# Patient Record
Sex: MALE | Race: WHITE | Employment: OTHER | URBAN - NONMETROPOLITAN AREA
[De-identification: names, ages, dates, MRNs, and addresses within clinical notes are randomized per-mention and may not be internally consistent; named-entity substitution may affect disease eponyms.]

---

## 2017-04-07 ENCOUNTER — APPOINTMENT (OUTPATIENT)
Dept: ULTRASOUND IMAGING | Age: 62
End: 2017-04-07
Payer: MEDICARE

## 2017-04-07 ENCOUNTER — HOSPITAL ENCOUNTER (EMERGENCY)
Age: 62
Discharge: HOME | End: 2017-04-07
Attending: EMERGENCY MEDICINE
Payer: MEDICARE

## 2017-04-07 ENCOUNTER — APPOINTMENT (OUTPATIENT)
Dept: CT IMAGING | Age: 62
End: 2017-04-07
Payer: MEDICARE

## 2017-04-07 VITALS
HEIGHT: 67 IN | DIASTOLIC BLOOD PRESSURE: 78 MMHG | HEART RATE: 60 BPM | SYSTOLIC BLOOD PRESSURE: 125 MMHG | WEIGHT: 145 LBS | BODY MASS INDEX: 22.76 KG/M2 | RESPIRATION RATE: 18 BRPM | TEMPERATURE: 98.7 F | OXYGEN SATURATION: 98 %

## 2017-04-07 DIAGNOSIS — N40.0 BENIGN PROSTATIC HYPERPLASIA WITHOUT LOWER URINARY TRACT SYMPTOMS, UNSPECIFIED MORPHOLOGY: ICD-10-CM

## 2017-04-07 DIAGNOSIS — N50.812 TESTICULAR PAIN, LEFT: Primary | ICD-10-CM

## 2017-04-07 DIAGNOSIS — R19.7 DIARRHEA, UNSPECIFIED TYPE: ICD-10-CM

## 2017-04-07 DIAGNOSIS — Z87.442 HISTORY OF KIDNEY STONES: ICD-10-CM

## 2017-04-07 DIAGNOSIS — R30.0 DYSURIA: ICD-10-CM

## 2017-04-07 DIAGNOSIS — R10.9 FLANK PAIN: ICD-10-CM

## 2017-04-07 LAB
ALBUMIN SERPL-MCNC: 4.1 G/DL (ref 3.5–5.2)
ALP BLD-CCNC: 81 U/L (ref 40–130)
ALT SERPL-CCNC: 10 U/L (ref 5–41)
ANION GAP SERPL CALCULATED.3IONS-SCNC: 11 MMOL/L (ref 7–19)
AST SERPL-CCNC: 11 U/L (ref 5–40)
BASOPHILS ABSOLUTE: 0 K/UL (ref 0–0.2)
BASOPHILS RELATIVE PERCENT: 0.2 % (ref 0–1)
BILIRUB SERPL-MCNC: <0.2 MG/DL (ref 0.2–1.2)
BILIRUBIN URINE: NEGATIVE
BLOOD, URINE: NEGATIVE
BUN BLDV-MCNC: 14 MG/DL (ref 8–23)
CALCIUM SERPL-MCNC: 9.3 MG/DL (ref 8.8–10.2)
CHLORIDE BLD-SCNC: 102 MMOL/L (ref 98–111)
CLARITY: CLEAR
CO2: 27 MMOL/L (ref 22–29)
COLOR: YELLOW
CREAT SERPL-MCNC: 0.9 MG/DL (ref 0.5–1.2)
EOSINOPHILS ABSOLUTE: 0.1 K/UL (ref 0–0.6)
EOSINOPHILS RELATIVE PERCENT: 1.4 % (ref 0–5)
GFR NON-AFRICAN AMERICAN: >60
GLOBULIN: 2.5 G/DL
GLUCOSE BLD-MCNC: 99 MG/DL (ref 74–109)
GLUCOSE URINE: NEGATIVE MG/DL
HCT VFR BLD CALC: 38.6 % (ref 42–52)
HEMOGLOBIN: 13.3 G/DL (ref 14–18)
KETONES, URINE: NEGATIVE MG/DL
LEUKOCYTE ESTERASE, URINE: NEGATIVE
LYMPHOCYTES ABSOLUTE: 1.7 K/UL (ref 1.1–4.5)
LYMPHOCYTES RELATIVE PERCENT: 34.2 % (ref 20–40)
MCH RBC QN AUTO: 29.2 PG (ref 27–31)
MCHC RBC AUTO-ENTMCNC: 34.5 G/DL (ref 33–37)
MCV RBC AUTO: 84.8 FL (ref 80–94)
MONOCYTES ABSOLUTE: 0.5 K/UL (ref 0–0.9)
MONOCYTES RELATIVE PERCENT: 9.8 % (ref 0–10)
NEUTROPHILS ABSOLUTE: 2.7 K/UL (ref 1.5–7.5)
NEUTROPHILS RELATIVE PERCENT: 54.4 % (ref 50–65)
NITRITE, URINE: NEGATIVE
PDW BLD-RTO: 13.9 % (ref 11.5–14.5)
PH UA: 5.5
PLATELET # BLD: 225 K/UL (ref 130–400)
PMV BLD AUTO: 8.9 FL (ref 7.4–10.4)
POTASSIUM SERPL-SCNC: 3.6 MMOL/L (ref 3.5–5)
PROTEIN UA: NEGATIVE MG/DL
RBC # BLD: 4.55 M/UL (ref 4.7–6.1)
SODIUM BLD-SCNC: 140 MMOL/L (ref 136–145)
SPECIFIC GRAVITY UA: 1.02
TOTAL PROTEIN: 6.6 G/DL (ref 6.6–8.7)
UROBILINOGEN, URINE: 0.2 E.U./DL
WBC # BLD: 5 K/UL (ref 4.8–10.8)

## 2017-04-07 PROCEDURE — 99284 EMERGENCY DEPT VISIT MOD MDM: CPT | Performed by: EMERGENCY MEDICINE

## 2017-04-07 PROCEDURE — 99284 EMERGENCY DEPT VISIT MOD MDM: CPT

## 2017-04-07 PROCEDURE — 96375 TX/PRO/DX INJ NEW DRUG ADDON: CPT

## 2017-04-07 PROCEDURE — 96374 THER/PROPH/DIAG INJ IV PUSH: CPT

## 2017-04-07 PROCEDURE — 76870 US EXAM SCROTUM: CPT

## 2017-04-07 PROCEDURE — 81003 URINALYSIS AUTO W/O SCOPE: CPT

## 2017-04-07 PROCEDURE — 2580000003 HC RX 258: Performed by: PHYSICIAN ASSISTANT

## 2017-04-07 PROCEDURE — 85025 COMPLETE CBC W/AUTO DIFF WBC: CPT

## 2017-04-07 PROCEDURE — 96372 THER/PROPH/DIAG INJ SC/IM: CPT

## 2017-04-07 PROCEDURE — 6360000002 HC RX W HCPCS: Performed by: EMERGENCY MEDICINE

## 2017-04-07 PROCEDURE — 80053 COMPREHEN METABOLIC PANEL: CPT

## 2017-04-07 PROCEDURE — 6360000002 HC RX W HCPCS: Performed by: PHYSICIAN ASSISTANT

## 2017-04-07 PROCEDURE — 74150 CT ABDOMEN W/O CONTRAST: CPT

## 2017-04-07 PROCEDURE — 36415 COLL VENOUS BLD VENIPUNCTURE: CPT

## 2017-04-07 PROCEDURE — 96376 TX/PRO/DX INJ SAME DRUG ADON: CPT

## 2017-04-07 RX ORDER — KETOROLAC TROMETHAMINE 30 MG/ML
15 INJECTION, SOLUTION INTRAMUSCULAR; INTRAVENOUS ONCE
Status: COMPLETED | OUTPATIENT
Start: 2017-04-07 | End: 2017-04-07

## 2017-04-07 RX ORDER — OXYCODONE AND ACETAMINOPHEN 7.5; 325 MG/1; MG/1
1 TABLET ORAL EVERY 4 HOURS PRN
COMMUNITY
End: 2021-01-25 | Stop reason: CLARIF

## 2017-04-07 RX ORDER — 0.9 % SODIUM CHLORIDE 0.9 %
1000 INTRAVENOUS SOLUTION INTRAVENOUS ONCE
Status: COMPLETED | OUTPATIENT
Start: 2017-04-07 | End: 2017-04-07

## 2017-04-07 RX ORDER — CIPROFLOXACIN 500 MG/1
500 TABLET, FILM COATED ORAL 2 TIMES DAILY
Qty: 20 TABLET | Refills: 0 | Status: SHIPPED | OUTPATIENT
Start: 2017-04-07 | End: 2017-04-17

## 2017-04-07 RX ORDER — ONDANSETRON 2 MG/ML
4 INJECTION INTRAMUSCULAR; INTRAVENOUS ONCE
Status: COMPLETED | OUTPATIENT
Start: 2017-04-07 | End: 2017-04-07

## 2017-04-07 RX ORDER — CALCIUM CARBONATE 500(1250)
500 TABLET ORAL DAILY
COMMUNITY
End: 2021-12-14

## 2017-04-07 RX ADMIN — KETOROLAC TROMETHAMINE 15 MG: 30 INJECTION, SOLUTION INTRAMUSCULAR at 02:12

## 2017-04-07 RX ADMIN — SODIUM CHLORIDE 1000 ML: 9 INJECTION, SOLUTION INTRAVENOUS at 01:27

## 2017-04-07 RX ADMIN — ONDANSETRON 4 MG: 2 INJECTION INTRAMUSCULAR; INTRAVENOUS at 01:27

## 2017-04-07 RX ADMIN — HYDROMORPHONE HYDROCHLORIDE 1 MG: 1 INJECTION, SOLUTION INTRAMUSCULAR; INTRAVENOUS; SUBCUTANEOUS at 02:37

## 2017-04-07 RX ADMIN — HYDROMORPHONE HYDROCHLORIDE 1 MG: 1 INJECTION, SOLUTION INTRAMUSCULAR; INTRAVENOUS; SUBCUTANEOUS at 02:43

## 2017-04-07 RX ADMIN — HYDROMORPHONE HYDROCHLORIDE 1 MG: 1 INJECTION, SOLUTION INTRAMUSCULAR; INTRAVENOUS; SUBCUTANEOUS at 01:26

## 2017-04-07 RX ADMIN — HYDROMORPHONE HYDROCHLORIDE 2 MG: 1 INJECTION, SOLUTION INTRAMUSCULAR; INTRAVENOUS; SUBCUTANEOUS at 03:35

## 2017-04-07 ASSESSMENT — ENCOUNTER SYMPTOMS
COUGH: 0
WHEEZING: 0
SHORTNESS OF BREATH: 0
VOMITING: 1
CHEST TIGHTNESS: 0
ABDOMINAL PAIN: 0
RHINORRHEA: 0
BACK PAIN: 0
CONSTIPATION: 0
STRIDOR: 0
ABDOMINAL DISTENTION: 0
SORE THROAT: 0
NAUSEA: 1
COLOR CHANGE: 0

## 2017-04-07 ASSESSMENT — PAIN SCALES - GENERAL
PAINLEVEL_OUTOF10: 9
PAINLEVEL_OUTOF10: 9
PAINLEVEL_OUTOF10: 8
PAINLEVEL_OUTOF10: 9
PAINLEVEL_OUTOF10: 8
PAINLEVEL_OUTOF10: 9

## 2017-04-07 ASSESSMENT — PAIN DESCRIPTION - ORIENTATION: ORIENTATION: RIGHT

## 2017-04-07 ASSESSMENT — PAIN DESCRIPTION - LOCATION: LOCATION: FLANK

## 2017-04-16 ENCOUNTER — APPOINTMENT (OUTPATIENT)
Dept: GENERAL RADIOLOGY | Age: 62
End: 2017-04-16
Payer: MEDICARE

## 2017-04-16 ENCOUNTER — HOSPITAL ENCOUNTER (EMERGENCY)
Age: 62
Discharge: HOME | End: 2017-04-17
Payer: MEDICARE

## 2017-04-16 DIAGNOSIS — T20.10XA: Primary | ICD-10-CM

## 2017-04-16 DIAGNOSIS — M79.601 PAIN OF RIGHT UPPER EXTREMITY: ICD-10-CM

## 2017-04-16 PROCEDURE — 90471 IMMUNIZATION ADMIN: CPT | Performed by: PHYSICIAN ASSISTANT

## 2017-04-16 PROCEDURE — 6360000002 HC RX W HCPCS

## 2017-04-16 PROCEDURE — 99282 EMERGENCY DEPT VISIT SF MDM: CPT | Performed by: PHYSICIAN ASSISTANT

## 2017-04-16 PROCEDURE — 6360000002 HC RX W HCPCS: Performed by: PHYSICIAN ASSISTANT

## 2017-04-16 PROCEDURE — 99283 EMERGENCY DEPT VISIT LOW MDM: CPT

## 2017-04-16 PROCEDURE — 2500000003 HC RX 250 WO HCPCS: Performed by: PHYSICIAN ASSISTANT

## 2017-04-16 PROCEDURE — 90715 TDAP VACCINE 7 YRS/> IM: CPT | Performed by: PHYSICIAN ASSISTANT

## 2017-04-16 PROCEDURE — 73060 X-RAY EXAM OF HUMERUS: CPT

## 2017-04-16 PROCEDURE — 6370000000 HC RX 637 (ALT 250 FOR IP): Performed by: PHYSICIAN ASSISTANT

## 2017-04-16 PROCEDURE — 96372 THER/PROPH/DIAG INJ SC/IM: CPT

## 2017-04-16 RX ORDER — BACITRACIN ZINC AND POLYMYXIN B SULFATE 500; 1000 [USP'U]/G; [USP'U]/G
OINTMENT TOPICAL
Qty: 1 TUBE | Refills: 1 | Status: SHIPPED | OUTPATIENT
Start: 2017-04-16 | End: 2017-04-23

## 2017-04-16 RX ORDER — ONDANSETRON 2 MG/ML
INJECTION INTRAMUSCULAR; INTRAVENOUS
Status: COMPLETED
Start: 2017-04-16 | End: 2017-04-16

## 2017-04-16 RX ORDER — TETRACAINE HYDROCHLORIDE 5 MG/ML
1 SOLUTION OPHTHALMIC ONCE
Status: COMPLETED | OUTPATIENT
Start: 2017-04-16 | End: 2017-04-16

## 2017-04-16 RX ADMIN — SILVER SULFADIAZINE: 10 CREAM TOPICAL at 22:14

## 2017-04-16 RX ADMIN — ONDANSETRON 4 MG: 2 INJECTION INTRAMUSCULAR; INTRAVENOUS at 21:08

## 2017-04-16 RX ADMIN — HYDROMORPHONE HYDROCHLORIDE 1 MG: 1 INJECTION, SOLUTION INTRAMUSCULAR; INTRAVENOUS; SUBCUTANEOUS at 21:03

## 2017-04-16 RX ADMIN — TETANUS TOXOID, REDUCED DIPHTHERIA TOXOID AND ACELLULAR PERTUSSIS VACCINE, ADSORBED 0.5 ML: 5; 2.5; 8; 8; 2.5 SUSPENSION INTRAMUSCULAR at 21:08

## 2017-04-16 RX ADMIN — TETRACAINE HYDROCHLORIDE 1 DROP: 5 SOLUTION OPHTHALMIC at 21:09

## 2017-04-16 ASSESSMENT — ENCOUNTER SYMPTOMS
CONSTIPATION: 0
VOMITING: 0
RHINORRHEA: 0
BACK PAIN: 0
COUGH: 0
ABDOMINAL PAIN: 0
CHEST TIGHTNESS: 0
COLOR CHANGE: 0
ABDOMINAL DISTENTION: 0
SORE THROAT: 0
WHEEZING: 0
STRIDOR: 0
NAUSEA: 0
SHORTNESS OF BREATH: 0

## 2017-04-16 ASSESSMENT — PAIN SCALES - GENERAL
PAINLEVEL_OUTOF10: 7
PAINLEVEL_OUTOF10: 7

## 2017-04-17 VITALS
HEART RATE: 60 BPM | TEMPERATURE: 98.6 F | WEIGHT: 142 LBS | RESPIRATION RATE: 18 BRPM | OXYGEN SATURATION: 95 % | DIASTOLIC BLOOD PRESSURE: 76 MMHG | SYSTOLIC BLOOD PRESSURE: 127 MMHG | BODY MASS INDEX: 22.29 KG/M2 | HEIGHT: 67 IN

## 2017-04-17 PROCEDURE — 6360000002 HC RX W HCPCS: Performed by: PHYSICIAN ASSISTANT

## 2017-04-17 RX ADMIN — HYDROMORPHONE HYDROCHLORIDE 1 MG: 1 INJECTION, SOLUTION INTRAMUSCULAR; INTRAVENOUS; SUBCUTANEOUS at 00:00

## 2017-04-17 ASSESSMENT — PAIN SCALES - GENERAL: PAINLEVEL_OUTOF10: 8

## 2017-06-05 ENCOUNTER — TELEPHONE (OUTPATIENT)
Dept: FAMILY MEDICINE CLINIC | Facility: CLINIC | Age: 62
End: 2017-06-05

## 2017-06-05 NOTE — TELEPHONE ENCOUNTER
Says he is having a lot of chest pains all the time. Has no energy, no appetite. Just doesn't feel well. They told him  he could not get in until the 19th. Says something is going on. Needs to be seen sooner

## 2017-06-09 ENCOUNTER — DOCUMENTATION (OUTPATIENT)
Dept: FAMILY MEDICINE CLINIC | Facility: CLINIC | Age: 62
End: 2017-06-09

## 2017-06-09 ENCOUNTER — TELEPHONE (OUTPATIENT)
Dept: FAMILY MEDICINE CLINIC | Facility: CLINIC | Age: 62
End: 2017-06-09

## 2017-06-09 ENCOUNTER — OFFICE VISIT (OUTPATIENT)
Dept: FAMILY MEDICINE CLINIC | Facility: CLINIC | Age: 62
End: 2017-06-09

## 2017-06-09 VITALS
BODY MASS INDEX: 22.13 KG/M2 | SYSTOLIC BLOOD PRESSURE: 110 MMHG | DIASTOLIC BLOOD PRESSURE: 60 MMHG | WEIGHT: 141 LBS | HEIGHT: 67 IN | TEMPERATURE: 97.5 F | HEART RATE: 72 BPM | RESPIRATION RATE: 16 BRPM

## 2017-06-09 DIAGNOSIS — R19.7 DIARRHEA, UNSPECIFIED TYPE: Primary | ICD-10-CM

## 2017-06-09 DIAGNOSIS — F41.9 ANXIETY: ICD-10-CM

## 2017-06-09 DIAGNOSIS — Z86.39 PERSONAL HISTORY OF OTHER ENDOCRINE, NUTRITIONAL AND METABOLIC DISEASE: ICD-10-CM

## 2017-06-09 DIAGNOSIS — G62.9 NEUROPATHY: ICD-10-CM

## 2017-06-09 DIAGNOSIS — G89.21 CHRONIC PAIN DUE TO INJURY: ICD-10-CM

## 2017-06-09 PROCEDURE — 99214 OFFICE O/P EST MOD 30 MIN: CPT | Performed by: FAMILY MEDICINE

## 2017-06-09 RX ORDER — OXYCODONE AND ACETAMINOPHEN 7.5; 325 MG/1; MG/1
TABLET ORAL
COMMUNITY
Start: 2017-05-23 | End: 2017-06-09 | Stop reason: SDUPTHER

## 2017-06-09 RX ORDER — METRONIDAZOLE 250 MG/1
250 TABLET ORAL 3 TIMES DAILY
Qty: 21 TABLET | Refills: 0 | Status: SHIPPED | OUTPATIENT
Start: 2017-06-09 | End: 2017-07-07

## 2017-06-09 RX ORDER — GABAPENTIN 300 MG/1
CAPSULE ORAL 2 TIMES DAILY
COMMUNITY
Start: 2017-04-10 | End: 2018-01-10

## 2017-06-09 RX ORDER — NITROGLYCERIN 0.4 MG/1
0.4 TABLET SUBLINGUAL
Qty: 30 TABLET | Refills: 12 | Status: SHIPPED | OUTPATIENT
Start: 2017-06-09 | End: 2017-09-12 | Stop reason: SDUPTHER

## 2017-06-09 RX ORDER — LORAZEPAM 0.5 MG/1
0.5 TABLET ORAL 3 TIMES DAILY PRN
Qty: 90 TABLET | Refills: 0 | Status: SHIPPED | OUTPATIENT
Start: 2017-06-09 | End: 2023-01-12

## 2017-06-09 RX ORDER — OXYCODONE AND ACETAMINOPHEN 7.5; 325 MG/1; MG/1
1 TABLET ORAL EVERY 6 HOURS PRN
Qty: 56 TABLET | Refills: 0 | Status: SHIPPED | OUTPATIENT
Start: 2017-06-09 | End: 2017-10-09

## 2017-06-09 RX ORDER — LORAZEPAM 0.5 MG/1
TABLET ORAL
Refills: 1 | COMMUNITY
Start: 2017-05-30 | End: 2017-06-09 | Stop reason: SDUPTHER
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
## 2017-06-10 LAB
ALBUMIN SERPL-MCNC: 4.3 G/DL (ref 3.5–5)
ALBUMIN/GLOB SERPL: 1.4 G/DL (ref 1.1–2.5)
ALP SERPL-CCNC: 81 U/L (ref 24–120)
ALT SERPL-CCNC: 17 U/L (ref 0–54)
AST SERPL-CCNC: 17 U/L (ref 7–45)
BASOPHILS # BLD AUTO: 0.01 10*3/MM3 (ref 0–0.2)
BASOPHILS NFR BLD AUTO: 0.2 % (ref 0–2)
BILIRUB SERPL-MCNC: 0.5 MG/DL (ref 0.1–1)
BUN SERPL-MCNC: 17 MG/DL (ref 5–21)
BUN/CREAT SERPL: 14.2 (ref 7–25)
CALCIUM SERPL-MCNC: 9.3 MG/DL (ref 8.4–10.4)
CHLORIDE SERPL-SCNC: 99 MMOL/L (ref 98–110)
CO2 SERPL-SCNC: 29 MMOL/L (ref 24–31)
CREAT SERPL-MCNC: 1.2 MG/DL (ref 0.5–1.4)
EOSINOPHIL # BLD AUTO: 0.04 10*3/MM3 (ref 0–0.7)
EOSINOPHIL NFR BLD AUTO: 0.7 % (ref 0–4)
ERYTHROCYTE [DISTWIDTH] IN BLOOD BY AUTOMATED COUNT: 14.6 % (ref 12–15)
GLOBULIN SER CALC-MCNC: 3 GM/DL
GLUCOSE SERPL-MCNC: 75 MG/DL (ref 70–100)
HBA1C MFR BLD: 5.5 %
HCT VFR BLD AUTO: 40.6 % (ref 40–52)
HGB BLD-MCNC: 13.4 G/DL (ref 14–18)
IMM GRANULOCYTES # BLD: 0.02 10*3/MM3 (ref 0–0.03)
IMM GRANULOCYTES NFR BLD: 0.4 % (ref 0–5)
LYMPHOCYTES # BLD AUTO: 1.54 10*3/MM3 (ref 0.72–4.86)
LYMPHOCYTES NFR BLD AUTO: 28.6 % (ref 15–45)
MCH RBC QN AUTO: 28.8 PG (ref 28–32)
MCHC RBC AUTO-ENTMCNC: 33 G/DL (ref 33–36)
MCV RBC AUTO: 87.3 FL (ref 82–95)
MONOCYTES # BLD AUTO: 0.51 10*3/MM3 (ref 0.19–1.3)
MONOCYTES NFR BLD AUTO: 9.5 % (ref 4–12)
NEUTROPHILS # BLD AUTO: 3.27 10*3/MM3 (ref 1.87–8.4)
NEUTROPHILS NFR BLD AUTO: 60.6 % (ref 39–78)
PLATELET # BLD AUTO: 269 10*3/MM3 (ref 130–400)
POTASSIUM SERPL-SCNC: 4.3 MMOL/L (ref 3.5–5.3)
PROT SERPL-MCNC: 7.3 G/DL (ref 6.3–8.7)
RBC # BLD AUTO: 4.65 10*6/MM3 (ref 4.8–5.9)
SODIUM SERPL-SCNC: 141 MMOL/L (ref 135–145)
VIT B12 SERPL-MCNC: 361 PG/ML (ref 239–931)
WBC # BLD AUTO: 5.39 10*3/MM3 (ref 4.8–10.8)

## 2017-06-13 DIAGNOSIS — G62.9 NEUROPATHY: Primary | ICD-10-CM

## 2017-07-07 ENCOUNTER — OFFICE VISIT (OUTPATIENT)
Dept: FAMILY MEDICINE CLINIC | Facility: CLINIC | Age: 62
End: 2017-07-07

## 2017-07-07 VITALS
RESPIRATION RATE: 16 BRPM | HEIGHT: 67 IN | OXYGEN SATURATION: 97 % | SYSTOLIC BLOOD PRESSURE: 104 MMHG | DIASTOLIC BLOOD PRESSURE: 70 MMHG | HEART RATE: 68 BPM | BODY MASS INDEX: 21.5 KG/M2 | WEIGHT: 137 LBS

## 2017-07-07 DIAGNOSIS — R19.7 DIARRHEA, UNSPECIFIED TYPE: ICD-10-CM

## 2017-07-07 DIAGNOSIS — G62.9 NEUROPATHY: Primary | ICD-10-CM

## 2017-07-07 PROCEDURE — 99213 OFFICE O/P EST LOW 20 MIN: CPT | Performed by: FAMILY MEDICINE

## 2017-07-07 RX ORDER — RANOLAZINE 1000 MG/1
TABLET, FILM COATED, EXTENDED RELEASE ORAL
COMMUNITY
Start: 2017-06-29 | End: 2018-12-21

## 2017-07-07 NOTE — PROGRESS NOTES
Subjective   Junito Phelan is a 62 y.o. male.     Chief Complaint   Patient presents with   • Follow-up     4 wk        History of Present Illness     he notes his neuropathic pain is persistent =--his [place] cardiolgist is making appt to see neurologist ..still with burning in hands and feet  His recent diarrhea has resolved --he says he sent stool specimens to [facility] lab    Current Outpatient Prescriptions:   •  amLODIPine (NORVASC) 10 MG tablet, Take 10 mg by mouth daily.  , Disp: , Rfl:   •  aspirin 325 MG tablet, Take 325 mg by mouth daily, Disp: , Rfl:   •  clopidogrel (PLAVIX) 75 MG tablet, Take 75 mg by mouth daily.  , Disp: , Rfl:   •  Coenzyme Q10 50 MG tablet dispersible, Take by mouth daily , Disp: , Rfl:   •  gabapentin (NEURONTIN) 300 MG capsule, 2 (Two) Times a Day., Disp: , Rfl:   •  hydrochlorothiazide (HYDRODIURIL) 12.5 MG tablet, Take 12.5 mg by mouth daily Indications: as needed, Disp: , Rfl:   •  isosorbide mononitrate (IMDUR) 120 MG 24 hr tablet, Take 120 mg by mouth daily, Disp: , Rfl:   •  LORazepam (ATIVAN) 0.5 MG tablet, Take 1 tablet by mouth 3 (Three) Times a Day As Needed for Anxiety., Disp: 90 tablet, Rfl: 0  •  metoprolol tartrate (LOPRESSOR) 25 MG tablet, Take 25 mg by mouth 2 times daily Indications: Pt unaware of dosage, Disp: , Rfl:   •  nitroglycerin (NITROSTAT) 0.4 MG SL tablet, Place 1 tablet under the tongue Every 5 (Five) Minutes As Needed for Chest Pain. Take no more than 3 doses in 15 minutes., Disp: 30 tablet, Rfl: 12  •  oxyCODONE-acetaminophen (PERCOCET) 7.5-325 MG per tablet, Take 1 tablet by mouth Every 6 (Six) Hours As Needed for Moderate Pain (4-6)., Disp: 56 tablet, Rfl: 0  •  RANEXA 1000 MG 12 hr tablet, , Disp: , Rfl:   •  rosuvastatin (CRESTOR) 40 MG tablet, Take 40 mg by mouth every evening, Disp: , Rfl:   •  tamsulosin (FLOMAX) 0.4 MG capsule 24 hr capsule, Take 1 capsule by mouth daily, Disp: , Rfl:   Allergies   Allergen Reactions   • Morphine    •  "Penicillins    • Phenergan [Promethazine Hcl]    • Propoxyphene        Past Medical History:   Diagnosis Date   • Hyperlipidemia    • Hypertension      Past Surgical History:   Procedure Laterality Date   • CHOLECYSTECTOMY     • COLONOSCOPY     • CORONARY STENT PLACEMENT         Review of Systems   Constitutional: Negative.    HENT: Negative.    Eyes: Negative.    Respiratory: Negative.    Cardiovascular: Negative.    Gastrointestinal: Negative.    Endocrine: Negative.    Genitourinary: Negative.    Musculoskeletal: Negative.    Skin: Negative.    Allergic/Immunologic: Negative.    Neurological: Positive for numbness.   Hematological: Negative.    Psychiatric/Behavioral: Negative.        Objective  /70  Pulse 68  Resp 16  Ht 67\" (170.2 cm)  Wt 137 lb (62.1 kg)  SpO2 97%  BMI 21.46 kg/m2  Physical Exam   Constitutional: He is oriented to person, place, and time. He appears well-developed and well-nourished.   HENT:   Head: Normocephalic and atraumatic.   Right Ear: External ear normal.   Left Ear: External ear normal.   Nose: Nose normal.   Mouth/Throat: Oropharynx is clear and moist.   Eyes: Conjunctivae and EOM are normal. Pupils are equal, round, and reactive to light.   Neck: Normal range of motion. Neck supple.   Cardiovascular: Normal rate, regular rhythm, normal heart sounds and intact distal pulses.    Pulmonary/Chest: Effort normal and breath sounds normal.   Abdominal: Soft. Bowel sounds are normal.   Musculoskeletal: Normal range of motion.   Neurological: He is alert and oriented to person, place, and time. He has normal reflexes.   Skin: Skin is warm and dry.   Psychiatric: He has a normal mood and affect. His behavior is normal. Judgment and thought content normal.   Nursing note and vitals reviewed.      Assessment/Plan   Junito was seen today for follow-up.    Diagnoses and all orders for this visit:    Neuropathy    Diarrhea, unspecified type                 No orders of the defined types " were placed in this encounter.      Follow up: 4 month(s)

## 2017-09-12 ENCOUNTER — OFFICE VISIT (OUTPATIENT)
Dept: FAMILY MEDICINE CLINIC | Facility: CLINIC | Age: 62
End: 2017-09-12

## 2017-09-12 VITALS
OXYGEN SATURATION: 97 % | WEIGHT: 140 LBS | RESPIRATION RATE: 16 BRPM | HEART RATE: 68 BPM | DIASTOLIC BLOOD PRESSURE: 70 MMHG | HEIGHT: 67 IN | BODY MASS INDEX: 21.97 KG/M2 | SYSTOLIC BLOOD PRESSURE: 110 MMHG

## 2017-09-12 DIAGNOSIS — J31.0 RHINITIS, UNSPECIFIED TYPE: ICD-10-CM

## 2017-09-12 DIAGNOSIS — I10 ESSENTIAL HYPERTENSION: ICD-10-CM

## 2017-09-12 DIAGNOSIS — G89.21 CHRONIC PAIN DUE TO INJURY: Primary | ICD-10-CM

## 2017-09-12 DIAGNOSIS — I25.10 CORONARY ARTERY DISEASE INVOLVING NATIVE CORONARY ARTERY OF NATIVE HEART WITHOUT ANGINA PECTORIS: ICD-10-CM

## 2017-09-12 PROCEDURE — 99214 OFFICE O/P EST MOD 30 MIN: CPT | Performed by: FAMILY MEDICINE

## 2017-09-12 RX ORDER — METHYLPREDNISOLONE 4 MG/1
TABLET ORAL
Qty: 21 TABLET | Refills: 0 | Status: SHIPPED | OUTPATIENT
Start: 2017-09-12 | End: 2017-10-09

## 2017-09-12 RX ORDER — NITROGLYCERIN 0.4 MG/1
0.4 TABLET SUBLINGUAL
Qty: 30 TABLET | Refills: 12 | Status: SHIPPED | OUTPATIENT
Start: 2017-09-12 | End: 2017-09-13 | Stop reason: SDUPTHER
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
## 2017-09-13 RX ORDER — NITROGLYCERIN 0.4 MG/1
0.4 TABLET SUBLINGUAL
Qty: 30 TABLET | Refills: 12 | Status: SHIPPED | OUTPATIENT
Start: 2017-09-13 | End: 2019-12-05 | Stop reason: SDUPTHER

## 2017-10-09 ENCOUNTER — OFFICE VISIT (OUTPATIENT)
Dept: FAMILY MEDICINE CLINIC | Facility: CLINIC | Age: 62
End: 2017-10-09

## 2017-10-09 VITALS
DIASTOLIC BLOOD PRESSURE: 72 MMHG | RESPIRATION RATE: 16 BRPM | HEIGHT: 67 IN | WEIGHT: 140 LBS | HEART RATE: 61 BPM | OXYGEN SATURATION: 96 % | BODY MASS INDEX: 21.97 KG/M2 | SYSTOLIC BLOOD PRESSURE: 108 MMHG

## 2017-10-09 DIAGNOSIS — H00.015 HORDEOLUM EXTERNUM OF LEFT LOWER EYELID: Primary | ICD-10-CM

## 2017-10-09 DIAGNOSIS — Z23 FLU VACCINE NEED: ICD-10-CM

## 2017-10-09 PROCEDURE — G0008 ADMIN INFLUENZA VIRUS VAC: HCPCS | Performed by: FAMILY MEDICINE

## 2017-10-09 PROCEDURE — 99213 OFFICE O/P EST LOW 20 MIN: CPT | Performed by: FAMILY MEDICINE

## 2017-10-09 PROCEDURE — 90686 IIV4 VACC NO PRSV 0.5 ML IM: CPT | Performed by: FAMILY MEDICINE

## 2017-10-09 RX ORDER — GENTAMICIN SULFATE 3 MG/ML
1 SOLUTION/ DROPS OPHTHALMIC 3 TIMES DAILY
Qty: 5 ML | Refills: 0 | Status: SHIPPED | OUTPATIENT
Start: 2017-10-09 | End: 2018-01-10

## 2017-10-09 RX ORDER — GENTAMICIN SULFATE 3 MG/ML
1 SOLUTION/ DROPS OPHTHALMIC 3 TIMES DAILY
Qty: 5 ML | Refills: 0 | Status: SHIPPED | OUTPATIENT
Start: 2017-10-09 | End: 2017-10-09 | Stop reason: SDUPTHER

## 2017-11-20 ENCOUNTER — TELEPHONE (OUTPATIENT)
Dept: FAMILY MEDICINE CLINIC | Facility: CLINIC | Age: 62
End: 2017-11-20

## 2017-11-20 RX ORDER — SCOLOPAMINE TRANSDERMAL SYSTEM 1 MG/1
PATCH, EXTENDED RELEASE TRANSDERMAL
Qty: 10 PATCH | Refills: 0 | Status: SHIPPED | OUTPATIENT
Start: 2017-11-20 | End: 2018-01-10

## 2017-11-20 NOTE — TELEPHONE ENCOUNTER
Amanda called & req something for motion sickness because they are going on a cruise.   [phone]

## 2017-12-19 ENCOUNTER — LAB (OUTPATIENT)
Dept: FAMILY MEDICINE CLINIC | Facility: CLINIC | Age: 62
End: 2017-12-19

## 2017-12-19 DIAGNOSIS — E78.5 HYPERLIPIDEMIA, UNSPECIFIED HYPERLIPIDEMIA TYPE: Primary | ICD-10-CM

## 2017-12-19 LAB
CHOLEST SERPL-MCNC: 113 MG/DL (ref 130–200)
HDLC SERPL-MCNC: 69 MG/DL
LDLC SERPL CALC-MCNC: 21 MG/DL (ref 0–99)
TRIGL SERPL-MCNC: 115 MG/DL (ref 0–149)
VLDLC SERPL CALC-MCNC: 23 MG/DL

## 2017-12-19 NOTE — PROGRESS NOTES
Pt informed Lipids are excellent. He said to let us know that his Heart in [place] put him on Repatha 2 mos ago

## 2017-12-28 ENCOUNTER — TELEPHONE (OUTPATIENT)
Dept: FAMILY MEDICINE CLINIC | Facility: CLINIC | Age: 62
End: 2017-12-28

## 2017-12-28 RX ORDER — OSELTAMIVIR PHOSPHATE 75 MG/1
75 CAPSULE ORAL 2 TIMES DAILY
Qty: 10 CAPSULE | Refills: 0 | Status: SHIPPED | OUTPATIENT
Start: 2017-12-28 | End: 2018-01-02

## 2017-12-28 NOTE — TELEPHONE ENCOUNTER
Pt called he has sore throat head congestion body aches fever he wants to know if u will call in meds or see him? [phone]

## 2018-01-10 ENCOUNTER — OFFICE VISIT (OUTPATIENT)
Dept: FAMILY MEDICINE CLINIC | Facility: CLINIC | Age: 63
End: 2018-01-10

## 2018-01-10 VITALS
SYSTOLIC BLOOD PRESSURE: 124 MMHG | HEIGHT: 67 IN | BODY MASS INDEX: 21.03 KG/M2 | HEART RATE: 90 BPM | DIASTOLIC BLOOD PRESSURE: 68 MMHG | OXYGEN SATURATION: 98 % | WEIGHT: 134 LBS | RESPIRATION RATE: 16 BRPM | TEMPERATURE: 98.4 F

## 2018-01-10 DIAGNOSIS — I10 ESSENTIAL HYPERTENSION: ICD-10-CM

## 2018-01-10 DIAGNOSIS — G89.21 CHRONIC PAIN DUE TO INJURY: ICD-10-CM

## 2018-01-10 DIAGNOSIS — I25.10 CORONARY ARTERY DISEASE INVOLVING NATIVE CORONARY ARTERY OF NATIVE HEART WITHOUT ANGINA PECTORIS: Primary | ICD-10-CM

## 2018-01-10 DIAGNOSIS — E78.2 MIXED HYPERLIPIDEMIA: ICD-10-CM

## 2018-01-10 PROCEDURE — 99214 OFFICE O/P EST MOD 30 MIN: CPT | Performed by: FAMILY MEDICINE

## 2018-01-10 RX ORDER — EVOLOCUMAB 420 MG/3.5
KIT SUBCUTANEOUS
COMMUNITY
Start: 2018-01-09

## 2018-01-10 RX ORDER — TIZANIDINE 4 MG/1
TABLET ORAL
COMMUNITY
Start: 2017-11-17 | End: 2018-07-02

## 2018-01-10 NOTE — PROGRESS NOTES
Subjective   Junito Phelan is a 62 y.o. male.     Chief Complaint   Patient presents with   • Follow-up        History of Present Illness     he is seeing his doctor at [facility] for cad...he nots bp is stable witthout cp or ha..he will see pain manamgent on jan 22nd ..tolerating Repatha nicely...      Current Outpatient Prescriptions:   •  amLODIPine (NORVASC) 10 MG tablet, Take 10 mg by mouth daily.  , Disp: , Rfl:   •  aspirin 325 MG tablet, Take 325 mg by mouth daily, Disp: , Rfl:   •  clopidogrel (PLAVIX) 75 MG tablet, Take 75 mg by mouth daily.  , Disp: , Rfl:   •  Coenzyme Q10 50 MG tablet dispersible, Take by mouth daily , Disp: , Rfl:   •  hydrochlorothiazide (HYDRODIURIL) 12.5 MG tablet, Take 12.5 mg by mouth daily Indications: as needed, Disp: , Rfl:   •  isosorbide mononitrate (IMDUR) 120 MG 24 hr tablet, Take 120 mg by mouth daily, Disp: , Rfl:   •  LORazepam (ATIVAN) 0.5 MG tablet, Take 1 tablet by mouth 3 (Three) Times a Day As Needed for Anxiety., Disp: 90 tablet, Rfl: 0  •  metoprolol tartrate (LOPRESSOR) 25 MG tablet, Take 25 mg by mouth 2 times daily Indications: Pt unaware of dosage, Disp: , Rfl:   •  nitroglycerin (NITROSTAT) 0.4 MG SL tablet, Place 1 tablet under the tongue Every 5 (Five) Minutes As Needed for Chest Pain. Take no more than 3 doses in 15 minutes., Disp: 30 tablet, Rfl: 12  •  RANEXA 1000 MG 12 hr tablet, , Disp: , Rfl:   •  REPATHA PUSHTRONEX SYSTEM 420 MG/3.5ML solution cartridge, , Disp: , Rfl:   •  tiZANidine (ZANAFLEX) 4 MG tablet, , Disp: , Rfl:   Allergies   Allergen Reactions   • Morphine    • Penicillins    • Phenergan [Promethazine Hcl]    • Propoxyphene        Past Medical History:   Diagnosis Date   • Hyperlipidemia    • Hypertension      Past Surgical History:   Procedure Laterality Date   • CHOLECYSTECTOMY     • COLONOSCOPY     • CORONARY STENT PLACEMENT         Review of Systems   Constitutional: Negative.    HENT: Negative.    Eyes: Negative.    Respiratory:  "Negative.    Cardiovascular: Positive for chest pain.   Gastrointestinal: Negative.    Endocrine: Negative.    Genitourinary: Negative.    Musculoskeletal: Positive for arthralgias and back pain.   Skin: Negative.    Allergic/Immunologic: Negative.    Neurological: Negative.    Hematological: Negative.    Psychiatric/Behavioral: The patient is nervous/anxious.        Objective  /68  Pulse 90  Temp 98.4 °F (36.9 °C)  Resp 16  Ht 170.2 cm (67.01\")  Wt 60.8 kg (134 lb)  SpO2 98%  BMI 20.98 kg/m2  Physical Exam   Constitutional: He appears well-developed and well-nourished.   HENT:   Head: Normocephalic.   Right Ear: External ear normal.   Left Ear: External ear normal.   Nose: Nose normal.   Mouth/Throat: Oropharynx is clear and moist.   Eyes: Conjunctivae and EOM are normal. Pupils are equal, round, and reactive to light.   Neck: Normal range of motion. Neck supple.   Cardiovascular: Normal rate, regular rhythm, normal heart sounds and intact distal pulses.    Pulmonary/Chest: Effort normal and breath sounds normal.   Abdominal: Soft. Bowel sounds are normal.   Musculoskeletal: Normal range of motion.   Neurological: He is alert. He has normal reflexes.   Skin: Skin is warm.   Psychiatric: He has a normal mood and affect. His behavior is normal. Judgment and thought content normal.   Nursing note and vitals reviewed.      Assessment/Plan   uJnito was seen today for follow-up.    Diagnoses and all orders for this visit:    Coronary artery disease involving native coronary artery of native heart without angina pectoris    Essential hypertension    Mixed hyperlipidemia    Chronic pain due to injury                 No orders of the defined types were placed in this encounter.      Follow up: 6 month(s)  "

## 2018-03-16 ENCOUNTER — TELEPHONE (OUTPATIENT)
Dept: FAMILY MEDICINE CLINIC | Facility: CLINIC | Age: 63
End: 2018-03-16

## 2018-03-16 RX ORDER — METHYLPREDNISOLONE 4 MG/1
TABLET ORAL
Qty: 21 TABLET | Refills: 0 | Status: SHIPPED | OUTPATIENT
Start: 2018-03-16 | End: 2018-07-02

## 2018-03-16 NOTE — TELEPHONE ENCOUNTER
Pt called he has coughing sneezing clear nasal drainge and some nausea no fever he asked if u will call in meds  hp

## 2018-07-02 ENCOUNTER — OFFICE VISIT (OUTPATIENT)
Dept: FAMILY MEDICINE CLINIC | Facility: CLINIC | Age: 63
End: 2018-07-02

## 2018-07-02 VITALS
RESPIRATION RATE: 16 BRPM | WEIGHT: 128 LBS | BODY MASS INDEX: 20.09 KG/M2 | HEIGHT: 67 IN | OXYGEN SATURATION: 98 % | SYSTOLIC BLOOD PRESSURE: 114 MMHG | DIASTOLIC BLOOD PRESSURE: 64 MMHG | HEART RATE: 59 BPM

## 2018-07-02 DIAGNOSIS — R31.9 HEMATURIA, UNSPECIFIED TYPE: ICD-10-CM

## 2018-07-02 DIAGNOSIS — R10.9 FLANK PAIN: Primary | ICD-10-CM

## 2018-07-02 PROCEDURE — 99214 OFFICE O/P EST MOD 30 MIN: CPT | Performed by: FAMILY MEDICINE

## 2018-07-02 RX ORDER — OXYCODONE AND ACETAMINOPHEN 10; 325 MG/1; MG/1
TABLET ORAL
Refills: 0 | COMMUNITY
Start: 2018-06-17 | End: 2018-12-21

## 2018-07-02 RX ORDER — CIPROFLOXACIN 500 MG/1
500 TABLET, FILM COATED ORAL EVERY 12 HOURS SCHEDULED
Qty: 20 TABLET | Refills: 0 | Status: SHIPPED | OUTPATIENT
Start: 2018-07-02 | End: 2018-09-12

## 2018-07-02 NOTE — PROGRESS NOTES
Subjective   Junito Phelan is a 63 y.o. male.     Chief Complaint   Patient presents with   • Flank Pain     right side flank pain, hemeturia, dysuria        History of Present Illness     he is having hematuria wiht right flank pain --denie fever or chills      Current Outpatient Prescriptions:   •  amLODIPine (NORVASC) 10 MG tablet, Take 10 mg by mouth daily.  , Disp: , Rfl:   •  aspirin 325 MG tablet, Take 325 mg by mouth daily, Disp: , Rfl:   •  clopidogrel (PLAVIX) 75 MG tablet, Take 75 mg by mouth daily.  , Disp: , Rfl:   •  Coenzyme Q10 50 MG tablet dispersible, Take by mouth daily , Disp: , Rfl:   •  hydrochlorothiazide (HYDRODIURIL) 12.5 MG tablet, Take 12.5 mg by mouth daily Indications: as needed, Disp: , Rfl:   •  isosorbide mononitrate (IMDUR) 120 MG 24 hr tablet, Take 120 mg by mouth daily, Disp: , Rfl:   •  LORazepam (ATIVAN) 0.5 MG tablet, Take 1 tablet by mouth 3 (Three) Times a Day As Needed for Anxiety., Disp: 90 tablet, Rfl: 0  •  metoprolol tartrate (LOPRESSOR) 25 MG tablet, Take 25 mg by mouth 2 times daily Indications: Pt unaware of dosage, Disp: , Rfl:   •  nitroglycerin (NITROSTAT) 0.4 MG SL tablet, Place 1 tablet under the tongue Every 5 (Five) Minutes As Needed for Chest Pain. Take no more than 3 doses in 15 minutes., Disp: 30 tablet, Rfl: 12  •  oxyCODONE-acetaminophen (PERCOCET)  MG per tablet, TK 1 T PO  Q 6 H, Disp: , Rfl: 0  •  RANEXA 1000 MG 12 hr tablet, , Disp: , Rfl:   •  REPATHA PUSHTRONEX SYSTEM 420 MG/3.5ML solution cartridge, , Disp: , Rfl:   Allergies   Allergen Reactions   • Morphine    • Penicillins    • Phenergan [Promethazine Hcl]    • Propoxyphene        Past Medical History:   Diagnosis Date   • Hyperlipidemia    • Hypertension      Past Surgical History:   Procedure Laterality Date   • CHOLECYSTECTOMY     • COLONOSCOPY     • CORONARY STENT PLACEMENT         Review of Systems   Constitutional: Negative.    HENT: Negative.    Eyes: Negative.    Respiratory:  "Negative.    Cardiovascular: Negative.    Gastrointestinal: Negative.    Endocrine: Negative.    Genitourinary: Positive for flank pain and hematuria.   Skin: Negative.    Allergic/Immunologic: Negative.    Neurological: Negative.    Hematological: Negative.    Psychiatric/Behavioral: Negative.        Objective  /64   Pulse 59   Resp 16   Ht 170.2 cm (67\")   Wt 58.1 kg (128 lb)   SpO2 98%   BMI 20.05 kg/m²   Physical Exam   Constitutional: He is oriented to person, place, and time. He appears well-developed and well-nourished.   HENT:   Head: Normocephalic and atraumatic.   Right Ear: External ear normal.   Left Ear: External ear normal.   Nose: Nose normal.   Mouth/Throat: Oropharynx is clear and moist.   Eyes: Conjunctivae and EOM are normal. Pupils are equal, round, and reactive to light.   Neck: Normal range of motion. Neck supple.   Cardiovascular: Normal rate, regular rhythm, normal heart sounds and intact distal pulses.    Pulmonary/Chest: Effort normal and breath sounds normal.   Abdominal: Soft. Bowel sounds are normal.   Musculoskeletal: Normal range of motion.   Neurological: He is alert and oriented to person, place, and time.   Skin: Skin is warm. Capillary refill takes less than 2 seconds.   Psychiatric: He has a normal mood and affect. His behavior is normal. Judgment and thought content normal.   Vitals reviewed.      Assessment/Plan   Junito was seen today for flank pain.    Diagnoses and all orders for this visit:    Flank pain  -     Ambulatory Referral to Urology  -     Urinalysis without microscopic (no culture) - Urine, Clean Catch  -     Urine Culture - Urine, Urine, Clean Catch    Hematuria, unspecified type  -     Ambulatory Referral to Urology  -     Urinalysis without microscopic (no culture) - Urine, Clean Catch  -     Urine Culture - Urine, Urine, Clean Catch    Other orders  -     ciprofloxacin (CIPRO) 500 MG tablet; Take 1 tablet by mouth Every 12 (Twelve) Hours.             "     No orders of the defined types were placed in this encounter.      Follow up: 3 month(s)

## 2018-07-04 LAB
BACTERIA UR CULT: NO GROWTH
BACTERIA UR CULT: NORMAL

## 2018-07-17 ENCOUNTER — OFFICE VISIT (OUTPATIENT)
Dept: UROLOGY | Age: 63
End: 2018-07-17
Payer: MEDICARE

## 2018-07-17 VITALS
TEMPERATURE: 97.9 F | SYSTOLIC BLOOD PRESSURE: 98 MMHG | WEIGHT: 135 LBS | DIASTOLIC BLOOD PRESSURE: 71 MMHG | BODY MASS INDEX: 21.19 KG/M2 | HEIGHT: 67 IN | OXYGEN SATURATION: 99 % | HEART RATE: 62 BPM

## 2018-07-17 DIAGNOSIS — R35.1 NOCTURIA: ICD-10-CM

## 2018-07-17 DIAGNOSIS — R31.0 GROSS HEMATURIA: ICD-10-CM

## 2018-07-17 DIAGNOSIS — R10.9 RIGHT FLANK PAIN: Primary | ICD-10-CM

## 2018-07-17 LAB
BILIRUBIN, POC: NORMAL
BLOOD URINE, POC: NORMAL
CLARITY, POC: NORMAL
COLOR, POC: NORMAL
GLUCOSE URINE, POC: NORMAL
KETONES, POC: NORMAL
LEUKOCYTE EST, POC: NORMAL
NITRITE, POC: NORMAL
PH, POC: 5
PROTEIN, POC: NORMAL
SPECIFIC GRAVITY, POC: 1.03
UROBILINOGEN, POC: 0.2

## 2018-07-17 PROCEDURE — 3017F COLORECTAL CA SCREEN DOC REV: CPT | Performed by: PHYSICIAN ASSISTANT

## 2018-07-17 PROCEDURE — G8427 DOCREV CUR MEDS BY ELIG CLIN: HCPCS | Performed by: PHYSICIAN ASSISTANT

## 2018-07-17 PROCEDURE — 51798 US URINE CAPACITY MEASURE: CPT | Performed by: PHYSICIAN ASSISTANT

## 2018-07-17 PROCEDURE — 81003 URINALYSIS AUTO W/O SCOPE: CPT | Performed by: PHYSICIAN ASSISTANT

## 2018-07-17 PROCEDURE — G8598 ASA/ANTIPLAT THER USED: HCPCS | Performed by: PHYSICIAN ASSISTANT

## 2018-07-17 PROCEDURE — G8420 CALC BMI NORM PARAMETERS: HCPCS | Performed by: PHYSICIAN ASSISTANT

## 2018-07-17 PROCEDURE — 1036F TOBACCO NON-USER: CPT | Performed by: PHYSICIAN ASSISTANT

## 2018-07-17 PROCEDURE — 99214 OFFICE O/P EST MOD 30 MIN: CPT | Performed by: PHYSICIAN ASSISTANT

## 2018-07-17 RX ORDER — LORAZEPAM 0.5 MG/1
TABLET ORAL
Refills: 4 | COMMUNITY
Start: 2018-07-03 | End: 2018-07-17 | Stop reason: DRUGHIGH

## 2018-07-17 ASSESSMENT — ENCOUNTER SYMPTOMS
WHEEZING: 0
EYE DISCHARGE: 0
NAUSEA: 0
EYE REDNESS: 0
SHORTNESS OF BREATH: 0
HEARTBURN: 0

## 2018-07-17 NOTE — PROGRESS NOTES
Peggy Johnson is a 61 y.o. male who presents today   Chief Complaint   Patient presents with    Follow-up     I am here today because of hematuria and flank pain. I am not seeing the blood in my urine now however I did notice it for at least three days in a row.  Flank Pain    Hematuria     HPI: Flank pain and hematuria:  Patient is a 78-year-old gentleman we have not seen for 2 years who was referred by Dr. Ronan Comer for flank pain on the right side and episode or hematuria which lasted 3 days. Patient was placed on antibiotic and his symptoms have resolved however his main complaint today was nocturia getting up every hour. He has a history of kidney stones patient did not get any imaging studies. His urine is now clear both grossly and microscopically. Past Medical History:   Diagnosis Date    CAD (coronary artery disease) 2010    Native Vessel. S/p stenting wtih negative cardiac cath on January 18, 2010 and negative nuclear stress test on June 29, 2010.  Chest pain 1/28/2016    DJD (degenerative joint disease)     GERD (gastroesophageal reflux disease) 2010    HTN (hypertension)     Hyperlipidemia     Left knee pain 1/28/2016    Pacemaker 2010    [employer]    Polyarticular arthritis     Renal calculi 2010    S/p lithrotripsy    Right arm pain 1/28/2016    Right ureteral stone 7/8/2016    Tobacco abuse 2010    Prior. Past Surgical History:   Procedure Laterality Date    CARDIAC CATHETERIZATION  01/08/2010    EF is estimated to be 60%. See scanned document.  CARDIAC CATHETERIZATION  6/20/08, 10/2/08    EF is estimated to be 60%. See scanned document.  CARDIAC CATHETERIZATION  2/13/07, 6/6/07    EF 50%. See scanned document.  CARDIAC CATHETERIZATION  1/8/07, 11/26/07    EF is estimated to be in excess of 50%. See scanned doucment.  CARDIAC CATHETERIZATION  08/17/2006    EF is estimated to be in excess of 50%. See scanned doucment.     CARDIAC CATHETERIZATION  5/14/14  amlodipine (NORVASC) 10 MG tablet Take 10 mg by mouth daily.  clopidogrel (PLAVIX) 75 MG tablet Take 75 mg by mouth daily. No current facility-administered medications for this visit. Allergies   Allergen Reactions    Darvocet [Propoxyphene N-Acetaminophen]     Ezetimibe-Simvastatin      Other reaction(s): myositis/elevated CPK  Other reaction(s): Other (See Comments)  Myositis/ elevated CPK    Morphine     Penicillins     Phenergan [Promethazine Hcl]     Promethazine      Other reaction(s): jitters    Promethazine Hcl     Propoxyphene        Social History     Social History    Marital status:      Spouse name: N/A    Number of children: N/A    Years of education: N/A     Occupational History     Disabled     Social History Main Topics    Smoking status: Former Smoker    Smokeless tobacco: Never Used    Alcohol use No    Drug use: No    Sexual activity: Not Asked     Other Topics Concern    None     Social History Narrative    None       History reviewed. No pertinent family history. REVIEW OF SYSTEMS:  Review of Systems   Constitutional: Negative for chills and fever. HENT: Negative for hearing loss. Eyes: Negative for discharge and redness. Respiratory: Negative for shortness of breath and wheezing. Cardiovascular: Negative for leg swelling and PND. Gastrointestinal: Negative for heartburn and nausea. Genitourinary: Positive for flank pain. Negative for dysuria, frequency, hematuria and urgency. Musculoskeletal: Negative for myalgias and neck pain. Skin: Negative for itching and rash. Neurological: Negative for seizures, loss of consciousness and headaches. Endo/Heme/Allergies: Negative for environmental allergies and polydipsia. Psychiatric/Behavioral: Negative for depression and suicidal ideas.        PHYSICAL EXAM:  BP 98/71 (Site: Left Arm, Position: Sitting, Cuff Size: Medium Adult)   Pulse 62   Temp 97.9 °F (36.6 °C) (Temporal) Ht 5' 7\" (1.702 m)   Wt 135 lb (61.2 kg)   SpO2 99%   BMI 21.14 kg/m²   Physical Exam   Constitutional: No distress. HENT:   Mouth/Throat: No oropharyngeal exudate. Eyes: Left eye exhibits no discharge. No scleral icterus. Neck: No JVD present. No tracheal deviation present. No thyromegaly present. Cardiovascular: Exam reveals no gallop and no friction rub. Pulmonary/Chest: No stridor. He has no rales. Abdominal: He exhibits no distension and no mass. There is no rebound and no guarding. Musculoskeletal: He exhibits no edema. Neurological: No cranial nerve deficit. He exhibits normal muscle tone. Coordination normal.   Skin: He is not diaphoretic. No pallor. DATA:  U/A:    Lab Results   Component Value Date    NITRITE neg 07/17/2018    COLORU YELLOW 04/07/2017    PROTEINU neg 07/17/2018    PROTEINU Negative 04/07/2017    PHUR 5.0 07/17/2018    PHUR 5.5 04/07/2017    WBCUA 2-4 07/11/2016    RBCUA TNTC 07/11/2016    RBCUA 46 08/24/2015    YEAST Rare 07/11/2016    BACTERIA NEGATIVE 07/04/2016    CLARITYU Clear 04/07/2017    SPECGRAV 1.030 07/17/2018    SPECGRAV 1.022 04/07/2017    LEUKOCYTESUR neg 07/17/2018    LEUKOCYTESUR Negative 04/07/2017    UROBILINOGEN 0.2 04/07/2017    BILIRUBINUR small 07/17/2018    BLOODU neg 07/17/2018    BLOODU Negative 04/07/2017    GLUCOSEU neg 07/17/2018    GLUCOSEU Negative 04/07/2017           1. Right flank pain  Is is improved after antibiotic he was given Cipro. - POCT Urinalysis No Micro (Auto)  - MI MEASUREMENT,POST-VOID RESIDUAL VOLUME BY US,NON-IMAGING    2. Gross hematuria  Had 3 days of gross hematuria which is since resolved his urine is also microscopically clear he does have history kidney stones.  Because because of the flank pain and his history of stones and gross hematuria I wanted the patient to get an imaging study such as a KUB her CT scan however he stated he did not want that and is not worried about this time I did explain this

## 2018-08-17 ENCOUNTER — OFFICE VISIT (OUTPATIENT)
Dept: UROLOGY | Age: 63
End: 2018-08-17
Payer: MEDICARE

## 2018-08-17 VITALS
TEMPERATURE: 96.8 F | DIASTOLIC BLOOD PRESSURE: 68 MMHG | WEIGHT: 137 LBS | SYSTOLIC BLOOD PRESSURE: 107 MMHG | HEIGHT: 67 IN | HEART RATE: 71 BPM | BODY MASS INDEX: 21.5 KG/M2

## 2018-08-17 DIAGNOSIS — R35.1 NOCTURIA: Primary | ICD-10-CM

## 2018-08-17 LAB
BILIRUBIN, POC: 0
BLOOD URINE, POC: NORMAL
CLARITY, POC: CLEAR
COLOR, POC: NORMAL
GLUCOSE URINE, POC: NORMAL
KETONES, POC: NORMAL
LEUKOCYTE EST, POC: NORMAL
NITRITE, POC: NORMAL
PH, POC: 5
PROTEIN, POC: NORMAL
SPECIFIC GRAVITY, POC: 1.03
UROBILINOGEN, POC: 0.2

## 2018-08-17 PROCEDURE — 81003 URINALYSIS AUTO W/O SCOPE: CPT | Performed by: PHYSICIAN ASSISTANT

## 2018-08-17 PROCEDURE — G8420 CALC BMI NORM PARAMETERS: HCPCS | Performed by: PHYSICIAN ASSISTANT

## 2018-08-17 PROCEDURE — G8598 ASA/ANTIPLAT THER USED: HCPCS | Performed by: PHYSICIAN ASSISTANT

## 2018-08-17 PROCEDURE — 3017F COLORECTAL CA SCREEN DOC REV: CPT | Performed by: PHYSICIAN ASSISTANT

## 2018-08-17 PROCEDURE — 1036F TOBACCO NON-USER: CPT | Performed by: PHYSICIAN ASSISTANT

## 2018-08-17 PROCEDURE — G8427 DOCREV CUR MEDS BY ELIG CLIN: HCPCS | Performed by: PHYSICIAN ASSISTANT

## 2018-08-17 PROCEDURE — 99213 OFFICE O/P EST LOW 20 MIN: CPT | Performed by: PHYSICIAN ASSISTANT

## 2018-08-17 RX ORDER — FLUOCINONIDE 1 MG/G
CREAM TOPICAL
Refills: 6 | Status: ON HOLD | COMMUNITY
Start: 2018-07-16 | End: 2021-01-13 | Stop reason: ALTCHOICE

## 2018-08-17 ASSESSMENT — ENCOUNTER SYMPTOMS
NAUSEA: 0
BLURRED VISION: 0
WHEEZING: 1
DOUBLE VISION: 0
HEARTBURN: 0
SHORTNESS OF BREATH: 1
SORE THROAT: 0

## 2018-08-17 NOTE — PROGRESS NOTES
Janae Beebe is a 61 y.o. male who presents today   Chief Complaint   Patient presents with    Follow-up     I'm here for a f/u on nocturia i was given samples of Myrbetriq. they worked for [employer] but then it quit. I've not seen any more blood in my urine     HPI: Follow up nocturia:  Patient is a 78-year-old gentleman who was originally referred back to a 07/17/18 by Dr. Michelle Cordon for flank pain on the right side an episode of hematuria which lasted approximately 3 days. Recent antibiotic and his symptoms resolved. I wanted to evaluate further with CT scan however the patient declined despite my recommendation is main complaint was nocturia by last saw him. I placed him on Myrbetriq which worked initially and now does not work this is the 25 mg samples. He feels like he empties his bladder well he has no problems with left lower stream.    Past Medical History:   Diagnosis Date    CAD (coronary artery disease) 2010    Native Vessel. S/p stenting wtih negative cardiac cath on January 18, 2010 and negative nuclear stress test on June 29, 2010.  Chest pain 1/28/2016    DJD (degenerative joint disease)     GERD (gastroesophageal reflux disease) 2010    HTN (hypertension)     Hyperlipidemia     Left knee pain 1/28/2016    Pacemaker 2010    [employer]ONIC    Polyarticular arthritis     Renal calculi 2010    S/p lithrotripsy    Right arm pain 1/28/2016    Right ureteral stone 7/8/2016    Tobacco abuse 2010    Prior. Past Surgical History:   Procedure Laterality Date    CARDIAC CATHETERIZATION  01/08/2010    EF is estimated to be 60%. See scanned document.  CARDIAC CATHETERIZATION  6/20/08, 10/2/08    EF is estimated to be 60%. See scanned document.  CARDIAC CATHETERIZATION  2/13/07, 6/6/07    EF 50%. See scanned document.  CARDIAC CATHETERIZATION  1/8/07, 11/26/07    EF is estimated to be in excess of 50%. See scanned doucment.     CARDIAC CATHETERIZATION  08/17/2006    EF is estimated to be in excess of 50%. See scanned doucment.  CARDIAC CATHETERIZATION  5/14/14  MDL    normal LV function. EF 60    CHOLECYSTECTOMY      CLAVICLE SURGERY      COLONOSCOPY      Endoscopy    CYSTOSCOPY Right 7/8/2016    CYSTOSCOPY; RIGHT RETROGRADE PYELOGRAM; RIGHT URETERAL DILATATION; RIGHT URETEROSCOPY; RIGHT URETERAL LASER LITHOTRIPSY AND STONE EXTRACTION; INSERTION RIGHT URETERAL DOUBLE J STENT performed by Tacho Kan MD at [address] [place] [address] Left     HAND SURGERY      HUMERUS SURGERY      KNEE SURGERY      Right    KNEE SURGERY Left     LITHOTRIPSY      MANDIBLE FRACTURE SURGERY      NECK SURGERY      cervical spine.  OTHER SURGICAL HISTORY      Brachytherapy of the core stents.  PACEMAKER PLACEMENT      [employer]    PTCA      TIBIA FRACTURE SURGERY      WRIST SURGERY         Current Outpatient Prescriptions   Medication Sig Dispense Refill    Fluocinonide 0.1 % CREA APPLY 2-3 GRAMS TO AFFECTED AREAS 3-4 TIMES DAILY (1 GRAM = 1 DIME SIZE)  6    oxyCODONE-acetaminophen (PERCOCET) 7.5-325 MG per tablet Take 1 tablet by mouth every 4 hours as needed for Pain .       calcium carbonate (OSCAL) 500 MG TABS tablet Take 500 mg by mouth daily      diclofenac sodium 1 % GEL Apply 2 g topically 2 times daily      aspirin 325 MG tablet Take 325 mg by mouth daily      metoprolol tartrate (LOPRESSOR) 25 MG tablet Take 25 mg by mouth 2 times daily Indications: Pt unaware of dosage      tamsulosin (FLOMAX) 0.4 MG capsule Take 1 capsule by mouth daily 30 capsule 0    isosorbide mononitrate (IMDUR) 120 MG CR tablet Take 120 mg by mouth daily      hydrochlorothiazide (HYDRODIURIL) 12.5 MG tablet Take 12.5 mg by mouth daily Indications: as needed      Coenzyme Q10 (COQ-10 PO) Take by mouth daily       ranolazine (RANEXA) 500 MG SR tablet Take 500 mg by mouth 2 times daily      Nutritional Supplements (BOOST PO) Take by mouth      NITROSTAT 0.4 MG SL tablet Place 1 tablet under the tongue does not have insomnia. PHYSICAL EXAM:  /68   Pulse 71   Temp 96.8 °F (36 °C) (Temporal)   Ht 5' 7\" (1.702 m)   Wt 137 lb (62.1 kg)   BMI 21.46 kg/m²   Physical Exam   Constitutional: No distress. HENT:   Mouth/Throat: No oropharyngeal exudate. Eyes: Left eye exhibits no discharge. No scleral icterus. Neck: No JVD present. No tracheal deviation present. No thyromegaly present. Cardiovascular: Exam reveals no gallop and no friction rub. Pulmonary/Chest: No stridor. He has no rales. Abdominal: He exhibits no distension and no mass. There is no rebound and no guarding. Musculoskeletal: He exhibits no edema. Neurological: No cranial nerve deficit. He exhibits normal muscle tone. Coordination normal.   Skin: He is not diaphoretic. No pallor. DATA:  U/A:    Lab Results   Component Value Date    NITRITE neg 2018    COLORU orange 2018    COLORU YELLOW 2017    PROTEINU neg 2018    PROTEINU Negative 2017    PHUR 5.0 2018    PHUR 5.5 2017    WBCUA 2-4 2016    RBCUA TNTC 2016    RBCUA 46 2015    YEAST Rare 2016    BACTERIA NEGATIVE 2016    CLARITYU clear 2018    CLARITYU Clear 2017    SPECGRAV 1.030 2018    SPECGRAV 1.022 2017    LEUKOCYTESUR neg 2018    LEUKOCYTESUR Negative 2017    UROBILINOGEN 0.2 2017    BILIRUBINUR 0 2018    BLOODU neg 2018    BLOODU Negative 2017    GLUCOSEU neg 2018    GLUCOSEU Negative 2017           Imagin. Nocturia  Had a improvement initially and 25 mg Myrbetriq but feels like it is not helping he gets up several times at night. Orders Placed This Encounter   Procedures    POCT Urinalysis No Micro (Auto)     No orders of the defined types were placed in this encounter. Plan:  Patient will follow up in 1 month.

## 2018-09-12 ENCOUNTER — OFFICE VISIT (OUTPATIENT)
Dept: FAMILY MEDICINE CLINIC | Facility: CLINIC | Age: 63
End: 2018-09-12

## 2018-09-12 VITALS
WEIGHT: 134 LBS | HEIGHT: 67 IN | DIASTOLIC BLOOD PRESSURE: 64 MMHG | SYSTOLIC BLOOD PRESSURE: 106 MMHG | OXYGEN SATURATION: 96 % | RESPIRATION RATE: 16 BRPM | HEART RATE: 86 BPM | BODY MASS INDEX: 21.03 KG/M2

## 2018-09-12 DIAGNOSIS — J30.1 ACUTE SEASONAL ALLERGIC RHINITIS DUE TO POLLEN: Primary | ICD-10-CM

## 2018-09-12 PROCEDURE — 99213 OFFICE O/P EST LOW 20 MIN: CPT | Performed by: FAMILY MEDICINE

## 2018-09-12 RX ORDER — MONTELUKAST SODIUM 10 MG/1
10 TABLET ORAL NIGHTLY
Qty: 30 TABLET | Refills: 1 | Status: SHIPPED | OUTPATIENT
Start: 2018-09-12 | End: 2018-10-13 | Stop reason: SDUPTHER

## 2018-09-12 RX ORDER — METHYLPREDNISOLONE 4 MG/1
TABLET ORAL
Qty: 21 TABLET | Refills: 0 | Status: SHIPPED | OUTPATIENT
Start: 2018-09-12 | End: 2018-12-21

## 2018-09-12 NOTE — PROGRESS NOTES
Subjective   Junito Phelan is a 63 y.o. male.     Chief Complaint   Patient presents with   • Cough     x 2 days   nonproductive cough, sneezing, head/chest congestion        History of Present Illness     he notes sneezing wiht rhinorrhea and cough      Current Outpatient Prescriptions:   •  amLODIPine (NORVASC) 10 MG tablet, Take 10 mg by mouth daily.  , Disp: , Rfl:   •  aspirin 325 MG tablet, Take 325 mg by mouth daily, Disp: , Rfl:   •  clopidogrel (PLAVIX) 75 MG tablet, Take 75 mg by mouth daily.  , Disp: , Rfl:   •  Coenzyme Q10 50 MG tablet dispersible, Take by mouth daily , Disp: , Rfl:   •  hydrochlorothiazide (HYDRODIURIL) 12.5 MG tablet, Take 12.5 mg by mouth daily Indications: as needed, Disp: , Rfl:   •  isosorbide mononitrate (IMDUR) 120 MG 24 hr tablet, Take 120 mg by mouth daily, Disp: , Rfl:   •  LORazepam (ATIVAN) 0.5 MG tablet, Take 1 tablet by mouth 3 (Three) Times a Day As Needed for Anxiety., Disp: 90 tablet, Rfl: 0  •  MethylPREDNISolone (MEDROL, JYOTHI,) 4 MG tablet, Take as directed on package instructions., Disp: 21 tablet, Rfl: 0  •  metoprolol tartrate (LOPRESSOR) 25 MG tablet, Take 25 mg by mouth 2 times daily Indications: Pt unaware of dosage, Disp: , Rfl:   •  montelukast (SINGULAIR) 10 MG tablet, Take 1 tablet by mouth Every Night., Disp: 30 tablet, Rfl: 1  •  nitroglycerin (NITROSTAT) 0.4 MG SL tablet, Place 1 tablet under the tongue Every 5 (Five) Minutes As Needed for Chest Pain. Take no more than 3 doses in 15 minutes., Disp: 30 tablet, Rfl: 12  •  oxyCODONE-acetaminophen (PERCOCET)  MG per tablet, TK 1 T PO  Q 6 H, Disp: , Rfl: 0  •  RANEXA 1000 MG 12 hr tablet, , Disp: , Rfl:   •  REPATHA PUSHTRONEX SYSTEM 420 MG/3.5ML solution cartridge, , Disp: , Rfl:   Allergies   Allergen Reactions   • Ezetimibe-Simvastatin Other (See Comments)     Myositis/ elevated CPK  Other reaction(s): myositis/elevated CPK  Other reaction(s): Other (See Comments)  Myositis/ elevated CPK   •  "Morphine    • Penicillins    • Phenergan [Promethazine Hcl]    • Hydrocodone Rash     Other reaction(s): RASH URTICARIA   • Propoxyphene Rash       Past Medical History:   Diagnosis Date   • Hyperlipidemia    • Hypertension      Past Surgical History:   Procedure Laterality Date   • CHOLECYSTECTOMY     • COLONOSCOPY     • CORONARY STENT PLACEMENT         Review of Systems   Constitutional: Negative.    HENT: Positive for congestion, postnasal drip, rhinorrhea and sneezing.    Eyes: Negative.    Respiratory: Negative.    Cardiovascular: Negative.    Gastrointestinal: Negative.    Endocrine: Negative.    Genitourinary: Negative.    Musculoskeletal: Negative.    Skin: Negative.        Objective  /64   Pulse 86   Resp 16   Ht 170.2 cm (67\")   Wt 60.8 kg (134 lb)   SpO2 96%   BMI 20.99 kg/m²   Physical Exam   Constitutional: He is oriented to person, place, and time. He appears well-developed and well-nourished.   HENT:   Head: Normocephalic.   Right Ear: External ear normal.   Left Ear: External ear normal.   Nose: Nose normal.   Mouth/Throat: Oropharynx is clear and moist.   Eyes: Pupils are equal, round, and reactive to light. Conjunctivae and EOM are normal.   Neck: Normal range of motion. Neck supple.   Cardiovascular: Normal rate, regular rhythm, normal heart sounds and intact distal pulses.    Pulmonary/Chest: Effort normal and breath sounds normal.   Abdominal: Soft. Bowel sounds are normal.   Musculoskeletal: Normal range of motion.   Neurological: He is alert and oriented to person, place, and time.   Skin: Skin is warm. Capillary refill takes less than 2 seconds.   Psychiatric: He has a normal mood and affect. His behavior is normal. Judgment and thought content normal.   Vitals reviewed.      Assessment/Plan   Junito was seen today for cough.    Diagnoses and all orders for this visit:    Acute seasonal allergic rhinitis due to pollen    Other orders  -     MethylPREDNISolone (MEDROL, JYOTHI,) 4 MG " tablet; Take as directed on package instructions.  -     montelukast (SINGULAIR) 10 MG tablet; Take 1 tablet by mouth Every Night.      Decadron 4mg im x 1             No orders of the defined types were placed in this encounter.      Follow up: 4 month(s)

## 2018-10-15 RX ORDER — MONTELUKAST SODIUM 10 MG/1
TABLET ORAL
Qty: 30 TABLET | Refills: 2 | Status: ON HOLD | OUTPATIENT
Start: 2018-10-15 | End: 2023-01-19

## 2018-10-19 NOTE — PROGRESS NOTES
Subjective   Junito Phelan is a 62 y.o. male.     Chief Complaint   Patient presents with   • Blepharitis     left lower eyelid        History of Present Illness     he is here today with his wife Amanda --he is noting lesion under left eye that has been present for about a week--its interferring with his vison      Current Outpatient Prescriptions:   •  amLODIPine (NORVASC) 10 MG tablet, Take 10 mg by mouth daily.  , Disp: , Rfl:   •  aspirin 325 MG tablet, Take 325 mg by mouth daily, Disp: , Rfl:   •  clopidogrel (PLAVIX) 75 MG tablet, Take 75 mg by mouth daily.  , Disp: , Rfl:   •  Coenzyme Q10 50 MG tablet dispersible, Take by mouth daily , Disp: , Rfl:   •  gabapentin (NEURONTIN) 300 MG capsule, 2 (Two) Times a Day., Disp: , Rfl:   •  gentamicin (GARAMYCIN) 0.3 % ophthalmic solution, Administer 1 drop into the left eye 3 (Three) Times a Day., Disp: 5 mL, Rfl: 0  •  hydrochlorothiazide (HYDRODIURIL) 12.5 MG tablet, Take 12.5 mg by mouth daily Indications: as needed, Disp: , Rfl:   •  isosorbide mononitrate (IMDUR) 120 MG 24 hr tablet, Take 120 mg by mouth daily, Disp: , Rfl:   •  LORazepam (ATIVAN) 0.5 MG tablet, Take 1 tablet by mouth 3 (Three) Times a Day As Needed for Anxiety., Disp: 90 tablet, Rfl: 0  •  metoprolol tartrate (LOPRESSOR) 25 MG tablet, Take 25 mg by mouth 2 times daily Indications: Pt unaware of dosage, Disp: , Rfl:   •  nitroglycerin (NITROSTAT) 0.4 MG SL tablet, Place 1 tablet under the tongue Every 5 (Five) Minutes As Needed for Chest Pain. Take no more than 3 doses in 15 minutes., Disp: 30 tablet, Rfl: 12  •  RANEXA 1000 MG 12 hr tablet, , Disp: , Rfl:   •  rosuvastatin (CRESTOR) 40 MG tablet, Take 40 mg by mouth every evening, Disp: , Rfl:   •  tamsulosin (FLOMAX) 0.4 MG capsule 24 hr capsule, Take 1 capsule by mouth daily, Disp: , Rfl:   Allergies   Allergen Reactions   • Morphine    • Penicillins    • Phenergan [Promethazine Hcl]    • Propoxyphene        Past Medical History:  V[facility] Ortiz has called requesting a refill of their controlled medication, morphine ir, for the management of chronic generalized pain . Last office visit date: 09/21/18    Date last  was pulled and reviewed : 10/19/18    Was the patient compliant when the above report was pulled? yes    Analgesia: 70% pain relief noted     Aberrancies: none noted     ADL's: Patient is able to complete adl care. Adverse Reaction: none noted     Provider's last note and plan of care reviewed? yes  Request forwarded to provider for review. "  Diagnosis Date   • Hyperlipidemia    • Hypertension      Past Surgical History:   Procedure Laterality Date   • CHOLECYSTECTOMY     • COLONOSCOPY     • CORONARY STENT PLACEMENT         Review of Systems   Constitutional: Negative.    HENT: Negative.    Eyes: Positive for visual disturbance.   Respiratory: Negative.    Cardiovascular: Negative.    Gastrointestinal: Negative.    Endocrine: Negative.    Genitourinary: Negative.    Musculoskeletal: Negative.    Skin: Positive for wound.       Objective  /72  Pulse 61  Resp 16  Ht 67\" (170.2 cm)  Wt 140 lb (63.5 kg)  SpO2 96%  BMI 21.93 kg/m2  Physical Exam   Constitutional: He appears well-developed and well-nourished.   Eyes: Conjunctivae and EOM are normal. Pupils are equal, round, and reactive to light.   Skin: Skin is warm and dry.   Noted left lower lid lesion--0.5cm    Psychiatric: He has a normal mood and affect. His behavior is normal. Judgment and thought content normal.   Nursing note and vitals reviewed.      Assessment/Plan   Junito was seen today for blepharitis.    Diagnoses and all orders for this visit:    Hordeolum externum of left lower eyelid  -     Ambulatory Referral for Diabetic Eye Exam-Ophthalmology    Other orders  -     gentamicin (GARAMYCIN) 0.3 % ophthalmic solution; Administer 1 drop into the left eye 3 (Three) Times a Day.      Flu shot             No orders of the defined types were placed in this encounter.      [id number] up next appt  "

## 2018-10-29 ENCOUNTER — APPOINTMENT (OUTPATIENT)
Dept: CT IMAGING | Age: 63
End: 2018-10-29
Payer: MEDICARE

## 2018-10-29 ENCOUNTER — HOSPITAL ENCOUNTER (EMERGENCY)
Age: 63
Discharge: HOME | End: 2018-10-29
Payer: MEDICARE

## 2018-10-29 VITALS
HEART RATE: 77 BPM | RESPIRATION RATE: 16 BRPM | DIASTOLIC BLOOD PRESSURE: 72 MMHG | SYSTOLIC BLOOD PRESSURE: 110 MMHG | WEIGHT: 130 LBS | HEIGHT: 68 IN | TEMPERATURE: 98 F | BODY MASS INDEX: 19.7 KG/M2 | OXYGEN SATURATION: 97 %

## 2018-10-29 DIAGNOSIS — H11.31 SUBCONJUNCTIVAL BLEED, RIGHT: ICD-10-CM

## 2018-10-29 DIAGNOSIS — S05.01XA ABRASION OF RIGHT CORNEA, INITIAL ENCOUNTER: Primary | ICD-10-CM

## 2018-10-29 LAB
APTT: 26.4 SEC (ref 26–36.2)
BASOPHILS ABSOLUTE: 0 K/UL (ref 0–0.2)
BASOPHILS RELATIVE PERCENT: 0.2 % (ref 0–1)
EOSINOPHILS ABSOLUTE: 0 K/UL (ref 0–0.6)
EOSINOPHILS RELATIVE PERCENT: 0.4 % (ref 0–5)
HCT VFR BLD CALC: 39.6 % (ref 42–52)
HEMOGLOBIN: 13.3 G/DL (ref 14–18)
INR BLD: 0.92 (ref 0.88–1.18)
LYMPHOCYTES ABSOLUTE: 1.2 K/UL (ref 1.1–4.5)
LYMPHOCYTES RELATIVE PERCENT: 21.9 % (ref 20–40)
MCH RBC QN AUTO: 29.5 PG (ref 27–31)
MCHC RBC AUTO-ENTMCNC: 33.6 G/DL (ref 33–37)
MCV RBC AUTO: 87.8 FL (ref 80–94)
MONOCYTES ABSOLUTE: 0.5 K/UL (ref 0–0.9)
MONOCYTES RELATIVE PERCENT: 8.8 % (ref 0–10)
NEUTROPHILS ABSOLUTE: 3.7 K/UL (ref 1.5–7.5)
NEUTROPHILS RELATIVE PERCENT: 68.3 % (ref 50–65)
PDW BLD-RTO: 12.6 % (ref 11.5–14.5)
PLATELET # BLD: 214 K/UL (ref 130–400)
PMV BLD AUTO: 8.3 FL (ref 9.4–12.4)
PROTHROMBIN TIME: 12.3 SEC (ref 12–14.6)
RBC # BLD: 4.51 M/UL (ref 4.7–6.1)
WBC # BLD: 5.4 K/UL (ref 4.8–10.8)

## 2018-10-29 PROCEDURE — 85025 COMPLETE CBC W/AUTO DIFF WBC: CPT

## 2018-10-29 PROCEDURE — 99284 EMERGENCY DEPT VISIT MOD MDM: CPT | Performed by: NURSE PRACTITIONER

## 2018-10-29 PROCEDURE — 85730 THROMBOPLASTIN TIME PARTIAL: CPT

## 2018-10-29 PROCEDURE — 6360000002 HC RX W HCPCS: Performed by: NURSE PRACTITIONER

## 2018-10-29 PROCEDURE — 6360000002 HC RX W HCPCS

## 2018-10-29 PROCEDURE — 99283 EMERGENCY DEPT VISIT LOW MDM: CPT

## 2018-10-29 PROCEDURE — 96372 THER/PROPH/DIAG INJ SC/IM: CPT

## 2018-10-29 PROCEDURE — 85610 PROTHROMBIN TIME: CPT

## 2018-10-29 PROCEDURE — 70486 CT MAXILLOFACIAL W/O DYE: CPT

## 2018-10-29 PROCEDURE — 6370000000 HC RX 637 (ALT 250 FOR IP): Performed by: NURSE PRACTITIONER

## 2018-10-29 PROCEDURE — 36415 COLL VENOUS BLD VENIPUNCTURE: CPT

## 2018-10-29 RX ORDER — PROPARACAINE HYDROCHLORIDE 5 MG/ML
1 SOLUTION/ DROPS OPHTHALMIC ONCE
Status: COMPLETED | OUTPATIENT
Start: 2018-10-29 | End: 2018-10-29

## 2018-10-29 RX ORDER — OXYCODONE HYDROCHLORIDE AND ACETAMINOPHEN 5; 325 MG/1; MG/1
1 TABLET ORAL EVERY 6 HOURS PRN
Qty: 8 TABLET | Refills: 0 | Status: SHIPPED | OUTPATIENT
Start: 2018-10-29 | End: 2018-11-01

## 2018-10-29 RX ORDER — ERYTHROMYCIN 5 MG/G
OINTMENT OPHTHALMIC ONCE
Status: COMPLETED | OUTPATIENT
Start: 2018-10-29 | End: 2018-10-29

## 2018-10-29 RX ORDER — KETOROLAC TROMETHAMINE 30 MG/ML
30 INJECTION, SOLUTION INTRAMUSCULAR; INTRAVENOUS ONCE
Status: COMPLETED | OUTPATIENT
Start: 2018-10-29 | End: 2018-10-29

## 2018-10-29 RX ORDER — ONDANSETRON 2 MG/ML
4 INJECTION INTRAMUSCULAR; INTRAVENOUS ONCE
Status: COMPLETED | OUTPATIENT
Start: 2018-10-29 | End: 2018-10-29

## 2018-10-29 RX ORDER — HYDROMORPHONE HCL 110MG/55ML
1 PATIENT CONTROLLED ANALGESIA SYRINGE INTRAVENOUS ONCE
Status: DISCONTINUED | OUTPATIENT
Start: 2018-10-29 | End: 2018-10-29 | Stop reason: HOSPADM

## 2018-10-29 RX ORDER — CIPROFLOXACIN HYDROCHLORIDE 3.5 MG/ML
1 SOLUTION/ DROPS TOPICAL ONCE
Status: COMPLETED | OUTPATIENT
Start: 2018-10-29 | End: 2018-10-29

## 2018-10-29 RX ADMIN — PROPARACAINE HYDROCHLORIDE 1 DROP: 5 SOLUTION/ DROPS OPHTHALMIC at 13:10

## 2018-10-29 RX ADMIN — FLUORESCEIN SODIUM 1 EACH: 0.6 STRIP OPHTHALMIC at 14:41

## 2018-10-29 RX ADMIN — Medication 1 MG: at 13:10

## 2018-10-29 RX ADMIN — CIPROFLOXACIN HYDROCHLORIDE 1 DROP: 3 SOLUTION/ DROPS OPHTHALMIC at 14:41

## 2018-10-29 RX ADMIN — ERYTHROMYCIN: 5 OINTMENT OPHTHALMIC at 14:43

## 2018-10-29 RX ADMIN — ONDANSETRON 4 MG: 2 INJECTION INTRAMUSCULAR; INTRAVENOUS at 13:11

## 2018-10-29 RX ADMIN — KETOROLAC TROMETHAMINE 30 MG: 30 INJECTION, SOLUTION INTRAMUSCULAR; INTRAVENOUS at 14:46

## 2018-10-29 ASSESSMENT — ENCOUNTER SYMPTOMS
BACK PAIN: 0
EYE REDNESS: 1
COLOR CHANGE: 0
PHOTOPHOBIA: 1
ABDOMINAL PAIN: 0
EYE DISCHARGE: 0
ABDOMINAL DISTENTION: 0
FACIAL SWELLING: 1
APNEA: 0
PHOTOPHOBIA: 0
COUGH: 0
SHORTNESS OF BREATH: 0
EYE PAIN: 1

## 2018-10-29 ASSESSMENT — PAIN SCALES - GENERAL
PAINLEVEL_OUTOF10: 5
PAINLEVEL_OUTOF10: 6
PAINLEVEL_OUTOF10: 8

## 2018-10-29 NOTE — ED PROVIDER NOTES
mandibular fracture with dental braces   which obscure any fracture of the alveolar margin of the mandible. The above finding are recorded on a digital voice clip in PACS. Signed by Dr Darrel Gallegos on 10/29/2018 2:34 PM          LABS:  Labs Reviewed   CBC WITH AUTO DIFFERENTIAL - Abnormal; Notable for the following:        Result Value    RBC 4.51 (*)     Hemoglobin 13.3 (*)     Hematocrit 39.6 (*)     MPV 8.3 (*)     Neutrophils % 68.3 (*)     All other components within normal limits   PROTIME-INR   APTT       All other labs were within normal range or notreturned as of this dictation. RE-ASSESSMENT          EMERGENCY DEPARTMENT COURSE and DIFFERENTIAL DIAGNOSIS/MDM:   Vitals:    Vitals:    10/29/18 1227 10/29/18 1505   BP: 102/66 110/72   Pulse: 60 77   Resp: 18 16   Temp: 98.6 °F (37 °C) 98 °F (36.7 °C)   SpO2: 95% 97%   Weight: 130 lb (59 kg)    Height: 5' 8\" (1.727 m)            MDM  Number of Diagnoses or Management Options  Abrasion of right cornea, initial encounter:   Diagnosis management comments: Patient presents to the ED today with eye trauma. The CT was unremarkable for an acute fracture there was a depressed fracture of the right lamina papyracea. The patient tells me he had a very traumatic injury a few years ago that caused this old fracture noted on the CT. Discussed with the patient today and feel his symptoms are consistent with a corneal abrasion and a sad conjunctivae hemorrhage from the abrasion and trauma. We have given the patient eyedrops in the ER to treat his abrasion. I have stained the eye shows evidence of corneal abrasion and I have also checked his optic pressures which were 16. The patient's visual acuity was unremarkable. At this time he will follow-up with Dr. Madsen Bound his ophthalmologist tomorrow for further evaluation. He will be taking th eyedrops home with him as I medicine him home with erythromycin and Ciloxan.        PROCEDURES:    Procedures      FINAL IMPRESSION
or new fractures when compared to old x-rays. Per radiology report. We'll discharge this patient with eyedrops for antibiotic coverage as well as soothing effect. Along with only for pain. Plan is to follow-up with his ophthalmologist Dr. Rosanna Langston. Patient and spouse are agreeable to this plan usually recovers very Othelia Sites long as there is no orbital fracture surrounding it which has been ruled out today. PROCEDURES:    Procedures      FINAL IMPRESSION    No diagnosis found. DISPOSITION/PLAN   DISPOSITION        PATIENT REFERRED TO:  No follow-up provider specified.     DISCHARGE MEDICATIONS:  New Prescriptions    No medications on file       (Please note that portions of this note were completed with a voice recognition program.  Efforts were made to edit the dictations but occasionallywords are mis-transcribed.)    Eyal Flores [address] [place] [address]  10/29/18 [id number]

## 2018-11-27 ENCOUNTER — FLU SHOT (OUTPATIENT)
Dept: FAMILY MEDICINE CLINIC | Facility: CLINIC | Age: 63
End: 2018-11-27

## 2018-11-27 DIAGNOSIS — Z23 NEED FOR VACCINATION: Primary | ICD-10-CM

## 2018-11-27 PROCEDURE — G0008 ADMIN INFLUENZA VIRUS VAC: HCPCS | Performed by: FAMILY MEDICINE

## 2018-11-27 PROCEDURE — 90686 IIV4 VACC NO PRSV 0.5 ML IM: CPT | Performed by: FAMILY MEDICINE

## 2018-12-17 ENCOUNTER — APPOINTMENT (OUTPATIENT)
Dept: GENERAL RADIOLOGY | Age: 63
End: 2018-12-17
Payer: MEDICARE

## 2018-12-17 ENCOUNTER — HOSPITAL ENCOUNTER (EMERGENCY)
Age: 63
Discharge: HOME | End: 2018-12-17
Attending: FAMILY MEDICINE
Payer: MEDICARE

## 2018-12-17 VITALS
SYSTOLIC BLOOD PRESSURE: 105 MMHG | RESPIRATION RATE: 16 BRPM | OXYGEN SATURATION: 98 % | HEIGHT: 68 IN | DIASTOLIC BLOOD PRESSURE: 72 MMHG | TEMPERATURE: 98.2 F | BODY MASS INDEX: 19.7 KG/M2 | HEART RATE: 60 BPM | WEIGHT: 130 LBS
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
## 2018-12-17 PROCEDURE — 12032 INTMD RPR S/A/T/EXT 2.6-7.5: CPT

## 2018-12-17 PROCEDURE — 90715 TDAP VACCINE 7 YRS/> IM: CPT | Performed by: FAMILY MEDICINE

## 2018-12-17 PROCEDURE — 99283 EMERGENCY DEPT VISIT LOW MDM: CPT

## 2018-12-17 PROCEDURE — 73090 X-RAY EXAM OF FOREARM: CPT

## 2018-12-17 PROCEDURE — 6360000002 HC RX W HCPCS: Performed by: FAMILY MEDICINE

## 2018-12-17 PROCEDURE — 2500000003 HC RX 250 WO HCPCS: Performed by: FAMILY MEDICINE

## 2018-12-17 PROCEDURE — 99283 EMERGENCY DEPT VISIT LOW MDM: CPT | Performed by: FAMILY MEDICINE

## 2018-12-17 PROCEDURE — 12032 INTMD RPR S/A/T/EXT 2.6-7.5: CPT | Performed by: FAMILY MEDICINE

## 2018-12-17 PROCEDURE — 90471 IMMUNIZATION ADMIN: CPT | Performed by: FAMILY MEDICINE

## 2018-12-17 PROCEDURE — 6370000000 HC RX 637 (ALT 250 FOR IP): Performed by: FAMILY MEDICINE

## 2018-12-17 RX ORDER — LIDOCAINE HYDROCHLORIDE AND EPINEPHRINE 10; 10 MG/ML; UG/ML
20 INJECTION, SOLUTION INFILTRATION; PERINEURAL ONCE
Status: COMPLETED | OUTPATIENT
Start: 2018-12-17 | End: 2018-12-17

## 2018-12-17 RX ORDER — HYDROCODONE BITARTRATE AND ACETAMINOPHEN 5; 325 MG/1; MG/1
1 TABLET ORAL ONCE
Status: COMPLETED | OUTPATIENT
Start: 2018-12-17 | End: 2018-12-17

## 2018-12-17 RX ORDER — OXYCODONE AND ACETAMINOPHEN 10; 325 MG/1; MG/1
1 TABLET ORAL ONCE
Status: COMPLETED | OUTPATIENT
Start: 2018-12-17 | End: 2018-12-17

## 2018-12-17 RX ORDER — CLINDAMYCIN HYDROCHLORIDE 300 MG/1
300 CAPSULE ORAL 2 TIMES DAILY
Qty: 14 CAPSULE | Refills: 0 | Status: SHIPPED | OUTPATIENT
Start: 2018-12-17 | End: 2018-12-24

## 2018-12-17 RX ADMIN — HYDROCODONE BITARTRATE AND ACETAMINOPHEN 1 TABLET: 5; 325 TABLET ORAL at 13:40

## 2018-12-17 RX ADMIN — TETANUS TOXOID, REDUCED DIPHTHERIA TOXOID AND ACELLULAR PERTUSSIS VACCINE, ADSORBED 0.5 ML: 5; 2.5; 8; 8; 2.5 SUSPENSION INTRAMUSCULAR at 15:52

## 2018-12-17 RX ADMIN — LIDOCAINE HYDROCHLORIDE,EPINEPHRINE BITARTRATE 20 ML: 10; .01 INJECTION, SOLUTION INFILTRATION; PERINEURAL at 13:40

## 2018-12-17 RX ADMIN — OXYCODONE HYDROCHLORIDE AND ACETAMINOPHEN 1 TABLET: 10; 325 TABLET ORAL at 14:55

## 2018-12-17 ASSESSMENT — ENCOUNTER SYMPTOMS
ABDOMINAL DISTENTION: 0
VOMITING: 0
DIARRHEA: 0
BACK PAIN: 0
CHEST TIGHTNESS: 0
CONSTIPATION: 0
SHORTNESS OF BREATH: 0
APNEA: 0
SORE THROAT: 0
COUGH: 0
WHEEZING: 0
TROUBLE SWALLOWING: 0
ABDOMINAL PAIN: 0

## 2018-12-17 ASSESSMENT — PAIN SCALES - GENERAL
PAINLEVEL_OUTOF10: 7

## 2018-12-17 NOTE — ED NOTES
Contacted Carole Angeles with [facility]. Gave all information about dog bite to him. He states he will follow up with patient.      Choco Srivastava RN  12/17/18 2937

## 2018-12-17 NOTE — ED NOTES
Patient's right arm wrapped with gauze using sterile technique.       Joleen Correia RN  12/17/18 [id number]

## 2018-12-17 NOTE — ED PROVIDER NOTES
mg by mouth daily. ASPIRIN 325 MG TABLET    Take 325 mg by mouth daily    CALCIUM CARBONATE (OSCAL) 500 MG TABS TABLET    Take 500 mg by mouth daily    CLOPIDOGREL (PLAVIX) 75 MG TABLET    Take 75 mg by mouth daily. COENZYME Q10 (COQ-10 PO)    Take by mouth daily     DICLOFENAC SODIUM 1 % GEL    Apply 2 g topically 2 times daily    FLUOCINONIDE 0.1 % CREA    APPLY 2-3 GRAMS TO AFFECTED AREAS 3-4 TIMES DAILY (1 GRAM = 1 DIME SIZE)    HYDROCHLOROTHIAZIDE (HYDRODIURIL) 12.5 MG TABLET    Take 12.5 mg by mouth daily Indications: as needed    ISOSORBIDE MONONITRATE (IMDUR) 120 MG CR TABLET    Take 120 mg by mouth daily    LORAZEPAM (ATIVAN) 1 MG TABLET    Take 1 mg by mouth 3 times daily as needed    METOPROLOL TARTRATE (LOPRESSOR) 25 MG TABLET    Take 25 mg by mouth 2 times daily Indications: Pt unaware of dosage    NITROSTAT 0.4 MG SL TABLET    Place 1 tablet under the tongue 3 times daily as needed    NUTRITIONAL SUPPLEMENTS (BOOST PO)    Take by mouth    OXYCODONE-ACETAMINOPHEN (PERCOCET) 7.5-325 MG PER TABLET    Take 1 tablet by mouth every 4 hours as needed for Pain . RANOLAZINE (RANEXA) 500 MG SR TABLET    Take 500 mg by mouth 2 times daily    ROSUVASTATIN (CRESTOR) 40 MG TABLET    Take 40 mg by mouth every evening    TAMSULOSIN (FLOMAX) 0.4 MG CAPSULE    Take 1 capsule by mouth daily       ALLERGIES     Darvocet [propoxyphene n-acetaminophen]; Ezetimibe-simvastatin; Morphine; Penicillins; Phenergan [promethazine hcl]; Promethazine; Promethazine hcl; and Propoxyphene    FAMILY HISTORY     History reviewed. No pertinent family history.        SOCIAL HISTORY       Social History     Social History    Marital status:      Spouse name: N/A    Number of children: N/A    Years of education: N/A     Occupational History     Disabled     Social History Main Topics    Smoking status: Former Smoker    Smokeless tobacco: Never Used    Alcohol use No    Drug use: No    Sexual activity: Not Asked Other Topics Concern    None     Social History Narrative    None       SCREENINGS             PHYSICAL EXAM    (up to 7 for level 4, 8 or more for level 5)     ED Triage Vitals [12/17/18 1314]   BP Temp Temp src Pulse Resp SpO2 Height Weight   (!) 63/37 98.2 °F (36.8 °C) -- 60 17 98 % 5' 8\" (1.727 m) 130 lb (59 kg)       Physical Exam   Constitutional: He is oriented to person, place, and time. He appears well-developed and well-nourished. HENT:   Head: Normocephalic and atraumatic. Eyes: Pupils are equal, round, and reactive to light. Conjunctivae and EOM are normal.   Neck: Normal range of motion. Neck supple. No tracheal deviation present. Cardiovascular: Normal rate, regular rhythm, normal heart sounds and intact distal pulses. Pulmonary/Chest: Effort normal and breath sounds normal. No respiratory distress. He has no wheezes. He has no rales. Abdominal: Soft. Bowel sounds are normal. There is no tenderness. Musculoskeletal: Normal range of motion. Neurological: He is alert and oriented to person, place, and time. Skin: Skin is warm and dry. There is a 4 cm, irregular, stellate-shaped laceration on the dorsal aspect of the right forearm with mild active bleeding. No evidence of arterial bleeding. There is no obvious foreign body in the wound. The laceration extends down to an extensor tendon but does not violate the tendon. There is minimal muscular involvement. Psychiatric: He has a normal mood and affect. His behavior is normal. Thought content normal.   Nursing note and vitals reviewed.       DIAGNOSTIC RESULTS     EKG: All EKG's areinterpreted by the Emergency Department Physician who either signs or Co-signs this chart in the absence of a cardiologist.    None    RADIOLOGY:  Non-plain film images such as CT, Ultrasound and MRI are read by the radiologist. Plain radiographic images are visualized and preliminarily interpreted bythe emergency physician with the below Repair type: Intermediate  Pre-procedure details:     Preparation:  Patient was prepped and draped in usual sterile fashion and imaging obtained to evaluate for foreign bodies  Exploration:     Hemostasis achieved with:  Direct pressure    Wound exploration: wound explored through full range of motion and entire depth of wound probed and visualized      Wound extent: no fascia violation noted, no foreign bodies/material noted, no muscle damage noted, no nerve damage noted, no tendon damage noted and no vascular damage noted      Contaminated: no    Treatment:     Area cleansed with:  Hibiclens    Amount of cleaning:  Extensive    Irrigation solution:  Sterile saline    Irrigation method:  Pressure wash    Visualized foreign bodies/material removed: no    Skin repair:     Repair method:  Sutures    Suture size:  3-0    Suture material:  Nylon    Suture technique:  Simple interrupted    Number of sutures:  7  Approximation:     Approximation:  Loose  Post-procedure details:     Dressing:  Non-adherent dressing    Patient tolerance of procedure: Tolerated well, no immediate complications        FINAL IMPRESSION      1.  Dog bite, initial encounter          DISPOSITION/PLAN   DISPOSITION Decision To Discharge 12/17/2018 03:57:43 PM      PATIENT REFERRED TO:  Fady Crespo MD  [facility]  [phone]    In 2 days        DISCHARGE MEDICATIONS:  New Prescriptions    No medications on file          (Please note that portions of this note were completed with a voice recognition program.  Efforts were made to edit thedictations but occasionally words are mis-transcribed.)    Mame Chan MD (electronically signed)  Attending Emergency Physician          Mame Chan MD  12/17/18 3768

## 2018-12-21 ENCOUNTER — OFFICE VISIT (OUTPATIENT)
Dept: FAMILY MEDICINE CLINIC | Facility: CLINIC | Age: 63
End: 2018-12-21

## 2018-12-21 VITALS
BODY MASS INDEX: 21.35 KG/M2 | DIASTOLIC BLOOD PRESSURE: 64 MMHG | HEART RATE: 78 BPM | HEIGHT: 67 IN | SYSTOLIC BLOOD PRESSURE: 116 MMHG | RESPIRATION RATE: 16 BRPM | OXYGEN SATURATION: 98 % | WEIGHT: 136 LBS
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
## 2018-12-21 DIAGNOSIS — S41.151D DOG BITE OF RIGHT UPPER EXTREMITY, SUBSEQUENT ENCOUNTER: Primary | ICD-10-CM
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
## 2018-12-21 PROBLEM — S41.151A DOG BITE OF RIGHT ARM: Status: ACTIVE | Noted: 2018-12-21

## 2018-12-21 PROCEDURE — 99213 OFFICE O/P EST LOW 20 MIN: CPT | Performed by: FAMILY MEDICINE

## 2018-12-21 RX ORDER — CLINDAMYCIN HYDROCHLORIDE 300 MG/1
CAPSULE ORAL
Refills: 0 | COMMUNITY
Start: 2018-12-17 | End: 2019-01-28

## 2018-12-21 RX ORDER — RANOLAZINE 1000 MG/1
1000 TABLET, EXTENDED RELEASE ORAL
COMMUNITY
Start: 2018-12-05 | End: 2019-12-06

## 2018-12-21 RX ORDER — OXYCODONE AND ACETAMINOPHEN 10; 325 MG/1; MG/1
1 TABLET ORAL
COMMUNITY
End: 2021-07-30

## 2018-12-21 RX ORDER — ISOSORBIDE MONONITRATE 120 MG/1
120 TABLET, EXTENDED RELEASE ORAL
COMMUNITY
Start: 2018-12-05 | End: 2019-12-06

## 2018-12-21 NOTE — PROGRESS NOTES
Subjective   Junito Phelan is a 63 y.o. male.     Chief Complaint   Patient presents with   • Animal Bite     pt states that he was bitten by his dog on monday night and seen at [facility] ER to get sutures.  he wanted to make sure that the wound is not getting infected        History of Present Illness     was bitten by his dog this week --went to [facility] and had laceration repaired and placed on antbx and given tetanus booster...--he feels laceration is healing nicely      Current Outpatient Medications:   •  isosorbide mononitrate (IMDUR) 120 MG 24 hr tablet, Take 120 mg by mouth., Disp: , Rfl:   •  ranolazine (RANEXA) 1000 MG 12 hr tablet, Take 1,000 mg by mouth., Disp: , Rfl:   •  amLODIPine (NORVASC) 10 MG tablet, Take 10 mg by mouth daily.  , Disp: , Rfl:   •  aspirin 325 MG tablet, Take 325 mg by mouth daily, Disp: , Rfl:   •  clindamycin (CLEOCIN) 300 MG capsule, TK 1 C PO BID FOR 7 DAYS, Disp: , Rfl: 0  •  clopidogrel (PLAVIX) 75 MG tablet, Take 75 mg by mouth daily.  , Disp: , Rfl:   •  Coenzyme Q10 50 MG tablet dispersible, Take by mouth daily , Disp: , Rfl:   •  hydrochlorothiazide (HYDRODIURIL) 12.5 MG tablet, Take 12.5 mg by mouth daily Indications: as needed, Disp: , Rfl:   •  LORazepam (ATIVAN) 0.5 MG tablet, Take 1 tablet by mouth 3 (Three) Times a Day As Needed for Anxiety., Disp: 90 tablet, Rfl: 0  •  metoprolol tartrate (LOPRESSOR) 25 MG tablet, Take 25 mg by mouth 2 times daily Indications: Pt unaware of dosage, Disp: , Rfl:   •  montelukast (SINGULAIR) 10 MG tablet, TAKE ONE TABLET AT BEDTIME, Disp: 30 tablet, Rfl: 2  •  nitroglycerin (NITROSTAT) 0.4 MG SL tablet, Place 1 tablet under the tongue Every 5 (Five) Minutes As Needed for Chest Pain. Take no more than 3 doses in 15 minutes., Disp: 30 tablet, Rfl: 12  •  oxyCODONE-acetaminophen (PERCOCET)  MG per tablet, Take 1 tablet by mouth., Disp: , Rfl:   •  REPATHA PUSHTRONEX SYSTEM 420 MG/3.5ML solution cartridge, , Disp: , Rfl:  "  Allergies   Allergen Reactions   • Ezetimibe-Simvastatin Other (See Comments)     Myositis/ elevated CPK  Other reaction(s): myositis/elevated CPK  Other reaction(s): Other (See Comments)  Myositis/ elevated CPK   • Morphine    • Penicillins    • Phenergan [Promethazine Hcl]    • Hydrocodone Rash     Other reaction(s): RASH URTICARIA   • Propoxyphene Rash   • Shellfish-Derived Products Rash       Past Medical History:   Diagnosis Date   • Hyperlipidemia    • Hypertension      Past Surgical History:   Procedure Laterality Date   • CHOLECYSTECTOMY     • COLONOSCOPY     • CORONARY STENT PLACEMENT         Review of Systems   Constitutional: Negative.    HENT: Negative.    Eyes: Negative.    Respiratory: Negative.    Cardiovascular: Negative.    Gastrointestinal: Negative.    Endocrine: Negative.    Genitourinary: Negative.    Musculoskeletal: Negative.    Skin: Positive for wound.   Allergic/Immunologic: Negative.    Neurological: Negative.    Hematological: Negative.    Psychiatric/Behavioral: Negative.        Objective  /64   Pulse 78   Resp 16   Ht 170.2 cm (67\")   Wt 61.7 kg (136 lb)   SpO2 98%   BMI 21.30 kg/m²   Physical Exam   Constitutional: He is oriented to person, place, and time. He appears well-developed and well-nourished.   HENT:   Head: Normocephalic and atraumatic.   Right Ear: External ear normal.   Left Ear: External ear normal.   Nose: Nose normal.   Mouth/Throat: Oropharynx is clear and moist.   Eyes: Conjunctivae and EOM are normal. Pupils are equal, round, and reactive to light.   Neck: Normal range of motion. Neck supple.   Cardiovascular: Normal rate, regular rhythm, normal heart sounds and intact distal pulses.   Pulmonary/Chest: Effort normal and breath sounds normal.   Abdominal: Soft. Bowel sounds are normal.   Musculoskeletal: Normal range of motion.   Neurological: He is alert and oriented to person, place, and time.   Skin: Skin is warm and dry. Capillary refill takes less " than 2 seconds.   Psychiatric: He has a normal mood and affect. His behavior is normal. Judgment and thought content normal.   Nursing note and vitals reviewed.  exam does confirm healing dog bite wound  Right arm with noted lacerations--no evidence of infection     Assessment/Plan   Junito was seen today for animal bite.    Diagnoses and all orders for this visit:    Dog bite of right upper extremity, subsequent encounter      Continue antbx for now  He will come out next week to have sutures removed next week           No orders of the defined types were placed in this encounter.      Follow up: 5 day(s) for suture removal

## 2018-12-26 ENCOUNTER — OFFICE VISIT (OUTPATIENT)
Dept: FAMILY MEDICINE CLINIC | Facility: CLINIC | Age: 63
End: 2018-12-26

## 2018-12-26 VITALS
SYSTOLIC BLOOD PRESSURE: 112 MMHG | HEART RATE: 72 BPM | HEIGHT: 67 IN | WEIGHT: 130 LBS | DIASTOLIC BLOOD PRESSURE: 72 MMHG | BODY MASS INDEX: 20.4 KG/M2 | OXYGEN SATURATION: 90 %

## 2018-12-26 DIAGNOSIS — S41.151D DOG BITE OF RIGHT UPPER EXTREMITY, SUBSEQUENT ENCOUNTER: Primary | ICD-10-CM
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
## 2018-12-26 PROCEDURE — 99213 OFFICE O/P EST LOW 20 MIN: CPT | Performed by: FAMILY MEDICINE

## 2018-12-26 NOTE — PROGRESS NOTES
Subjective   Junito Phelan is a 63 y.o. male.     Chief Complaint   Patient presents with   • Follow-up     Patient states stitches removed from right arm.         History of Present Illness     feels his dog bite is healing nicely witout fver orchills      Current Outpatient Medications:   •  amLODIPine (NORVASC) 10 MG tablet, Take 10 mg by mouth daily.  , Disp: , Rfl:   •  aspirin 325 MG tablet, Take 325 mg by mouth daily, Disp: , Rfl:   •  clindamycin (CLEOCIN) 300 MG capsule, TK 1 C PO BID FOR 7 DAYS, Disp: , Rfl: 0  •  clopidogrel (PLAVIX) 75 MG tablet, Take 75 mg by mouth daily.  , Disp: , Rfl:   •  Coenzyme Q10 50 MG tablet dispersible, Take by mouth daily , Disp: , Rfl:   •  hydrochlorothiazide (HYDRODIURIL) 12.5 MG tablet, Take 12.5 mg by mouth daily Indications: as needed, Disp: , Rfl:   •  isosorbide mononitrate (IMDUR) 120 MG 24 hr tablet, Take 120 mg by mouth., Disp: , Rfl:   •  LORazepam (ATIVAN) 0.5 MG tablet, Take 1 tablet by mouth 3 (Three) Times a Day As Needed for Anxiety., Disp: 90 tablet, Rfl: 0  •  metoprolol tartrate (LOPRESSOR) 25 MG tablet, Take 25 mg by mouth 2 times daily Indications: Pt unaware of dosage, Disp: , Rfl:   •  montelukast (SINGULAIR) 10 MG tablet, TAKE ONE TABLET AT BEDTIME, Disp: 30 tablet, Rfl: 2  •  nitroglycerin (NITROSTAT) 0.4 MG SL tablet, Place 1 tablet under the tongue Every 5 (Five) Minutes As Needed for Chest Pain. Take no more than 3 doses in 15 minutes., Disp: 30 tablet, Rfl: 12  •  oxyCODONE-acetaminophen (PERCOCET)  MG per tablet, Take 1 tablet by mouth., Disp: , Rfl:   •  ranolazine (RANEXA) 1000 MG 12 hr tablet, Take 1,000 mg by mouth., Disp: , Rfl:   •  REPATHA PUSHTRONEX SYSTEM 420 MG/3.5ML solution cartridge, , Disp: , Rfl:   Allergies   Allergen Reactions   • Ezetimibe-Simvastatin Other (See Comments)     Myositis/ elevated CPK  Other reaction(s): myositis/elevated CPK  Other reaction(s): Other (See Comments)  Myositis/ elevated CPK   • Morphine   "  • Penicillins    • Phenergan [Promethazine Hcl]    • Hydrocodone Rash     Other reaction(s): RASH URTICARIA   • Propoxyphene Rash   • Shellfish-Derived Products Rash       Past Medical History:   Diagnosis Date   • Hyperlipidemia    • Hypertension      Past Surgical History:   Procedure Laterality Date   • CHOLECYSTECTOMY     • COLONOSCOPY     • CORONARY STENT PLACEMENT         Review of Systems   Constitutional: Negative.    HENT: Negative.    Eyes: Negative.    Respiratory: Negative.    Cardiovascular: Negative.    Gastrointestinal: Negative.    Endocrine: Negative.    Genitourinary: Negative.    Musculoskeletal: Negative.    Skin: Positive for wound.   Allergic/Immunologic: Negative.    Neurological: Negative.    Hematological: Negative.    Psychiatric/Behavioral: Negative.        Objective  /72 (BP Location: Left arm, Patient Position: Sitting, Cuff Size: Adult)   Pulse 72   Ht 170.2 cm (67\")   Wt 59 kg (130 lb)   SpO2 90%   BMI 20.36 kg/m²   Physical Exam   Constitutional: He is oriented to person, place, and time. He appears well-developed and well-nourished.   HENT:   Head: Normocephalic and atraumatic.   Right Ear: External ear normal.   Left Ear: External ear normal.   Nose: Nose normal.   Mouth/Throat: Oropharynx is clear and moist.   Eyes: Conjunctivae and EOM are normal. Pupils are equal, round, and reactive to light.   Neck: Normal range of motion. Neck supple.   Cardiovascular: Normal rate, regular rhythm, normal heart sounds and intact distal pulses.   Pulmonary/Chest: Effort normal and breath sounds normal.   Abdominal: Soft. Bowel sounds are normal.   Musculoskeletal: Normal range of motion.   Neurological: He is alert and oriented to person, place, and time.   Skin: Skin is warm. Capillary refill takes less than 2 seconds. There is erythema.   Psychiatric: He has a normal mood and affect. His behavior is normal. Judgment and thought content normal.   Nursing note and vitals " reviewed.  exam does  Confirm healing erythema of the right forearm    Assessment/Plan   Junito was seen today for follow-up.    Diagnoses and all orders for this visit:    Dog bite of right upper extremity, subsequent encounter      Continue local care and call if any signs of  infection           No orders of the defined types were placed in this encounter.      Follow up: 4 month(s)

## 2018-12-28 ENCOUNTER — TELEPHONE (OUTPATIENT)
Dept: FAMILY MEDICINE CLINIC | Facility: CLINIC | Age: 63
End: 2018-12-28

## 2018-12-28 RX ORDER — AZITHROMYCIN 250 MG/1
TABLET, FILM COATED ORAL
Qty: 6 TABLET | Refills: 0 | Status: SHIPPED | OUTPATIENT
Start: 2018-12-28 | End: 2019-01-28

## 2018-12-28 NOTE — TELEPHONE ENCOUNTER
He says he has been around his Grandson, and has the same thing, Sore throat, head congestion, and cough. Would like something sent to [facility]

## 2019-01-10 ENCOUNTER — TELEPHONE (OUTPATIENT)
Dept: FAMILY MEDICINE CLINIC | Facility: CLINIC | Age: 64
End: 2019-01-10

## 2019-01-10 NOTE — TELEPHONE ENCOUNTER
Pt is in [place] nothing can be called in he is already on cipro so he will cont that and walk in clinic if need be

## 2019-01-10 NOTE — TELEPHONE ENCOUNTER
Pt called he is out of town on vacation stating that when he got off the plane his ear seemed all clogged up and sinus pressure he said that he can not get them to pop or clear up so he wants to know what you suggest he do? [phone]

## 2019-01-24 ENCOUNTER — TELEPHONE (OUTPATIENT)
Dept: FAMILY MEDICINE CLINIC | Facility: CLINIC | Age: 64
End: 2019-01-24

## 2019-01-24 NOTE — TELEPHONE ENCOUNTER
Pt wife called she is asking if u will see this pt he has been having issues with his ears since the 1st of jan he has been out of the country since and his ears are no better(there is a message on his wife also)

## 2019-01-28 ENCOUNTER — OFFICE VISIT (OUTPATIENT)
Dept: FAMILY MEDICINE CLINIC | Facility: CLINIC | Age: 64
End: 2019-01-28

## 2019-01-28 VITALS
RESPIRATION RATE: 16 BRPM | HEART RATE: 68 BPM | SYSTOLIC BLOOD PRESSURE: 102 MMHG | BODY MASS INDEX: 20.88 KG/M2 | DIASTOLIC BLOOD PRESSURE: 62 MMHG | WEIGHT: 133 LBS | OXYGEN SATURATION: 97 % | HEIGHT: 67 IN

## 2019-01-28 DIAGNOSIS — H66.003 ACUTE SUPPURATIVE OTITIS MEDIA OF BOTH EARS WITHOUT SPONTANEOUS RUPTURE OF TYMPANIC MEMBRANES, RECURRENCE NOT SPECIFIED: Primary | ICD-10-CM

## 2019-01-28 PROCEDURE — 99213 OFFICE O/P EST LOW 20 MIN: CPT | Performed by: FAMILY MEDICINE

## 2019-01-28 RX ORDER — CLARITHROMYCIN 500 MG/1
500 TABLET, COATED ORAL 2 TIMES DAILY
Qty: 20 TABLET | Refills: 0 | Status: SHIPPED | OUTPATIENT
Start: 2019-01-28 | End: 2019-02-05

## 2019-01-28 NOTE — PROGRESS NOTES
Subjective   Junito Phelan is a 64 y.o. male.     Chief Complaint   Patient presents with   • Ear Fullness     pt c/o body aches, ear fullness x 3 weeks, sore throat        History of Present Illness     he is here today with his wife...noted ear fullness and sore throat  with mylagias      Current Outpatient Medications:   •  amLODIPine (NORVASC) 10 MG tablet, Take 10 mg by mouth daily.  , Disp: , Rfl:   •  aspirin 325 MG tablet, Take 325 mg by mouth daily, Disp: , Rfl:   •  clopidogrel (PLAVIX) 75 MG tablet, Take 75 mg by mouth daily.  , Disp: , Rfl:   •  Coenzyme Q10 50 MG tablet dispersible, Take by mouth daily , Disp: , Rfl:   •  hydrochlorothiazide (HYDRODIURIL) 12.5 MG tablet, Take 12.5 mg by mouth daily Indications: as needed, Disp: , Rfl:   •  isosorbide mononitrate (IMDUR) 120 MG 24 hr tablet, Take 120 mg by mouth., Disp: , Rfl:   •  LORazepam (ATIVAN) 0.5 MG tablet, Take 1 tablet by mouth 3 (Three) Times a Day As Needed for Anxiety., Disp: 90 tablet, Rfl: 0  •  metoprolol tartrate (LOPRESSOR) 25 MG tablet, Take 25 mg by mouth 2 times daily Indications: Pt unaware of dosage, Disp: , Rfl:   •  montelukast (SINGULAIR) 10 MG tablet, TAKE ONE TABLET AT BEDTIME, Disp: 30 tablet, Rfl: 2  •  nitroglycerin (NITROSTAT) 0.4 MG SL tablet, Place 1 tablet under the tongue Every 5 (Five) Minutes As Needed for Chest Pain. Take no more than 3 doses in 15 minutes., Disp: 30 tablet, Rfl: 12  •  oxyCODONE-acetaminophen (PERCOCET)  MG per tablet, Take 1 tablet by mouth., Disp: , Rfl:   •  ranolazine (RANEXA) 1000 MG 12 hr tablet, Take 1,000 mg by mouth., Disp: , Rfl:   •  REPATHA PUSHTRONEX SYSTEM 420 MG/3.5ML solution cartridge, , Disp: , Rfl:   Allergies   Allergen Reactions   • Ezetimibe-Simvastatin Other (See Comments)     Myositis/ elevated CPK  Other reaction(s): myositis/elevated CPK  Other reaction(s): Other (See Comments)  Myositis/ elevated CPK   • Morphine    • Penicillins    • Phenergan [Promethazine Hcl]   "  • Hydrocodone Rash     Other reaction(s): RASH URTICARIA   • Propoxyphene Rash   • Shellfish-Derived Products Rash       Past Medical History:   Diagnosis Date   • Hyperlipidemia    • Hypertension      Past Surgical History:   Procedure Laterality Date   • CHOLECYSTECTOMY     • COLONOSCOPY     • CORONARY STENT PLACEMENT         Review of Systems   Constitutional: Negative.    HENT: Positive for congestion and ear pain.    Eyes: Negative.    Respiratory: Negative.    Cardiovascular: Negative.    Gastrointestinal: Negative.    Endocrine: Negative.    Genitourinary: Negative.    Musculoskeletal: Negative.    Skin: Negative.    Allergic/Immunologic: Negative.    Neurological: Negative.    Hematological: Negative.    Psychiatric/Behavioral: Negative.        Objective  /62   Pulse 68   Resp 16   Ht 170.2 cm (67\")   Wt 60.3 kg (133 lb)   SpO2 97%   BMI 20.83 kg/m²   Physical Exam   Constitutional: He is oriented to person, place, and time. He appears well-developed and well-nourished.   HENT:   Head: Normocephalic and atraumatic.   Right Ear: External ear normal.   Left Ear: External ear normal.   Nose: Nose normal.   Mouth/Throat: Oropharynx is clear and moist.   Eyes: Conjunctivae and EOM are normal. Pupils are equal, round, and reactive to light.   Neck: Normal range of motion. Neck supple.   Cardiovascular: Normal rate, regular rhythm, normal heart sounds and intact distal pulses.   Pulmonary/Chest: Effort normal and breath sounds normal.   Abdominal: Soft. Bowel sounds are normal.   Musculoskeletal: Normal range of motion.   Neurological: He is alert and oriented to person, place, and time.   Skin: Skin is warm. Capillary refill takes less than 2 seconds.   Psychiatric: He has a normal mood and affect. His behavior is normal. Judgment and thought content normal.   Nursing note and vitals reviewed.      Assessment/Plan   Junito was seen today for ear fullness.    Diagnoses and all orders for this " visit:    Acute suppurative otitis media of both ears without spontaneous rupture of tympanic membranes, recurrence not specified    Other orders  -     clarithromycin (BIAXIN) 500 MG tablet; Take 1 tablet by mouth 2 (Two) Times a Day.        Keep me informrd       No orders of the defined types were placed in this encounter.      Follow up prn

## 2019-02-04 ENCOUNTER — TELEPHONE (OUTPATIENT)
Dept: FAMILY MEDICINE CLINIC | Facility: CLINIC | Age: 64
End: 2019-02-04

## 2019-02-04 NOTE — TELEPHONE ENCOUNTER
He says he is not getting any better.He says his ear is alittle better. Says he just doesn't feel like doing anything.Wants to know what to do?

## 2019-02-05 ENCOUNTER — OFFICE VISIT (OUTPATIENT)
Dept: FAMILY MEDICINE CLINIC | Facility: CLINIC | Age: 64
End: 2019-02-05

## 2019-02-05 VITALS
DIASTOLIC BLOOD PRESSURE: 62 MMHG | SYSTOLIC BLOOD PRESSURE: 90 MMHG | HEART RATE: 61 BPM | WEIGHT: 134 LBS | RESPIRATION RATE: 16 BRPM | BODY MASS INDEX: 21.03 KG/M2 | HEIGHT: 67 IN | OXYGEN SATURATION: 97 %

## 2019-02-05 DIAGNOSIS — R53.82 CHRONIC FATIGUE: ICD-10-CM

## 2019-02-05 DIAGNOSIS — R05.9 COUGH: ICD-10-CM

## 2019-02-05 DIAGNOSIS — H66.001 ACUTE SUPPURATIVE OTITIS MEDIA OF RIGHT EAR WITHOUT SPONTANEOUS RUPTURE OF TYMPANIC MEMBRANE, RECURRENCE NOT SPECIFIED: Primary | ICD-10-CM

## 2019-02-05 DIAGNOSIS — Z77.21 HISTORY OF EXPOSURE TO BLOOD OR BODY FLUID: ICD-10-CM

## 2019-02-05 PROCEDURE — 99214 OFFICE O/P EST MOD 30 MIN: CPT | Performed by: FAMILY MEDICINE

## 2019-02-05 RX ORDER — LEVOFLOXACIN 500 MG/1
500 TABLET, FILM COATED ORAL DAILY
Qty: 10 TABLET | Refills: 0 | Status: SHIPPED | OUTPATIENT
Start: 2019-02-05 | End: 2019-12-23
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
## 2019-02-06 LAB
HAV IGM SERPL QL IA: NEGATIVE
HBV CORE IGM SERPL QL IA: NEGATIVE
HBV SURFACE AG SERPL QL IA: NEGATIVE
HCV AB S/CO SERPL IA: <0.1 S/CO RATIO (ref 0–0.9)
HIV 1+2 AB+HIV1 P24 AG SERPL QL IA: NON REACTIVE
RPR SER QL: NON REACTIVE

## 2019-03-26 ENCOUNTER — APPOINTMENT (OUTPATIENT)
Dept: CT IMAGING | Age: 64
End: 2019-03-26
Payer: MEDICARE

## 2019-03-26 ENCOUNTER — HOSPITAL ENCOUNTER (EMERGENCY)
Age: 64
Discharge: HOME | End: 2019-03-26
Attending: FAMILY MEDICINE
Payer: MEDICARE

## 2019-03-26 VITALS
OXYGEN SATURATION: 96 % | BODY MASS INDEX: 19.77 KG/M2 | TEMPERATURE: 98.7 F | DIASTOLIC BLOOD PRESSURE: 82 MMHG | HEART RATE: 68 BPM | RESPIRATION RATE: 16 BRPM | SYSTOLIC BLOOD PRESSURE: 124 MMHG | WEIGHT: 130 LBS

## 2019-03-26 DIAGNOSIS — R10.9 FLANK PAIN: Primary | ICD-10-CM

## 2019-03-26 DIAGNOSIS — N20.0 NEPHROLITHIASIS: ICD-10-CM

## 2019-03-26 DIAGNOSIS — R10.9 RIGHT FLANK PAIN: ICD-10-CM

## 2019-03-26 LAB
ALBUMIN SERPL-MCNC: 4.5 G/DL (ref 3.5–5.2)
ALP BLD-CCNC: 74 U/L (ref 40–130)
ALT SERPL-CCNC: 9 U/L (ref 5–41)
ANION GAP SERPL CALCULATED.3IONS-SCNC: 14 MMOL/L (ref 7–19)
AST SERPL-CCNC: 12 U/L (ref 5–40)
BASOPHILS ABSOLUTE: 0 K/UL (ref 0–0.2)
BASOPHILS RELATIVE PERCENT: 0.5 % (ref 0–1)
BILIRUB SERPL-MCNC: <0.2 MG/DL (ref 0.2–1.2)
BILIRUBIN URINE: NEGATIVE
BLOOD, URINE: NEGATIVE
BUN BLDV-MCNC: 15 MG/DL (ref 8–23)
CALCIUM SERPL-MCNC: 9.2 MG/DL (ref 8.8–10.2)
CHLORIDE BLD-SCNC: 103 MMOL/L (ref 98–111)
CLARITY: CLEAR
CO2: 23 MMOL/L (ref 22–29)
COLOR: YELLOW
CREAT SERPL-MCNC: 0.9 MG/DL (ref 0.5–1.2)
EOSINOPHILS ABSOLUTE: 0.1 K/UL (ref 0–0.6)
EOSINOPHILS RELATIVE PERCENT: 1.6 % (ref 0–5)
GFR NON-AFRICAN AMERICAN: >60
GLUCOSE BLD-MCNC: 103 MG/DL (ref 74–109)
GLUCOSE URINE: NEGATIVE MG/DL
HCT VFR BLD CALC: 45 % (ref 42–52)
HEMOGLOBIN: 14.6 G/DL (ref 14–18)
KETONES, URINE: NEGATIVE MG/DL
LEUKOCYTE ESTERASE, URINE: NEGATIVE
LIPASE: 35 U/L (ref 13–60)
LYMPHOCYTES ABSOLUTE: 1.4 K/UL (ref 1.1–4.5)
LYMPHOCYTES RELATIVE PERCENT: 24.9 % (ref 20–40)
MCH RBC QN AUTO: 29.9 PG (ref 27–31)
MCHC RBC AUTO-ENTMCNC: 32.4 G/DL (ref 33–37)
MCV RBC AUTO: 92.2 FL (ref 80–94)
MONOCYTES ABSOLUTE: 0.7 K/UL (ref 0–0.9)
MONOCYTES RELATIVE PERCENT: 12.5 % (ref 0–10)
NEUTROPHILS ABSOLUTE: 3.4 K/UL (ref 1.5–7.5)
NEUTROPHILS RELATIVE PERCENT: 60 % (ref 50–65)
NITRITE, URINE: NEGATIVE
PDW BLD-RTO: 13.6 % (ref 11.5–14.5)
PH UA: 7 (ref 5–8)
PLATELET # BLD: 233 K/UL (ref 130–400)
PMV BLD AUTO: 9 FL (ref 9.4–12.4)
POTASSIUM REFLEX MAGNESIUM: 4.1 MMOL/L (ref 3.5–5)
PROTEIN UA: NEGATIVE MG/DL
RBC # BLD: 4.88 M/UL (ref 4.7–6.1)
SODIUM BLD-SCNC: 140 MMOL/L (ref 136–145)
SPECIFIC GRAVITY UA: 1.01 (ref 1–1.03)
TOTAL PROTEIN: 7 G/DL (ref 6.6–8.7)
UROBILINOGEN, URINE: 0.2 E.U./DL
WBC # BLD: 5.7 K/UL (ref 4.8–10.8)

## 2019-03-26 PROCEDURE — 74176 CT ABD & PELVIS W/O CONTRAST: CPT

## 2019-03-26 PROCEDURE — 85025 COMPLETE CBC W/AUTO DIFF WBC: CPT

## 2019-03-26 PROCEDURE — 81003 URINALYSIS AUTO W/O SCOPE: CPT

## 2019-03-26 PROCEDURE — 83690 ASSAY OF LIPASE: CPT

## 2019-03-26 PROCEDURE — 99284 EMERGENCY DEPT VISIT MOD MDM: CPT | Performed by: FAMILY MEDICINE

## 2019-03-26 PROCEDURE — 80053 COMPREHEN METABOLIC PANEL: CPT

## 2019-03-26 PROCEDURE — 2580000003 HC RX 258: Performed by: FAMILY MEDICINE

## 2019-03-26 PROCEDURE — 96376 TX/PRO/DX INJ SAME DRUG ADON: CPT

## 2019-03-26 PROCEDURE — 96375 TX/PRO/DX INJ NEW DRUG ADDON: CPT

## 2019-03-26 PROCEDURE — 99284 EMERGENCY DEPT VISIT MOD MDM: CPT

## 2019-03-26 PROCEDURE — 96374 THER/PROPH/DIAG INJ IV PUSH: CPT

## 2019-03-26 PROCEDURE — 36415 COLL VENOUS BLD VENIPUNCTURE: CPT

## 2019-03-26 PROCEDURE — 6360000002 HC RX W HCPCS: Performed by: FAMILY MEDICINE

## 2019-03-26 RX ORDER — MONTELUKAST SODIUM 10 MG/1
10 TABLET ORAL NIGHTLY
COMMUNITY
End: 2021-01-13

## 2019-03-26 RX ORDER — 0.9 % SODIUM CHLORIDE 0.9 %
1000 INTRAVENOUS SOLUTION INTRAVENOUS ONCE
Status: COMPLETED | OUTPATIENT
Start: 2019-03-26 | End: 2019-03-26

## 2019-03-26 RX ORDER — ONDANSETRON 2 MG/ML
4 INJECTION INTRAMUSCULAR; INTRAVENOUS ONCE
Status: COMPLETED | OUTPATIENT
Start: 2019-03-26 | End: 2019-03-26

## 2019-03-26 RX ADMIN — HYDROMORPHONE HYDROCHLORIDE 0.5 MG: 1 INJECTION, SOLUTION INTRAMUSCULAR; INTRAVENOUS; SUBCUTANEOUS at 17:27

## 2019-03-26 RX ADMIN — ONDANSETRON 4 MG: 2 INJECTION INTRAMUSCULAR; INTRAVENOUS at 17:28

## 2019-03-26 RX ADMIN — HYDROMORPHONE HYDROCHLORIDE 0.5 MG: 1 INJECTION, SOLUTION INTRAMUSCULAR; INTRAVENOUS; SUBCUTANEOUS at 18:45

## 2019-03-26 RX ADMIN — SODIUM CHLORIDE 1000 ML: 9 INJECTION, SOLUTION INTRAVENOUS at 17:28

## 2019-03-26 ASSESSMENT — ENCOUNTER SYMPTOMS
APNEA: 0
SHORTNESS OF BREATH: 0
TROUBLE SWALLOWING: 0
SORE THROAT: 0
WHEEZING: 0
VOMITING: 0
ABDOMINAL PAIN: 0
CONSTIPATION: 0
COUGH: 0
BACK PAIN: 0
ABDOMINAL DISTENTION: 0
DIARRHEA: 0
NAUSEA: 1
CHEST TIGHTNESS: 0

## 2019-03-26 ASSESSMENT — PAIN SCALES - GENERAL
PAINLEVEL_OUTOF10: 10
PAINLEVEL_OUTOF10: 7
PAINLEVEL_OUTOF10: 10

## 2019-12-05 RX ORDER — NITROGLYCERIN 0.4 MG/1
0.4 TABLET SUBLINGUAL
Qty: 30 TABLET | Refills: 2 | Status: SHIPPED | OUTPATIENT
Start: 2019-12-05 | End: 2020-12-21

## 2019-12-19 ENCOUNTER — TELEPHONE (OUTPATIENT)
Dept: FAMILY MEDICINE CLINIC | Facility: CLINIC | Age: 64
End: 2019-12-19

## 2019-12-19 NOTE — TELEPHONE ENCOUNTER
Can u please call this pt and set him up for an appt next week with either allyson or chato he had a seizure while in [place] and was told to see pcp [phone]

## 2019-12-23 ENCOUNTER — DOCUMENTATION (OUTPATIENT)
Dept: FAMILY MEDICINE CLINIC | Facility: CLINIC | Age: 64
End: 2019-12-23

## 2019-12-23 ENCOUNTER — OFFICE VISIT (OUTPATIENT)
Dept: FAMILY MEDICINE CLINIC | Facility: CLINIC | Age: 64
End: 2019-12-23

## 2019-12-23 VITALS
BODY MASS INDEX: 20.72 KG/M2 | HEIGHT: 67 IN | WEIGHT: 132 LBS | HEART RATE: 82 BPM | TEMPERATURE: 97.8 F | OXYGEN SATURATION: 99 % | SYSTOLIC BLOOD PRESSURE: 98 MMHG | DIASTOLIC BLOOD PRESSURE: 62 MMHG | RESPIRATION RATE: 14 BRPM

## 2019-12-23 DIAGNOSIS — R56.9 SEIZURE (HCC): Primary | ICD-10-CM

## 2019-12-23 PROCEDURE — 99213 OFFICE O/P EST LOW 20 MIN: CPT | Performed by: FAMILY MEDICINE

## 2019-12-23 RX ORDER — ISOSORBIDE MONONITRATE 120 MG/1
90 TABLET, EXTENDED RELEASE ORAL 2 TIMES DAILY
COMMUNITY

## 2019-12-23 NOTE — PROGRESS NOTES
Subjective   Junito Phelan is a 64 y.o. male.     Chief Complaint   Patient presents with   • Seizures     F/U hospital stay in [place], [place]       History of Present Illness     while in [facility] Junito had a single seizure lasting a few min..he was admitted and saw neurology and had eeg and ct scan and sent home..      Current Outpatient Medications:   •  amLODIPine (NORVASC) 10 MG tablet, Take 10 mg by mouth daily.  , Disp: , Rfl:   •  aspirin 325 MG tablet, Take 325 mg by mouth daily, Disp: , Rfl:   •  clopidogrel (PLAVIX) 75 MG tablet, Take 75 mg by mouth daily.  , Disp: , Rfl:   •  Coenzyme Q10 50 MG tablet dispersible, Take by mouth daily , Disp: , Rfl:   •  hydrochlorothiazide (HYDRODIURIL) 12.5 MG tablet, Take 12.5 mg by mouth daily Indications: as needed, Disp: , Rfl:   •  isosorbide mononitrate (IMDUR) 120 MG 24 hr tablet, Take 120 mg by mouth Daily., Disp: , Rfl:   •  LORazepam (ATIVAN) 0.5 MG tablet, Take 1 tablet by mouth 3 (Three) Times a Day As Needed for Anxiety., Disp: 90 tablet, Rfl: 0  •  metoprolol tartrate (LOPRESSOR) 25 MG tablet, Take 25 mg by mouth 2 times daily Indications: Pt unaware of dosage, Disp: , Rfl:   •  nitroglycerin (NITROSTAT) 0.4 MG SL tablet, Place 1 tablet under the tongue Every 5 (Five) Minutes As Needed for Chest Pain. Take no more than 3 doses in 15 minutes., Disp: 30 tablet, Rfl: 2  •  oxyCODONE-acetaminophen (PERCOCET)  MG per tablet, Take 1 tablet by mouth., Disp: , Rfl:   •  REPATHA PUSHTRONEX SYSTEM 420 MG/3.5ML solution cartridge, , Disp: , Rfl:   •  montelukast (SINGULAIR) 10 MG tablet, TAKE ONE TABLET AT BEDTIME, Disp: 30 tablet, Rfl: 2  Allergies   Allergen Reactions   • Ezetimibe-Simvastatin Other (See Comments)     Myositis/ elevated CPK  Other reaction(s): myositis/elevated CPK  Other reaction(s): Other (See Comments)  Myositis/ elevated CPK   • Morphine    • Penicillins    • Phenergan [Promethazine Hcl]    • Hydrocodone Rash     Other reaction(s): RASH  "URTICARIA   • Propoxyphene Rash   • Shellfish-Derived Products Rash       Past Medical History:   Diagnosis Date   • Hyperlipidemia    • Hypertension      Past Surgical History:   Procedure Laterality Date   • CHOLECYSTECTOMY     • COLONOSCOPY     • CORONARY STENT PLACEMENT         Review of Systems   Constitutional: Negative.    HENT: Negative.    Eyes: Negative.    Respiratory: Negative.    Cardiovascular: Negative.    Gastrointestinal: Negative.    Endocrine: Negative.    Genitourinary: Negative.    Musculoskeletal: Negative.    Skin: Negative.    Allergic/Immunologic: Negative.    Neurological: Positive for seizures.   Hematological: Negative.    Psychiatric/Behavioral: Negative.        Objective  BP 98/62 (BP Location: Left arm, Patient Position: Sitting)   Pulse 82   Temp 97.8 °F (36.6 °C) (Temporal)   Resp 14   Ht 170.2 cm (67\")   Wt 59.9 kg (132 lb)   SpO2 99%   BMI 20.67 kg/m²   Physical Exam   Constitutional: He is oriented to person, place, and time. He appears well-developed and well-nourished.   HENT:   Head: Normocephalic and atraumatic.   Right Ear: External ear normal.   Left Ear: External ear normal.   Nose: Nose normal.   Mouth/Throat: Oropharynx is clear and moist.   Eyes: Pupils are equal, round, and reactive to light. Conjunctivae and EOM are normal.   Neck: Normal range of motion. Neck supple.   Cardiovascular: Normal rate, regular rhythm, normal heart sounds and intact distal pulses.   Pulmonary/Chest: Effort normal and breath sounds normal.   Abdominal: Soft. Bowel sounds are normal.   Musculoskeletal: Normal range of motion.   Neurological: He is alert and oriented to person, place, and time.   Skin: Skin is warm. Capillary refill takes less than 2 seconds.   Psychiatric: He has a normal mood and affect. His behavior is normal. Judgment and thought content normal.   Nursing note and vitals reviewed.      Assessment/Plan   Junito was seen today for seizures.    Diagnoses and all orders " for this visit:    Seizure (CMS/HCC)                  No orders of the defined types were placed in this encounter.      Follow up: 2 month(s)

## 2020-06-17 ENCOUNTER — APPOINTMENT (OUTPATIENT)
Dept: CT IMAGING | Age: 65
End: 2020-06-17
Payer: MEDICARE

## 2020-06-17 ENCOUNTER — HOSPITAL ENCOUNTER (EMERGENCY)
Age: 65
Discharge: HOME | End: 2020-06-17
Attending: EMERGENCY MEDICINE
Payer: MEDICARE

## 2020-06-17 VITALS
RESPIRATION RATE: 20 BRPM | OXYGEN SATURATION: 95 % | HEIGHT: 68 IN | WEIGHT: 142 LBS | DIASTOLIC BLOOD PRESSURE: 99 MMHG | HEART RATE: 89 BPM | BODY MASS INDEX: 21.52 KG/M2 | SYSTOLIC BLOOD PRESSURE: 143 MMHG | TEMPERATURE: 98.1 F

## 2020-06-17 LAB
ALBUMIN SERPL-MCNC: 5 G/DL (ref 3.5–5.2)
ALP BLD-CCNC: 93 U/L (ref 40–130)
ALT SERPL-CCNC: 12 U/L (ref 5–41)
ANION GAP SERPL CALCULATED.3IONS-SCNC: 11 MMOL/L (ref 7–19)
AST SERPL-CCNC: 15 U/L (ref 5–40)
BACTERIA: NEGATIVE /HPF
BASOPHILS ABSOLUTE: 0 K/UL (ref 0–0.2)
BASOPHILS RELATIVE PERCENT: 0.6 % (ref 0–1)
BILIRUB SERPL-MCNC: 0.7 MG/DL (ref 0.2–1.2)
BILIRUBIN URINE: NEGATIVE
BLOOD, URINE: ABNORMAL
BUN BLDV-MCNC: 14 MG/DL (ref 8–23)
CALCIUM SERPL-MCNC: 9.3 MG/DL (ref 8.8–10.2)
CHLORIDE BLD-SCNC: 102 MMOL/L (ref 98–111)
CLARITY: CLEAR
CO2: 27 MMOL/L (ref 22–29)
COLOR: YELLOW
CREAT SERPL-MCNC: 0.8 MG/DL (ref 0.5–1.2)
EOSINOPHILS ABSOLUTE: 0 K/UL (ref 0–0.6)
EOSINOPHILS RELATIVE PERCENT: 0.6 % (ref 0–5)
EPITHELIAL CELLS, UA: 0 /HPF (ref 0–5)
GFR NON-AFRICAN AMERICAN: >60
GLUCOSE BLD-MCNC: 99 MG/DL (ref 74–109)
GLUCOSE URINE: NEGATIVE MG/DL
HCT VFR BLD CALC: 45.8 % (ref 42–52)
HEMOGLOBIN: 15.6 G/DL (ref 14–18)
HYALINE CASTS: 0 /HPF (ref 0–8)
IMMATURE GRANULOCYTES #: 0 K/UL
KETONES, URINE: NEGATIVE MG/DL
LEUKOCYTE ESTERASE, URINE: NEGATIVE
LYMPHOCYTES ABSOLUTE: 1.7 K/UL (ref 1.1–4.5)
LYMPHOCYTES RELATIVE PERCENT: 23.4 % (ref 20–40)
MCH RBC QN AUTO: 28.8 PG (ref 27–31)
MCHC RBC AUTO-ENTMCNC: 34.1 G/DL (ref 33–37)
MCV RBC AUTO: 84.5 FL (ref 80–94)
MONOCYTES ABSOLUTE: 0.6 K/UL (ref 0–0.9)
MONOCYTES RELATIVE PERCENT: 8.6 % (ref 0–10)
NEUTROPHILS ABSOLUTE: 4.8 K/UL (ref 1.5–7.5)
NEUTROPHILS RELATIVE PERCENT: 66.4 % (ref 50–65)
NITRITE, URINE: NEGATIVE
PDW BLD-RTO: 13.5 % (ref 11.5–14.5)
PH UA: 5.5 (ref 5–8)
PLATELET # BLD: 216 K/UL (ref 130–400)
PMV BLD AUTO: 8.6 FL (ref 9.4–12.4)
POTASSIUM REFLEX MAGNESIUM: 3.8 MMOL/L (ref 3.5–5)
PROTEIN UA: NEGATIVE MG/DL
RBC # BLD: 5.42 M/UL (ref 4.7–6.1)
RBC UA: 5 /HPF (ref 0–4)
SODIUM BLD-SCNC: 140 MMOL/L (ref 136–145)
SPECIFIC GRAVITY UA: 1.02 (ref 1–1.03)
TOTAL PROTEIN: 8 G/DL (ref 6.6–8.7)
UROBILINOGEN, URINE: 0.2 E.U./DL
WBC # BLD: 7.3 K/UL (ref 4.8–10.8)
WBC UA: 1 /HPF (ref 0–5)
YEAST: ABNORMAL /HPF

## 2020-06-17 PROCEDURE — 81001 URINALYSIS AUTO W/SCOPE: CPT

## 2020-06-17 PROCEDURE — 85025 COMPLETE CBC W/AUTO DIFF WBC: CPT

## 2020-06-17 PROCEDURE — 6360000002 HC RX W HCPCS: Performed by: EMERGENCY MEDICINE

## 2020-06-17 PROCEDURE — 99284 EMERGENCY DEPT VISIT MOD MDM: CPT

## 2020-06-17 PROCEDURE — 96375 TX/PRO/DX INJ NEW DRUG ADDON: CPT

## 2020-06-17 PROCEDURE — 6360000002 HC RX W HCPCS: Performed by: NURSE PRACTITIONER

## 2020-06-17 PROCEDURE — 36415 COLL VENOUS BLD VENIPUNCTURE: CPT

## 2020-06-17 PROCEDURE — 74150 CT ABDOMEN W/O CONTRAST: CPT

## 2020-06-17 PROCEDURE — 96374 THER/PROPH/DIAG INJ IV PUSH: CPT

## 2020-06-17 PROCEDURE — 80053 COMPREHEN METABOLIC PANEL: CPT

## 2020-06-17 RX ORDER — TAMSULOSIN HYDROCHLORIDE 0.4 MG/1
0.4 CAPSULE ORAL DAILY
Qty: 10 CAPSULE | Refills: 0 | Status: SHIPPED | OUTPATIENT
Start: 2020-06-17 | End: 2020-10-04

## 2020-06-17 RX ORDER — ONDANSETRON 4 MG/1
4 TABLET, ORALLY DISINTEGRATING ORAL EVERY 8 HOURS PRN
Qty: 15 TABLET | Refills: 0 | Status: SHIPPED | OUTPATIENT
Start: 2020-06-17 | End: 2020-11-04 | Stop reason: ALTCHOICE

## 2020-06-17 RX ORDER — KETOROLAC TROMETHAMINE 30 MG/ML
30 INJECTION, SOLUTION INTRAMUSCULAR; INTRAVENOUS ONCE
Status: COMPLETED | OUTPATIENT
Start: 2020-06-17 | End: 2020-06-17

## 2020-06-17 RX ORDER — MEPERIDINE HYDROCHLORIDE 50 MG/ML
25 INJECTION INTRAMUSCULAR; INTRAVENOUS; SUBCUTANEOUS ONCE
Status: COMPLETED | OUTPATIENT
Start: 2020-06-17 | End: 2020-06-17

## 2020-06-17 RX ORDER — ONDANSETRON 2 MG/ML
4 INJECTION INTRAMUSCULAR; INTRAVENOUS ONCE
Status: COMPLETED | OUTPATIENT
Start: 2020-06-17 | End: 2020-06-17

## 2020-06-17 RX ADMIN — MEPERIDINE HYDROCHLORIDE 25 MG: 50 INJECTION, SOLUTION INTRAMUSCULAR; INTRAVENOUS; SUBCUTANEOUS at 20:49

## 2020-06-17 RX ADMIN — ONDANSETRON 4 MG: 2 INJECTION INTRAMUSCULAR; INTRAVENOUS at 19:15

## 2020-06-17 RX ADMIN — KETOROLAC TROMETHAMINE 30 MG: 30 INJECTION, SOLUTION INTRAMUSCULAR at 19:15

## 2020-06-17 RX ADMIN — MEPERIDINE HYDROCHLORIDE 25 MG: 50 INJECTION, SOLUTION INTRAMUSCULAR; INTRAVENOUS; SUBCUTANEOUS at 21:18

## 2020-06-17 ASSESSMENT — PAIN SCALES - GENERAL
PAINLEVEL_OUTOF10: 8
PAINLEVEL_OUTOF10: 7
PAINLEVEL_OUTOF10: 8

## 2020-06-17 ASSESSMENT — PAIN DESCRIPTION - LOCATION
LOCATION: FLANK
LOCATION: FLANK

## 2020-06-17 ASSESSMENT — ENCOUNTER SYMPTOMS
VOMITING: 1
NAUSEA: 1
ABDOMINAL PAIN: 1

## 2020-06-17 ASSESSMENT — PAIN DESCRIPTION - ORIENTATION: ORIENTATION: RIGHT

## 2020-06-18 NOTE — ED PROVIDER NOTES
[facility] EMERGENCY DEPT  eMERGENCY dEPARTMENT eNCOUnter      Pt Name: Gabino Pike  MRN: [id number]  Radhagfnadege 1955  Date of evaluation: 6/17/2020  Provider: Derrell Alvarez MD    [address] [place] [address]       Chief Complaint   Patient presents with    Flank Pain     right, states he passed multiple stones today         HISTORY OF PRESENT ILLNESS   (Location/Symptom, Timing/Onset,Context/Setting, Quality, Duration, Modifying Factors, Severity)  Note limiting factors. Gabino Pike is a 72 y.o. male who presents to the emergency department      The history is provided by the patient. Abdominal Pain   Pain location:  R flank  Pain quality: aching    Pain radiates to:  Groin  Pain severity now: 8/10. Onset quality:  Sudden  Duration: last pm.  Timing:  Constant  Progression:  Waxing and waning  Chronicity:  Recurrent (\"stones\")  Relieved by:  Nothing  Worsened by:  Nothing  Ineffective treatments: \"throw my percocet up\"  Associated symptoms: hematuria (earlier today), nausea and vomiting (x 6)    Associated symptoms: no dysuria and no fever    Associated symptoms comment:  Reports he passed a stone earlier but still has pain      NursingNotes were reviewed. REVIEW OF SYSTEMS    (2-9 systems for level 4, 10 or more for level 5)     Review of Systems   Constitutional: Negative for fever. Gastrointestinal: Positive for abdominal pain, nausea and vomiting (x 6). Genitourinary: Positive for hematuria (earlier today). Negative for dysuria. All other systems reviewed and are negative. Except as noted above the remainder of the review of systems was reviewed and negative. PAST MEDICAL HISTORY     Past Medical History:   Diagnosis Date    CAD (coronary artery disease) 2010    Native Vessel. S/p stenting wtih negative cardiac cath on January 18, 2010 and negative nuclear stress test on June 29, 2010.     Chest pain 1/28/2016    DJD (degenerative joint disease)     GERD (gastroesophageal reflux disease) hours as needed for Pain . Historical Med      calcium carbonate (OSCAL) 500 MG TABS tablet Take 500 mg by mouth dailyHistorical Med      aspirin 325 MG tablet Take 325 mg by mouth daily      metoprolol tartrate (LOPRESSOR) 25 MG tablet Take 25 mg by mouth 2 times daily Indications: Pt unaware of dosage      isosorbide mononitrate (IMDUR) 120 MG CR tablet Take 120 mg by mouth daily      hydrochlorothiazide (HYDRODIURIL) 12.5 MG tablet Take 12.5 mg by mouth daily Indications: as needed      Coenzyme Q10 (COQ-10 PO) Take by mouth daily       ranolazine (RANEXA) 500 MG SR tablet Take 500 mg by mouth 2 times daily      LORazepam (ATIVAN) 1 MG tablet Take 1 mg by mouth 3 times daily as needed, R-5      rosuvastatin (CRESTOR) 40 MG tablet Take 40 mg by mouth every evening      amlodipine (NORVASC) 10 MG tablet Take 10 mg by mouth daily. clopidogrel (PLAVIX) 75 MG tablet Take 75 mg by mouth daily. Fluocinonide 0.1 % CREA APPLY 2-3 GRAMS TO AFFECTED AREAS 3-4 TIMES DAILY (1 GRAM = 1 DIME SIZE), R-6Historical Med      diclofenac sodium 1 % GEL Apply 2 g topically 2 times daily, Topical, 2 TIMES DAILY, Until Discontinued, Historical Med      Nutritional Supplements (BOOST PO) Take by mouth      NITROSTAT 0.4 MG SL tablet Place 1 tablet under the tongue 3 times daily as needed, R-0, RUBEN             ALLERGIES     Darvocet [propoxyphene n-acetaminophen]; Ezetimibe-simvastatin; Morphine; Penicillins; Phenergan [promethazine hcl]; Promethazine; Promethazine hcl; and Propoxyphene    FAMILY HISTORY     History reviewed. No pertinent family history.        SOCIAL HISTORY       Social History     Socioeconomic History    Marital status:      Spouse name: None    Number of children: None    Years of education: None    Highest education level: None   Occupational History     Employer: DISABLED   Social Needs    Financial resource strain: None    Food insecurity     Worry: None     Inability: None   

## 2020-06-24 ENCOUNTER — TELEPHONE (OUTPATIENT)
Dept: UROLOGY | Age: 65
End: 2020-06-24

## 2020-07-16 ENCOUNTER — TELEPHONE (OUTPATIENT)
Dept: FAMILY MEDICINE CLINIC | Facility: CLINIC | Age: 65
End: 2020-07-16

## 2020-07-16 NOTE — TELEPHONE ENCOUNTER
Patients wife called in wanting to see if the patient could come in to see Dr. Stover for a cyst on his shoulder. She states he has had this before but it has came back and is now needing it to be removed. Per the front office, a referral will be needed for the patient to see a specialist to have removed. Please call patient or his wife once referral has been placed. Phone number is [phone]

## 2020-07-21 ENCOUNTER — OFFICE VISIT (OUTPATIENT)
Dept: FAMILY MEDICINE CLINIC | Facility: CLINIC | Age: 65
End: 2020-07-21

## 2020-07-21 VITALS
HEIGHT: 67 IN | OXYGEN SATURATION: 98 % | SYSTOLIC BLOOD PRESSURE: 104 MMHG | TEMPERATURE: 98 F | BODY MASS INDEX: 22.6 KG/M2 | WEIGHT: 144 LBS | RESPIRATION RATE: 14 BRPM | DIASTOLIC BLOOD PRESSURE: 60 MMHG | HEART RATE: 92 BPM

## 2020-07-21 DIAGNOSIS — R56.9 SEIZURE (HCC): ICD-10-CM

## 2020-07-21 DIAGNOSIS — L72.3 SEBACEOUS CYST: ICD-10-CM

## 2020-07-21 DIAGNOSIS — R79.9 ABNORMAL FINDING OF BLOOD CHEMISTRY, UNSPECIFIED: ICD-10-CM

## 2020-07-21 DIAGNOSIS — I10 ESSENTIAL HYPERTENSION: ICD-10-CM

## 2020-07-21 DIAGNOSIS — I25.119 ATHEROSCLEROSIS OF NATIVE CORONARY ARTERY OF NATIVE HEART WITH ANGINA PECTORIS (HCC): ICD-10-CM

## 2020-07-21 DIAGNOSIS — I25.10 CORONARY ARTERY DISEASE INVOLVING NATIVE CORONARY ARTERY OF NATIVE HEART WITHOUT ANGINA PECTORIS: Primary | ICD-10-CM

## 2020-07-21 DIAGNOSIS — E78.2 MIXED HYPERLIPIDEMIA: ICD-10-CM

## 2020-07-21 DIAGNOSIS — Z12.11 COLON CANCER SCREENING: ICD-10-CM

## 2020-07-21 PROCEDURE — G0439 PPPS, SUBSEQ VISIT: HCPCS | Performed by: FAMILY MEDICINE

## 2020-07-21 PROCEDURE — 99214 OFFICE O/P EST MOD 30 MIN: CPT | Performed by: FAMILY MEDICINE

## 2020-07-21 RX ORDER — MINOCYCLINE HYDROCHLORIDE 50 MG/1
50 CAPSULE ORAL 2 TIMES DAILY
Qty: 60 CAPSULE | Refills: 1 | Status: SHIPPED | OUTPATIENT
Start: 2020-07-21 | End: 2020-09-28

## 2020-07-21 RX ORDER — POTASSIUM CHLORIDE 750 MG/1
10 TABLET, EXTENDED RELEASE ORAL AS NEEDED
COMMUNITY
Start: 2020-05-14 | End: 2021-05-15

## 2020-07-21 NOTE — PROGRESS NOTES
The ABCs of the Annual Wellness Visit  Subsequent Medicare Wellness Visit    Chief Complaint   Patient presents with   • Cyst     Right shoulder x 6 months       Subjective   History of Present Illness:  Junito Phelan is a 65 y.o. male who presents for a Subsequent Medicare Wellness Visit.    HEALTH RISK ASSESSMENT    Recent Hospitalizations:  No hospitalization(s) within the last year.    Current Medical Providers:  Patient Care Team:  Jim Stover MD as PCP - General (Family Medicine)    Smoking Status:  Social History     Tobacco Use   Smoking Status Never Smoker   Smokeless Tobacco Never Used       Alcohol Consumption:  Social History     Substance and Sexual Activity   Alcohol Use No       Depression Screen:   PHQ-2/PHQ-9 Depression Screening 7/21/2020   Little interest or pleasure in doing things 1   Feeling down, depressed, or hopeless 0   Total Score 1       Fall Risk Screen:  STEADI Fall Risk Assessment was completed, and patient is at MODERATE risk for falls. Assessment completed on:7/21/2020    Health Habits and Functional and Cognitive Screening:  Functional & Cognitive Status 7/21/2020   Do you have difficulty preparing food and eating? No   Do you have difficulty bathing yourself, getting dressed or grooming yourself? No   Do you have difficulty using the toilet? No   Do you have difficulty moving around from place to place? No   Do you have trouble with steps or getting out of a bed or a chair? No   Current Diet Well Balanced Diet   Dental Exam Up to date   Eye Exam Up to date   Exercise (times per week) 5 times per week   Current Exercise Activities Include Bicycling Outdoors   Do you need help using the phone?  No   Are you deaf or do you have serious difficulty hearing?  No   Do you need help with transportation? No   Do you need help shopping? No   Do you need help preparing meals?  No   Do you need help with housework?  No   Do you need help with laundry? No   Do you need help taking  your medications? No   Do you need help managing money? No   Do you ever drive or ride in a car without wearing a seat belt? No   Have you felt unusual stress, anger or loneliness in the last month? No   Who do you live with? Spouse   If you need help, do you have trouble finding someone available to you? No   Have you been bothered in the last four weeks by sexual problems? No   Do you have difficulty concentrating, remembering or making decisions? Yes         Does the patient have evidence of cognitive impairment? No    Asprin use counseling:Taking ASA appropriately as indicated    Age-appropriate Screening Schedule:  Refer to the list below for future screening recommendations based on patient's age, sex and/or medical conditions. Orders for these recommended tests are listed in the plan section. The patient has been provided with a written plan.    Health Maintenance   Topic Date Due   • ZOSTER VACCINE (1 of 2) 01/22/2005   • COLONOSCOPY  10/10/2016   • LIPID PANEL  09/01/2019   • INFLUENZA VACCINE  08/01/2020   • TDAP/TD VACCINES (3 - Td) 12/17/2028          The following portions of the patient's history were reviewed and updated as appropriate: allergies, current medications, past family history, past medical history, past social history, past surgical history and problem list.    Outpatient Medications Prior to Visit   Medication Sig Dispense Refill   • amLODIPine (NORVASC) 10 MG tablet Take 10 mg by mouth daily.       • aspirin 325 MG tablet Take 325 mg by mouth daily     • clopidogrel (PLAVIX) 75 MG tablet Take 75 mg by mouth daily.       • Coenzyme Q10 50 MG tablet dispersible Take by mouth daily      • hydrochlorothiazide (HYDRODIURIL) 12.5 MG tablet Take 12.5 mg by mouth daily Indications: as needed     • isosorbide mononitrate (IMDUR) 120 MG 24 hr tablet Take 120 mg by mouth Daily.     • LORazepam (ATIVAN) 0.5 MG tablet Take 1 tablet by mouth 3 (Three) Times a Day As Needed for Anxiety. 90 tablet 0   •  "metoprolol tartrate (LOPRESSOR) 25 MG tablet Take 25 mg by mouth 2 times daily Indications: Pt unaware of dosage     • nitroglycerin (NITROSTAT) 0.4 MG SL tablet Place 1 tablet under the tongue Every 5 (Five) Minutes As Needed for Chest Pain. Take no more than 3 doses in 15 minutes. 30 tablet 2   • oxyCODONE-acetaminophen (PERCOCET)  MG per tablet Take 1 tablet by mouth.     • potassium chloride (K-DUR,KLOR-CON) 10 MEQ CR tablet Take 10 mEq by mouth.     • REPATHA PUSHTRONEX SYSTEM 420 MG/3.5ML solution cartridge      • montelukast (SINGULAIR) 10 MG tablet TAKE ONE TABLET AT BEDTIME 30 tablet 2     No facility-administered medications prior to visit.        Patient Active Problem List   Diagnosis   • Atherosclerosis of native coronary artery of native heart with angina pectoris (CMS/HCC)   • Essential hypertension   • Mixed hyperlipidemia   • Chronic stable angina (CMS/HCC)   • Anxiety   • Neuropathy   • Diarrhea   • Chronic pain due to injury   • Rhinitis   • Flank pain   • Hematuria   • Acute seasonal allergic rhinitis due to pollen   • Dog bite of right arm   • Acute suppurative otitis media of right ear without spontaneous rupture of tympanic membrane   • Seizure (CMS/HCC)   • Sebaceous cyst       Advanced Care Planning:  ACP discussion was held with the patient during this visit. Patient does not have an advance directive, information provided.    Review of Systems    Compared to one year ago, the patient feels his physical health is the same.  Compared to one year ago, the patient feels his mental health is the same.    Reviewed chart for potential of high risk medication in the elderly: yes  Reviewed chart for potential of harmful drug interactions in the elderly:yes    Objective         Vitals:    07/21/20 1300   BP: 104/60   Pulse: 92   Resp: 14   Temp: 98 °F (36.7 °C)   SpO2: 98%   Weight: 65.3 kg (144 lb)   Height: 170.2 cm (67\")   PainSc:   6   PainLoc: Arm  Comment: Right       Body mass index is " 22.55 kg/m².  Discussed the patient's BMI with him. The BMI is in the acceptable range.    Physical Exam          Assessment/Plan   Medicare Risks and Personalized Health Plan  CMS Preventative Services Quick Reference  Advance Directive Discussion    The above risks/problems have been discussed with the patient.  Pertinent information has been shared with the patient in the After Visit Summary.  Follow up plans and orders are seen below in the Assessment/Plan Section.    Diagnoses and all orders for this visit:    1. Coronary artery disease involving native coronary artery of native heart without angina pectoris (Primary)    2. Seizure (CMS/HCC)    3. Atherosclerosis of native coronary artery of native heart with angina pectoris (CMS/HCC)    4. Essential hypertension    5. Mixed hyperlipidemia    6. Sebaceous cyst  -     Comprehensive metabolic panel  -     Lipid Panel With / Chol / HDL Ratio    7. Abnormal finding of blood chemistry, unspecified   -     Lipid Panel With / Chol / HDL Ratio    8. Colon cancer screening  -     Ambulatory Referral to Gastroenterology    Other orders  -     minocycline (Minocin) 50 MG capsule; Take 1 capsule by mouth 2 (Two) Times a Day.  Dispense: 60 capsule; Refill: 1      Follow Up:  No follow-ups on file.     An After Visit Summary and PPPS were given to the patient.

## 2020-07-22 LAB
ALBUMIN SERPL-MCNC: 4.5 G/DL (ref 3.5–5.2)
ALBUMIN/GLOB SERPL: 1.9 G/DL
ALP SERPL-CCNC: 85 U/L (ref 39–117)
ALT SERPL-CCNC: 18 U/L (ref 1–41)
AST SERPL-CCNC: 15 U/L (ref 1–40)
BILIRUB SERPL-MCNC: 0.4 MG/DL (ref 0–1.2)
BUN SERPL-MCNC: 13 MG/DL (ref 8–23)
BUN/CREAT SERPL: 13.4 (ref 7–25)
CALCIUM SERPL-MCNC: 8.9 MG/DL (ref 8.6–10.5)
CHLORIDE SERPL-SCNC: 104 MMOL/L (ref 98–107)
CHOLEST SERPL-MCNC: 100 MG/DL (ref 0–200)
CHOLEST/HDLC SERPL: 1.89 {RATIO}
CO2 SERPL-SCNC: 27.8 MMOL/L (ref 22–29)
CREAT SERPL-MCNC: 0.97 MG/DL (ref 0.76–1.27)
GLOBULIN SER CALC-MCNC: 2.4 GM/DL
GLUCOSE SERPL-MCNC: 100 MG/DL (ref 65–99)
HDLC SERPL-MCNC: 53 MG/DL (ref 40–60)
LDLC SERPL CALC-MCNC: 30 MG/DL (ref 0–100)
POTASSIUM SERPL-SCNC: 4.1 MMOL/L (ref 3.5–5.2)
PROT SERPL-MCNC: 6.9 G/DL (ref 6–8.5)
SODIUM SERPL-SCNC: 140 MMOL/L (ref 136–145)
TRIGL SERPL-MCNC: 83 MG/DL (ref 0–150)
VLDLC SERPL CALC-MCNC: 16.6 MG/DL

## 2020-08-09 ENCOUNTER — HOSPITAL ENCOUNTER (EMERGENCY)
Age: 65
Discharge: ANOTHER HOSPITAL | End: 2020-08-09
Attending: PEDIATRICS
Payer: MEDICARE

## 2020-08-09 ENCOUNTER — APPOINTMENT (OUTPATIENT)
Dept: GENERAL RADIOLOGY | Age: 65
End: 2020-08-09
Payer: MEDICARE

## 2020-08-09 VITALS
SYSTOLIC BLOOD PRESSURE: 104 MMHG | OXYGEN SATURATION: 96 % | WEIGHT: 142 LBS | HEIGHT: 68 IN | RESPIRATION RATE: 17 BRPM | DIASTOLIC BLOOD PRESSURE: 79 MMHG | HEART RATE: 96 BPM | BODY MASS INDEX: 21.52 KG/M2 | TEMPERATURE: 98.7 F

## 2020-08-09 LAB
ALBUMIN SERPL-MCNC: 4.6 G/DL (ref 3.5–5.2)
ALP BLD-CCNC: 87 U/L (ref 40–130)
ALT SERPL-CCNC: 12 U/L (ref 5–41)
ANION GAP SERPL CALCULATED.3IONS-SCNC: 10 MMOL/L (ref 7–19)
APTT: 26.4 SEC (ref 26–36.2)
AST SERPL-CCNC: 12 U/L (ref 5–40)
BASOPHILS ABSOLUTE: 0 K/UL (ref 0–0.2)
BASOPHILS RELATIVE PERCENT: 0.4 % (ref 0–1)
BILIRUB SERPL-MCNC: 0.4 MG/DL (ref 0.2–1.2)
BILIRUBIN URINE: NEGATIVE
BLOOD, URINE: NEGATIVE
BUN BLDV-MCNC: 14 MG/DL (ref 8–23)
CALCIUM SERPL-MCNC: 9.3 MG/DL (ref 8.8–10.2)
CHLORIDE BLD-SCNC: 104 MMOL/L (ref 98–111)
CLARITY: CLEAR
CO2: 28 MMOL/L (ref 22–29)
COLOR: YELLOW
CREAT SERPL-MCNC: 0.9 MG/DL (ref 0.5–1.2)
EOSINOPHILS ABSOLUTE: 0 K/UL (ref 0–0.6)
EOSINOPHILS RELATIVE PERCENT: 0.3 % (ref 0–5)
GFR AFRICAN AMERICAN: >59
GFR NON-AFRICAN AMERICAN: >60
GLUCOSE BLD-MCNC: 110 MG/DL (ref 74–109)
GLUCOSE URINE: NEGATIVE MG/DL
HCT VFR BLD CALC: 41.8 % (ref 42–52)
HEMOGLOBIN: 14.2 G/DL (ref 14–18)
IMMATURE GRANULOCYTES #: 0 K/UL
INR BLD: 0.96 (ref 0.88–1.18)
KETONES, URINE: NEGATIVE MG/DL
LEUKOCYTE ESTERASE, URINE: NEGATIVE
LYMPHOCYTES ABSOLUTE: 1.7 K/UL (ref 1.1–4.5)
LYMPHOCYTES RELATIVE PERCENT: 24.4 % (ref 20–40)
MCH RBC QN AUTO: 28.3 PG (ref 27–31)
MCHC RBC AUTO-ENTMCNC: 34 G/DL (ref 33–37)
MCV RBC AUTO: 83.3 FL (ref 80–94)
MONOCYTES ABSOLUTE: 0.6 K/UL (ref 0–0.9)
MONOCYTES RELATIVE PERCENT: 8.7 % (ref 0–10)
NEUTROPHILS ABSOLUTE: 4.5 K/UL (ref 1.5–7.5)
NEUTROPHILS RELATIVE PERCENT: 65.6 % (ref 50–65)
NITRITE, URINE: NEGATIVE
PDW BLD-RTO: 13.3 % (ref 11.5–14.5)
PH UA: 6 (ref 5–8)
PLATELET # BLD: 232 K/UL (ref 130–400)
PMV BLD AUTO: 8.9 FL (ref 9.4–12.4)
POTASSIUM REFLEX MAGNESIUM: 4 MMOL/L (ref 3.5–5)
PRO-BNP: 164 PG/ML (ref 0–900)
PROTEIN UA: NEGATIVE MG/DL
PROTHROMBIN TIME: 12.7 SEC (ref 12–14.6)
RBC # BLD: 5.02 M/UL (ref 4.7–6.1)
SODIUM BLD-SCNC: 142 MMOL/L (ref 136–145)
SPECIFIC GRAVITY UA: 1.02 (ref 1–1.03)
TOTAL PROTEIN: 7.1 G/DL (ref 6.6–8.7)
TROPONIN: <0.01 NG/ML (ref 0–0.03)
UROBILINOGEN, URINE: 0.2 E.U./DL
WBC # BLD: 6.8 K/UL (ref 4.8–10.8)

## 2020-08-09 PROCEDURE — 71045 X-RAY EXAM CHEST 1 VIEW: CPT

## 2020-08-09 PROCEDURE — 85025 COMPLETE CBC W/AUTO DIFF WBC: CPT

## 2020-08-09 PROCEDURE — 93005 ELECTROCARDIOGRAM TRACING: CPT | Performed by: PEDIATRICS

## 2020-08-09 PROCEDURE — 96367 TX/PROPH/DG ADDL SEQ IV INF: CPT

## 2020-08-09 PROCEDURE — 85730 THROMBOPLASTIN TIME PARTIAL: CPT

## 2020-08-09 PROCEDURE — 96375 TX/PRO/DX INJ NEW DRUG ADDON: CPT

## 2020-08-09 PROCEDURE — 96365 THER/PROPH/DIAG IV INF INIT: CPT

## 2020-08-09 PROCEDURE — 80053 COMPREHEN METABOLIC PANEL: CPT

## 2020-08-09 PROCEDURE — 99284 EMERGENCY DEPT VISIT MOD MDM: CPT

## 2020-08-09 PROCEDURE — 2500000003 HC RX 250 WO HCPCS: Performed by: PEDIATRICS

## 2020-08-09 PROCEDURE — 96366 THER/PROPH/DIAG IV INF ADDON: CPT

## 2020-08-09 PROCEDURE — 36415 COLL VENOUS BLD VENIPUNCTURE: CPT

## 2020-08-09 PROCEDURE — 81003 URINALYSIS AUTO W/O SCOPE: CPT

## 2020-08-09 PROCEDURE — 84484 ASSAY OF TROPONIN QUANT: CPT

## 2020-08-09 PROCEDURE — 6360000002 HC RX W HCPCS: Performed by: PEDIATRICS

## 2020-08-09 PROCEDURE — 85610 PROTHROMBIN TIME: CPT

## 2020-08-09 PROCEDURE — 83880 ASSAY OF NATRIURETIC PEPTIDE: CPT

## 2020-08-09 PROCEDURE — 6370000000 HC RX 637 (ALT 250 FOR IP): Performed by: PEDIATRICS

## 2020-08-09 RX ORDER — OXYCODONE HYDROCHLORIDE 15 MG/1
15 TABLET, FILM COATED, EXTENDED RELEASE ORAL ONCE
Status: COMPLETED | OUTPATIENT
Start: 2020-08-09 | End: 2020-08-09

## 2020-08-09 RX ORDER — HEPARIN SODIUM 1000 [USP'U]/ML
60 INJECTION, SOLUTION INTRAVENOUS; SUBCUTANEOUS ONCE
Status: COMPLETED | OUTPATIENT
Start: 2020-08-09 | End: 2020-08-09

## 2020-08-09 RX ORDER — ASPIRIN 81 MG/1
324 TABLET, CHEWABLE ORAL ONCE
Status: DISCONTINUED | OUTPATIENT
Start: 2020-08-09 | End: 2020-08-09 | Stop reason: HOSPADM

## 2020-08-09 RX ORDER — HYDROMORPHONE HYDROCHLORIDE 1 MG/ML
0.5 INJECTION, SOLUTION INTRAMUSCULAR; INTRAVENOUS; SUBCUTANEOUS ONCE
Status: COMPLETED | OUTPATIENT
Start: 2020-08-09 | End: 2020-08-09

## 2020-08-09 RX ORDER — HEPARIN SODIUM 10000 [USP'U]/100ML
12 INJECTION, SOLUTION INTRAVENOUS CONTINUOUS
Status: DISCONTINUED | OUTPATIENT
Start: 2020-08-09 | End: 2020-08-09 | Stop reason: HOSPADM

## 2020-08-09 RX ORDER — NITROGLYCERIN 0.4 MG/1
0.4 TABLET SUBLINGUAL EVERY 5 MIN PRN
Status: DISCONTINUED | OUTPATIENT
Start: 2020-08-09 | End: 2020-08-09 | Stop reason: HOSPADM

## 2020-08-09 RX ORDER — NITROGLYCERIN 20 MG/100ML
5 INJECTION INTRAVENOUS CONTINUOUS
Status: DISCONTINUED | OUTPATIENT
Start: 2020-08-09 | End: 2020-08-09 | Stop reason: HOSPADM

## 2020-08-09 RX ADMIN — NITROGLYCERIN 5 MCG: 20 INJECTION INTRAVENOUS at 17:00

## 2020-08-09 RX ADMIN — NITROGLYCERIN 0.4 MG: 0.4 TABLET, ORALLY DISINTEGRATING SUBLINGUAL at 16:52

## 2020-08-09 RX ADMIN — HYDROMORPHONE HYDROCHLORIDE 0.5 MG: 1 INJECTION, SOLUTION INTRAMUSCULAR; INTRAVENOUS; SUBCUTANEOUS at 19:16

## 2020-08-09 RX ADMIN — HYDROMORPHONE HYDROCHLORIDE 0.5 MG: 1 INJECTION, SOLUTION INTRAMUSCULAR; INTRAVENOUS; SUBCUTANEOUS at 17:14

## 2020-08-09 RX ADMIN — NITROGLYCERIN 0.4 MG: 0.4 TABLET, ORALLY DISINTEGRATING SUBLINGUAL at 15:40

## 2020-08-09 RX ADMIN — HEPARIN SODIUM 12 UNITS/KG/HR: 10000 INJECTION, SOLUTION INTRAVENOUS at 17:14

## 2020-08-09 RX ADMIN — OXYCODONE HYDROCHLORIDE 15 MG: 15 TABLET, FILM COATED, EXTENDED RELEASE ORAL at 15:40

## 2020-08-09 RX ADMIN — HEPARIN SODIUM 3860 UNITS: 1000 INJECTION INTRAVENOUS; SUBCUTANEOUS at 17:14

## 2020-08-09 ASSESSMENT — ENCOUNTER SYMPTOMS
EYE DISCHARGE: 0
BACK PAIN: 0
ABDOMINAL PAIN: 0
SHORTNESS OF BREATH: 1
RHINORRHEA: 0
NAUSEA: 0
COLOR CHANGE: 0
VOMITING: 0
COUGH: 0

## 2020-08-09 ASSESSMENT — PAIN SCALES - GENERAL
PAINLEVEL_OUTOF10: 8
PAINLEVEL_OUTOF10: 8
PAINLEVEL_OUTOF10: 4

## 2020-08-09 NOTE — ED NOTES
[address] @ [phone] @ 9097 for transfer. Unable to use [employer] or [employer] so ground transportation was requested.      Baldev Diss  08/09/20 9571
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
## 2020-08-10 LAB
EKG P AXIS: NORMAL DEGREES
EKG P-R INTERVAL: NORMAL MS
EKG Q-T INTERVAL: 412 MS
EKG QRS DURATION: 86 MS
EKG QTC CALCULATION (BAZETT): 412 MS
EKG T AXIS: 67 DEGREES

## 2020-08-10 PROCEDURE — 93010 ELECTROCARDIOGRAM REPORT: CPT | Performed by: INTERNAL MEDICINE

## 2020-08-25 ENCOUNTER — OFFICE VISIT (OUTPATIENT)
Dept: GASTROENTEROLOGY | Facility: CLINIC | Age: 65
End: 2020-08-25

## 2020-08-25 VITALS
HEIGHT: 67 IN | SYSTOLIC BLOOD PRESSURE: 116 MMHG | BODY MASS INDEX: 22.76 KG/M2 | WEIGHT: 145 LBS | DIASTOLIC BLOOD PRESSURE: 76 MMHG | OXYGEN SATURATION: 100 % | HEART RATE: 54 BPM | TEMPERATURE: 98.2 F

## 2020-08-25 DIAGNOSIS — Z86.010 HX OF COLONIC POLYP: Primary | ICD-10-CM

## 2020-08-25 DIAGNOSIS — D68.9 COAGULOPATHY (HCC): ICD-10-CM

## 2020-08-25 PROCEDURE — S0260 H&P FOR SURGERY: HCPCS | Performed by: NURSE PRACTITIONER

## 2020-08-25 RX ORDER — OXYCODONE HYDROCHLORIDE 15 MG/1
15 TABLET, FILM COATED, EXTENDED RELEASE ORAL EVERY 6 HOURS PRN
COMMUNITY

## 2020-09-28 RX ORDER — MINOCYCLINE HYDROCHLORIDE 50 MG/1
CAPSULE ORAL
Qty: 60 CAPSULE | Refills: 1 | Status: SHIPPED | OUTPATIENT
Start: 2020-09-28 | End: 2021-07-30

## 2020-10-04 ENCOUNTER — HOSPITAL ENCOUNTER (EMERGENCY)
Age: 65
Discharge: HOME | End: 2020-10-04
Attending: EMERGENCY MEDICINE
Payer: MEDICARE

## 2020-10-04 ENCOUNTER — APPOINTMENT (OUTPATIENT)
Dept: CT IMAGING | Age: 65
End: 2020-10-04
Payer: MEDICARE

## 2020-10-04 VITALS
DIASTOLIC BLOOD PRESSURE: 74 MMHG | TEMPERATURE: 98.7 F | BODY MASS INDEX: 22.05 KG/M2 | WEIGHT: 145 LBS | OXYGEN SATURATION: 98 % | HEART RATE: 60 BPM | RESPIRATION RATE: 20 BRPM | SYSTOLIC BLOOD PRESSURE: 117 MMHG

## 2020-10-04 LAB
ALBUMIN SERPL-MCNC: 4.6 G/DL (ref 3.5–5.2)
ALP BLD-CCNC: 75 U/L (ref 40–130)
ALT SERPL-CCNC: 16 U/L (ref 5–41)
ANION GAP SERPL CALCULATED.3IONS-SCNC: 9 MMOL/L (ref 7–19)
APTT: 28.1 SEC (ref 26–36.2)
AST SERPL-CCNC: 15 U/L (ref 5–40)
BASOPHILS ABSOLUTE: 0 K/UL (ref 0–0.2)
BASOPHILS RELATIVE PERCENT: 0.2 % (ref 0–1)
BILIRUB SERPL-MCNC: 0.5 MG/DL (ref 0.2–1.2)
BILIRUBIN URINE: NEGATIVE
BLOOD, URINE: NEGATIVE
BUN BLDV-MCNC: 13 MG/DL (ref 8–23)
CALCIUM SERPL-MCNC: 9.4 MG/DL (ref 8.8–10.2)
CHLORIDE BLD-SCNC: 104 MMOL/L (ref 98–111)
CLARITY: CLEAR
CO2: 25 MMOL/L (ref 22–29)
COLOR: YELLOW
CREAT SERPL-MCNC: 0.8 MG/DL (ref 0.5–1.2)
EOSINOPHILS ABSOLUTE: 0 K/UL (ref 0–0.6)
EOSINOPHILS RELATIVE PERCENT: 0.4 % (ref 0–5)
GFR AFRICAN AMERICAN: >59
GFR NON-AFRICAN AMERICAN: >60
GLUCOSE BLD-MCNC: 108 MG/DL (ref 74–109)
GLUCOSE URINE: NEGATIVE MG/DL
HCT VFR BLD CALC: 42.2 % (ref 42–52)
HEMOGLOBIN: 14.3 G/DL (ref 14–18)
IMMATURE GRANULOCYTES #: 0 K/UL
INR BLD: 1.02 (ref 0.88–1.18)
KETONES, URINE: NEGATIVE MG/DL
LEUKOCYTE ESTERASE, URINE: NEGATIVE
LIPASE: 34 U/L (ref 13–60)
LYMPHOCYTES ABSOLUTE: 1.1 K/UL (ref 1.1–4.5)
LYMPHOCYTES RELATIVE PERCENT: 18.9 % (ref 20–40)
MAGNESIUM: 2.2 MG/DL (ref 1.6–2.4)
MCH RBC QN AUTO: 28.8 PG (ref 27–31)
MCHC RBC AUTO-ENTMCNC: 33.9 G/DL (ref 33–37)
MCV RBC AUTO: 85.1 FL (ref 80–94)
MONOCYTES ABSOLUTE: 0.4 K/UL (ref 0–0.9)
MONOCYTES RELATIVE PERCENT: 7.7 % (ref 0–10)
NEUTROPHILS ABSOLUTE: 4.1 K/UL (ref 1.5–7.5)
NEUTROPHILS RELATIVE PERCENT: 72.4 % (ref 50–65)
NITRITE, URINE: NEGATIVE
PDW BLD-RTO: 13.6 % (ref 11.5–14.5)
PH UA: 6 (ref 5–8)
PLATELET # BLD: 231 K/UL (ref 130–400)
PMV BLD AUTO: 8.8 FL (ref 9.4–12.4)
POTASSIUM REFLEX MAGNESIUM: 3.5 MMOL/L (ref 3.5–5)
PROTEIN UA: NEGATIVE MG/DL
PROTHROMBIN TIME: 13.3 SEC (ref 12–14.6)
RBC # BLD: 4.96 M/UL (ref 4.7–6.1)
SODIUM BLD-SCNC: 138 MMOL/L (ref 136–145)
SPECIFIC GRAVITY UA: 1.01 (ref 1–1.03)
TOTAL PROTEIN: 7.2 G/DL (ref 6.6–8.7)
UROBILINOGEN, URINE: 0.2 E.U./DL
WBC # BLD: 5.7 K/UL (ref 4.8–10.8)

## 2020-10-04 PROCEDURE — 81003 URINALYSIS AUTO W/O SCOPE: CPT

## 2020-10-04 PROCEDURE — 36415 COLL VENOUS BLD VENIPUNCTURE: CPT

## 2020-10-04 PROCEDURE — 6360000002 HC RX W HCPCS: Performed by: PHYSICIAN ASSISTANT

## 2020-10-04 PROCEDURE — 99282 EMERGENCY DEPT VISIT SF MDM: CPT

## 2020-10-04 PROCEDURE — 96375 TX/PRO/DX INJ NEW DRUG ADDON: CPT

## 2020-10-04 PROCEDURE — 83735 ASSAY OF MAGNESIUM: CPT

## 2020-10-04 PROCEDURE — 74150 CT ABDOMEN W/O CONTRAST: CPT

## 2020-10-04 PROCEDURE — 2580000003 HC RX 258: Performed by: PHYSICIAN ASSISTANT

## 2020-10-04 PROCEDURE — 85025 COMPLETE CBC W/AUTO DIFF WBC: CPT

## 2020-10-04 PROCEDURE — 99999 PR OFFICE/OUTPT VISIT,PROCEDURE ONLY: CPT | Performed by: PHYSICIAN ASSISTANT

## 2020-10-04 PROCEDURE — 80053 COMPREHEN METABOLIC PANEL: CPT

## 2020-10-04 PROCEDURE — 83690 ASSAY OF LIPASE: CPT

## 2020-10-04 PROCEDURE — 85610 PROTHROMBIN TIME: CPT

## 2020-10-04 PROCEDURE — 96376 TX/PRO/DX INJ SAME DRUG ADON: CPT

## 2020-10-04 PROCEDURE — 96374 THER/PROPH/DIAG INJ IV PUSH: CPT

## 2020-10-04 PROCEDURE — 85730 THROMBOPLASTIN TIME PARTIAL: CPT

## 2020-10-04 RX ORDER — HYDROMORPHONE HYDROCHLORIDE 1 MG/ML
0.5 INJECTION, SOLUTION INTRAMUSCULAR; INTRAVENOUS; SUBCUTANEOUS ONCE
Status: COMPLETED | OUTPATIENT
Start: 2020-10-04 | End: 2020-10-04

## 2020-10-04 RX ORDER — DIPHENHYDRAMINE HYDROCHLORIDE 50 MG/ML
25 INJECTION INTRAMUSCULAR; INTRAVENOUS ONCE
Status: DISCONTINUED | OUTPATIENT
Start: 2020-10-04 | End: 2020-10-04

## 2020-10-04 RX ORDER — 0.9 % SODIUM CHLORIDE 0.9 %
1000 INTRAVENOUS SOLUTION INTRAVENOUS ONCE
Status: COMPLETED | OUTPATIENT
Start: 2020-10-04 | End: 2020-10-04

## 2020-10-04 RX ORDER — ONDANSETRON 2 MG/ML
4 INJECTION INTRAMUSCULAR; INTRAVENOUS ONCE
Status: COMPLETED | OUTPATIENT
Start: 2020-10-04 | End: 2020-10-04

## 2020-10-04 RX ORDER — TAMSULOSIN HYDROCHLORIDE 0.4 MG/1
0.4 CAPSULE ORAL DAILY
Qty: 90 CAPSULE | Refills: 0 | Status: SHIPPED | OUTPATIENT
Start: 2020-10-04 | End: 2020-11-04 | Stop reason: ALTCHOICE

## 2020-10-04 RX ORDER — MORPHINE SULFATE 4 MG/ML
4 INJECTION, SOLUTION INTRAMUSCULAR; INTRAVENOUS ONCE
Status: DISCONTINUED | OUTPATIENT
Start: 2020-10-04 | End: 2020-10-04

## 2020-10-04 RX ADMIN — HYDROMORPHONE HYDROCHLORIDE 0.5 MG: 1 INJECTION, SOLUTION INTRAMUSCULAR; INTRAVENOUS; SUBCUTANEOUS at 17:55

## 2020-10-04 RX ADMIN — ONDANSETRON 4 MG: 2 INJECTION INTRAMUSCULAR; INTRAVENOUS at 16:19

## 2020-10-04 RX ADMIN — SODIUM CHLORIDE 1000 ML: 9 INJECTION, SOLUTION INTRAVENOUS at 16:19

## 2020-10-04 RX ADMIN — HYDROMORPHONE HYDROCHLORIDE 0.5 MG: 1 INJECTION, SOLUTION INTRAMUSCULAR; INTRAVENOUS; SUBCUTANEOUS at 16:19

## 2020-10-04 RX ADMIN — ONDANSETRON 4 MG: 2 INJECTION INTRAMUSCULAR; INTRAVENOUS at 17:56

## 2020-10-04 ASSESSMENT — ENCOUNTER SYMPTOMS
PHOTOPHOBIA: 0
APNEA: 0
EYE DISCHARGE: 0
EYE ITCHING: 0
BACK PAIN: 0
SHORTNESS OF BREATH: 0
NAUSEA: 1
COUGH: 0
COLOR CHANGE: 0

## 2020-10-04 ASSESSMENT — PAIN SCALES - GENERAL
PAINLEVEL_OUTOF10: 6
PAINLEVEL_OUTOF10: 9
PAINLEVEL_OUTOF10: 7

## 2020-10-04 NOTE — ED PROVIDER NOTES
Diagnosis Date    CAD (coronary artery disease) 2010    Native Vessel. S/p stenting wtih negative cardiac cath on January 18, 2010 and negative nuclear stress test on June 29, 2010.  Chest pain 1/28/2016    DJD (degenerative joint disease)     GERD (gastroesophageal reflux disease) 2010    HTN (hypertension)     Hyperlipidemia     Left knee pain 1/28/2016    Pacemaker 2010    [employer]    Polyarticular arthritis     Renal calculi 2010    S/p lithrotripsy    Right arm pain 1/28/2016    Right ureteral stone 7/8/2016    Tobacco abuse 2010    Prior. SURGICAL HISTORY       Past Surgical History:   Procedure Laterality Date    CARDIAC CATHETERIZATION  01/08/2010    EF is estimated to be 60%. See scanned document.  CARDIAC CATHETERIZATION  6/20/08, 10/2/08    EF is estimated to be 60%. See scanned document.  CARDIAC CATHETERIZATION  2/13/07, 6/6/07    EF 50%. See scanned document.  CARDIAC CATHETERIZATION  1/8/07, 11/26/07    EF is estimated to be in excess of 50%. See scanned doucment.  CARDIAC CATHETERIZATION  08/17/2006    EF is estimated to be in excess of 50%. See scanned doucment.  CARDIAC CATHETERIZATION  5/14/14  MDL    normal LV function. EF 60    CHOLECYSTECTOMY      CLAVICLE SURGERY      COLONOSCOPY      Endoscopy    CYSTOSCOPY Right 7/8/2016    CYSTOSCOPY; RIGHT RETROGRADE PYELOGRAM; RIGHT URETERAL DILATATION; RIGHT URETEROSCOPY; RIGHT URETERAL LASER LITHOTRIPSY AND STONE EXTRACTION; INSERTION RIGHT URETERAL DOUBLE J STENT performed by Eleonora Monteiro MD at [address] [place] [address] Left     HAND SURGERY      HUMERUS SURGERY      KNEE SURGERY      Right    KNEE SURGERY Left     LITHOTRIPSY      MANDIBLE FRACTURE SURGERY      NECK SURGERY      cervical spine.  OTHER SURGICAL HISTORY      Brachytherapy of the core stents.     PACEMAKER PLACEMENT      [employer]    PTCA      TIBIA FRACTURE SURGERY      WRIST SURGERY           CURRENT MEDICATIONS Discharge Medication List as of 10/4/2020  5:49 PM      CONTINUE these medications which have NOT CHANGED    Details   ondansetron (ZOFRAN ODT) 4 MG disintegrating tablet Take 1 tablet by mouth every 8 hours as needed for Nausea or Vomiting, Disp-15 tablet, R-0Print      montelukast (SINGULAIR) 10 MG tablet Take 10 mg by mouth nightlyHistorical Med      Fluocinonide 0.1 % CREA APPLY 2-3 GRAMS TO AFFECTED AREAS 3-4 TIMES DAILY (1 GRAM = 1 DIME SIZE), R-6Historical Med      oxyCODONE-acetaminophen (PERCOCET) 7.5-325 MG per tablet Take 1 tablet by mouth every 4 hours as needed for Pain . Historical Med      calcium carbonate (OSCAL) 500 MG TABS tablet Take 500 mg by mouth dailyHistorical Med      diclofenac sodium 1 % GEL Apply 2 g topically 2 times daily, Topical, 2 TIMES DAILY, Until Discontinued, Historical Med      aspirin 325 MG tablet Take 325 mg by mouth daily      metoprolol tartrate (LOPRESSOR) 25 MG tablet Take 25 mg by mouth 2 times daily Indications: Pt unaware of dosage      isosorbide mononitrate (IMDUR) 120 MG CR tablet Take 120 mg by mouth daily      hydrochlorothiazide (HYDRODIURIL) 12.5 MG tablet Take 12.5 mg by mouth daily Indications: as needed      Coenzyme Q10 (COQ-10 PO) Take by mouth daily       ranolazine (RANEXA) 500 MG SR tablet Take 500 mg by mouth 2 times daily      Nutritional Supplements (BOOST PO) Take by mouth      NITROSTAT 0.4 MG SL tablet Place 1 tablet under the tongue 3 times daily as needed, R-0, RUBEN      LORazepam (ATIVAN) 1 MG tablet Take 1 mg by mouth 3 times daily as needed, R-5      rosuvastatin (CRESTOR) 40 MG tablet Take 40 mg by mouth every evening      amlodipine (NORVASC) 10 MG tablet Take 10 mg by mouth daily. clopidogrel (PLAVIX) 75 MG tablet Take 75 mg by mouth daily. ALLERGIES     Darvocet [propoxyphene n-acetaminophen]; Ezetimibe-simvastatin; Morphine; Penicillins; Phenergan [promethazine hcl];  Promethazine; Promethazine hcl; and Propoxyphene    FAMILY HISTORY     History reviewed. No pertinent family history. SOCIAL HISTORY       Social History     Socioeconomic History    Marital status:      Spouse name: None    Number of children: None    Years of education: None    Highest education level: None   Occupational History     Employer: DISABLED   Social Needs    Financial resource strain: None    Food insecurity     Worry: None     Inability: None    Transportation needs     Medical: None     Non-medical: None   Tobacco Use    Smoking status: Former Smoker    Smokeless tobacco: Never Used   Substance and Sexual Activity    Alcohol use: No    Drug use: No    Sexual activity: None   Lifestyle    Physical activity     Days per week: None     Minutes per session: None    Stress: None   Relationships    Social connections     Talks on phone: None     Gets together: None     Attends [employer]ism service: None     Active member of club or organization: None     Attends meetings of clubs or organizations: None     Relationship status: None    Intimate partner violence     Fear of current or ex partner: None     Emotionally abused: None     Physically abused: None     Forced sexual activity: None   Other Topics Concern    None   Social History Narrative    None       SCREENINGS           PHYSICAL EXAM    (up to 7 forlevel 4, 8 or more for level 5)     ED Triage Vitals [10/04/20 1546]   BP Temp Temp Source Pulse Resp SpO2 Height Weight   117/74 98.7 °F (37.1 °C) Temporal 60 20 98 % -- 145 lb (65.8 kg)       Physical Exam  Vitals signs and nursing note reviewed. Constitutional:       General: He is not in acute distress. Appearance: Normal appearance. He is well-developed. He is not diaphoretic. HENT:      Head: Normocephalic and atraumatic. Right Ear: External ear normal.      Left Ear: External ear normal.   Eyes:      Pupils: Pupils are equal, round, and reactive to light.    Neck:      Musculoskeletal: Normal other components within normal limits   COMPREHENSIVE METABOLIC PANEL W/ REFLEX TO MG FOR LOW K   LIPASE   URINE RT REFLEX TO CULTURE   PROTIME-INR   APTT   MAGNESIUM       All other labs were within normal range or notreturned as of this dictation. RE-ASSESSMENT        EMERGENCY DEPARTMENT COURSE and DIFFERENTIAL DIAGNOSIS/MDM:   Vitals:    Vitals:    10/04/20 1546   BP: 117/74   Pulse: 60   Resp: 20   Temp: 98.7 °F (37.1 °C)   TempSrc: Temporal   SpO2: 98%   Weight: 145 lb (65.8 kg)       MDM  Patient has findings consistent with renal colic possibly recent passed stone he tells shows me one stone is Craig passed I think this is likely from that. He has bladder wall thickening on CT so support possible infection versus trauma I think again the recent passage correlates. His urine is otherwise clean and no obvious stones between the kidneys and within the bladder at this time appreciated. He has intrarenal calculi which should not be causing any symptoms pain controlled here plan will be for him to go home and follow with Dr. Soni Dodge as needed. PROCEDURES:    Procedures      FINAL IMPRESSION      1.  Right flank pain          DISPOSITION/PLAN   DISPOSITION Decision To Discharge 10/04/2020 05:48:56 PM      PATIENT REFERRED TO:  [address] [place] [address] EMERGENCY DEPT  [facility]  [phone]    If symptoms worsen    Renetta Raymond MD  [address] [place] [address] 32 [phone]    Call   As needed      DISCHARGE MEDICATIONS:  Discharge Medication List as of 10/4/2020  5:49 PM          (Please note that portions of this note were completed with a voice recognition program.  Efforts were made to edit the dictations but occasionallywords are mis-transcribed.)    Eyal Au [address] [place] [address]  10/04/20 [id number]

## 2020-10-05 NOTE — ED PROVIDER NOTES
See PA note. Reviewed HPI, PE, labs, imaging. Agree with A/P.      Shannen Navarro MD  10/05/20 2617

## 2020-11-04 ENCOUNTER — OFFICE VISIT (OUTPATIENT)
Dept: UROLOGY | Age: 65
End: 2020-11-04
Payer: MEDICARE

## 2020-11-04 VITALS — BODY MASS INDEX: 22.71 KG/M2 | HEIGHT: 67 IN

## 2020-11-04 LAB
BILIRUBIN, POC: NORMAL
BLOOD URINE, POC: NORMAL
CLARITY, POC: CLEAR
COLOR, POC: YELLOW
GLUCOSE URINE, POC: NORMAL
KETONES, POC: NORMAL
LEUKOCYTE EST, POC: NORMAL
NITRITE, POC: NORMAL
PH, POC: 6.5
PROTEIN, POC: NORMAL
SPECIFIC GRAVITY, POC: >=1.03
UROBILINOGEN, POC: 1

## 2020-11-04 PROCEDURE — 51798 US URINE CAPACITY MEASURE: CPT | Performed by: NURSE PRACTITIONER

## 2020-11-04 PROCEDURE — 99214 OFFICE O/P EST MOD 30 MIN: CPT | Performed by: NURSE PRACTITIONER

## 2020-11-04 PROCEDURE — 81003 URINALYSIS AUTO W/O SCOPE: CPT | Performed by: NURSE PRACTITIONER

## 2020-11-04 RX ORDER — SOLIFENACIN SUCCINATE 10 MG/1
10 TABLET, FILM COATED ORAL DAILY
Qty: 90 TABLET | Refills: 2 | Status: ON HOLD | OUTPATIENT
Start: 2020-11-04 | End: 2021-01-14 | Stop reason: HOSPADM

## 2020-11-04 RX ORDER — POTASSIUM CHLORIDE 750 MG/1
10 TABLET, FILM COATED, EXTENDED RELEASE ORAL DAILY PRN
COMMUNITY
Start: 2020-05-14 | End: 2021-12-14

## 2020-11-04 ASSESSMENT — ENCOUNTER SYMPTOMS
VOMITING: 0
SORE THROAT: 0
COUGH: 0
EYE PAIN: 0
WHEEZING: 0
NAUSEA: 0
BACK PAIN: 0

## 2020-11-04 NOTE — PROGRESS NOTES
Erna West is a 72 y.o. male who presents today   Chief Complaint   Patient presents with    Follow-up     i am here to discuss flank pain today. I had a CT done at [facility] 10/4/20       Benign Prostatic Hypertrophy   This is a chronic problem. The current episode started more than 1 year ago. The problem has been gradually worsening since onset. Irritative symptoms include frequency, nocturia and urgency. Obstructive symptoms include dribbling. Obstructive symptoms do not include incomplete emptying, an intermittent stream, a slower stream, straining or a weak stream. Associated symptoms include dysuria (not today; he did have dysuria 11/3/20). Pertinent negatives include no chills, hematuria, hesitancy, nausea or vomiting. Nothing aggravates the symptoms. Past treatments include nothing. Patient presents with complaints of persistent right-sided flank pain. He is a well-known stone patient reports he has passed 119 stones since 1980. He reports passing a 3 mm stone on 10/4/2020 or he presented to the emergency room. He continues to experience intermittent right-sided flank pain since his emergency room visit, however, this pain is no worse than before. He does have chronic urinary symptoms such as nocturia, frequency, urgency, but denies any fevers, chills, nausea, vomiting, hematuria, dysuria. He has undergone metabolic work-up at the [facility] twice sometime in the early 90s and once again after that. He has passed a 7 mm stone on his own from his left side per patient report. Past Medical History:   Diagnosis Date    CAD (coronary artery disease) 2010    Native Vessel. S/p stenting wtih negative cardiac cath on January 18, 2010 and negative nuclear stress test on June 29, 2010.     Chest pain 1/28/2016    DJD (degenerative joint disease)     GERD (gastroesophageal reflux disease) 2010    HTN (hypertension)     Hyperlipidemia     Left knee pain 1/28/2016    Pacemaker 2010    [employer]  Polyarticular arthritis     Renal calculi 2010    S/p lithrotripsy    Right arm pain 1/28/2016    Right ureteral stone 7/8/2016    Tobacco abuse 2010    Prior. Past Surgical History:   Procedure Laterality Date    CARDIAC CATHETERIZATION  01/08/2010    EF is estimated to be 60%. See scanned document.  CARDIAC CATHETERIZATION  6/20/08, 10/2/08    EF is estimated to be 60%. See scanned document.  CARDIAC CATHETERIZATION  2/13/07, 6/6/07    EF 50%. See scanned document.  CARDIAC CATHETERIZATION  1/8/07, 11/26/07    EF is estimated to be in excess of 50%. See scanned doucment.  CARDIAC CATHETERIZATION  08/17/2006    EF is estimated to be in excess of 50%. See scanned doucment.  CARDIAC CATHETERIZATION  5/14/14  MDL    normal LV function. EF 60    CHOLECYSTECTOMY      CLAVICLE SURGERY      COLONOSCOPY      Endoscopy    CYSTOSCOPY Right 7/8/2016    CYSTOSCOPY; RIGHT RETROGRADE PYELOGRAM; RIGHT URETERAL DILATATION; RIGHT URETEROSCOPY; RIGHT URETERAL LASER LITHOTRIPSY AND STONE EXTRACTION; INSERTION RIGHT URETERAL DOUBLE J STENT performed by Sharron Ganser, MD at [address] [place] [address] Left     HAND SURGERY      HUMERUS SURGERY      KNEE SURGERY      Right    KNEE SURGERY Left     LITHOTRIPSY      MANDIBLE FRACTURE SURGERY      NECK SURGERY      cervical spine.  OTHER SURGICAL HISTORY      Brachytherapy of the core stents.  PACEMAKER PLACEMENT      [employer]    PTCA      TIBIA FRACTURE SURGERY      WRIST SURGERY         Current Outpatient Medications   Medication Sig Dispense Refill    potassium chloride (KLOR-CON 10) 10 MEQ extended release tablet Take 10 mEq by mouth daily as needed      solifenacin (VESICARE) 10 MG tablet Take 1 tablet by mouth daily 90 tablet 2    oxyCODONE-acetaminophen (PERCOCET) 7.5-325 MG per tablet Take 1 tablet by mouth every 4 hours as needed for Pain .       calcium carbonate (OSCAL) 500 MG TABS tablet Take 500 mg by mouth daily Pt Allergic/Immunologic: Negative for environmental allergies and food allergies. Neurological: Negative for dizziness and headaches. Hematological: Negative for adenopathy. Does not bruise/bleed easily. Psychiatric/Behavioral: Negative for confusion and hallucinations. All other systems reviewed and are negative. PHYSICAL EXAM:  Ht 5' 7\" (1.702 m)   BMI 22.71 kg/m²   Physical Exam  Vitals signs and nursing note reviewed. Constitutional:       General: He is not in acute distress. Appearance: Normal appearance. He is not ill-appearing. HENT:      Head: Normocephalic and atraumatic. Nose: Nose normal.      Mouth/Throat:      Pharynx: Oropharynx is clear. Eyes:      General:         Right eye: No discharge. Left eye: No discharge. Extraocular Movements: Extraocular movements intact. Neck:      Musculoskeletal: Normal range of motion. Cardiovascular:      Pulses: Normal pulses. Pulmonary:      Effort: Pulmonary effort is normal. No respiratory distress. Abdominal:      General: There is no distension. Tenderness: There is no abdominal tenderness. There is no right CVA tenderness or left CVA tenderness. Musculoskeletal:         General: No swelling. Right lower leg: No edema. Left lower leg: No edema. Skin:     General: Skin is warm and dry. Neurological:      Mental Status: He is alert and oriented to person, place, and time. Mental status is at baseline.    Psychiatric:         Mood and Affect: Mood normal.         Behavior: Behavior normal.         DATA:  Results for orders placed or performed in visit on 11/04/20   POCT Urinalysis No Micro (Auto)   Result Value Ref Range    Color, UA yellow     Clarity, UA clear     Glucose, UA POC neg     Bilirubin, UA neg     Ketones, UA neg     Spec Grav, UA >=1.030     Blood, UA POC neg     pH, UA 6.5     Protein, UA POC trace     Urobilinogen, UA 1.0     Leukocytes, UA neg     Nitrite, UA neg      Lab Results Component Value Date    PSA 0.3 08/11/2016     Lab Results   Component Value Date    PSAFREEPCT 33 08/11/2016     Bladder Scan interpretation  Estimation of residual urine via abdominal ultrasound  Residual Urine: 25 ml  Indication: BPH  Position: Supine  Examination: Incremental scanning of the suprapubic area using 3 MHz transducer using copious amounts of acoustic gel. Findings: An anechoic area was demonstrated which represented the bladder, with measurement of residual urine as noted. IMAGING:  CT without contrast on 10/4/2020 reveals bilateral nonobstructing renal calculi. 1. Right flank pain  - POCT Urinalysis No Micro (Auto)  2. Bilateral nephrolithiasis  Patient presents for follow-up of persistent right flank pain. He does have a known history of nephrolithiasis and most recent imaging last month revealed to lower pole nonobstructing stones. We discussed the possibility of reimaging with KUB or CT again today, patient does not wish to proceed at this time with additional imaging. He does not feel it is necessary as his symptoms are not severe. 3. Benign prostatic hyperplasia with urinary frequency  Patient does complain of some chronic urinary symptoms that have become more bothersome. Bladder scan reveals adequate bladder emptying today. He denies any significant issues with his stream.  We will proceed with a trial of anticholinergics. We discussed possible side effects such as dry eyes, dry mouth, constipation, urinary retention. He will follow up in 3 months for symptom check and bladder scan. - solifenacin (VESICARE) 10 MG tablet; Take 1 tablet by mouth daily  Dispense: 90 tablet; Refill: 2      Orders Placed This Encounter   Procedures    POCT Urinalysis No Micro (Auto)        Return in about 3 months (around 2/4/2021).       ARNOLD RUVALCABA, APRN - CNP    All information inputted into the note by the MA to include chief complaint, past medical history, past surgical history, medications, allergies, social and family history and review of systems has been reviewed and updated as needed by me. EMR Dragon/transcription disclaimer: Much of this document is electronic transcription/translation of spoken language to printed text. The electronic translation of spoken language may be erroneous or, at times, nonsensical words or phrases may be inadvertently transcribed.  Although I have reviewed the document for such errors, some may still exist.

## 2020-12-16 ENCOUNTER — FLU SHOT (OUTPATIENT)
Dept: FAMILY MEDICINE CLINIC | Facility: CLINIC | Age: 65
End: 2020-12-16

## 2020-12-16 DIAGNOSIS — Z23 NEED FOR INFLUENZA VACCINATION: ICD-10-CM

## 2020-12-16 PROCEDURE — 90694 VACC AIIV4 NO PRSRV 0.5ML IM: CPT | Performed by: FAMILY MEDICINE

## 2020-12-16 PROCEDURE — G0008 ADMIN INFLUENZA VIRUS VAC: HCPCS | Performed by: FAMILY MEDICINE

## 2020-12-21 RX ORDER — NITROGLYCERIN 0.4 MG/1
TABLET SUBLINGUAL
Qty: 25 TABLET | Refills: 1 | Status: SHIPPED | OUTPATIENT
Start: 2020-12-21 | End: 2021-05-25

## 2020-12-21 NOTE — TELEPHONE ENCOUNTER
Requested Prescriptions     Pending Prescriptions Disp Refills   • nitroglycerin (NITROSTAT) 0.4 MG SL tablet [Pharmacy Med Name: NITROGLYCERIN 0.4MG SUB TAB 25] 25 tablet 1     Sig: ONE TABLET UNDER TONGUE AS NEEDED FOR CHEST PAIN. MAY REPEAT EVERY 5 MINUTES UP TO 3 TABLETS IN 15 MINUTES.

## 2021-01-13 ENCOUNTER — TELEPHONE (OUTPATIENT)
Dept: UROLOGY | Age: 66
End: 2021-01-13

## 2021-01-13 ENCOUNTER — HOSPITAL ENCOUNTER (OUTPATIENT)
Age: 66
Setting detail: OBSERVATION
Discharge: HOME | End: 2021-01-14
Attending: PEDIATRICS | Admitting: UROLOGY
Payer: MEDICARE

## 2021-01-13 ENCOUNTER — APPOINTMENT (OUTPATIENT)
Dept: GENERAL RADIOLOGY | Age: 66
End: 2021-01-13
Payer: MEDICARE

## 2021-01-13 ENCOUNTER — APPOINTMENT (OUTPATIENT)
Dept: CT IMAGING | Age: 66
End: 2021-01-13
Payer: MEDICARE

## 2021-01-13 DIAGNOSIS — R10.9 FLANK PAIN: ICD-10-CM

## 2021-01-13 DIAGNOSIS — N20.1 URETEROLITHIASIS: Primary | ICD-10-CM

## 2021-01-13 LAB
ALBUMIN SERPL-MCNC: 4.3 G/DL (ref 3.5–5.2)
ALP BLD-CCNC: 84 U/L (ref 40–130)
ALT SERPL-CCNC: 12 U/L (ref 5–41)
ANION GAP SERPL CALCULATED.3IONS-SCNC: 7 MMOL/L (ref 7–19)
AST SERPL-CCNC: 14 U/L (ref 5–40)
BACTERIA: NEGATIVE /HPF
BASOPHILS ABSOLUTE: 0.1 K/UL (ref 0–0.2)
BASOPHILS RELATIVE PERCENT: 0.8 % (ref 0–1)
BILIRUB SERPL-MCNC: 0.3 MG/DL (ref 0.2–1.2)
BILIRUBIN URINE: NEGATIVE
BLOOD, URINE: ABNORMAL
BUN BLDV-MCNC: 20 MG/DL (ref 8–23)
CALCIUM SERPL-MCNC: 9.1 MG/DL (ref 8.8–10.2)
CHLORIDE BLD-SCNC: 102 MMOL/L (ref 98–111)
CLARITY: CLEAR
CO2: 30 MMOL/L (ref 22–29)
COLOR: YELLOW
CREAT SERPL-MCNC: 0.9 MG/DL (ref 0.5–1.2)
CRYSTALS, UA: ABNORMAL /HPF
EOSINOPHILS ABSOLUTE: 0.4 K/UL (ref 0–0.6)
EOSINOPHILS RELATIVE PERCENT: 5.3 % (ref 0–5)
EPITHELIAL CELLS, UA: 0 /HPF (ref 0–5)
GFR AFRICAN AMERICAN: >59
GFR NON-AFRICAN AMERICAN: >60
GLUCOSE BLD-MCNC: 101 MG/DL (ref 74–109)
GLUCOSE URINE: NEGATIVE MG/DL
HCT VFR BLD CALC: 38.9 % (ref 42–52)
HEMOGLOBIN: 13.1 G/DL (ref 14–18)
HYALINE CASTS: 0 /HPF (ref 0–8)
IMMATURE GRANULOCYTES #: 0 K/UL
KETONES, URINE: NEGATIVE MG/DL
LEUKOCYTE ESTERASE, URINE: ABNORMAL
LYMPHOCYTES ABSOLUTE: 2.2 K/UL (ref 1.1–4.5)
LYMPHOCYTES RELATIVE PERCENT: 27.7 % (ref 20–40)
MCH RBC QN AUTO: 29 PG (ref 27–31)
MCHC RBC AUTO-ENTMCNC: 33.7 G/DL (ref 33–37)
MCV RBC AUTO: 86.3 FL (ref 80–94)
MONOCYTES ABSOLUTE: 0.8 K/UL (ref 0–0.9)
MONOCYTES RELATIVE PERCENT: 9.9 % (ref 0–10)
NEUTROPHILS ABSOLUTE: 4.4 K/UL (ref 1.5–7.5)
NEUTROPHILS RELATIVE PERCENT: 55.9 % (ref 50–65)
NITRITE, URINE: NEGATIVE
PDW BLD-RTO: 12.7 % (ref 11.5–14.5)
PH UA: 7 (ref 5–8)
PLATELET # BLD: 200 K/UL (ref 130–400)
PMV BLD AUTO: 9 FL (ref 9.4–12.4)
POTASSIUM REFLEX MAGNESIUM: 4 MMOL/L (ref 3.5–5)
PROTEIN UA: NEGATIVE MG/DL
RBC # BLD: 4.51 M/UL (ref 4.7–6.1)
RBC UA: 193 /HPF (ref 0–4)
SARS-COV-2, NAAT: NOT DETECTED
SODIUM BLD-SCNC: 139 MMOL/L (ref 136–145)
SPECIFIC GRAVITY UA: 1.01 (ref 1–1.03)
TOTAL PROTEIN: 6.8 G/DL (ref 6.6–8.7)
UROBILINOGEN, URINE: 0.2 E.U./DL
WBC # BLD: 7.9 K/UL (ref 4.8–10.8)
WBC UA: 1 /HPF (ref 0–5)

## 2021-01-13 PROCEDURE — 85025 COMPLETE CBC W/AUTO DIFF WBC: CPT

## 2021-01-13 PROCEDURE — 99284 EMERGENCY DEPT VISIT MOD MDM: CPT

## 2021-01-13 PROCEDURE — 2580000003 HC RX 258: Performed by: UROLOGY

## 2021-01-13 PROCEDURE — 96376 TX/PRO/DX INJ SAME DRUG ADON: CPT

## 2021-01-13 PROCEDURE — 80053 COMPREHEN METABOLIC PANEL: CPT

## 2021-01-13 PROCEDURE — 81001 URINALYSIS AUTO W/SCOPE: CPT

## 2021-01-13 PROCEDURE — G0378 HOSPITAL OBSERVATION PER HR: HCPCS

## 2021-01-13 PROCEDURE — 74176 CT ABD & PELVIS W/O CONTRAST: CPT

## 2021-01-13 PROCEDURE — 96375 TX/PRO/DX INJ NEW DRUG ADDON: CPT

## 2021-01-13 PROCEDURE — 6370000000 HC RX 637 (ALT 250 FOR IP): Performed by: UROLOGY

## 2021-01-13 PROCEDURE — U0002 COVID-19 LAB TEST NON-CDC: HCPCS

## 2021-01-13 PROCEDURE — 6360000002 HC RX W HCPCS: Performed by: UROLOGY

## 2021-01-13 PROCEDURE — 36415 COLL VENOUS BLD VENIPUNCTURE: CPT

## 2021-01-13 PROCEDURE — 6360000002 HC RX W HCPCS: Performed by: PEDIATRICS

## 2021-01-13 PROCEDURE — 74018 RADEX ABDOMEN 1 VIEW: CPT

## 2021-01-13 PROCEDURE — 96372 THER/PROPH/DIAG INJ SC/IM: CPT

## 2021-01-13 PROCEDURE — 96374 THER/PROPH/DIAG INJ IV PUSH: CPT

## 2021-01-13 RX ORDER — SODIUM CHLORIDE 0.9 % (FLUSH) 0.9 %
10 SYRINGE (ML) INJECTION PRN
Status: DISCONTINUED | OUTPATIENT
Start: 2021-01-13 | End: 2021-01-14

## 2021-01-13 RX ORDER — SODIUM CHLORIDE 0.9 % (FLUSH) 0.9 %
10 SYRINGE (ML) INJECTION EVERY 12 HOURS SCHEDULED
Status: DISCONTINUED | OUTPATIENT
Start: 2021-01-13 | End: 2021-01-14

## 2021-01-13 RX ORDER — HYDROMORPHONE HYDROCHLORIDE 1 MG/ML
1 INJECTION, SOLUTION INTRAMUSCULAR; INTRAVENOUS; SUBCUTANEOUS
Status: COMPLETED | OUTPATIENT
Start: 2021-01-13 | End: 2021-01-13

## 2021-01-13 RX ORDER — HYDROMORPHONE HYDROCHLORIDE 1 MG/ML
1 INJECTION, SOLUTION INTRAMUSCULAR; INTRAVENOUS; SUBCUTANEOUS ONCE
Status: COMPLETED | OUTPATIENT
Start: 2021-01-13 | End: 2021-01-13

## 2021-01-13 RX ORDER — ACETAMINOPHEN 325 MG/1
650 TABLET ORAL EVERY 4 HOURS PRN
Status: DISCONTINUED | OUTPATIENT
Start: 2021-01-13 | End: 2021-01-15 | Stop reason: HOSPADM

## 2021-01-13 RX ORDER — HYDROMORPHONE HYDROCHLORIDE 1 MG/ML
0.5 INJECTION, SOLUTION INTRAMUSCULAR; INTRAVENOUS; SUBCUTANEOUS EVERY 30 MIN PRN
Status: COMPLETED | OUTPATIENT
Start: 2021-01-13 | End: 2021-01-14

## 2021-01-13 RX ORDER — KETOROLAC TROMETHAMINE 30 MG/ML
15 INJECTION, SOLUTION INTRAMUSCULAR; INTRAVENOUS ONCE
Status: COMPLETED | OUTPATIENT
Start: 2021-01-13 | End: 2021-01-13

## 2021-01-13 RX ORDER — KETOROLAC TROMETHAMINE 30 MG/ML
15 INJECTION, SOLUTION INTRAMUSCULAR; INTRAVENOUS EVERY 8 HOURS PRN
Status: DISCONTINUED | OUTPATIENT
Start: 2021-01-13 | End: 2021-01-14 | Stop reason: HOSPADM

## 2021-01-13 RX ORDER — ONDANSETRON 2 MG/ML
4 INJECTION INTRAMUSCULAR; INTRAVENOUS EVERY 6 HOURS PRN
Status: DISCONTINUED | OUTPATIENT
Start: 2021-01-13 | End: 2021-01-14 | Stop reason: HOSPADM

## 2021-01-13 RX ADMIN — HYDROMORPHONE HYDROCHLORIDE 1 MG: 1 INJECTION, SOLUTION INTRAMUSCULAR; INTRAVENOUS; SUBCUTANEOUS at 22:19

## 2021-01-13 RX ADMIN — ACETAMINOPHEN 650 MG: 325 TABLET ORAL at 22:09

## 2021-01-13 RX ADMIN — HYDROMORPHONE HYDROCHLORIDE 0.5 MG: 1 INJECTION, SOLUTION INTRAMUSCULAR; INTRAVENOUS; SUBCUTANEOUS at 17:57

## 2021-01-13 RX ADMIN — KETOROLAC TROMETHAMINE 15 MG: 30 INJECTION, SOLUTION INTRAMUSCULAR at 20:54

## 2021-01-13 RX ADMIN — SODIUM CHLORIDE, PRESERVATIVE FREE 10 ML: 5 INJECTION INTRAVENOUS at 22:09

## 2021-01-13 RX ADMIN — HYDROMORPHONE HYDROCHLORIDE 1 MG: 1 INJECTION, SOLUTION INTRAMUSCULAR; INTRAVENOUS; SUBCUTANEOUS at 16:03

## 2021-01-13 RX ADMIN — HYDROMORPHONE HYDROCHLORIDE 0.5 MG: 1 INJECTION, SOLUTION INTRAMUSCULAR; INTRAVENOUS; SUBCUTANEOUS at 20:05

## 2021-01-13 RX ADMIN — ONDANSETRON HYDROCHLORIDE 4 MG: 2 SOLUTION INTRAMUSCULAR; INTRAVENOUS at 22:09

## 2021-01-13 RX ADMIN — ENOXAPARIN SODIUM 40 MG: 40 INJECTION SUBCUTANEOUS at 22:09

## 2021-01-13 ASSESSMENT — PAIN DESCRIPTION - ORIENTATION: ORIENTATION: LEFT

## 2021-01-13 ASSESSMENT — PAIN SCALES - GENERAL
PAINLEVEL_OUTOF10: 9
PAINLEVEL_OUTOF10: 8
PAINLEVEL_OUTOF10: 9

## 2021-01-13 ASSESSMENT — PAIN DESCRIPTION - PAIN TYPE: TYPE: ACUTE PAIN

## 2021-01-13 NOTE — ED PROVIDER NOTES
pallor. Neurological: Negative for syncope and light-headedness. Psychiatric/Behavioral: Negative for agitation and confusion. All other systems reviewed and are negative. PAST MEDICALHISTORY     Past Medical History:   Diagnosis Date    CAD (coronary artery disease) 2010    [place]. S/p stenting wtih negative cardiac cath on January 18, 2010 and negative nuclear stress test on June 29, 2010.  Chest pain 1/28/2016    DJD (degenerative joint disease)     GERD (gastroesophageal reflux disease) 2010    HTN (hypertension)     Hyperlipidemia     Left knee pain 1/28/2016    Pacemaker 2010    [employer]    Polyarticular arthritis     Renal calculi 2010    S/p lithrotripsy    Right arm pain 1/28/2016    Right ureteral stone 7/8/2016    Tobacco abuse 2010    Prior. SURGICAL HISTORY       Past Surgical History:   Procedure Laterality Date    CARDIAC CATHETERIZATION  01/08/2010    EF is estimated to be 60%. See scanned document.  CARDIAC CATHETERIZATION  6/20/08, 10/2/08    EF is estimated to be 60%. See scanned document.  CARDIAC CATHETERIZATION  2/13/07, 6/6/07    EF 50%. See scanned document.  CARDIAC CATHETERIZATION  1/8/07, 11/26/07    EF is estimated to be in excess of 50%. See scanned doucment.  CARDIAC CATHETERIZATION  08/17/2006    EF is estimated to be in excess of 50%. See scanned doucment.  CARDIAC CATHETERIZATION  5/14/14  [facility]DL    normal LV function.  EF 60    CHOLECYSTECTOMY      CLAVICLE SURGERY      COLONOSCOPY      Endoscopy    CYSTOSCOPY Right 7/8/2016    CYSTOSCOPY; RIGHT RETROGRADE PYELOGRAM; RIGHT URETERAL DILATATION; RIGHT URETEROSCOPY; RIGHT URETERAL LASER LITHOTRIPSY AND STONE EXTRACTION; INSERTION RIGHT URETERAL DOUBLE J STENT performed by Ryan Angel MD at [address] [place] [address] Left     HAND SURGERY      HUMERUS SURGERY      KNEE SURGERY      Right    KNEE SURGERY Left     LITHOTRIPSY      MANDIBLE FRACTURE SURGERY      NECK SURGERY      cervical spine.  OTHER SURGICAL HISTORY      Brachytherapy of the core stents.  PACEMAKER PLACEMENT      [employer]    PTCA      TIBIA FRACTURE SURGERY      WRIST SURGERY           CURRENT MEDICATIONS     Current Discharge Medication List      CONTINUE these medications which have NOT CHANGED    Details   potassium chloride (KLOR-CON 10) 10 MEQ extended release tablet Take 10 mEq by mouth daily as needed      solifenacin (VESICARE) 10 MG tablet Take 1 tablet by mouth daily  Qty: 90 tablet, Refills: 2    Associated Diagnoses: Benign prostatic hyperplasia with urinary frequency      oxyCODONE-acetaminophen (PERCOCET) 7.5-325 MG per tablet Take 1 tablet by mouth every 4 hours as needed for Pain . calcium carbonate (OSCAL) 500 MG TABS tablet Take 500 mg by mouth daily Pt states he is taking prn      aspirin 325 MG tablet Take 325 mg by mouth daily      metoprolol tartrate (LOPRESSOR) 25 MG tablet Take 25 mg by mouth 2 times daily Indications: Pt unaware of dosage      isosorbide mononitrate (IMDUR) 120 MG CR tablet Take 120 mg by mouth daily      hydrochlorothiazide (HYDRODIURIL) 12.5 MG tablet Take 12.5 mg by mouth daily Indications: as needed      Coenzyme Q10 (COQ-10 PO) Take by mouth daily       ranolazine (RANEXA) 500 MG SR tablet Take 500 mg by mouth 2 times daily      Nutritional Supplements (BOOST PO) Take by mouth      amlodipine (NORVASC) 10 MG tablet Take 10 mg by mouth daily. clopidogrel (PLAVIX) 75 MG tablet Take 75 mg by mouth daily. NITROSTAT 0.4 MG SL tablet Place 1 tablet under the tongue 3 times daily as needed  Refills: 0      LORazepam (ATIVAN) 1 MG tablet Take 1 mg by mouth 3 times daily as needed  Refills: 5             ALLERGIES     Ezetimibe-simvastatin, Bee venom, Darvocet [propoxyphene n-acetaminophen], Penicillins, Phenergan [promethazine hcl], Promethazine, Promethazine hcl, and Propoxyphene    FAMILY HISTORY     History reviewed.  No pertinent family history. SOCIAL HISTORY       Social History     Socioeconomic History    Marital status:      Spouse name: None    Number of children: None    Years of education: None    Highest education level: None   Occupational History     Employer: DISABLED   Social Needs    Financial resource strain: None    Food insecurity     Worry: None     Inability: None    Transportation needs     Medical: None     Non-medical: None   Tobacco Use    Smoking status: Former Smoker     Types: Cigarettes    Smokeless tobacco: Never Used   Substance and Sexual Activity    Alcohol use: No    Drug use: No    Sexual activity: None   Lifestyle    Physical activity     Days per week: None     Minutes per session: None    Stress: None   Relationships    Social connections     Talks on phone: None     Gets together: None     Attends [employer] service: None     Active member of club or organization: None     Attends meetings of clubs or organizations: None     Relationship status: None    Intimate partner violence     Fear of current or ex partner: None     Emotionally abused: None     Physically abused: None     Forced sexual activity: None   Other Topics Concern    None   Social History Narrative    None       SCREENINGS    [place] Coma Scale  Eye Opening: Spontaneous  Best Verbal Response: Oriented  Best Motor Response: Obeys commands  [place] Coma Scale Score: 15        PHYSICAL EXAM    (up to 7 for level 4, 8 or more for level 5)     ED Triage Vitals   BP Temp Temp src Pulse Resp SpO2 Height Weight   -- -- -- -- -- -- -- --       Physical Exam  Vitals signs and nursing note reviewed. Constitutional:       General: He is not in acute distress. Comments: Patient appears to be in pain. HENT:      Head: Normocephalic and atraumatic. Right Ear: External ear normal.      Left Ear: External ear normal.      Nose: Nose normal.      Mouth/Throat:      Pharynx: Oropharynx is clear.  No oropharyngeal exudate. Comments: Tacky mucous membranes. Eyes:      General: No scleral icterus. Conjunctiva/sclera: Conjunctivae normal.      Pupils: Pupils are equal, round, and reactive to light. Neck:      Musculoskeletal: Neck supple. No neck rigidity. Cardiovascular:      Rate and Rhythm: Normal rate and regular rhythm. Pulses: Normal pulses. Heart sounds: Normal heart sounds. Pulmonary:      Effort: Pulmonary effort is normal.      Breath sounds: Normal breath sounds. Abdominal:      General: Bowel sounds are normal.      Palpations: Abdomen is soft. Tenderness: There is no abdominal tenderness. There is no right CVA tenderness, left CVA tenderness or guarding. Musculoskeletal:         General: No tenderness or deformity. Skin:     General: Skin is warm and dry. Capillary Refill: Capillary refill takes less than 2 seconds. Coloration: Skin is not jaundiced. Neurological:      General: No focal deficit present. Mental Status: He is alert and oriented to person, place, and time. Mental status is at baseline. Coordination: Coordination normal.   Psychiatric:         Mood and Affect: Mood normal.         Behavior: Behavior normal.         DIAGNOSTIC RESULTS     RADIOLOGY:  Non-plain film images such as CT, Ultrasound and MRI are read by the radiologist. Plain radiographic images are visualized and preliminarily interpreted bythe emergency physician with the below findings:          XR ABDOMEN (KUB) (SINGLE AP VIEW)   Final Result   Satisfactory position of the left ureteral stent. 2 tiny 1   mm stone fragments appear to be present in the proximal left ureter at   the L3-4 level. Moderate constipation. Signed by Dr Hal Marlow. Mariola on 1/13/2021 9:17 PM      CT ABDOMEN PELVIS WO CONTRAST Additional Contrast? None   Final Result   1. A left-sided double-J ureteral stent is in place. There is a   punctate nonobstructing calculus lower pole calyx left kidney.  There is some mild dilatation of the upper tracts of the left kidney. A   small proximal left ureteral stone is noted just posterior to the   indwelling stent just below the UPJ. This measures approximately 2 mm   in size. No additional stones are present. The distal aspect of the   indwelling stent does abut the UVJ and there does appear to be some   mild thickening along the posterior aspect of the urinary bladder wall   perhaps reactive in nature. No discrete bladder stone is demonstrated. The right ureter is decompressed and normal in appearance. No evidence   of right-sided obstructive uropathy. 2. Mild to moderate constipation with increased stool throughout the   colon. No evidence of mechanical obstruction. The appendix is   identified and is normal in appearance. 3. Mild enlargement of the prostate gland with central calcifications. Signed by Dr Jayme Jackman on 1/13/2021 4:58 PM              LABS:  Labs Reviewed   CBC WITH AUTO DIFFERENTIAL - Abnormal; Notable for the following components:       Result Value    RBC 4.51 (*)     Hemoglobin 13.1 (*)     Hematocrit 38.9 (*)     MPV 9.0 (*)     Eosinophils % 5.3 (*)     All other components within normal limits   COMPREHENSIVE METABOLIC PANEL W/ REFLEX TO MG FOR LOW K - Abnormal; Notable for the following components:    CO2 30 (*)     All other components within normal limits   URINE RT REFLEX TO CULTURE - Abnormal; Notable for the following components:    Blood, Urine LARGE (*)     Leukocyte Esterase, Urine TRACE (*)     All other components within normal limits   MICROSCOPIC URINALYSIS - Abnormal; Notable for the following components:    Bacteria, UA NEGATIVE (*)     Crystals, UA NEG (*)     RBC,  (*)     All other components within normal limits   COVID-19       All other labs were within normal range or not returned as of this dictation.     EMERGENCY DEPARTMENT COURSE and DIFFERENTIAL DIAGNOSIS/MDM:   Vitals:    Vitals:    01/13/21 2005 01/13/21 2125 01/13/21 2150 01/13/21 2203   BP: 119/88 115/79 115/79 130/82   Pulse: 81 80 80 80   Resp: 19 19 18 18   Temp:  98 °F (36.7 °C) 98 °F (36.7 °C) 97.1 °F (36.2 °C)   TempSrc:   Oral Temporal   SpO2: 98% 98%  99%       MDM  44-year-old male with history of \"21 kidney stones\" in the past presents with his 22nd known ureteral stone. Lab and radiology results reviewed. Discussed with Dr. Ajay Espinosa, urologist, who will admit patient for further evaluation and treatment. He recommends Toradol 15 mg IV every 8 hours as needed pain. We will hold Plavix and aspirin at this time. Patient will be n.p.o. after midnight. Patient verbalizes understanding and agreement with plan of care. CONSULTS:  None    PROCEDURES:  Unless otherwise noted below, none     Procedures    FINAL IMPRESSION      1. Ureterolithiasis    2. Flank pain          DISPOSITION/PLAN   DISPOSITION Admitted 01/13/2021 08:52:09 PM      PATIENT REFERRED TO:  No follow-up provider specified.     DISCHARGE MEDICATIONS:  Current Discharge Medication List             (Please note that portions of this note were completed with a voice recognition program.  Efforts were made to edit thedictations but occasionally words are mis-transcribed.)    Fiona Olivo MD (electronically signed)  Attending Emergency Physician          Fiona Olivo MD  01/14/21 1359

## 2021-01-14 ENCOUNTER — APPOINTMENT (OUTPATIENT)
Dept: GENERAL RADIOLOGY | Age: 66
End: 2021-01-14
Payer: MEDICARE

## 2021-01-14 ENCOUNTER — ANESTHESIA (OUTPATIENT)
Dept: OPERATING ROOM | Age: 66
End: 2021-01-14
Payer: MEDICARE

## 2021-01-14 ENCOUNTER — ANESTHESIA EVENT (OUTPATIENT)
Dept: OPERATING ROOM | Age: 66
End: 2021-01-14
Payer: MEDICARE

## 2021-01-14 VITALS
SYSTOLIC BLOOD PRESSURE: 137 MMHG | TEMPERATURE: 94.6 F | RESPIRATION RATE: 1 BRPM | DIASTOLIC BLOOD PRESSURE: 84 MMHG | OXYGEN SATURATION: 100 %

## 2021-01-14 VITALS
RESPIRATION RATE: 14 BRPM | OXYGEN SATURATION: 96 % | DIASTOLIC BLOOD PRESSURE: 74 MMHG | HEART RATE: 59 BPM | SYSTOLIC BLOOD PRESSURE: 122 MMHG | TEMPERATURE: 97.5 F

## 2021-01-14 PROBLEM — N23 RENAL COLIC ON LEFT SIDE: Status: ACTIVE | Noted: 2021-01-14

## 2021-01-14 PROCEDURE — 2720000010 HC SURG SUPPLY STERILE: Performed by: UROLOGY

## 2021-01-14 PROCEDURE — 3209999900 FLUORO FOR SURGICAL PROCEDURES

## 2021-01-14 PROCEDURE — 2580000003 HC RX 258: Performed by: ANESTHESIOLOGY

## 2021-01-14 PROCEDURE — 3700000000 HC ANESTHESIA ATTENDED CARE: Performed by: UROLOGY

## 2021-01-14 PROCEDURE — 6360000002 HC RX W HCPCS: Performed by: NURSE ANESTHETIST, CERTIFIED REGISTERED

## 2021-01-14 PROCEDURE — 3600000004 HC SURGERY LEVEL 4 BASE: Performed by: UROLOGY

## 2021-01-14 PROCEDURE — 6360000002 HC RX W HCPCS: Performed by: UROLOGY

## 2021-01-14 PROCEDURE — 7100000001 HC PACU RECOVERY - ADDTL 15 MIN: Performed by: UROLOGY

## 2021-01-14 PROCEDURE — 93005 ELECTROCARDIOGRAM TRACING: CPT | Performed by: UROLOGY

## 2021-01-14 PROCEDURE — G0378 HOSPITAL OBSERVATION PER HR: HCPCS

## 2021-01-14 PROCEDURE — 6360000002 HC RX W HCPCS: Performed by: ANESTHESIOLOGY

## 2021-01-14 PROCEDURE — 6370000000 HC RX 637 (ALT 250 FOR IP): Performed by: UROLOGY

## 2021-01-14 PROCEDURE — 3700000001 HC ADD 15 MINUTES (ANESTHESIA): Performed by: UROLOGY

## 2021-01-14 PROCEDURE — 6360000002 HC RX W HCPCS: Performed by: PEDIATRICS

## 2021-01-14 PROCEDURE — 2580000003 HC RX 258: Performed by: UROLOGY

## 2021-01-14 PROCEDURE — 2709999900 HC NON-CHARGEABLE SUPPLY: Performed by: UROLOGY

## 2021-01-14 PROCEDURE — 7100000000 HC PACU RECOVERY - FIRST 15 MIN: Performed by: UROLOGY

## 2021-01-14 PROCEDURE — C2617 STENT, NON-COR, TEM W/O DEL: HCPCS | Performed by: UROLOGY

## 2021-01-14 PROCEDURE — 96376 TX/PRO/DX INJ SAME DRUG ADON: CPT

## 2021-01-14 PROCEDURE — C1769 GUIDE WIRE: HCPCS | Performed by: UROLOGY

## 2021-01-14 PROCEDURE — 99219 PR INITIAL OBSERVATION CARE/DAY 50 MINUTES: CPT | Performed by: UROLOGY

## 2021-01-14 PROCEDURE — 52356 CYSTO/URETERO W/LITHOTRIPSY: CPT | Performed by: UROLOGY

## 2021-01-14 PROCEDURE — 3600000014 HC SURGERY LEVEL 4 ADDTL 15MIN: Performed by: UROLOGY

## 2021-01-14 PROCEDURE — 2500000003 HC RX 250 WO HCPCS: Performed by: NURSE ANESTHETIST, CERTIFIED REGISTERED

## 2021-01-14 PROCEDURE — C1758 CATHETER, URETERAL: HCPCS | Performed by: UROLOGY

## 2021-01-14 DEVICE — URETERAL STENT
Type: IMPLANTABLE DEVICE | Site: URETER | Status: FUNCTIONAL
Brand: PERCUFLEX™ PLUS

## 2021-01-14 RX ORDER — PROPOFOL 10 MG/ML
INJECTION, EMULSION INTRAVENOUS PRN
Status: DISCONTINUED | OUTPATIENT
Start: 2021-01-14 | End: 2021-01-14 | Stop reason: SDUPTHER

## 2021-01-14 RX ORDER — LORAZEPAM 1 MG/1
1 TABLET ORAL 3 TIMES DAILY PRN
Status: DISCONTINUED | OUTPATIENT
Start: 2021-01-14 | End: 2021-01-15 | Stop reason: HOSPADM

## 2021-01-14 RX ORDER — SODIUM CHLORIDE 9 MG/ML
INJECTION, SOLUTION INTRAVENOUS CONTINUOUS
Status: DISCONTINUED | OUTPATIENT
Start: 2021-01-14 | End: 2021-01-15 | Stop reason: HOSPADM

## 2021-01-14 RX ORDER — ONDANSETRON 2 MG/ML
4 INJECTION INTRAMUSCULAR; INTRAVENOUS
Status: DISCONTINUED | OUTPATIENT
Start: 2021-01-14 | End: 2021-01-14 | Stop reason: HOSPADM

## 2021-01-14 RX ORDER — ISOSORBIDE MONONITRATE 60 MG/1
120 TABLET, EXTENDED RELEASE ORAL DAILY
Status: DISCONTINUED | OUTPATIENT
Start: 2021-01-14 | End: 2021-01-15 | Stop reason: HOSPADM

## 2021-01-14 RX ORDER — HYDROMORPHONE HYDROCHLORIDE 1 MG/ML
1 INJECTION, SOLUTION INTRAMUSCULAR; INTRAVENOUS; SUBCUTANEOUS
Status: DISCONTINUED | OUTPATIENT
Start: 2021-01-14 | End: 2021-01-15 | Stop reason: HOSPADM

## 2021-01-14 RX ORDER — SODIUM CHLORIDE, SODIUM LACTATE, POTASSIUM CHLORIDE, CALCIUM CHLORIDE 600; 310; 30; 20 MG/100ML; MG/100ML; MG/100ML; MG/100ML
INJECTION, SOLUTION INTRAVENOUS CONTINUOUS
Status: DISCONTINUED | OUTPATIENT
Start: 2021-01-14 | End: 2021-01-14

## 2021-01-14 RX ORDER — HYDROMORPHONE HYDROCHLORIDE 1 MG/ML
0.25 INJECTION, SOLUTION INTRAMUSCULAR; INTRAVENOUS; SUBCUTANEOUS EVERY 5 MIN PRN
Status: DISCONTINUED | OUTPATIENT
Start: 2021-01-14 | End: 2021-01-14 | Stop reason: HOSPADM

## 2021-01-14 RX ORDER — SODIUM CHLORIDE 0.9 % (FLUSH) 0.9 %
10 SYRINGE (ML) INJECTION EVERY 12 HOURS SCHEDULED
Status: DISCONTINUED | OUTPATIENT
Start: 2021-01-14 | End: 2021-01-15 | Stop reason: HOSPADM

## 2021-01-14 RX ORDER — PHENAZOPYRIDINE HYDROCHLORIDE 100 MG/1
100 TABLET, FILM COATED ORAL EVERY 6 HOURS PRN
Status: DISCONTINUED | OUTPATIENT
Start: 2021-01-14 | End: 2021-01-15 | Stop reason: HOSPADM

## 2021-01-14 RX ORDER — FENTANYL CITRATE 50 UG/ML
INJECTION, SOLUTION INTRAMUSCULAR; INTRAVENOUS PRN
Status: DISCONTINUED | OUTPATIENT
Start: 2021-01-14 | End: 2021-01-14 | Stop reason: SDUPTHER

## 2021-01-14 RX ORDER — CIPROFLOXACIN 2 MG/ML
INJECTION, SOLUTION INTRAVENOUS PRN
Status: DISCONTINUED | OUTPATIENT
Start: 2021-01-14 | End: 2021-01-14 | Stop reason: SDUPTHER

## 2021-01-14 RX ORDER — OXYCODONE AND ACETAMINOPHEN 7.5; 325 MG/1; MG/1
1 TABLET ORAL EVERY 4 HOURS PRN
Status: DISCONTINUED | OUTPATIENT
Start: 2021-01-14 | End: 2021-01-15 | Stop reason: HOSPADM

## 2021-01-14 RX ORDER — CALCIUM CARBONATE 500(1250)
500 TABLET ORAL DAILY
Status: DISCONTINUED | OUTPATIENT
Start: 2021-01-14 | End: 2021-01-15 | Stop reason: HOSPADM

## 2021-01-14 RX ORDER — TROSPIUM CHLORIDE 20 MG/1
20 TABLET, FILM COATED ORAL NIGHTLY
Status: DISCONTINUED | OUTPATIENT
Start: 2021-01-14 | End: 2021-01-15 | Stop reason: HOSPADM

## 2021-01-14 RX ORDER — KETOROLAC TROMETHAMINE 30 MG/ML
INJECTION, SOLUTION INTRAMUSCULAR; INTRAVENOUS PRN
Status: DISCONTINUED | OUTPATIENT
Start: 2021-01-14 | End: 2021-01-14 | Stop reason: SDUPTHER

## 2021-01-14 RX ORDER — ROCURONIUM BROMIDE 10 MG/ML
INJECTION, SOLUTION INTRAVENOUS PRN
Status: DISCONTINUED | OUTPATIENT
Start: 2021-01-14 | End: 2021-01-14 | Stop reason: SDUPTHER

## 2021-01-14 RX ORDER — ACETAMINOPHEN 325 MG/1
650 TABLET ORAL EVERY 6 HOURS PRN
Status: DISCONTINUED | OUTPATIENT
Start: 2021-01-14 | End: 2021-01-15 | Stop reason: HOSPADM

## 2021-01-14 RX ORDER — HYDROMORPHONE HYDROCHLORIDE 1 MG/ML
0.5 INJECTION, SOLUTION INTRAMUSCULAR; INTRAVENOUS; SUBCUTANEOUS EVERY 5 MIN PRN
Status: DISCONTINUED | OUTPATIENT
Start: 2021-01-14 | End: 2021-01-14 | Stop reason: HOSPADM

## 2021-01-14 RX ORDER — PHENAZOPYRIDINE HYDROCHLORIDE 200 MG/1
200 TABLET, FILM COATED ORAL 3 TIMES DAILY PRN
Qty: 15 TABLET | Refills: 1 | Status: SHIPPED | OUTPATIENT
Start: 2021-01-14 | End: 2021-01-17

## 2021-01-14 RX ORDER — GINSENG 100 MG
CAPSULE ORAL 2 TIMES DAILY
Status: DISCONTINUED | OUTPATIENT
Start: 2021-01-14 | End: 2021-01-15 | Stop reason: HOSPADM

## 2021-01-14 RX ORDER — POLYETHYLENE GLYCOL 3350 17 G/17G
17 POWDER, FOR SOLUTION ORAL DAILY PRN
Status: DISCONTINUED | OUTPATIENT
Start: 2021-01-14 | End: 2021-01-15 | Stop reason: HOSPADM

## 2021-01-14 RX ORDER — SULFAMETHOXAZOLE AND TRIMETHOPRIM 800; 160 MG/1; MG/1
1 TABLET ORAL 2 TIMES DAILY
Qty: 14 TABLET | Refills: 0 | Status: SHIPPED | OUTPATIENT
Start: 2021-01-14 | End: 2021-01-21

## 2021-01-14 RX ORDER — SODIUM CHLORIDE 0.9 % (FLUSH) 0.9 %
10 SYRINGE (ML) INJECTION EVERY 12 HOURS SCHEDULED
Status: DISCONTINUED | OUTPATIENT
Start: 2021-01-14 | End: 2021-01-14 | Stop reason: HOSPADM

## 2021-01-14 RX ORDER — LIDOCAINE HYDROCHLORIDE 10 MG/ML
INJECTION, SOLUTION INFILTRATION; PERINEURAL PRN
Status: DISCONTINUED | OUTPATIENT
Start: 2021-01-14 | End: 2021-01-14 | Stop reason: SDUPTHER

## 2021-01-14 RX ORDER — ONDANSETRON 2 MG/ML
INJECTION INTRAMUSCULAR; INTRAVENOUS PRN
Status: DISCONTINUED | OUTPATIENT
Start: 2021-01-14 | End: 2021-01-14 | Stop reason: SDUPTHER

## 2021-01-14 RX ORDER — AMLODIPINE BESYLATE 10 MG/1
10 TABLET ORAL DAILY
Status: DISCONTINUED | OUTPATIENT
Start: 2021-01-14 | End: 2021-01-15 | Stop reason: HOSPADM

## 2021-01-14 RX ORDER — DEXAMETHASONE SODIUM PHOSPHATE 4 MG/ML
4 INJECTION, SOLUTION INTRA-ARTICULAR; INTRALESIONAL; INTRAMUSCULAR; INTRAVENOUS; SOFT TISSUE ONCE
Status: DISCONTINUED | OUTPATIENT
Start: 2021-01-14 | End: 2021-01-14 | Stop reason: HOSPADM

## 2021-01-14 RX ORDER — SODIUM CHLORIDE 0.9 % (FLUSH) 0.9 %
10 SYRINGE (ML) INJECTION PRN
Status: DISCONTINUED | OUTPATIENT
Start: 2021-01-14 | End: 2021-01-15 | Stop reason: HOSPADM

## 2021-01-14 RX ORDER — DEXAMETHASONE SODIUM PHOSPHATE 10 MG/ML
INJECTION, SOLUTION INTRAMUSCULAR; INTRAVENOUS PRN
Status: DISCONTINUED | OUTPATIENT
Start: 2021-01-14 | End: 2021-01-14 | Stop reason: SDUPTHER

## 2021-01-14 RX ORDER — TAMSULOSIN HYDROCHLORIDE 0.4 MG/1
0.4 CAPSULE ORAL DAILY
Status: DISCONTINUED | OUTPATIENT
Start: 2021-01-14 | End: 2021-01-15 | Stop reason: HOSPADM

## 2021-01-14 RX ORDER — RANOLAZINE 500 MG/1
500 TABLET, EXTENDED RELEASE ORAL 2 TIMES DAILY
Status: DISCONTINUED | OUTPATIENT
Start: 2021-01-14 | End: 2021-01-15 | Stop reason: HOSPADM

## 2021-01-14 RX ORDER — CIPROFLOXACIN 2 MG/ML
INJECTION, SOLUTION INTRAVENOUS
Status: COMPLETED
Start: 2021-01-14 | End: 2021-01-14

## 2021-01-14 RX ORDER — FENTANYL CITRATE 50 UG/ML
50 INJECTION, SOLUTION INTRAMUSCULAR; INTRAVENOUS EVERY 5 MIN PRN
Status: DISCONTINUED | OUTPATIENT
Start: 2021-01-14 | End: 2021-01-14 | Stop reason: HOSPADM

## 2021-01-14 RX ORDER — DEXTROSE AND SODIUM CHLORIDE 5; .45 G/100ML; G/100ML
INJECTION, SOLUTION INTRAVENOUS CONTINUOUS
Status: DISCONTINUED | OUTPATIENT
Start: 2021-01-14 | End: 2021-01-14

## 2021-01-14 RX ORDER — FUROSEMIDE 10 MG/ML
INJECTION INTRAMUSCULAR; INTRAVENOUS PRN
Status: DISCONTINUED | OUTPATIENT
Start: 2021-01-14 | End: 2021-01-14 | Stop reason: SDUPTHER

## 2021-01-14 RX ORDER — NITROGLYCERIN 0.4 MG/1
0.4 TABLET SUBLINGUAL 3 TIMES DAILY PRN
Status: DISCONTINUED | OUTPATIENT
Start: 2021-01-14 | End: 2021-01-15 | Stop reason: HOSPADM

## 2021-01-14 RX ORDER — SODIUM CHLORIDE 0.9 % (FLUSH) 0.9 %
10 SYRINGE (ML) INJECTION PRN
Status: DISCONTINUED | OUTPATIENT
Start: 2021-01-14 | End: 2021-01-14 | Stop reason: HOSPADM

## 2021-01-14 RX ORDER — HYDROCHLOROTHIAZIDE 12.5 MG/1
12.5 CAPSULE, GELATIN COATED ORAL DAILY
Status: DISCONTINUED | OUTPATIENT
Start: 2021-01-14 | End: 2021-01-15 | Stop reason: HOSPADM

## 2021-01-14 RX ORDER — ACETAMINOPHEN 650 MG/1
650 SUPPOSITORY RECTAL EVERY 6 HOURS PRN
Status: DISCONTINUED | OUTPATIENT
Start: 2021-01-14 | End: 2021-01-15 | Stop reason: HOSPADM

## 2021-01-14 RX ORDER — POTASSIUM CHLORIDE 750 MG/1
10 TABLET, EXTENDED RELEASE ORAL DAILY
Status: DISCONTINUED | OUTPATIENT
Start: 2021-01-14 | End: 2021-01-15 | Stop reason: HOSPADM

## 2021-01-14 RX ORDER — TAMSULOSIN HYDROCHLORIDE 0.4 MG/1
0.4 CAPSULE ORAL DAILY
Qty: 30 CAPSULE | Refills: 3 | Status: SHIPPED | OUTPATIENT
Start: 2021-01-14 | End: 2021-07-12

## 2021-01-14 RX ADMIN — SODIUM CHLORIDE, SODIUM LACTATE, POTASSIUM CHLORIDE, AND CALCIUM CHLORIDE: 600; 310; 30; 20 INJECTION, SOLUTION INTRAVENOUS at 12:36

## 2021-01-14 RX ADMIN — AMLODIPINE BESYLATE 10 MG: 10 TABLET ORAL at 16:07

## 2021-01-14 RX ADMIN — Medication 10 ML: at 15:58

## 2021-01-14 RX ADMIN — DEXTROSE AND SODIUM CHLORIDE: 5; 450 INJECTION, SOLUTION INTRAVENOUS at 03:41

## 2021-01-14 RX ADMIN — SODIUM CHLORIDE, PRESERVATIVE FREE 10 ML: 5 INJECTION INTRAVENOUS at 02:52

## 2021-01-14 RX ADMIN — METOPROLOL TARTRATE 25 MG: 25 TABLET, FILM COATED ORAL at 09:49

## 2021-01-14 RX ADMIN — FENTANYL CITRATE 50 MCG: 50 INJECTION, SOLUTION INTRAMUSCULAR; INTRAVENOUS at 11:19

## 2021-01-14 RX ADMIN — HYDROMORPHONE HYDROCHLORIDE 0.5 MG: 1 INJECTION, SOLUTION INTRAMUSCULAR; INTRAVENOUS; SUBCUTANEOUS at 13:16

## 2021-01-14 RX ADMIN — PROPOFOL 140 MG: 10 INJECTION, EMULSION INTRAVENOUS at 11:19

## 2021-01-14 RX ADMIN — BACITRACIN: 500 OINTMENT TOPICAL at 15:38

## 2021-01-14 RX ADMIN — HYDROMORPHONE HYDROCHLORIDE 0.5 MG: 1 INJECTION, SOLUTION INTRAMUSCULAR; INTRAVENOUS; SUBCUTANEOUS at 01:40

## 2021-01-14 RX ADMIN — HYDROMORPHONE HYDROCHLORIDE 1 MG: 1 INJECTION, SOLUTION INTRAMUSCULAR; INTRAVENOUS; SUBCUTANEOUS at 07:37

## 2021-01-14 RX ADMIN — TAMSULOSIN HYDROCHLORIDE 0.4 MG: 0.4 CAPSULE ORAL at 16:06

## 2021-01-14 RX ADMIN — ROCURONIUM BROMIDE 10 MG: 10 INJECTION, SOLUTION INTRAVENOUS at 11:51

## 2021-01-14 RX ADMIN — CIPROFLOXACIN 400 MG: 2 INJECTION INTRAVENOUS at 11:23

## 2021-01-14 RX ADMIN — HYDROMORPHONE HYDROCHLORIDE 0.5 MG: 1 INJECTION, SOLUTION INTRAMUSCULAR; INTRAVENOUS; SUBCUTANEOUS at 13:35

## 2021-01-14 RX ADMIN — SODIUM CHLORIDE, PRESERVATIVE FREE 10 ML: 5 INJECTION INTRAVENOUS at 01:40

## 2021-01-14 RX ADMIN — DEXAMETHASONE SODIUM PHOSPHATE 10 MG: 10 INJECTION, SOLUTION INTRAMUSCULAR; INTRAVENOUS at 11:25

## 2021-01-14 RX ADMIN — ROCURONIUM BROMIDE 40 MG: 10 INJECTION, SOLUTION INTRAVENOUS at 11:19

## 2021-01-14 RX ADMIN — SUGAMMADEX 200 MG: 100 INJECTION, SOLUTION INTRAVENOUS at 12:36

## 2021-01-14 RX ADMIN — FUROSEMIDE 20 MG: 10 INJECTION, SOLUTION INTRAVENOUS at 12:40

## 2021-01-14 RX ADMIN — LIDOCAINE HYDROCHLORIDE 50 MG: 10 INJECTION, SOLUTION INFILTRATION; PERINEURAL at 11:19

## 2021-01-14 RX ADMIN — FENTANYL CITRATE 50 MCG: 50 INJECTION, SOLUTION INTRAMUSCULAR; INTRAVENOUS at 11:51

## 2021-01-14 RX ADMIN — ONDANSETRON HYDROCHLORIDE 4 MG: 2 INJECTION, SOLUTION INTRAMUSCULAR; INTRAVENOUS at 11:25

## 2021-01-14 RX ADMIN — ISOSORBIDE MONONITRATE 120 MG: 60 TABLET, EXTENDED RELEASE ORAL at 16:07

## 2021-01-14 RX ADMIN — SODIUM CHLORIDE, SODIUM LACTATE, POTASSIUM CHLORIDE, AND CALCIUM CHLORIDE: 600; 310; 30; 20 INJECTION, SOLUTION INTRAVENOUS at 10:09

## 2021-01-14 RX ADMIN — KETOROLAC TROMETHAMINE 15 MG: 30 INJECTION, SOLUTION INTRAMUSCULAR; INTRAVENOUS at 11:58

## 2021-01-14 RX ADMIN — OXYCODONE HYDROCHLORIDE AND ACETAMINOPHEN 1 TABLET: 7.5; 325 TABLET ORAL at 16:07

## 2021-01-14 RX ADMIN — HYDROMORPHONE HYDROCHLORIDE 1 MG: 1 INJECTION, SOLUTION INTRAMUSCULAR; INTRAVENOUS; SUBCUTANEOUS at 14:57

## 2021-01-14 RX ADMIN — HYDROMORPHONE HYDROCHLORIDE 1 MG: 1 INJECTION, SOLUTION INTRAMUSCULAR; INTRAVENOUS; SUBCUTANEOUS at 02:52

## 2021-01-14 RX ADMIN — HYDROMORPHONE HYDROCHLORIDE 0.5 MG: 1 INJECTION, SOLUTION INTRAMUSCULAR; INTRAVENOUS; SUBCUTANEOUS at 13:01

## 2021-01-14 ASSESSMENT — ENCOUNTER SYMPTOMS
RHINORRHEA: 0
COUGH: 0
VOMITING: 0
ABDOMINAL PAIN: 0
RESPIRATORY NEGATIVE: 1
SHORTNESS OF BREATH: 0
EYES NEGATIVE: 1
COLOR CHANGE: 0
BACK PAIN: 0
EYE DISCHARGE: 0
NAUSEA: 1

## 2021-01-14 ASSESSMENT — PAIN SCALES - GENERAL
PAINLEVEL_OUTOF10: 5
PAINLEVEL_OUTOF10: 8
PAINLEVEL_OUTOF10: 8
PAINLEVEL_OUTOF10: 5
PAINLEVEL_OUTOF10: 6

## 2021-01-14 ASSESSMENT — LIFESTYLE VARIABLES: SMOKING_STATUS: 0

## 2021-01-14 NOTE — BRIEF OP NOTE
Brief Postoperative Note      Patient: Kali Cifuentes  YOB: 1955  MRN: [id number]    Date of Procedure: 1/14/2021    Pre-Op Diagnosis: Left renal colic, indwelling left ureteral stent, left proximal ureteral calculus (ureteropelvic junction)    Post-Op Diagnosis: Same as preoperative diagnosis-stone 2 x 4 mm    Procedure: Cystoscopy, left ureteroscopy laser pulverization of left proximal ureteral calculus, insertion of left ureteral stent    Surgeon(s):  Maeve Moss MD    Assistant: NONE    Anesthesia: General    Estimated Blood Loss (mL): 0 mL  Complications: NONE    Specimens:   None    Implants:  Implant Name Type Inv.  Item Serial No.  Lot No. LRB No. Used Action   STENT URET 6FR [id number] HYDR+ GRAD CIRCUMFERENTIAL MRK LO PROF  STENT URET 6FR L24CM HYDR+ GRAD CIRCUMFERENTIAL MRK LO PROF  [employer] [facility] UROLOG[facility]- [id number] Left 1 Implanted         Drains: None    Findings: Ureteropelvic junction calculus stone measuring 2 x 4 mm    Electronically signed by Maeve Moss MD on 1/14/2021 at 12:45 PM

## 2021-01-14 NOTE — DISCHARGE INSTR - DIET

## 2021-01-14 NOTE — H&P
Polyarticular arthritis     Renal calculi 2010    S/p lithrotripsy    Right arm pain 1/28/2016    Right ureteral stone 7/8/2016    Tobacco abuse 2010    Prior. Past Surgical History     Past Surgical History:   Procedure Laterality Date    CARDIAC CATHETERIZATION  01/08/2010    EF is estimated to be 60%. See scanned document.  CARDIAC CATHETERIZATION  6/20/08, 10/2/08    EF is estimated to be 60%. See scanned document.  CARDIAC CATHETERIZATION  2/13/07, 6/6/07    EF 50%. See scanned document.  CARDIAC CATHETERIZATION  1/8/07, 11/26/07    EF is estimated to be in excess of 50%. See scanned doucment.  CARDIAC CATHETERIZATION  08/17/2006    EF is estimated to be in excess of 50%. See scanned doucment.  CARDIAC CATHETERIZATION  5/14/14  MDL    normal LV function. EF 60    CHOLECYSTECTOMY      CLAVICLE SURGERY      COLONOSCOPY      Endoscopy    CYSTOSCOPY Right 7/8/2016    CYSTOSCOPY; RIGHT RETROGRADE PYELOGRAM; RIGHT URETERAL DILATATION; RIGHT URETEROSCOPY; RIGHT URETERAL LASER LITHOTRIPSY AND STONE EXTRACTION; INSERTION RIGHT URETERAL DOUBLE J STENT performed by Fawn Blake MD at [address] [place] [address] Left     HAND SURGERY      HUMERUS SURGERY      KNEE SURGERY      Right    KNEE SURGERY Left     LITHOTRIPSY      MANDIBLE FRACTURE SURGERY      NECK SURGERY      cervical spine.  OTHER SURGICAL HISTORY      Brachytherapy of the core stents.  PACEMAKER PLACEMENT      [employer]    PTCA      TIBIA FRACTURE SURGERY      WRIST SURGERY          Medications Prior to Admission     Prior to Admission medications    Medication Sig Start Date End Date Taking?  Authorizing Provider   potassium chloride (KLOR-CON 10) 10 MEQ extended release tablet Take 10 mEq by mouth daily as needed 5/14/20 5/15/21 Yes Historical Provider, MD   solifenacin (VESICARE) 10 MG tablet Take 1 tablet by mouth daily 11/4/20  Yes Arianna Gordillo, APRN - CNP   oxyCODONE-acetaminophen (PERCOCET) 7.5-325 MG per tablet Take 1 tablet by mouth every 4 hours as needed for Pain . Yes Historical Provider, MD   calcium carbonate (OSCAL) 500 MG TABS tablet Take 500 mg by mouth daily Pt states he is taking prn   Yes Historical Provider, MD   aspirin 325 MG tablet Take 325 mg by mouth daily   Yes Historical Provider, MD   metoprolol tartrate (LOPRESSOR) 25 MG tablet Take 25 mg by mouth 2 times daily Indications: Pt unaware of dosage   Yes Historical Provider, MD   isosorbide mononitrate (IMDUR) 120 MG CR tablet Take 120 mg by mouth daily   Yes Historical Provider, MD   hydrochlorothiazide (HYDRODIURIL) 12.5 MG tablet Take 12.5 mg by mouth daily Indications: as needed   Yes Historical Provider, MD   Coenzyme Q10 (COQ-10 PO) Take by mouth daily    Yes Historical Provider, MD   ranolazine (RANEXA) 500 MG SR tablet Take 500 mg by mouth 2 times daily   Yes Historical Provider, MD   Nutritional Supplements (BOOST PO) Take by mouth   Yes Historical Provider, MD   amlodipine (NORVASC) 10 MG tablet Take 10 mg by mouth daily. Yes Historical Provider, MD   clopidogrel (PLAVIX) 75 MG tablet Take 75 mg by mouth daily. Yes Historical Provider, MD   NITROSTAT 0.4 MG SL tablet Place 1 tablet under the tongue 3 times daily as needed 10/23/15   Historical Provider, MD   LORazepam (ATIVAN) 1 MG tablet Take 1 mg by mouth 3 times daily as needed 1/18/16   Historical Provider, MD        Allergies   Ezetimibe-simvastatin, Bee venom, Darvocet [propoxyphene n-acetaminophen], Penicillins, Phenergan [promethazine hcl], Promethazine, Promethazine hcl, and Propoxyphene    Social History     Social History     Tobacco History     Smoking Status  Former Smoker Smoking Tobacco Type  Cigarettes    Smokeless Tobacco Use  Never Used          Alcohol History     Alcohol Use Status  No          Drug Use     Drug Use Status  No          Sexual Activity     Sexually Active  Not Asked                Family History   History reviewed.  No pertinent family history. Review of Systems   Review of Systems   Constitutional: Negative for activity change, chills and fever. HENT: Negative. Eyes: Negative. Respiratory: Negative. Cardiovascular: Negative. Gastrointestinal: Positive for nausea. Negative for abdominal pain and vomiting. Genitourinary: Positive for flank pain and frequency. Neurological: Negative. All other systems reviewed and are negative. Physical Exam   /74   Pulse 59   Temp 97.2 °F (36.2 °C) (Temporal)   Resp 16   SpO2 96%     Physical Exam  Constitutional:       General: He is not in acute distress. Appearance: Normal appearance. He is normal weight. He is not toxic-appearing. HENT:      Head: Normocephalic and atraumatic. Nose: Nose normal.      Mouth/Throat:      Mouth: Mucous membranes are moist.   Eyes:      General: No scleral icterus. Extraocular Movements: Extraocular movements intact. Conjunctiva/sclera: Conjunctivae normal.   Neck:      Musculoskeletal: Normal range of motion and neck supple. Cardiovascular:      Rate and Rhythm: Normal rate and regular rhythm. Pulses: Normal pulses. Pulmonary:      Effort: Pulmonary effort is normal. No respiratory distress. Abdominal:      General: Abdomen is flat. There is no distension. Palpations: Abdomen is soft. Tenderness: There is no abdominal tenderness. There is left CVA tenderness. Genitourinary:     Penis: Normal.       Testes: Normal.   Musculoskeletal: Normal range of motion. General: No swelling or deformity. Skin:     General: Skin is warm and dry. Neurological:      General: No focal deficit present. Mental Status: He is alert and oriented to person, place, and time.    Psychiatric:         Mood and Affect: Mood normal.         Labs      Recent Results (from the past 24 hour(s))   CBC Auto Differential    Collection Time: 01/13/21  3:45 PM   Result Value Ref Range    WBC 7.9 4.8 - 10.8 K/uL RBC 4.51 (L) 4.70 - 6.10 M/uL    Hemoglobin 13.1 (L) 14.0 - 18.0 g/dL    Hematocrit 38.9 (L) 42.0 - 52.0 %    MCV 86.3 80.0 - 94.0 fL    MCH 29.0 27.0 - 31.0 pg    MCHC 33.7 33.0 - 37.0 g/dL    RDW 12.7 11.5 - 14.5 %    Platelets 365 755 - 897 K/uL    MPV 9.0 (L) 9.4 - 12.4 fL    Neutrophils % 55.9 50.0 - 65.0 %    Lymphocytes % 27.7 20.0 - 40.0 %    Monocytes % 9.9 0.0 - 10.0 %    Eosinophils % 5.3 (H) 0.0 - 5.0 %    Basophils % 0.8 0.0 - 1.0 %    Neutrophils Absolute 4.4 1.5 - 7.5 K/uL    Immature Granulocytes # 0.0 K/uL    Lymphocytes Absolute 2.2 1.1 - 4.5 K/uL    Monocytes Absolute 0.80 0.00 - 0.90 K/uL    Eosinophils Absolute 0.40 0.00 - 0.60 K/uL    Basophils Absolute 0.10 0.00 - 0.20 K/uL   Comprehensive Metabolic Panel w/ Reflex to MG    Collection Time: 01/13/21  3:45 PM   Result Value Ref Range    Sodium 139 136 - 145 mmol/L    Potassium reflex Magnesium 4.0 3.5 - 5.0 mmol/L    Chloride 102 98 - 111 mmol/L    CO2 30 (H) 22 - 29 mmol/L    Anion Gap 7 7 - 19 mmol/L    Glucose 101 74 - 109 mg/dL    BUN 20 8 - 23 mg/dL    CREATININE 0.9 0.5 - 1.2 mg/dL    GFR Non-African American >60 >60    GFR African American >59 >59    Calcium 9.1 8.8 - 10.2 mg/dL    Total Protein 6.8 6.6 - 8.7 g/dL    Alb 4.3 3.5 - 5.2 g/dL    Total Bilirubin 0.3 0.2 - 1.2 mg/dL    Alkaline Phosphatase 84 40 - 130 U/L    ALT 12 5 - 41 U/L    AST 14 5 - 40 U/L   Urinalysis Reflex to Culture    Collection Time: 01/13/21  8:00 PM    Specimen: Urine, clean catch   Result Value Ref Range    Color, UA YELLOW Straw/Yellow    Clarity, UA Clear Clear    Glucose, Ur Negative Negative mg/dL    Bilirubin Urine Negative Negative    Ketones, Urine Negative Negative mg/dL    Specific Gravity, UA 1.008 1.005 - 1.030    Blood, Urine LARGE (A) Negative    pH, UA 7.0 5.0 - 8.0    Protein, UA Negative Negative mg/dL    Urobilinogen, Urine 0.2 <2.0 E.U./dL    Nitrite, Urine Negative Negative    Leukocyte Esterase, Urine TRACE (A) Negative   Microscopic Urinalysis    Collection Time: 01/13/21  8:00 PM   Result Value Ref Range    Bacteria, UA NEGATIVE (A) None Seen /HPF    Crystals, UA NEG (A) None Seen /HPF    Hyaline Casts, UA 0 0 - 8 /HPF    WBC, UA 1 0 - 5 /HPF    RBC,  (H) 0 - 4 /HPF    Epithelial Cells, UA 0 0 - 5 /HPF   COVID-19    Collection Time: 01/13/21  9:09 PM   Result Value Ref Range    SARS-CoV-2, NAAT Not Detected Not Detected        Imaging/Diagnostics Last 24 Hours   Ct Abdomen Pelvis Wo Contrast Additional Contrast? None    Result Date: 1/13/2021  CT ABDOMEN PELVIS WO CONTRAST 1/13/2021 3:38 PM HISTORY: Flank pain. Status post stent on 1/10/2020 COMPARISON: 10/4/2020 DLP: 672 mGy cm. Automated exposure control was utilized to diminish patient radiation dose. TECHNIQUE: Noncontrast enhanced images of the abdomen and pelvis obtained without oral contrast. FINDINGS: There is mild bibasilar atelectasis. The lung bases are otherwise clear. A transvenous pacer is in place. The base of the heart is otherwise unremarkable. Lawrance Potter LIVER: No focal liver lesion. BILIARY SYSTEM: The gallbladder has been surgically removed. No evidence of intra or extrahepatic biliary dilatation. Lawrance Potter PANCREAS: No focal pancreatic lesion. SPLEEN: No evidence of splenomegaly. Splenic granulomas are present. Lawrance Potter KIDNEYS AND ADRENALS: The adrenals are unremarkable. Cortical calcifications are noted along the right renal capsule anteriorly. A previously noted 3 mm calculus which was projected over the lower pole calyx of the right kidney is no longer visualized. The right ureter is decompressed and normal in appearance. Metallic clips are noted adjacent to the left UPJ unchanged from the previous examination. There is a punctate nonobstructing calculus of the lower pole calyx of the left kidney. I do not see any additional stones in the upper tracts of the left kidney but there is mild dilatation of the upper tracts of the left kidney.  Within the proximal left ureter just below the UPJ evidence of mechanical obstruction. The appendix is identified and is normal in appearance. 3. Mild enlargement of the prostate gland with central calcifications. Signed by Dr Vasu Gonzalez on 1/13/2021 4:58 PM    Xr Abdomen (kub) (single Ap View)    Result Date: 1/13/2021  EXAMINATION: XR ABDOMEN (KUB) (SINGLE AP VIEW) 1/13/2021 9:15 PM HISTORY: Kidney stones. Report: A supine view of the abdomen was obtained. COMPARISON: CT of the abdomen and pelvis without contrast 1/13/2021. Large amount stool seen throughout the colon. There is a left-sided double pigtail ureteral stent which appears to be in satisfactory position. No definite nephrolithiasis is identified. There are 2 tiny adjacent ureterolithiasis can't within the proximal left ureter at the L3-4 level, each measuring approximately 1 mm. Cholecystectomy clips are present. There are advanced degenerative changes throughout the lumbar spine. Satisfactory position of the left ureteral stent. 2 tiny 1 mm stone fragments appear to be present in the proximal left ureter at the L3-4 level. Moderate constipation. Signed by Dr Halley Hernandez. Mariola on 1/13/2021 9:17 PM      Assessment      Hospital Problems           Last Modified POA    Left ureteral calculus 3/62/2045 Yes    Renal colic on left side 2/06/3523 Yes          Plan   1. Left renal colic secondary to proximal left ureteral calculus. He had a stent placed proximal week ago. He is likely having some stent colic as well his pain is not controlled on outpatient basis therefore he is now admitted for pain control, and definitive urologic intervention. We will get IV fluids, tamsulosin, parenteral pain medicine and keep n.p.o.  2. Left proximal ureteral calculus. Status post left stent placement outside hospital approximately week ago. We will plan for urologic intervention with left ureteroscopy stone extraction laser lithotripsy possible stent replacement.   We discussed the risks of failure to remove the stone need for subsequent adjuvant or repeat procedures ureteral tear perforation avulsion or injury. He does understand that the stent may need to be replaced.   Because he is on Plavix and aspirin he is not a candidate for ESWL      Consultations Ordered:  None    Electronically signed by Miya Sims MD on 1/14/21 at 6:34 AM CST

## 2021-01-14 NOTE — PROGRESS NOTES
4 Eyes Skin Assessment    Philipp Cruz is being assessed upon: Admission    I agree that I, Kadeem Kan, along with Sarah Tao (either 2 RN's or 1 LPN and 1 RN) have performed a thorough Head to Toe Skin Assessment on the patient. ALL assessment sites listed below have been assessed. Areas assessed by both nurses:     [x]   Head, Face, and Ears   [x]   Shoulders, Back, and Chest  [x]   Arms, Elbows, and Hands   [x]   Coccyx, Sacrum, and Ischium  [x]   Legs, Feet, and Heels    Does the Patient have Skin Breakdown? No    Koko Prevention initiated: Yes  Wound Care Orders initiated: NA    [facility] nurse consulted for Pressure Injury (Stage 3,4, Unstageable, DTI, NWPT, and Complex wounds) and New or Established Ostomies: No        Primary Nurse eSignature: Kadeem Kan RN on 1/14/2021 at 1:53 AM      Co-Signer eSignature: {Esignature:[id number]}   Philipp Cruz arrived to room # 515. Presented with: ureterolithiasis  Mental Status: Patient is oriented, alert, coherent, logical, thought processes intact and able to concentrate and follow conversation. Vitals:    01/13/21 2203   BP: 130/82   Pulse: 80   Resp: 18   Temp: 97.1 °F (36.2 °C)   SpO2: 99%     Patient safety contract and falls prevention contract reviewed with patient Yes. Oriented Patient to room. Call light within reach. Yes.   Needs, issues or concerns expressed at this time: no.      Electronically signed by Kadeem Kan RN on 1/14/2021 at 1:53 AM

## 2021-01-14 NOTE — ANESTHESIA POSTPROCEDURE EVALUATION
Department of Anesthesiology  Postprocedure Note    Patient: Governor Schwab  MRN: [id number]  YOB: 1955  Date of evaluation: 1/14/2021  Time:  12:51 PM     Procedure Summary     Date: 01/14/21 Room / Location: [facility] OR [facility]    Anesthesia Start: 1113 Anesthesia Stop: 1250    Procedure: CYSTOSCOPY URETEROSCOPY LASER LITHO WITH STONE EXTRACTION AND STENT REMOVAL> (Left ) Diagnosis: (renal stone)    Surgeons: Wandy Pepe MD Responsible Provider: BHUMIKA Lara CRNA    Anesthesia Type: general ASA Status: 3          Anesthesia Type: general    Brenna Phase I: Brenna Score: 9    Brenna Phase II:      Last vitals: Reviewed and per EMR flowsheets.        Anesthesia Post Evaluation    Patient location during evaluation: bedside  Level of consciousness: awake  Pain score: 0  Airway patency: patent  Nausea & Vomiting: no nausea and no vomiting  Complications: no  Cardiovascular status: blood pressure returned to baseline  Respiratory status: acceptable  Hydration status: euvolemic

## 2021-01-14 NOTE — ANESTHESIA PRE PROCEDURE
Department of Anesthesiology  Preprocedure Note       Name:  Alexandro Moulton   Age:  72 y.o.  :  1955                                          MRN:  [id number]         Date:  2021      Surgeon: Cheikh Hebert):  Ilana Lawson MD    Procedure: Procedure(s):  CYSTOSCOPY URETEROSCOPY LASER LITHO WITH STONE EXTRACTION AND STENT REMOVAL>    Medications prior to admission:   Prior to Admission medications    Medication Sig Start Date End Date Taking? Authorizing Provider   potassium chloride (KLOR-CON 10) 10 MEQ extended release tablet Take 10 mEq by mouth daily as needed 5/14/20 5/15/21 Yes Historical Provider, MD   solifenacin (VESICARE) 10 MG tablet Take 1 tablet by mouth daily 20  Yes Arianna Gordillo APRN - DONNIE   oxyCODONE-acetaminophen (PERCOCET) 7.5-325 MG per tablet Take 1 tablet by mouth every 4 hours as needed for Pain . Yes Historical Provider, MD   calcium carbonate (OSCAL) 500 MG TABS tablet Take 500 mg by mouth daily Pt states he is taking prn   Yes Historical Provider, MD   aspirin 325 MG tablet Take 325 mg by mouth daily   Yes Historical Provider, MD   metoprolol tartrate (LOPRESSOR) 25 MG tablet Take 25 mg by mouth 2 times daily Indications: Pt unaware of dosage   Yes Historical Provider, MD   isosorbide mononitrate (IMDUR) 120 MG CR tablet Take 120 mg by mouth daily   Yes Historical Provider, MD   hydrochlorothiazide (HYDRODIURIL) 12.5 MG tablet Take 12.5 mg by mouth daily Indications: as needed   Yes Historical Provider, MD   Coenzyme Q10 (COQ-10 PO) Take by mouth daily    Yes Historical Provider, MD   ranolazine (RANEXA) 500 MG SR tablet Take 500 mg by mouth 2 times daily   Yes Historical Provider, MD   Nutritional Supplements (BOOST PO) Take by mouth   Yes Historical Provider, MD   amlodipine (NORVASC) 10 MG tablet Take 10 mg by mouth daily. Yes Historical Provider, MD   clopidogrel (PLAVIX) 75 MG tablet Take 75 mg by mouth daily.      Yes Historical Provider, MD   NITROSTAT 0.4 MG SL tablet Place 1 tablet under the tongue 3 times daily as needed 10/23/15   Historical Provider, MD   LORazepam (ATIVAN) 1 MG tablet Take 1 mg by mouth 3 times daily as needed 1/18/16   Historical Provider, MD       Current medications:    Current Facility-Administered Medications   Medication Dose Route Frequency Provider Last Rate Last Admin    HYDROmorphone HCl PF (DILAUDID) injection 1 mg  1 mg Intravenous Q3H PRN Ines Macias MD   1 mg at 01/14/21 0737    amLODIPine (NORVASC) tablet 10 mg  10 mg Oral Daily Ines Macias MD        nitroGLYCERIN (NITROSTAT) SL tablet 0.4 mg  0.4 mg Sublingual TID PRN Ines Macias MD        LORazepam (ATIVAN) tablet 1 mg  1 mg Oral TID PRN Ines Macias MD        isosorbide mononitrate (IMDUR) extended release tablet 120 mg  120 mg Oral Daily Ines Macias MD        hydroCHLOROthiazide (MICROZIDE) capsule 12.5 mg  12.5 mg Oral Daily Ines Macias MD        metoprolol tartrate (LOPRESSOR) tablet 25 mg  25 mg Oral BID Ines Macias MD   25 mg at 01/14/21 0949    oxyCODONE-acetaminophen (PERCOCET) 7.5-325 MG per tablet 1 tablet  1 tablet Oral Q4H PRN Ines Macias MD        calcium elemental (OSCAL) tablet 500 mg  500 mg Oral Daily Ines Macias MD        potassium chloride (KLOR-CON M) extended release tablet 10 mEq  10 mEq Oral Daily Ines Macias MD        [facility]) tablet 20 mg  20 mg Oral Nightly Ines Macias MD        ranolazine [facility]) extended release tablet 500 mg  500 mg Oral BID Ines Macias MD        0.9 % sodium chloride infusion   Intravenous Continuous Ines Macias MD        sodium chloride flush 0.9 % injection 10 mL  10 mL Intravenous 2 times per day Ines Macias MD        sodium chloride flush 0.9 % injection 10 mL  10 mL Intravenous PRN Ines Macias MD        polyethylene glycol [facility]) packet 17 g  17 g Oral Daily PRN Ines Macias MD       [facility] acetaminophen (TYLENOL) tablet 650 mg  650 mg Oral Q6H PRN Abisai Sheikh MD        Or    acetaminophen (TYLENOL) suppository 650 mg  650 mg Rectal Q6H PRN Abisai Sheikh MD        tamsulosin [facility]) capsule 0.4 mg  0.4 mg Oral Daily Abisai Sheikh MD        lactated ringers infusion   Intravenous Continuous Patito Mccollum  mL/hr at 01/14/21 1009 New Bag at 01/14/21 1009    acetaminophen (TYLENOL) tablet 650 mg  650 mg Oral Q4H PRN Abisai Sheikh MD   650 mg at 01/13/21 2209    ondansetron (ZOFRAN) injection 4 mg  4 mg Intravenous Q6H PRN Abisai Sheikh MD   4 mg at 01/13/21 2209    ketorolac (TORADOL) injection 15 mg  15 mg Intravenous Q8H PRN Abisai Sheikh MD           Allergies: Allergies   Allergen Reactions    Ezetimibe-Simvastatin Other (See Comments)     Other reaction(s): myositis/elevated CPK  Other reaction(s):  Other (See Comments)  Myositis/ elevated CPK    Bee Venom     Darvocet [Propoxyphene N-Acetaminophen] Rash    Penicillins Other (See Comments)     childhood allergy    Phenergan [Promethazine Hcl] Anxiety and Other (See Comments)     Restless legs    Promethazine Other (See Comments)     Other reaction(s): jitters    Promethazine Hcl Other (See Comments)     Restless legs    Propoxyphene Rash       Problem List:    Patient Active Problem List   Diagnosis Code    Coronary artery disease involving native coronary artery of native heart I25.10    Tobacco abuse Z72.0    Hyperlipidemia E78.5    HTN (hypertension) I10    Chest pain R07.9    Right arm pain M79.601    Left knee pain M25.562    History of kidney stones Z87.442    Benign prostatic hyperplasia with urinary frequency N40.1, R35.0    Angina pectoris, crescendo ([address] I20.0    ACS (acute coronary syndrome) (HCC) I24.9    Nocturia R35.1    Left ureteral calculus P18.6    Renal colic on left side M65       Past Medical History:        Diagnosis Date    CAD (coronary artery disease) 2010    Native Vessel. S/p stenting wt negative cardiac cath on January 18, 2010 and negative nuclear stress test on June 29, 2010.  Chest pain 1/28/2016    DJD (degenerative joint disease)     GERD (gastroesophageal reflux disease) 2010    HTN (hypertension)     Hyperlipidemia     Left knee pain 1/28/2016    Pacemaker 2010    [employer]TR[employer]IC    Polyarticular arthritis     Renal calculi 2010    S/p lithrotripsy    Right arm pain 1/28/2016    Right ureteral stone 7/8/2016    Tobacco abuse 2010    Prior. Past Surgical History:        Procedure Laterality Date    CARDIAC CATHETERIZATION  01/08/2010    EF is estimated to be 60%. See scanned document.  CARDIAC CATHETERIZATION  6/20/08, 10/2/08    EF is estimated to be 60%. See scanned document.  CARDIAC CATHETERIZATION  2/13/07, 6/6/07    EF 50%. See scanned document.  CARDIAC CATHETERIZATION  1/8/07, 11/26/07    EF is estimated to be in excess of 50%. See scanned doucment.  CARDIAC CATHETERIZATION  08/17/2006    EF is estimated to be in excess of 50%. See scanned doucment.  CARDIAC CATHETERIZATION  5/14/14  MDL    normal LV function. EF 60    CHOLECYSTECTOMY      CLAVICLE SURGERY      COLONOSCOPY      Endoscopy    CYSTOSCOPY Right 7/8/2016    CYSTOSCOPY; RIGHT RETROGRADE PYELOGRAM; RIGHT URETERAL DILATATION; RIGHT URETEROSCOPY; RIGHT URETERAL LASER LITHOTRIPSY AND STONE EXTRACTION; INSERTION RIGHT URETERAL DOUBLE J STENT performed by Juan Jose Rosales MD at [address] [place] [address] Left     HAND SURGERY      HUMERUS SURGERY      KNEE SURGERY      Right    KNEE SURGERY Left     LITHOTRIPSY      MANDIBLE FRACTURE SURGERY      NECK SURGERY      cervical spine.  OTHER SURGICAL HISTORY      Brachytherapy of the core stents.     PACEMAKER PLACEMENT      [employer]    PTCA      TIBIA FRACTURE SURGERY      WRIST SURGERY         Social History:    Social History     Tobacco Use    Smoking status: Former Smoker     Types: Cigarettes    Smokeless tobacco: Never Used   Substance Use Topics    Alcohol use: No                                Counseling given: Not Answered      Vital Signs (Current):   Vitals:    01/13/21 2150 01/13/21 2203 01/14/21 0334 01/14/21 0639   BP: 115/79 130/82 114/74 117/80   Pulse: 80 80 59 59   Resp: 18 18 16 16   Temp: 98 °F (36.7 °C) 97.1 °F (36.2 °C) 97.2 °F (36.2 °C) 97.6 °F (36.4 °C)   TempSrc: Oral Temporal Temporal    SpO2:  99% 96% 98%                                              BP Readings from Last 3 Encounters:   01/14/21 117/80   10/04/20 117/74   08/09/20 104/79       NPO Status: Time of last liquid consumption: 2350                        Time of last solid consumption: 2350                        Date of last liquid consumption: 01/13/21                        Date of last solid food consumption: 01/13/21    BMI:   Wt Readings from Last 3 Encounters:   10/04/20 145 lb (65.8 kg)   08/09/20 142 lb (64.4 kg)   06/17/20 142 lb (64.4 kg)     There is no height or weight on file to calculate BMI.    CBC:   Lab Results   Component Value Date    WBC 7.9 01/13/2021    RBC 4.51 01/13/2021    RBC 4.11 12/18/2011    HGB 13.1 01/13/2021    HCT 38.9 01/13/2021    HCT 36.7 12/18/2011    MCV 86.3 01/13/2021    RDW 12.7 01/13/2021     01/13/2021       CMP:   Lab Results   Component Value Date     01/13/2021     12/21/2011    K 4.0 01/13/2021    K 3.7 12/21/2011     01/13/2021     12/21/2011    CO2 30 01/13/2021    BUN 20 01/13/2021    CREATININE 0.9 01/13/2021    CREATININE 0.9 12/21/2011    GFRAA >59 01/13/2021    LABGLOM >60 01/13/2021    GLUCOSE 101 01/13/2021    PROT 6.8 01/13/2021    CALCIUM 9.1 01/13/2021    BILITOT 0.3 01/13/2021    ALKPHOS 84 01/13/2021    AST 14 01/13/2021    ALT 12 01/13/2021       POC Tests: No results for input(s): POCGLU, POCNA, POCK, POCCL, POCBUN, POCHEMO, POCHCT in the last 72 hours.     Coags:   Lab Results   Component Value Date    PROTIME 13.3 10/04/2020    PROTIME 12.76 12/18/2011    INR 1.02 10/04/2020    APTT 28.1 10/04/2020       HCG (If Applicable): No results found for: PREGTESTUR, PREGSERUM, HCG, HCGQUANT     ABGs: No results found for: PHART, PO2ART, QJH7XEY, WMQ0NWR, BEART, F7ELZYBT     Type & Screen (If Applicable):  No results found for: LABABO, LABRH    Drug/Infectious Status (If Applicable):  No results found for: HIV, HEPCAB    COVID-19 Screening (If Applicable):   Lab Results   Component Value Date    COVID19 Not Detected 01/13/2021         Anesthesia Evaluation  Patient summary reviewed no history of anesthetic complications:   Airway: Mallampati: II  TM distance: >3 FB   Neck ROM: full  Mouth opening: > = 3 FB Dental:          Pulmonary:normal exam  breath sounds clear to auscultation  (+) sleep apnea: on CPAP,      (-) asthma, recent URI and not a current smoker                           Cardiovascular:  Exercise tolerance: good (>4 METS),   (+) hypertension:, pacemaker (Medtronic, placed for SSS, last interrogated approximately November 2020):, CAD:, CABG/stent (Stents x 6, last one 2010):,     (-) past MI and  angina    ECG reviewed  Rhythm: regular  Rate: normal           Beta Blocker:  Dose within 24 Hrs         Neuro/Psych:      (-) seizures, TIA and CVA           GI/Hepatic/Renal:   (+) GERD:, renal disease: kidney stones,      (-) liver disease       Endo/Other:        (-) diabetes mellitus, hypothyroidism, hyperthyroidism               Abdominal:           Vascular:                                    Anesthesia Plan      general     ASA 3     (Preop dexamethasone)  Induction: intravenous. MIPS: Postoperative opioids intended and Prophylactic antiemetics administered. Anesthetic plan and risks discussed with patient. Use of blood products discussed with patient whom consented to blood products.                    Calli Vega MD   1/14/2021

## 2021-01-15 ENCOUNTER — TELEPHONE (OUTPATIENT)
Dept: UROLOGY | Age: 66
End: 2021-01-15

## 2021-01-15 NOTE — TELEPHONE ENCOUNTER
Received CT from [facility].   Pt was in [facility] and had to have ER SX.

## 2021-01-15 NOTE — DISCHARGE SUMMARY
[facility] [email or web address] OF [facility] PATIENCE [address], [address] [facility]                               DISCHARGE SUMMARY    PATIENT NAME: Ruth Kingsley                    :        1955  MED REC NO:   [id number]                              ROOM:       [facility]  ACCOUNT NO:   [de-identified]                           ADMIT DATE: 2021  PROVIDER:     Tam Rocha MD               [address] [place] [address] DATE: 2021    ADMISSION DIAGNOSES:  1. Left renal colic. 2.  Left ureteropelvic junction calculus. 3. Indwelling left ureteral stent. COMORBIDITIES:  Include,  1. Hypertension. 2.  Coronary artery disease. 3.  Peripheral vascular disease. DISCHARGE DIAGNOSES:  1. Left renal colic. 2.  Left ureteropelvic junction calculus. 3. Indwelling left ureteral stent. PROCEDURES UNDERGONE DURING HOSPITALIZATION:  1. Cystoscopy. 2.  Left ureteroscopy. 3.  Laser pulverization of left ureteropelvic junction calculus. 4. Re-insertion of left ureteral stent. CLINICAL HISTORY:  A 69-year-old white male presented with left flank  pain. The patient's history is significant that he has had multiple recurrent  nephrolithiasis. On this presentation, the patient apparently was  vacationing in [place] and developed left flank pain. A workup done  including a CT scan showed he had a stone overlying the distribution of  the left proximal ureter measuring 2 x 4 mm. A double-J stent was  placed and the patient returned home here to [place]. On returning home, the patient with continued pain and as such presented  to the hospital.  The patient was admitted for definitive treatment to  his left proximal ureteral calculus.     Medications, allergies, past surgical history, past medical history,  family history, social history, review of systems, physical examination  as well as the admission labs, please refer to the admission history and  physical.    IMPRESSION: 1.  Indwelling left ureteral stent. 2.  Left proximal ureteral calculus in the area of the left  ureteropelvic junction, measuring 2 x 4 mm. 3.  Indwelling left ureteral stent. HOSPITAL COURSE:  The patient was brought to the operating room on the  day of admission, when he underwent cystoscopy, left ureteroscopy with  laser pulverization of the stone, and insertion of a left ureteral  stent. The stone was pulverized using a holmium laser. Postoperatively, a double-J stent was left in position because of the  manipulation of the ureter in the area of the left ureteropelvic  junction. Postoperative course was essentially unremarkable. The patient was  brought back to the floor where he was observed to voiding clear urine  and was tolerating a regular diet. Based on the patient's improved  condition, the patient is now being discharged to be followed up in the  office in approximately 10 days for stent removal.    DISCHARGE MEDICATIONS:  Include those medications on which he was  admitted along with,  1. Bactrim DS. 2.  Pyridium. 3.  Lortab. 4.  Flomax. DISCHARGE DIET:  A 2 gm sodium diet. DISCHARGE ACTIVITY:  Limited until seen back in the office but most  important, limited for the next 24 hours. DISCHARGE CONDITION:  Improved. CECILIO Espana MD    D: 01/14/2021 15:37:10      T: 01/14/2021 17:03:44     [id number]  Job#: [id number]     Doc#: [id number]    CC:  Hui Greene MD

## 2021-01-15 NOTE — OP NOTE
bladder mucosa had undergone minimal trabecular changes. There appeared  to be no evidence of stones or tumors in the bladder. There was some  some bullous edema around the left ureteral orifice, as the stent is  seen exiting the left ureteral orifice. Following removal of the stent,  there appeared to be diminished efflux of urine from the left ureteral  orifice in comparison to that on the right. Left ureteroscopy demonstrated a stone in the area of the left  ureteropelvic junction. The stone was fragmented using the laser. The  estimated treatment was 3 minutes and 28 seconds. The energy used was  1.2 joules and the frequency was 8 Hz. No stone fragment was removed as  the stone was powdered. Some larger fragments may pass spontaneously  and those will be collected and submitted for chemical analysis. Following treatment, double-J stent was repositioned such that the  proximal end of the stent was in the renal pelvis and the distal end was  left in the bladder. OPERATIVE PROCEDURE:  The patient was brought to the operating room and  was positioned in the supine position. Following the administration of  general anesthesia, the patient was repositioned in a dorsal lithotomy  position. Groin area was then prepped and draped in the usual sterile  manner to expose the penis. A #22-Albanian Storz cystoscope was passed transurethrally into the  bladder under direct vision. First, the urethra was examined with the  30-degree lens and the scope was passed into the bladder and bladder was  examined with the 30- and 70-degree lens respectively. Those findings  are as described above. An alligator forceps was passed through the working channel of the  cystoscope, the stent was grasped as it exited the left ureteral orifice  and was pulled back to the tip of the penis.   Glidewire was then passed  through the working channel of the stent and it was manipulated in such a manner, it was passed up the left ureter into the left renal pelvis. The stent was removed and attention was then directed towards the left  ureteroscopy. A 7.5-Jordanian Storz ureteroscope was then passed transurethrally into the  bladder and up the left ureter. The stone was visualized in the area of  the left ureteropelvic junction. Attempts to manipulate the stone into  the basket was met with difficulty and as such, attention was then  directed towards the laser pulverization of the stone. A 200 micron laser fiber was passed through the working channel of the  ureteroscope and the stone was engaged and was powdered. Larger  fragments were seen that were relative to the powdered fragments and  those fragments were left uncollected with the hope that they will  spontaneously pass because of the dilated ureter. Having accomplished  this and having felt that the stone was completely pulverized, attention  was then directed towards placement of a double-J stent. The ureteroscope was removed leaving the glidewire in position. Next,  the cystoscope was back threaded over the glidewire and was passed  transurethrally into the bladder. Over the glidewire, was then passed a  24 cm x 6-Jordanian double-J stent. The stent was manipulated in such a  manner that the proximal end was passed in the renal pelvis and distal  end was left in the bladder. The glidewire was then removed leaving the  stent in position. Fluoroscopy confirmed that the stent was in excellent position in that  the proximal end of the stent was in the renal pelvis and distal end was  in the bladder. The patient was subsequently repositioned in supine position, reversed  from the anesthesia, extubated and transferred to the recovery room in  stable condition. The patient tolerated these procedures well. CECILIO Paula MD    D: 01/14/2021 14:42:20      T: 01/14/2021 14:52:32     [id number] Job#: [id number]     Doc#: [id number]    CC:  Mariusz Ng MD

## 2021-01-17 LAB
EKG P AXIS: -12 DEGREES
EKG P-R INTERVAL: 206 MS
EKG Q-T INTERVAL: 402 MS
EKG QRS DURATION: 86 MS
EKG QTC CALCULATION (BAZETT): 402 MS
EKG T AXIS: 39 DEGREES

## 2021-01-17 PROCEDURE — 93010 ELECTROCARDIOGRAM REPORT: CPT | Performed by: INTERNAL MEDICINE

## 2021-01-25 ENCOUNTER — PROCEDURE VISIT (OUTPATIENT)
Dept: UROLOGY | Age: 66
End: 2021-01-25
Payer: MEDICARE

## 2021-01-25 DIAGNOSIS — N20.1 LEFT URETERAL CALCULUS: ICD-10-CM

## 2021-01-25 DIAGNOSIS — N20.0 BILATERAL NEPHROLITHIASIS: Primary | ICD-10-CM

## 2021-01-25 LAB
BILIRUBIN, POC: ABNORMAL
BLOOD URINE, POC: ABNORMAL
CLARITY, POC: CLEAR
COLOR, POC: YELLOW
GLUCOSE URINE, POC: ABNORMAL
KETONES, POC: ABNORMAL
LEUKOCYTE EST, POC: ABNORMAL
NITRITE, POC: ABNORMAL
PH, POC: 5
PROTEIN, POC: ABNORMAL
SPECIFIC GRAVITY, POC: 1.03
UROBILINOGEN, POC: 0.2

## 2021-01-25 PROCEDURE — 52310 CYSTOSCOPY AND TREATMENT: CPT | Performed by: UROLOGY

## 2021-01-25 PROCEDURE — 81003 URINALYSIS AUTO W/O SCOPE: CPT | Performed by: UROLOGY

## 2021-01-25 RX ORDER — OXYCODONE HYDROCHLORIDE 15 MG/1
TABLET ORAL
COMMUNITY
Start: 2021-01-15

## 2021-01-25 RX ORDER — PROCHLORPERAZINE MALEATE 5 MG/1
TABLET ORAL
COMMUNITY
Start: 2021-01-04 | End: 2022-09-23

## 2021-01-25 RX ORDER — EVOLOCUMAB 420 MG/3.5
KIT SUBCUTANEOUS
COMMUNITY
Start: 2021-01-15 | End: 2022-01-16

## 2021-01-25 NOTE — PROGRESS NOTES
RIGHT URETERAL DILATATION; RIGHT URETEROSCOPY; RIGHT URETERAL LASER LITHOTRIPSY AND STONE EXTRACTION; INSERTION RIGHT URETERAL DOUBLE J STENT performed by Liang Alexandra MD at [facility] Left 1/14/2021    CYSTOSCOPY URETEROSCOPY LASER LITHO WITH STONE EXTRACTION AND STENT REMOVAL> performed by Bhavani Alvares MD at [address] [place] [address] Left     HAND SURGERY      HUMERUS SURGERY      KNEE SURGERY      Right    KNEE SURGERY Left     LITHOTRIPSY      MANDIBLE FRACTURE SURGERY      NECK SURGERY      cervical spine.  OTHER SURGICAL HISTORY      Brachytherapy of the core stents.     PACEMAKER PLACEMENT      [employer]    PTCA      TIBIA FRACTURE SURGERY      WRIST SURGERY         Current Outpatient Medications   Medication Sig Dispense Refill    Evolocumab with Infusor ([facility]) 420 MG/3.5ML SOCT INJECT THE CONTENTS OF ONE CARTRIDGE (420 MG) UNDER THE SKIN EVERY 30 DAYS (420 MG=3.5 ML)      oxyCODONE (OXY-IR) 15 MG immediate release tablet TAKE 1 TABLET BY MOUTH EVERY 6 HOURS      prochlorperazine (COMPAZINE) 5 MG tablet TAKE 1 TABLET BY MOUTH EVERY 6 HOURS AS NEEDED FOR NAUSEA      tamsulosin (FLOMAX) 0.4 MG capsule Take 1 capsule by mouth daily 30 capsule 3    potassium chloride (KLOR-CON 10) 10 MEQ extended release tablet Take 10 mEq by mouth daily as needed      calcium carbonate (OSCAL) 500 MG TABS tablet Take 500 mg by mouth daily Pt states he is taking prn      aspirin 325 MG tablet Take 325 mg by mouth daily      metoprolol tartrate (LOPRESSOR) 25 MG tablet Take 25 mg by mouth 2 times daily Indications: Pt unaware of dosage      isosorbide mononitrate (IMDUR) 120 MG CR tablet Take 120 mg by mouth daily      hydrochlorothiazide (HYDRODIURIL) 12.5 MG tablet Take 12.5 mg by mouth daily Indications: as needed      Coenzyme Q10 (COQ-10 PO) Take by mouth daily       ranolazine (RANEXA) 500 MG SR tablet Take 500 mg by mouth 2 times daily      Nutritional Supplements (BOOST PO) Take by mouth      NITROSTAT 0.4 MG SL tablet Place 1 tablet under the tongue 3 times daily as needed  0    LORazepam (ATIVAN) 1 MG tablet Take 1 mg by mouth 3 times daily as needed  5    amlodipine (NORVASC) 10 MG tablet Take 10 mg by mouth daily.  clopidogrel (PLAVIX) 75 MG tablet Take 75 mg by mouth daily. No current facility-administered medications for this visit. Allergies   Allergen Reactions    Ezetimibe-Simvastatin Other (See Comments)     Other reaction(s): myositis/elevated CPK  Other reaction(s): Other (See Comments)  Myositis/ elevated CPK    Bee Venom     Darvocet [Propoxyphene N-Acetaminophen] Rash    Penicillins Other (See Comments)     childhood allergy    Phenergan [Promethazine Hcl] Anxiety and Other (See Comments)     Restless legs    Promethazine Other (See Comments)     Other reaction(s): jitters    Promethazine Hcl Other (See Comments)     Restless legs    Propoxyphene Rash         REVIEW OF SYSTEMS:  Review of Systems    PHYSICAL EXAM:  Physical Exam      Cystoscopy Procedure Note    Indications: Diagnosis,  Indwelling Left Ureteral stent     Pre-operative Diagnosis: Left ureteral calculus status post stent placement    Post-operative Diagnosis: Same    Surgeon: Abisai Sheikh MD     Assistants: staff    Anesthesia: Local anesthesia topical 2% lidocaine gel    Procedure Details   The risks, benefits, complications, treatment options, and expected outcomes were discussedwith the patient. The patient concurred with the proposed plan, giving informed consent. Cystoscopy was performed today under local anesthesia, using sterile technique. The patient was placed in the supine position, prepped with Hibiclens, and draped in the usual sterile fashion. A 17 French sheath flexible cystoscope was used to inspect both the urethra and bladder. A string was seen in the pendulous urethra.  It was grasped with a grasper and the stent was moved removed in its entirety, without difficulty. Findings:  Anterior urethra: normal without strictures and without scarring. A string was seen within the lumen of the urethra this was grasped with alligator graspers and the stent was removed without difficulty. Complications:  None;patient tolerated the procedure well. Disposition: To home after observation. Condition: stable          DATA:  CMP:    Lab Results   Component Value Date     01/13/2021     12/21/2011    K 4.0 01/13/2021    K 3.7 12/21/2011     01/13/2021     12/21/2011    CO2 30 01/13/2021    BUN 20 01/13/2021    CREATININE 0.9 01/13/2021    CREATININE 0.9 12/21/2011    GFRAA >59 01/13/2021    LABGLOM >60 01/13/2021    GLUCOSE 101 01/13/2021    PROT 6.8 01/13/2021    LABALBU 4.3 01/13/2021    CALCIUM 9.1 01/13/2021    BILITOT 0.3 01/13/2021    ALKPHOS 84 01/13/2021    AST 14 01/13/2021    ALT 12 01/13/2021     Results for orders placed or performed in visit on 01/25/21   POCT Urinalysis No Micro (Auto)   Result Value Ref Range    Color, UA yellow     Clarity, UA clear     Glucose, UA POC neg     Bilirubin, UA small     Ketones, UA neg     Spec Grav, UA 1.030     Blood, UA POC large (A)     pH, UA 5.0     Protein, UA  mg/dl (A)     Urobilinogen, UA 0.2     Leukocytes, UA neg     Nitrite, UA neg        1. Bilateral nephrolithiasis  Patient had multiple stone episodes in the past recent CT showed no large stones in the kidneys we will otherwise manage these expectantly  - POCT Urinalysis No Micro (Auto)  - US RENAL COMPLETE; Future    2. Left ureteral calculus  Status post ureteroscopy laser lithotripsy stent was removed today.   He will follow-up in 6 weeks with a renal ultrasound  - [place] CYSTOURETHROGRAPHY REMV CALCULUS, STENT, FOREIGN BODY, SIMPLE  - US RENAL COMPLETE; Future      Orders Placed This Encounter   Procedures    US RENAL COMPLETE     In 6 weeks prior to next visit s/p ureteroscopy for ureteral calculus     Standing Status:   Future     Standing Expiration Date:   1/25/2022     Scheduling Instructions: In 6 week prior to next visit     Order Specific Question:   Reason for exam:     Answer:   s/p  ureterroscopy r/o hydro    POCT Urinalysis No Micro (Auto)    [place] CYSTOURETHROGRAPHY REMV CALCULUS, STENT, FOREIGN BODY, SIMPLE     Cysto stent removal        Return in about 6 weeks (around 3/8/2021) for Okay to follow-up with MARY (NP/PA), office visit after xray study.

## 2021-02-09 ENCOUNTER — TELEPHONE (OUTPATIENT)
Dept: FAMILY MEDICINE CLINIC | Facility: CLINIC | Age: 66
End: 2021-02-09

## 2021-02-09 NOTE — TELEPHONE ENCOUNTER
Ok for this(but make sure adia oks it with his cardiologist---this does have an effect upon heart functioning and rythym)

## 2021-02-09 NOTE — TELEPHONE ENCOUNTER
Caller: Junito Phelan    Relationship: Self    Best call back number: [phone]    What medication are you requesting: QUETIAPINE 25 MG.     What are your current symptoms: NEED HELP WITH SLEEPING.     How long have you been experiencing symptoms: MONTHS    Have you had these symptoms before:    [x] Yes  [] No    Have you been treated for these symptoms before:   [x] Yes  [] No    If a prescription is needed, what is your preferred pharmacy and phone number: [id number] - [place], [place] - [address] AT [facility] [id number]  [phone] [facility] [phone] FX     Additional notes: PATIENT STATED THAT THEY GAVE HIM THIS MEDICINE IN THE HOSPITAL. HE SAID IT WORKS GREAT.

## 2021-02-10 ENCOUNTER — TELEPHONE (OUTPATIENT)
Dept: FAMILY MEDICINE CLINIC | Facility: CLINIC | Age: 66
End: 2021-02-10

## 2021-02-10 NOTE — TELEPHONE ENCOUNTER
PATIENT CALLED WANTING TO TALK TO THE NURSE. COULD NOT GET ANY OTHER INFORMATION.    GOOD CALL BACK   [phone]

## 2021-02-12 ENCOUNTER — CLINICAL SUPPORT (OUTPATIENT)
Dept: FAMILY MEDICINE CLINIC | Facility: CLINIC | Age: 66
End: 2021-02-12

## 2021-02-12 VITALS — HEART RATE: 56 BPM | RESPIRATION RATE: 16 BRPM | TEMPERATURE: 98.4 F

## 2021-02-12 DIAGNOSIS — Z00.00 WELLNESS EXAMINATION: Primary | ICD-10-CM

## 2021-02-12 RX ORDER — QUETIAPINE FUMARATE 50 MG/1
25 TABLET, FILM COATED ORAL NIGHTLY
Qty: 30 TABLET | Refills: 1 | Status: SHIPPED | OUTPATIENT
Start: 2021-02-12 | End: 2021-05-04 | Stop reason: SDUPTHER

## 2021-02-12 RX ORDER — EPINEPHRINE 0.3 MG/.3ML
0.3 INJECTION SUBCUTANEOUS
COMMUNITY
Start: 2020-09-01

## 2021-02-12 RX ORDER — TAMSULOSIN HYDROCHLORIDE 0.4 MG/1
0.4 CAPSULE ORAL
COMMUNITY
Start: 2021-01-14 | End: 2021-07-30

## 2021-02-12 RX ORDER — CALCIUM CARBONATE 500(1250)
500 TABLET ORAL
COMMUNITY
End: 2021-02-12 | Stop reason: SDUPTHER

## 2021-02-12 NOTE — TELEPHONE ENCOUNTER
Pt showed proof of conversation with cardiologist stating it was okay for him for his PCP to prescribe this medication with EKG before prescribing and to continue monitoring . Request meds to go to [facility], [address].

## 2021-02-12 NOTE — PROGRESS NOTES
EKG done as ordered and taken to Dr. Stover for reviewal. Record of test was given to  to be scanned into chart.

## 2021-03-08 ENCOUNTER — HOSPITAL ENCOUNTER (OUTPATIENT)
Dept: ULTRASOUND IMAGING | Age: 66
Discharge: HOME | End: 2021-03-08
Payer: MEDICARE

## 2021-03-08 DIAGNOSIS — N20.0 BILATERAL NEPHROLITHIASIS: ICD-10-CM

## 2021-03-08 DIAGNOSIS — N20.1 LEFT URETERAL CALCULUS: ICD-10-CM

## 2021-03-08 PROCEDURE — 76770 US EXAM ABDO BACK WALL COMP: CPT

## 2021-03-10 ENCOUNTER — OFFICE VISIT (OUTPATIENT)
Dept: UROLOGY | Age: 66
End: 2021-03-10
Payer: MEDICARE

## 2021-03-10 VITALS
RESPIRATION RATE: 16 BRPM | BODY MASS INDEX: 21.07 KG/M2 | HEART RATE: 60 BPM | DIASTOLIC BLOOD PRESSURE: 63 MMHG | WEIGHT: 139 LBS | SYSTOLIC BLOOD PRESSURE: 102 MMHG | TEMPERATURE: 96.9 F | HEIGHT: 68 IN

## 2021-03-10 DIAGNOSIS — N20.0 LEFT NEPHROLITHIASIS: Primary | ICD-10-CM

## 2021-03-10 LAB
APPEARANCE FLUID: CLEAR
BILIRUBIN, POC: NORMAL
BLOOD URINE, POC: NORMAL
CLARITY, POC: CLEAR
COLOR, POC: YELLOW
GLUCOSE URINE, POC: NORMAL
KETONES, POC: NORMAL
LEUKOCYTE EST, POC: NORMAL
NITRITE, POC: NORMAL
PH, POC: 5
PROTEIN, POC: NORMAL
SPECIFIC GRAVITY, POC: >=1.03
UROBILINOGEN, POC: 0.2

## 2021-03-10 PROCEDURE — G8484 FLU IMMUNIZE NO ADMIN: HCPCS | Performed by: NURSE PRACTITIONER

## 2021-03-10 PROCEDURE — 4040F PNEUMOC VAC/ADMIN/RCVD: CPT | Performed by: NURSE PRACTITIONER

## 2021-03-10 PROCEDURE — 81002 URINALYSIS NONAUTO W/O SCOPE: CPT | Performed by: NURSE PRACTITIONER

## 2021-03-10 PROCEDURE — 1036F TOBACCO NON-USER: CPT | Performed by: NURSE PRACTITIONER

## 2021-03-10 PROCEDURE — G8420 CALC BMI NORM PARAMETERS: HCPCS | Performed by: NURSE PRACTITIONER

## 2021-03-10 PROCEDURE — G8427 DOCREV CUR MEDS BY ELIG CLIN: HCPCS | Performed by: NURSE PRACTITIONER

## 2021-03-10 PROCEDURE — 99213 OFFICE O/P EST LOW 20 MIN: CPT | Performed by: NURSE PRACTITIONER

## 2021-03-10 PROCEDURE — 1123F ACP DISCUSS/DSCN MKR DOCD: CPT | Performed by: NURSE PRACTITIONER

## 2021-03-10 PROCEDURE — 3017F COLORECTAL CA SCREEN DOC REV: CPT | Performed by: NURSE PRACTITIONER

## 2021-03-10 ASSESSMENT — ENCOUNTER SYMPTOMS
VOMITING: 0
DIARRHEA: 0

## 2021-03-10 NOTE — PROGRESS NOTES
Cholo Dick is a 77 y.o., male, Established patient who presents today   Chief Complaint   Patient presents with    Follow-up     6wk f/u renal US prior       HPI   Patient presents for 6 week follow up after stent removal status post ureteroscopy of a left proximal ureteral stone on 1/14/2021. His stent was removed via cystoscopy on 1/25/2021. He denies any fevers, chills, nausea, vomiting, hematuria, dysuria, flank pain, abdominal pain. He has had stones for several years and has been able to pass several stones on his own. He is also had both ESWL and ureteroscopy for stone management. He reports he did not do well with ESWL in the past as he developed a renal hematoma ultimately requiring blood transfusion. REVIEW OF SYSTEMS:  Review of Systems   Constitutional: Negative for chills and fever. Gastrointestinal: Negative for diarrhea and vomiting. Genitourinary: Negative for dysuria, flank pain, frequency, hematuria and urgency. PHYSICAL EXAM:  /63   Pulse 60   Temp 96.9 °F (36.1 °C)   Resp 16   Ht 5' 8\" (1.727 m)   Wt 139 lb (63 kg)   BMI 21.13 kg/m²   Physical Exam  Constitutional:       Appearance: Normal appearance. Pulmonary:      Effort: No respiratory distress. Abdominal:      General: There is no distension. Tenderness: There is no abdominal tenderness. Musculoskeletal:      Right lower leg: No edema. Left lower leg: No edema. Neurological:      Mental Status: He is alert.        DATA:  Results for orders placed or performed in visit on 03/10/21   POCT Urinalysis no Micro   Result Value Ref Range    Color, UA yellow     Clarity, UA clear     Glucose, UA POC Neg     Bilirubin, UA Neg     Ketones, UA Neg     Spec Grav, UA >=1.030     Blood, UA POC Neg     pH, UA 5.0     Protein, UA POC Neg     Urobilinogen, UA 0.2     Leukocytes, UA Neg     Nitrite, UA Neg     Appearance, Fluid Clear Clear, Slightly Cloudy       IMAGING:  Renal ultrasound reveals mild left-sided pelvicaliectasis which appears to be normal for the patient is seen previous imaging. There are approximately 3 small nonobstructing stones in his left kidney. ASSESSMENT/PLAN  1. Left nephrolithiasis  Patient presents for follow-up after ureteroscopic management of the left ureteral stone. He is currently asymptomatic. Renal ultrasound does reveal some nonobstructing stones in his left kidney, however, he does not wish to proceed treatment such as ESWL for these at this time. He would like to follow-up in 6 months with KUB prior to monitor stability. Orders Placed This Encounter   Procedures    XR ABDOMEN (KUB) (SINGLE AP VIEW)     Standing Status:   Future     Standing Expiration Date:   3/10/2022    POCT Urinalysis no Micro        Return in about 6 months (around 9/10/2021) for KUB prior. An electronic signature was used to authenticate this note. BHUMIKA JOSEPH - CNP    All information inputted into the note by the MA to include chief complaint, past medical history, past surgical history, medications, allergies, social and family history and review of systems has been reviewed and updated as needed by me. EMR Dragon/transcription disclaimer: Much of this document is electronic transcription/translation of spoken language to printed text. The electronic translation of spoken language may be erroneous or, at times, nonsensical words or phrases may be inadvertently transcribed.  Although I have reviewed the document for such errors, some may still exist.

## 2021-05-03 ENCOUNTER — TELEPHONE (OUTPATIENT)
Dept: FAMILY MEDICINE CLINIC | Facility: CLINIC | Age: 66
End: 2021-05-03

## 2021-05-03 RX ORDER — QUETIAPINE FUMARATE 50 MG/1
50 TABLET, FILM COATED ORAL NIGHTLY
Qty: 30 TABLET | Refills: 2 | Status: CANCELLED | OUTPATIENT
Start: 2021-05-03

## 2021-05-03 NOTE — TELEPHONE ENCOUNTER
Caller: Junito Phelan    Relationship: Self    Best call back number: [phone]     Medication needed:   Requested Prescriptions     Pending Prescriptions Disp Refills   • QUEtiapine (SEROquel) 50 MG tablet 30 tablet 1     Sig: Take 0.5 tablets by mouth Every Night.       When do you need the refill by: 05/03/21     What additional details did the patient provide when requesting the medication: PATIENT IS NEEDING A REFILL. HE RECEVIED 50 MG. LAST MONTH. HE IS WANTING TO STAY ON THE 50 MG. HE IS DOING BETTER WITHE THE 50. CAN HE HAVE MORE THAN I MONTH SUPPLY.     Does the patient have less than a 3 day supply:  [] Yes  [] No    What is the patient's preferred pharmacy:    [facility]   [address] [place] [address]

## 2021-05-03 NOTE — TELEPHONE ENCOUNTER
Amanda says that Junito has been taking 50mg Seroquel because they cannot cut the tablet in half.  She is asking if he can just continue the 50mg and resend to pharmacy w/ correct sig & dosing.

## 2021-05-03 NOTE — TELEPHONE ENCOUNTER
PATIENTS WIFE CALLED IN REQUESTING A CALL BACK TO DISCUSS CHANGING PATIENTS SEROQUEL     PLEASE CALL BACK AND ADVISE  [phone]

## 2021-05-04 RX ORDER — QUETIAPINE FUMARATE 50 MG/1
50 TABLET, FILM COATED ORAL NIGHTLY
Qty: 30 TABLET | Refills: 2 | Status: SHIPPED | OUTPATIENT
Start: 2021-05-04 | End: 2021-07-30 | Stop reason: SDUPTHER

## 2021-05-25 RX ORDER — NITROGLYCERIN 0.4 MG/1
TABLET SUBLINGUAL
Qty: 25 TABLET | Refills: 0 | Status: SHIPPED | OUTPATIENT
Start: 2021-05-25 | End: 2023-04-03 | Stop reason: SDUPTHER

## 2021-05-25 NOTE — TELEPHONE ENCOUNTER
Requested Prescriptions     Pending Prescriptions Disp Refills   • nitroglycerin (NITROSTAT) 0.4 MG SL tablet [Pharmacy Med Name: NITROGLYCERIN 0.4MG SUB TAB 25] 25 tablet 0     Sig: ONE TABLET UNDER TONGUE AS NEEDED FOR CHEST PAIN. MAY REPEAT EVERY 5 MINUTES UP TO 3 TABLETS IN 15 MINUTES.

## 2021-07-12 ENCOUNTER — APPOINTMENT (OUTPATIENT)
Dept: CT IMAGING | Age: 66
End: 2021-07-12
Payer: MEDICARE

## 2021-07-12 ENCOUNTER — APPOINTMENT (OUTPATIENT)
Dept: GENERAL RADIOLOGY | Age: 66
End: 2021-07-12
Payer: MEDICARE

## 2021-07-12 ENCOUNTER — HOSPITAL ENCOUNTER (EMERGENCY)
Age: 66
Discharge: HOME | End: 2021-07-12
Attending: EMERGENCY MEDICINE
Payer: MEDICARE

## 2021-07-12 VITALS
RESPIRATION RATE: 12 BRPM | OXYGEN SATURATION: 92 % | HEART RATE: 60 BPM | DIASTOLIC BLOOD PRESSURE: 78 MMHG | SYSTOLIC BLOOD PRESSURE: 123 MMHG | TEMPERATURE: 98 F

## 2021-07-12 DIAGNOSIS — I63.50 CEREBROVASCULAR ACCIDENT (CVA) INVOLVING POSTERIOR CIRCULATION (HCC): Primary | ICD-10-CM

## 2021-07-12 DIAGNOSIS — I25.811 CORONARY ARTERY DISEASE INVOLVING NATIVE ARTERY OF TRANSPLANTED HEART WITHOUT ANGINA PECTORIS: ICD-10-CM

## 2021-07-12 LAB
ALBUMIN SERPL-MCNC: 4.3 G/DL (ref 3.5–5.2)
ALP BLD-CCNC: 105 U/L (ref 40–130)
ALT SERPL-CCNC: 9 U/L (ref 5–41)
ANION GAP SERPL CALCULATED.3IONS-SCNC: 10 MMOL/L (ref 7–19)
APTT: 28.7 SEC (ref 26–36.2)
AST SERPL-CCNC: 17 U/L (ref 5–40)
BASOPHILS ABSOLUTE: 0 K/UL (ref 0–0.2)
BASOPHILS RELATIVE PERCENT: 0.4 % (ref 0–1)
BILIRUB SERPL-MCNC: <0.2 MG/DL (ref 0.2–1.2)
BUN BLDV-MCNC: 10 MG/DL (ref 8–23)
CALCIUM SERPL-MCNC: 9 MG/DL (ref 8.8–10.2)
CHLORIDE BLD-SCNC: 105 MMOL/L (ref 98–111)
CO2: 26 MMOL/L (ref 22–29)
CREAT SERPL-MCNC: 0.6 MG/DL (ref 0.5–1.2)
EKG P AXIS: NORMAL DEGREES
EKG P-R INTERVAL: NORMAL MS
EKG Q-T INTERVAL: 402 MS
EKG QRS DURATION: 82 MS
EKG QTC CALCULATION (BAZETT): 402 MS
EKG T AXIS: 51 DEGREES
EOSINOPHILS ABSOLUTE: 0 K/UL (ref 0–0.6)
EOSINOPHILS RELATIVE PERCENT: 0.8 % (ref 0–5)
GFR AFRICAN AMERICAN: >59
GFR NON-AFRICAN AMERICAN: >60
GLUCOSE BLD-MCNC: 84 MG/DL (ref 74–109)
HCT VFR BLD CALC: 39.7 % (ref 42–52)
HEMOGLOBIN: 13.4 G/DL (ref 14–18)
IMMATURE GRANULOCYTES #: 0 K/UL
INR BLD: 0.95 (ref 0.88–1.18)
LYMPHOCYTES ABSOLUTE: 1 K/UL (ref 1.1–4.5)
LYMPHOCYTES RELATIVE PERCENT: 18.5 % (ref 20–40)
MCH RBC QN AUTO: 30.2 PG (ref 27–31)
MCHC RBC AUTO-ENTMCNC: 33.8 G/DL (ref 33–37)
MCV RBC AUTO: 89.6 FL (ref 80–94)
MONOCYTES ABSOLUTE: 0.4 K/UL (ref 0–0.9)
MONOCYTES RELATIVE PERCENT: 6.9 % (ref 0–10)
NEUTROPHILS ABSOLUTE: 3.8 K/UL (ref 1.5–7.5)
NEUTROPHILS RELATIVE PERCENT: 73.2 % (ref 50–65)
PDW BLD-RTO: 13.9 % (ref 11.5–14.5)
PLATELET # BLD: 177 K/UL (ref 130–400)
PMV BLD AUTO: 8.8 FL (ref 9.4–12.4)
POTASSIUM REFLEX MAGNESIUM: 4.3 MMOL/L (ref 3.5–5)
PROTHROMBIN TIME: 12.9 SEC (ref 12–14.6)
RBC # BLD: 4.43 M/UL (ref 4.7–6.1)
SARS-COV-2, NAAT: NOT DETECTED
SODIUM BLD-SCNC: 141 MMOL/L (ref 136–145)
TOTAL PROTEIN: 6.7 G/DL (ref 6.6–8.7)
WBC # BLD: 5.2 K/UL (ref 4.8–10.8)

## 2021-07-12 PROCEDURE — 36415 COLL VENOUS BLD VENIPUNCTURE: CPT

## 2021-07-12 PROCEDURE — 6360000004 HC RX CONTRAST MEDICATION: Performed by: EMERGENCY MEDICINE

## 2021-07-12 PROCEDURE — 71045 X-RAY EXAM CHEST 1 VIEW: CPT

## 2021-07-12 PROCEDURE — 70496 CT ANGIOGRAPHY HEAD: CPT

## 2021-07-12 PROCEDURE — 99283 EMERGENCY DEPT VISIT LOW MDM: CPT

## 2021-07-12 PROCEDURE — 85610 PROTHROMBIN TIME: CPT

## 2021-07-12 PROCEDURE — 80053 COMPREHEN METABOLIC PANEL: CPT

## 2021-07-12 PROCEDURE — 87635 SARS-COV-2 COVID-19 AMP PRB: CPT

## 2021-07-12 PROCEDURE — 85025 COMPLETE CBC W/AUTO DIFF WBC: CPT

## 2021-07-12 PROCEDURE — 93005 ELECTROCARDIOGRAM TRACING: CPT | Performed by: EMERGENCY MEDICINE

## 2021-07-12 PROCEDURE — 70450 CT HEAD/BRAIN W/O DYE: CPT

## 2021-07-12 PROCEDURE — 6370000000 HC RX 637 (ALT 250 FOR IP): Performed by: EMERGENCY MEDICINE

## 2021-07-12 PROCEDURE — 85730 THROMBOPLASTIN TIME PARTIAL: CPT

## 2021-07-12 PROCEDURE — 70498 CT ANGIOGRAPHY NECK: CPT

## 2021-07-12 PROCEDURE — 93010 ELECTROCARDIOGRAM REPORT: CPT | Performed by: INTERNAL MEDICINE

## 2021-07-12 RX ORDER — LORAZEPAM 1 MG/1
1 TABLET ORAL ONCE
Status: COMPLETED | OUTPATIENT
Start: 2021-07-12 | End: 2021-07-12

## 2021-07-12 RX ORDER — OXYCODONE HYDROCHLORIDE 5 MG/1
15 TABLET ORAL ONCE
Status: COMPLETED | OUTPATIENT
Start: 2021-07-12 | End: 2021-07-12

## 2021-07-12 RX ADMIN — IOPAMIDOL 90 ML: 755 INJECTION, SOLUTION INTRAVENOUS at 14:55

## 2021-07-12 RX ADMIN — LORAZEPAM 0.5 MG: 1 TABLET ORAL at 15:39

## 2021-07-12 RX ADMIN — OXYCODONE HYDROCHLORIDE 15 MG: 5 TABLET ORAL at 15:38

## 2021-07-12 ASSESSMENT — ENCOUNTER SYMPTOMS
DIARRHEA: 0
SHORTNESS OF BREATH: 0
ABDOMINAL PAIN: 0
COUGH: 0
EYE REDNESS: 0
VOMITING: 0
EYE PAIN: 0
VOICE CHANGE: 0
RHINORRHEA: 0

## 2021-07-12 ASSESSMENT — PAIN SCALES - GENERAL: PAINLEVEL_OUTOF10: 6

## 2021-07-12 NOTE — ED PROVIDER NOTES
Neurological: Positive for dizziness, speech difficulty and headaches. Negative for weakness. Hematological: Negative. Psychiatric/Behavioral: Negative. All other systems reviewed and are negative. A complete review of systems was performed and is negative except as noted above in the HPI. PAST MEDICAL HISTORY     Past Medical History:   Diagnosis Date    CAD (coronary artery disease) 2010    Native Vessel. S/p stenting wtih negative cardiac cath on January 18, 2010 and negative nuclear stress test on June 29, 2010.  Chest pain 1/28/2016    DJD (degenerative joint disease)     GERD (gastroesophageal reflux disease) 2010    HTN (hypertension)     Hyperlipidemia     Left knee pain 1/28/2016    Pacemaker 2010    [employer]    Polyarticular arthritis     Renal calculi 2010    S/p lithrotripsy    Right arm pain 1/28/2016    Right ureteral stone 7/8/2016    Tobacco abuse 2010    Prior. SURGICAL HISTORY       Past Surgical History:   Procedure Laterality Date    CARDIAC CATHETERIZATION  01/08/2010    EF is estimated to be 60%. See scanned document.  CARDIAC CATHETERIZATION  6/20/08, 10/2/08    EF is estimated to be 60%. See scanned document.  CARDIAC CATHETERIZATION  2/13/07, 6/6/07    EF 50%. See scanned document.  CARDIAC CATHETERIZATION  1/8/07, 11/26/07    EF is estimated to be in excess of 50%. See scanned doucment.  CARDIAC CATHETERIZATION  08/17/2006    EF is estimated to be in excess of 50%. See scanned doucment.  CARDIAC CATHETERIZATION  5/14/14  MDL    normal LV function.  EF 60    CHOLECYSTECTOMY      CLAVICLE SURGERY      COLONOSCOPY      Endoscopy    CYSTOSCOPY Right 7/8/2016    CYSTOSCOPY; RIGHT RETROGRADE PYELOGRAM; RIGHT URETERAL DILATATION; RIGHT URETEROSCOPY; RIGHT URETERAL LASER LITHOTRIPSY AND STONE EXTRACTION; INSERTION RIGHT URETERAL DOUBLE J STENT performed by Noa Haley MD at [facility] Left 1/14/2021    CYSTOSCOPY URETEROSCOPY LASER LITHO WITH STONE EXTRACTION AND STENT REMOVAL> performed by Flaco Rhodes MD at [facility] Left     HAND SURGERY      HUMERUS SURGERY      KNEE SURGERY      Right    KNEE SURGERY Left     LITHOTRIPSY      MANDIBLE FRACTURE SURGERY      NECK SURGERY      cervical spine.  OTHER SURGICAL HISTORY      Brachytherapy of the core stents.     PACEMAKER PLACEMENT      [employer]    PTCA      TIBIA FRACTURE SURGERY      WRIST SURGERY           CURRENT MEDICATIONS       Discharge Medication List as of 7/12/2021  4:13 PM      CONTINUE these medications which have NOT CHANGED    Details   Evolocumab with Infusor ([address] [place] [address]) 420 MG/3.5ML SOCT INJECT THE CONTENTS OF ONE CARTRIDGE (420 MG) UNDER THE SKIN EVERY 30 DAYS (420 MG=3.5 ML)Historical Med      oxyCODONE (OXY-IR) 15 MG immediate release tablet TAKE 1 TABLET BY MOUTH EVERY 6 HOURSHistorical Med      prochlorperazine (COMPAZINE) 5 MG tablet TAKE 1 TABLET BY MOUTH EVERY 6 HOURS AS NEEDED FOR NAUSEAHistorical Med      potassium chloride (KLOR-CON 10) 10 MEQ extended release tablet Take 10 mEq by mouth daily as neededHistorical Med      calcium carbonate (OSCAL) 500 MG TABS tablet Take 500 mg by mouth daily Pt states he is taking prnHistorical Med      aspirin 325 MG tablet Take 325 mg by mouth daily      metoprolol tartrate (LOPRESSOR) 25 MG tablet Take 25 mg by mouth 2 times daily Indications: Pt unaware of dosage      isosorbide mononitrate (IMDUR) 120 MG CR tablet Take 120 mg by mouth 2 times daily Historical Med      hydrochlorothiazide (HYDRODIURIL) 12.5 MG tablet Take 12.5 mg by mouth daily Indications: as needed      Coenzyme Q10 (COQ-10 PO) Take by mouth daily       Nutritional Supplements (BOOST PO) Take by mouth      NITROSTAT 0.4 MG SL tablet Place 1 tablet under the tongue 3 times daily as needed, R-0, RUBEN      LORazepam (ATIVAN) 1 MG tablet Take 1 mg by mouth 3 times daily as needed, R-5 amlodipine (NORVASC) 10 MG tablet Take 10 mg by mouth daily. clopidogrel (PLAVIX) 75 MG tablet Take 75 mg by mouth daily. ALLERGIES     Ezetimibe-simvastatin, Bee venom, Darvocet [propoxyphene n-acetaminophen], Penicillins, Phenergan [promethazine hcl], Promethazine, Promethazine hcl, and Propoxyphene    FAMILY HISTORY     No family history on file. SOCIAL HISTORY       Social History     Socioeconomic History    Marital status:      Spouse name: Not on file    Number of children: Not on file    Years of education: Not on file    Highest education level: Not on file   Occupational History     Employer: DISABLED   Tobacco Use    Smoking status: Former Smoker     Types: Cigarettes    Smokeless tobacco: Never Used   Vaping Use    Vaping Use: Never used   Substance and Sexual Activity    Alcohol use: No    Drug use: No    Sexual activity: Not on file   Other Topics Concern    Not on file   Social History Narrative    Not on file     Social Determinants of Health     Financial Resource Strain:     Difficulty of Paying Living Expenses:    Food Insecurity:     Worried About [address] in the Last Year:     [address] [employer] in the Last Year:    Transportation Needs:     Lack of Transportation (Medical):      Lack of Transportation (Non-Medical):    Physical Activity:     Days of Exercise per Week:     Minutes of Exercise per Session:    Stress:     Feeling of Stress :    Social Connections:     Frequency of Communication with Friends and Family:     Frequency of Social Gatherings with Friends and Family:     Attends Gnosticism Services:     Active Member of Clubs or Organizations:     Attends Club or Organization Meetings:     Marital Status:    Intimate Partner Violence:     Fear of Current or Ex-Partner:     Emotionally Abused:     Physically Abused:     Sexually Abused:        SCREENINGS   NIH Stroke Scale  Interval: Baseline  Level of Consciousness oriented to person, place, and time. GCS: GCS eye subscore is 4. GCS verbal subscore is 5. GCS motor subscore is 6. Cranial Nerves: No cranial nerve deficit, dysarthria or facial asymmetry. Sensory: No sensory deficit. Motor: No weakness or abnormal muscle tone. Coordination: Romberg sign negative. Coordination normal.      Gait: Gait normal.      Comments: Rightward nystagmus. Bilateral mild abnormal FNF. otherwiese normal neurologic exam.    Psychiatric:         Mood and Affect: Mood normal.         Speech: Speech normal.         Behavior: Behavior normal.         Thought Content: Thought content normal.         Judgment: Judgment normal.         DIAGNOSTIC RESULTS     EKG: All EKG's are interpreted by the Emergency Department Physician who either signs or Co-signs this chart in the absence of a cardiologist.        RADIOLOGY:   Non-plain film images such as CT, Ultrasound and MRI are read by the radiologist. [place] Gamez images are visualized and preliminarily interpreted by the emergency physician with the below findings:      Interpretation per the Radiologist below, if available at the time of this note:    CT Head WO Contrast   Final Result   Mild cerebral and cerebellar volume loss with chronic microvascular   disease but no evidence of acute intracranial process. 2. bilateral dislocated temporomandibular joints. These may be   associated with old injury to the mandible   Signed by Dr Deandra Millan   Final Result   Impression:    1. Negative CT angiogram level [place]. 2. Old injury to medial wall of the right orbit. Signed by Dr Deandra Millan   Final Result   1. Widely patent bilateral extracranial carotid and vertebral arteries   with no stenosis, aneurysm, or dissection. There are 2. Left   subclavian cardiac pacer device. Mixing of contrast and nonenhanced   blood within the visible SVC.    3. At least moderate degenerative change of the cervical spine with   chronic appearing malalignment of the temporal mandibular joints. Signed by Dr Evelia Cortez   Final Result   1. Right basilar densities may be secondary to patchy atelectasis or   pneumonia. .   Signed by Dr Malini Crawley            ED BEDSIDE ULTRASOUND:   Performed by ED Physician - none    LABS:  Labs Reviewed   CBC WITH AUTO DIFFERENTIAL - Abnormal; Notable for the following components:       Result Value    RBC 4.43 (*)     Hemoglobin 13.4 (*)     Hematocrit 39.7 (*)     MPV 8.8 (*)     Neutrophils % 73.2 (*)     Lymphocytes % 18.5 (*)     Lymphocytes Absolute 1.0 (*)     All other components within normal limits   COVID-19, RAPID   COMPREHENSIVE METABOLIC PANEL W/ REFLEX TO MG FOR LOW K   PROTIME-INR   APTT       All other labs were within normal range or not returned as of this dictation. EMERGENCY DEPARTMENT COURSE and DIFFERENTIALDIAGNOSIS/MDM:   Vitals:    Vitals:    07/12/21 1401 07/12/21 1530 07/12/21 1532 07/12/21 1601   BP: 128/83 120/76 112/80 123/78   Pulse:  60 60 60   Resp:  13 13 12   Temp:       SpO2:  94% 94% 92%       MDM  ED Course as of Jul 12 2117   Mon Jul 12, 2021   1543 On exam, patient does have some mild rightward nystagmus and bilateral mild coordination difficulty in upper extremities. There is left leg weakness which is chronic. CT and CTA of the head and neck are unremarkable for acute process. EKG shows atrial paced rhythm with a rate of 60. No findings of acute ischemia or infarction. Normal intervals. Discussed case with neurology on-call. Patient unable to get MRI due to incompatible pacemaker leads according to wife. Patient already on dual antiplatelet therapy with full dose aspirin. At this time, no additional work-up or management to warrant hospitalization. Neurology plans to see patient in follow-up at 11 AM tomorrow.     [RONIT]      ED Course User Index  [RONIT] Deep Posadas MD       Evaluation and work-up here revealed no signs of emergent or life-threatening pathology that would necessitate admission for further work-up or management at this time. Patient is felt to be stable for discharge home with return precautions for worsening of the condition or development of new concerning symptoms. Patient was encouraged to follow-up with their primary care doctor in the appropriate timeframe. Necessary prescriptions and information have been provided for treatment at home. Patient voices understanding and agreement with the plan. CONSULTS:  None    PROCEDURES:  Unless otherwise notedbelow, none     Procedures  CRITICAL CARE TIME   Total Critical Care time was 30 minutes, excluding separately reportable procedures. There was a high probability of clinically significant/life threatening deterioration in the patient's condition which required my urgent intervention. FINAL IMPRESSION     1. Cerebrovascular accident (CVA) involving posterior circulation ([address]    2.  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  Efforts were made to edit the dictations butoccasionally words are mis-transcribed.)    Ayesha Wills MD (electronically signed)  Emergency Physician          Ayesha Yee MD  07/12/21 0697

## 2021-07-12 NOTE — ED NOTES
Monica Palma witnessed 0.5mg lorazepam waste     Jesus Howard, [facility]  07/12/21 7405

## 2021-07-12 NOTE — ED TRIAGE NOTES
Pt here with episodes of slurred speech and \"feeling drunk\" for the past two weeks. Pt notes that he hs felt like he is drunk for the past 4 day's and last episode of slurred speech 2 days ago.

## 2021-07-13 ENCOUNTER — OFFICE VISIT (OUTPATIENT)
Dept: NEUROLOGY | Age: 66
End: 2021-07-13
Payer: MEDICARE

## 2021-07-13 VITALS
HEIGHT: 68 IN | SYSTOLIC BLOOD PRESSURE: 104 MMHG | HEART RATE: 60 BPM | WEIGHT: 139 LBS | BODY MASS INDEX: 21.07 KG/M2 | DIASTOLIC BLOOD PRESSURE: 66 MMHG

## 2021-07-13 DIAGNOSIS — R47.81 SLURRED SPEECH: ICD-10-CM

## 2021-07-13 DIAGNOSIS — R26.89 IMBALANCE: Primary | ICD-10-CM

## 2021-07-13 DIAGNOSIS — R41.0 CONFUSION: ICD-10-CM

## 2021-07-13 DIAGNOSIS — R06.09 DYSPNEA ON EXERTION: ICD-10-CM

## 2021-07-13 PROCEDURE — 3017F COLORECTAL CA SCREEN DOC REV: CPT | Performed by: PSYCHIATRY & NEUROLOGY

## 2021-07-13 PROCEDURE — 1111F DSCHRG MED/CURRENT MED MERGE: CPT | Performed by: PSYCHIATRY & NEUROLOGY

## 2021-07-13 PROCEDURE — G8427 DOCREV CUR MEDS BY ELIG CLIN: HCPCS | Performed by: PSYCHIATRY & NEUROLOGY

## 2021-07-13 PROCEDURE — G8420 CALC BMI NORM PARAMETERS: HCPCS | Performed by: PSYCHIATRY & NEUROLOGY

## 2021-07-13 PROCEDURE — 99204 OFFICE O/P NEW MOD 45 MIN: CPT | Performed by: PSYCHIATRY & NEUROLOGY

## 2021-07-13 PROCEDURE — 1036F TOBACCO NON-USER: CPT | Performed by: PSYCHIATRY & NEUROLOGY

## 2021-07-13 PROCEDURE — 1123F ACP DISCUSS/DSCN MKR DOCD: CPT | Performed by: PSYCHIATRY & NEUROLOGY

## 2021-07-13 PROCEDURE — 4040F PNEUMOC VAC/ADMIN/RCVD: CPT | Performed by: PSYCHIATRY & NEUROLOGY
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
## 2021-07-13 NOTE — PROGRESS NOTES
Review of Systems    Constitutional - No fever or chills. No diaphoresis or significant fatigue. HENT -  No tinnitus or significant hearing loss. Eyes - no sudden vision change or eye pain  Respiratory - yes significant shortness of breath or cough  Cardiovascular - yes chest pain yes palpitations or significant leg swelling  Gastrointestinal - no abdominal swelling or pain. Genitourinary - No difficulty urinating, dysuria  Musculoskeletal - yes back pain or myalgia. Skin - no color change or rash  Neurologic - No seizures. No lateralizing weakness. Hematologic - yes easy bruising or excessive bleeding. Psychiatric - yes severe anxiety or nervousness. All other review of systems are negative.

## 2021-07-14 ENCOUNTER — TELEPHONE (OUTPATIENT)
Dept: NEUROLOGY | Age: 66
End: 2021-07-14

## 2021-07-14 PROBLEM — R06.09 DYSPNEA ON EXERTION: Status: ACTIVE | Noted: 2021-07-14

## 2021-07-14 NOTE — TELEPHONE ENCOUNTER
Called patient about his test appointments, patient is aware if the tests, appointment and locations.

## 2021-07-20 ENCOUNTER — TELEPHONE (OUTPATIENT)
Dept: NEUROSURGERY | Age: 66
End: 2021-07-20

## 2021-07-20 NOTE — TELEPHONE ENCOUNTER
Patient came in on Neurosurgery side asking if I could look into his testing that was suppose to be scheduled today. Pt. Stated registration would not arrive him for both his test. Stated he needed to see the doctor Jayden Kinney) so they could get other test schedule. Pt wants to do both tests same day. Looking into his appts, both tests where scheduled for yesterday, 7/19/21. Pt stated he wrote the date down wrong. I offered to call and rescheduled both tests for him. He stated he would call back when ready to get them rescheduled.

## 2021-07-30 ENCOUNTER — TELEPHONE (OUTPATIENT)
Dept: FAMILY MEDICINE CLINIC | Facility: CLINIC | Age: 66
End: 2021-07-30

## 2021-07-30 ENCOUNTER — OFFICE VISIT (OUTPATIENT)
Dept: FAMILY MEDICINE CLINIC | Facility: CLINIC | Age: 66
End: 2021-07-30

## 2021-07-30 DIAGNOSIS — Z20.822 EXPOSURE TO COVID-19 VIRUS: ICD-10-CM

## 2021-07-30 DIAGNOSIS — Z20.822 SUSPECTED COVID-19 VIRUS INFECTION: Primary | ICD-10-CM

## 2021-07-30 PROCEDURE — 99213 OFFICE O/P EST LOW 20 MIN: CPT | Performed by: NURSE PRACTITIONER

## 2021-07-30 RX ORDER — QUETIAPINE FUMARATE 50 MG/1
50 TABLET, FILM COATED ORAL NIGHTLY
Qty: 30 TABLET | Refills: 2 | Status: SHIPPED | OUTPATIENT
Start: 2021-07-30 | End: 2021-10-11

## 2021-07-30 RX ORDER — DOXYCYCLINE HYCLATE 100 MG/1
100 CAPSULE ORAL 2 TIMES DAILY
Qty: 20 CAPSULE | Refills: 0 | Status: SHIPPED | OUTPATIENT
Start: 2021-07-30 | End: 2021-08-09

## 2021-07-30 RX ORDER — METHYLPREDNISOLONE 4 MG/1
TABLET ORAL
Qty: 1 EACH | Refills: 0 | Status: SHIPPED | OUTPATIENT
Start: 2021-07-30 | End: 2021-11-16

## 2021-07-30 NOTE — PROGRESS NOTES
Notified negative Covid results.  Further discussion came out that he had had multiple tick bites in the last month.  With his fever, chills, fatigue, and severe headache, this is more likely to be tickborne illness.  We will send in doxycycline and Medrol Dosepak.  Is to follow-up next week if no better, ER over the weekend if worse.    Electronically signed by GUERA Nova, 07/30/21, 3:36 PM CDT.

## 2021-07-30 NOTE — TELEPHONE ENCOUNTER
Caller: Amanda Phelan    Relationship: Emergency Contact    Best call back number: [phone]    What orders are you requesting (i.e. lab or imaging): COVID TEST     In what timeframe would the patient need to come in: ASAP     Where will you receive your lab/imaging services: OFFICE
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
## 2021-08-26 ENCOUNTER — TELEPHONE (OUTPATIENT)
Dept: FAMILY MEDICINE CLINIC | Facility: CLINIC | Age: 66
End: 2021-08-26

## 2021-10-11 RX ORDER — QUETIAPINE FUMARATE 50 MG/1
TABLET, FILM COATED ORAL
Qty: 30 TABLET | Refills: 2 | Status: SHIPPED | OUTPATIENT
Start: 2021-10-11 | End: 2021-11-16

## 2021-11-16 ENCOUNTER — OFFICE VISIT (OUTPATIENT)
Dept: FAMILY MEDICINE CLINIC | Facility: CLINIC | Age: 66
End: 2021-11-16

## 2021-11-16 VITALS
HEIGHT: 67 IN | SYSTOLIC BLOOD PRESSURE: 112 MMHG | OXYGEN SATURATION: 97 % | DIASTOLIC BLOOD PRESSURE: 64 MMHG | WEIGHT: 127 LBS | BODY MASS INDEX: 19.93 KG/M2 | HEART RATE: 59 BPM | RESPIRATION RATE: 16 BRPM

## 2021-11-16 DIAGNOSIS — L98.9 SKIN LESIONS: Primary | ICD-10-CM

## 2021-11-16 DIAGNOSIS — Z23 NEED FOR INFLUENZA VACCINATION: ICD-10-CM

## 2021-11-16 PROCEDURE — 99213 OFFICE O/P EST LOW 20 MIN: CPT | Performed by: FAMILY MEDICINE

## 2021-11-16 PROCEDURE — 90662 IIV NO PRSV INCREASED AG IM: CPT | Performed by: FAMILY MEDICINE

## 2021-11-16 PROCEDURE — G0008 ADMIN INFLUENZA VIRUS VAC: HCPCS | Performed by: FAMILY MEDICINE

## 2021-11-16 RX ORDER — QUETIAPINE FUMARATE 25 MG/1
25 TABLET, FILM COATED ORAL 2 TIMES DAILY
Qty: 60 TABLET | Refills: 5 | Status: SHIPPED | OUTPATIENT
Start: 2021-11-16 | End: 2022-04-29

## 2021-11-16 RX ORDER — SCOLOPAMINE TRANSDERMAL SYSTEM 1 MG/1
1 PATCH, EXTENDED RELEASE TRANSDERMAL
Qty: 10 EACH | Refills: 0 | Status: SHIPPED | OUTPATIENT
Start: 2021-11-16 | End: 2023-03-29
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
## 2021-12-02 ENCOUNTER — APPOINTMENT (OUTPATIENT)
Dept: CT IMAGING | Age: 66
End: 2021-12-02
Payer: MEDICARE

## 2021-12-02 ENCOUNTER — HOSPITAL ENCOUNTER (EMERGENCY)
Age: 66
Discharge: HOME | End: 2021-12-02
Payer: MEDICARE

## 2021-12-02 VITALS
HEIGHT: 67 IN | BODY MASS INDEX: 20.4 KG/M2 | HEART RATE: 78 BPM | DIASTOLIC BLOOD PRESSURE: 80 MMHG | RESPIRATION RATE: 20 BRPM | WEIGHT: 130 LBS | OXYGEN SATURATION: 95 % | SYSTOLIC BLOOD PRESSURE: 109 MMHG | TEMPERATURE: 97.5 F

## 2021-12-02 DIAGNOSIS — N20.1 URETERIC STONE: Primary | ICD-10-CM

## 2021-12-02 LAB
ALBUMIN SERPL-MCNC: 4.1 G/DL (ref 3.5–5.2)
ALP BLD-CCNC: 89 U/L (ref 40–130)
ALT SERPL-CCNC: 61 U/L (ref 5–41)
ANION GAP SERPL CALCULATED.3IONS-SCNC: 10 MMOL/L (ref 7–19)
AST SERPL-CCNC: 96 U/L (ref 5–40)
BASOPHILS ABSOLUTE: 0 K/UL (ref 0–0.2)
BASOPHILS RELATIVE PERCENT: 0.7 % (ref 0–1)
BILIRUB SERPL-MCNC: 0.3 MG/DL (ref 0.2–1.2)
BILIRUBIN URINE: NEGATIVE
BLOOD, URINE: NEGATIVE
BUN BLDV-MCNC: 12 MG/DL (ref 8–23)
CALCIUM SERPL-MCNC: 8.6 MG/DL (ref 8.8–10.2)
CHLORIDE BLD-SCNC: 100 MMOL/L (ref 98–111)
CLARITY: CLEAR
CO2: 24 MMOL/L (ref 22–29)
COLOR: YELLOW
CREAT SERPL-MCNC: 0.7 MG/DL (ref 0.5–1.2)
EOSINOPHILS ABSOLUTE: 0 K/UL (ref 0–0.6)
EOSINOPHILS RELATIVE PERCENT: 0 % (ref 0–5)
GFR AFRICAN AMERICAN: >59
GFR NON-AFRICAN AMERICAN: >60
GLUCOSE BLD-MCNC: 95 MG/DL (ref 74–109)
GLUCOSE URINE: NEGATIVE MG/DL
HCT VFR BLD CALC: 42.7 % (ref 42–52)
HEMOGLOBIN: 13.6 G/DL (ref 14–18)
IMMATURE GRANULOCYTES #: 0 K/UL
KETONES, URINE: NEGATIVE MG/DL
LEUKOCYTE ESTERASE, URINE: NEGATIVE
LYMPHOCYTES ABSOLUTE: 0.6 K/UL (ref 1.1–4.5)
LYMPHOCYTES RELATIVE PERCENT: 20.5 % (ref 20–40)
MCH RBC QN AUTO: 29.1 PG (ref 27–31)
MCHC RBC AUTO-ENTMCNC: 31.9 G/DL (ref 33–37)
MCV RBC AUTO: 91.4 FL (ref 80–94)
MONOCYTES ABSOLUTE: 0.5 K/UL (ref 0–0.9)
MONOCYTES RELATIVE PERCENT: 16.7 % (ref 0–10)
NEUTROPHILS ABSOLUTE: 1.8 K/UL (ref 1.5–7.5)
NEUTROPHILS RELATIVE PERCENT: 62.1 % (ref 50–65)
NITRITE, URINE: NEGATIVE
PDW BLD-RTO: 12.3 % (ref 11.5–14.5)
PH UA: 5.5 (ref 5–8)
PLATELET # BLD: 139 K/UL (ref 130–400)
PMV BLD AUTO: 9.1 FL (ref 9.4–12.4)
POTASSIUM REFLEX MAGNESIUM: 4.2 MMOL/L (ref 3.5–5)
PROTEIN UA: ABNORMAL MG/DL
RBC # BLD: 4.67 M/UL (ref 4.7–6.1)
SODIUM BLD-SCNC: 134 MMOL/L (ref 136–145)
SPECIFIC GRAVITY UA: 1.02 (ref 1–1.03)
TOTAL PROTEIN: 6.7 G/DL (ref 6.6–8.7)
UROBILINOGEN, URINE: 0.2 E.U./DL
WBC # BLD: 2.9 K/UL (ref 4.8–10.8)

## 2021-12-02 PROCEDURE — 36415 COLL VENOUS BLD VENIPUNCTURE: CPT

## 2021-12-02 PROCEDURE — 99283 EMERGENCY DEPT VISIT LOW MDM: CPT

## 2021-12-02 PROCEDURE — 80053 COMPREHEN METABOLIC PANEL: CPT

## 2021-12-02 PROCEDURE — 96375 TX/PRO/DX INJ NEW DRUG ADDON: CPT

## 2021-12-02 PROCEDURE — 96374 THER/PROPH/DIAG INJ IV PUSH: CPT

## 2021-12-02 PROCEDURE — 2580000003 HC RX 258: Performed by: NURSE PRACTITIONER

## 2021-12-02 PROCEDURE — 85025 COMPLETE CBC W/AUTO DIFF WBC: CPT

## 2021-12-02 PROCEDURE — 6360000002 HC RX W HCPCS: Performed by: NURSE PRACTITIONER

## 2021-12-02 PROCEDURE — 81003 URINALYSIS AUTO W/O SCOPE: CPT

## 2021-12-02 PROCEDURE — 74150 CT ABDOMEN W/O CONTRAST: CPT

## 2021-12-02 RX ORDER — FENTANYL CITRATE 50 UG/ML
25 INJECTION, SOLUTION INTRAMUSCULAR; INTRAVENOUS ONCE
Status: COMPLETED | OUTPATIENT
Start: 2021-12-02 | End: 2021-12-02

## 2021-12-02 RX ORDER — ONDANSETRON 4 MG/1
4 TABLET, ORALLY DISINTEGRATING ORAL EVERY 8 HOURS PRN
Qty: 20 TABLET | Refills: 0 | Status: SHIPPED | OUTPATIENT
Start: 2021-12-02 | End: 2021-12-29 | Stop reason: SDUPTHER

## 2021-12-02 RX ORDER — ONDANSETRON 2 MG/ML
4 INJECTION INTRAMUSCULAR; INTRAVENOUS ONCE
Status: COMPLETED | OUTPATIENT
Start: 2021-12-02 | End: 2021-12-02

## 2021-12-02 RX ORDER — 0.9 % SODIUM CHLORIDE 0.9 %
1000 INTRAVENOUS SOLUTION INTRAVENOUS ONCE
Status: COMPLETED | OUTPATIENT
Start: 2021-12-02 | End: 2021-12-02

## 2021-12-02 RX ORDER — HYDROMORPHONE HYDROCHLORIDE 1 MG/ML
1 INJECTION, SOLUTION INTRAMUSCULAR; INTRAVENOUS; SUBCUTANEOUS ONCE
Status: COMPLETED | OUTPATIENT
Start: 2021-12-02 | End: 2021-12-02

## 2021-12-02 RX ORDER — TAMSULOSIN HYDROCHLORIDE 0.4 MG/1
0.4 CAPSULE ORAL DAILY
Qty: 30 CAPSULE | Refills: 0 | Status: SHIPPED | OUTPATIENT
Start: 2021-12-02 | End: 2022-08-01

## 2021-12-02 RX ADMIN — SODIUM CHLORIDE 1000 ML: 9 INJECTION, SOLUTION INTRAVENOUS at 17:48

## 2021-12-02 RX ADMIN — FENTANYL CITRATE 25 MCG: 0.05 INJECTION, SOLUTION INTRAMUSCULAR; INTRAVENOUS at 18:18

## 2021-12-02 RX ADMIN — HYDROMORPHONE HYDROCHLORIDE 1 MG: 1 INJECTION, SOLUTION INTRAMUSCULAR; INTRAVENOUS; SUBCUTANEOUS at 19:44

## 2021-12-02 RX ADMIN — ONDANSETRON 4 MG: 2 INJECTION INTRAMUSCULAR; INTRAVENOUS at 18:17

## 2021-12-02 ASSESSMENT — ENCOUNTER SYMPTOMS
VOMITING: 1
NAUSEA: 1

## 2021-12-02 ASSESSMENT — PAIN DESCRIPTION - ORIENTATION: ORIENTATION: LEFT

## 2021-12-02 ASSESSMENT — PAIN DESCRIPTION - DESCRIPTORS: DESCRIPTORS: PATIENT UNABLE TO DESCRIBE

## 2021-12-02 ASSESSMENT — PAIN SCALES - GENERAL
PAINLEVEL_OUTOF10: 8
PAINLEVEL_OUTOF10: 3

## 2021-12-02 ASSESSMENT — PAIN DESCRIPTION - LOCATION: LOCATION: FLANK

## 2021-12-03 NOTE — ED PROVIDER NOTES
[facility] EMERGENCY DEPT  eMERGENCY dEPARTMENT eNCOUnter      Pt Name: Suzi Walsh  MRN: [id number]  [place] 1955  Date of evaluation: 12/2/2021  Provider: Suraj [address]       Chief Complaint   Patient presents with    Flank Pain     left side and testicular pain         HISTORY OF PRESENT ILLNESS   (Location/Symptom, Timing/Onset,Context/Setting, Quality, Duration, Modifying Factors, Severity)  Note limiting factors. Suzi Walsh is a 77 y.o. male who presents to the emergency department with 3 days of left flank pain that radiates into his testicle. No testicle swelling or problem. Patient has a history of kidney stones. His last one was about a year ago. He does have chronic back pain and takes pain medication daily. He denies any fever. He has been nauseated and vomiting. Tells me he has not been urinating as much as he usually does    The history is provided by the patient. Flank Pain  This is a new problem. The current episode started more than 2 days ago. The problem occurs constantly. The problem has not changed since onset. NursingNotes were reviewed. REVIEW OF SYSTEMS    (2-9 systems for level 4, 10 or more for level 5)     Review of Systems   Constitutional: Negative for fever. Gastrointestinal: Positive for nausea and vomiting. Genitourinary: Positive for difficulty urinating and flank pain. Except as noted above the remainder of the review of systems was reviewed and negative. PAST MEDICAL HISTORY     Past Medical History:   Diagnosis Date    CAD (coronary artery disease) 2010    Native Vessel. S/p stenting wtih negative cardiac cath on January 18, 2010 and negative nuclear stress test on June 29, 2010.     Chest pain 1/28/2016    DJD (degenerative joint disease)     GERD (gastroesophageal reflux disease) 2010    HTN (hypertension)     Hyperlipidemia     Left knee pain 1/28/2016    Pacemaker 2010    MEDTRONIC    Polyarticular arthritis     Renal calculi 2010    S/p lithrotripsy    Right arm pain 1/28/2016    Right ureteral stone 7/8/2016    Tobacco abuse 2010    Prior. SURGICALHISTORY       Past Surgical History:   Procedure Laterality Date    CARDIAC CATHETERIZATION  01/08/2010    EF is estimated to be 60%. See scanned document.  CARDIAC CATHETERIZATION  6/20/08, 10/2/08    EF is estimated to be 60%. See scanned document.  CARDIAC CATHETERIZATION  2/13/07, 6/6/07    EF 50%. See scanned document.  CARDIAC CATHETERIZATION  1/8/07, 11/26/07    EF is estimated to be in excess of 50%. See scanned doucment.  CARDIAC CATHETERIZATION  08/17/2006    EF is estimated to be in excess of 50%. See scanned doucment.  CARDIAC CATHETERIZATION  5/14/14  MDL    normal LV function. EF 60    CHOLECYSTECTOMY      CLAVICLE SURGERY      COLONOSCOPY      Endoscopy    CYSTOSCOPY Right 7/8/2016    CYSTOSCOPY; RIGHT RETROGRADE PYELOGRAM; RIGHT URETERAL DILATATION; RIGHT URETEROSCOPY; RIGHT URETERAL LASER LITHOTRIPSY AND STONE EXTRACTION; INSERTION RIGHT URETERAL DOUBLE J STENT performed by Denice Salazar MD at [address] [place] [address] Left 1/14/2021    CYSTOSCOPY URETEROSCOPY LASER LITHO WITH STONE EXTRACTION AND STENT REMOVAL> performed by Misa Michelle MD at [address] [place] [address] Left     HAND SURGERY      HUMERUS SURGERY      KNEE SURGERY      Right    KNEE SURGERY Left     LITHOTRIPSY      MANDIBLE FRACTURE SURGERY      NECK SURGERY      cervical spine.  OTHER SURGICAL HISTORY      Brachytherapy of the core stents.     PACEMAKER PLACEMENT      [employer]TRONIC    PTCA      TIBIA FRACTURE SURGERY      WRIST SURGERY           CURRENT MEDICATIONS       Discharge Medication List as of 12/2/2021  7:54 PM      CONTINUE these medications which have NOT CHANGED    Details   Evolocumab with [employer] ([address] [place] [address]) 420 MG/3.5ML SOCT INJECT THE CONTENTS OF ONE CARTRIDGE (420 MG) UNDER THE SKIN EVERY 30 DAYS Never used   Substance and Sexual Activity    Alcohol use: No    Drug use: No    Sexual activity: None   Other Topics Concern    None   Social History Narrative    None     Social Determinants of Health     Financial Resource Strain:     Difficulty of Paying Living Expenses: Not on file   Food Insecurity:     Worried About Running Out of Food in the Last Year: Not on file    Javy of Food in the Last Year: Not on file   Transportation Needs:     Lack of Transportation (Medical): Not on file    Lack of Transportation (Non-Medical): Not on file   Physical Activity:     Days of Exercise per Week: Not on file    Minutes of Exercise per Session: Not on file   Stress:     Feeling of Stress : Not on file   Social Connections:     Frequency of Communication with Friends and Family: Not on file    Frequency of Social Gatherings with Friends and Family: Not on file    Attends Mandaen Services: Not on file    Active Member of [address] [place] [address] or Organizations: Not on file    Attends Club or Organization Meetings: Not on file    Marital Status: Not on file   Intimate Partner Violence:     Fear of Current or Ex-Partner: Not on file    Emotionally Abused: Not on file    Physically Abused: Not on file    Sexually Abused: Not on file   Housing Stability:     Unable to Pay for Housing in the Last Year: Not on file    Number of [place] in the Last Year: Not on file    Unstable Housing in the Last Year: Not on file       SCREENINGS      @FLOW([id number])@      PHYSICAL EXAM    (up to 7 for level 4, 8 or more for level 5)     ED Triage Vitals [12/02/21 1656]   BP Temp Temp Source Pulse Resp SpO2 Height Weight   122/72 97.5 °F (36.4 °C) Temporal 61 17 97 % 5' 7\" (1.702 m) 130 lb (59 kg)       Physical Exam  Vitals and nursing note reviewed. Constitutional:       Appearance: He is well-developed. HENT:      Head: Normocephalic and atraumatic. Eyes:      General: No scleral icterus.         Right eye: No discharge. Left eye: No discharge. Cardiovascular:      Rate and Rhythm: Normal rate and regular rhythm. Heart sounds: Normal heart sounds. Pulmonary:      Effort: No respiratory distress. Breath sounds: Normal breath sounds. Abdominal:      General: Bowel sounds are normal.      Palpations: Abdomen is soft. Tenderness: There is abdominal tenderness in the left lower quadrant. There is guarding. There is no rebound. Hernia: No hernia is present. Musculoskeletal:      Cervical back: Normal range of motion and neck supple. Neurological:      Mental Status: He is alert. Psychiatric:         Behavior: Behavior normal.         DIAGNOSTIC RESULTS     EKG: All EKG's are interpreted by the Emergency Department Physician who either signs or Co-signsthis chart in the absence of a cardiologist.        RADIOLOGY:   Levorn Clement such as CT, Ultrasound and MRI are read by the radiologist. Plain radiographic images are visualized and preliminarily interpreted by the emergency physician with the below findings:      Interpretation per the Radiologist below, if available at the time of this note:    CT KIDNEY WO CONTRAST   Final Result   1.. Punctate nonobstructing nephrolithiasis of the upper tracts of the   left kidney. There is moderate dilatation of the upper tracts of the   left kidney with asymmetric left-sided mild ureteral distention. This   is noted to the level of the left UVJ where there is a 3 mm stone at   the level of the UVJ/left ureteral orifice. The right ureter is   decompressed and normal in appearance. 2. Moderate constipation with increased stool throughout the colon. Mild diverticular disease is noted of the sigmoid colon without   evidence of diverticulitis. 3. Arthritic changes throughout the lumbar spine. .   Signed by Dr Rae Garcia            ED BEDSIDEULTRASOUND:   Performed by ED Physician -none    LABS:  Labs Reviewed   CBC WITH AUTO DIFFERENTIAL - Abnormal; Notable for the following components:       Result Value    WBC 2.9 (*)     RBC 4.67 (*)     Hemoglobin 13.6 (*)     MCHC 31.9 (*)     MPV 9.1 (*)     Monocytes % 16.7 (*)     Lymphocytes Absolute 0.6 (*)     All other components within normal limits   COMPREHENSIVE METABOLIC PANEL W/ REFLEX TO MG FOR LOW K - Abnormal; Notable for the following components:    Sodium 134 (*)     Calcium 8.6 (*)     ALT 61 (*)     AST 96 (*)     All other components within normal limits   URINE RT REFLEX TO CULTURE - Abnormal; Notable for the following components:    Protein, UA TRACE (*)     All other components within normal limits       All other labs were within normal range or not returned as of this dictation. EMERGENCY DEPARTMENT COURSE and DIFFERENTIALDIAGNOSIS/MDM:   Vitals:    Vitals:    12/02/21 1656 12/02/21 1959   BP: 122/72 109/80   Pulse: 61 78   Resp: 17 20   Temp: 97.5 °F (36.4 °C)    TempSrc: Temporal    SpO2: 97% 95%   Weight: 130 lb (59 kg)    Height: 5' 7\" (1.702 m)            MDM  Pt too try flomax, fluids, and nausea meds. Will call Dr Fitzgerald Letters and make an appt      CONSULTS:  None    PROCEDURES:  Unless otherwise noted below, none     Procedures    FINAL IMPRESSION      1.  Ureteric stone        DISPOSITION/PLAN   DISPOSITION        PATIENT REFERRED TO:  Aleksandra Daniels MD  Replaced by [facility] [address]  [phone]            DISCHARGE MEDICATIONS:  Discharge Medication List as of 12/2/2021  7:54 PM      START taking these medications    Details   ondansetron (ZOFRAN ODT) 4 MG disintegrating tablet Take 1 tablet by mouth every 8 hours as needed for Nausea or Vomiting, Disp-20 tablet, R-0Normal      tamsulosin (FLOMAX) 0.4 MG capsule Take 1 capsule by mouth daily, Disp-30 capsule, R-0Normal                (Please note that portions of this note were completed with a voice recognitionprogram.  Efforts were made to edit the dictations but occasionally words are

## 2021-12-14 ENCOUNTER — OFFICE VISIT (OUTPATIENT)
Dept: UROLOGY | Age: 66
End: 2021-12-14
Payer: MEDICARE

## 2021-12-14 VITALS — BODY MASS INDEX: 19.78 KG/M2 | HEIGHT: 67 IN | WEIGHT: 126 LBS | TEMPERATURE: 98.2 F

## 2021-12-14 DIAGNOSIS — N20.1 LEFT URETERAL CALCULUS: Primary | ICD-10-CM

## 2021-12-14 LAB
APPEARANCE FLUID: CLEAR
BILIRUBIN, POC: NORMAL
BLOOD URINE, POC: NORMAL
CLARITY, POC: CLEAR
COLOR, POC: YELLOW
GLUCOSE URINE, POC: NORMAL
KETONES, POC: NORMAL
LEUKOCYTE EST, POC: NORMAL
NITRITE, POC: NORMAL
PH, POC: 7
PROTEIN, POC: NORMAL
SPECIFIC GRAVITY, POC: 1.02
UROBILINOGEN, POC: 0.2

## 2021-12-14 PROCEDURE — G8427 DOCREV CUR MEDS BY ELIG CLIN: HCPCS | Performed by: NURSE PRACTITIONER

## 2021-12-14 PROCEDURE — 81002 URINALYSIS NONAUTO W/O SCOPE: CPT | Performed by: NURSE PRACTITIONER

## 2021-12-14 PROCEDURE — G8484 FLU IMMUNIZE NO ADMIN: HCPCS | Performed by: NURSE PRACTITIONER

## 2021-12-14 PROCEDURE — 4040F PNEUMOC VAC/ADMIN/RCVD: CPT | Performed by: NURSE PRACTITIONER

## 2021-12-14 PROCEDURE — 3017F COLORECTAL CA SCREEN DOC REV: CPT | Performed by: NURSE PRACTITIONER

## 2021-12-14 PROCEDURE — 4004F PT TOBACCO SCREEN RCVD TLK: CPT | Performed by: NURSE PRACTITIONER

## 2021-12-14 PROCEDURE — 1123F ACP DISCUSS/DSCN MKR DOCD: CPT | Performed by: NURSE PRACTITIONER

## 2021-12-14 PROCEDURE — 99214 OFFICE O/P EST MOD 30 MIN: CPT | Performed by: NURSE PRACTITIONER

## 2021-12-14 PROCEDURE — G8420 CALC BMI NORM PARAMETERS: HCPCS | Performed by: NURSE PRACTITIONER

## 2021-12-14 RX ORDER — SCOLOPAMINE TRANSDERMAL SYSTEM 1 MG/1
PATCH, EXTENDED RELEASE TRANSDERMAL
COMMUNITY
Start: 2021-11-16 | End: 2022-08-11

## 2021-12-14 RX ORDER — NALOXONE HYDROCHLORIDE 4 MG/.1ML
SPRAY NASAL
COMMUNITY
Start: 2021-06-01 | End: 2022-06-02

## 2021-12-14 RX ORDER — QUETIAPINE FUMARATE 25 MG/1
TABLET, FILM COATED ORAL
COMMUNITY
Start: 2021-12-12

## 2021-12-14 NOTE — PROGRESS NOTES
Natalya Vieyra is a 77 y.o. male who presents today   Chief Complaint   Patient presents with    Follow-up     I am here for an ED fu for stones       Patient is a 60-year-old male who presents the clinic today for follow-up. He was seen in the ED on 12/2/2021 with complaints of left flank pain that radiates to his left testicle. He has a long history of kidney stones and is passed over 100. He does continue to have intermittent left flank pain that radiates to his left groin but denies any fever, chills, vomiting. He has had some nausea. Last intervention was approximately 1 year ago. Currently on Flomax and Zofran. Has not been straining his urine although he knows he has not passed the stone as of yet. CT reveals a 3 mm stone obstructing the left distal ureter. He does take chronic pain medication and does have Percocet at home for pain.       Past Medical History:   Diagnosis Date    CAD (coronary artery disease) 2010    [place] Vessel. S/p stenting wtih negative cardiac cath on January 18, 2010 and negative nuclear stress test on June 29, 2010.  Chest pain 1/28/2016    DJD (degenerative joint disease)     GERD (gastroesophageal reflux disease) 2010    HTN (hypertension)     Hyperlipidemia     Left knee pain 1/28/2016    Pacemaker 2010    [employer]TRONIC    Polyarticular arthritis     Renal calculi 2010    S/p lithrotripsy    Right arm pain 1/28/2016    Right ureteral stone 7/8/2016    Tobacco abuse 2010    Prior. Past Surgical History:   Procedure Laterality Date    CARDIAC CATHETERIZATION  01/08/2010    EF is estimated to be 60%. See scanned document.  CARDIAC CATHETERIZATION  6/20/08, 10/2/08    EF is estimated to be 60%. See scanned document.  CARDIAC CATHETERIZATION  2/13/07, 6/6/07    EF 50%. See scanned document.  CARDIAC CATHETERIZATION  1/8/07, 11/26/07    EF is estimated to be in excess of 50%. See scanned doucment.     CARDIAC CATHETERIZATION  08/17/2006    EF is estimated to be in excess of 50%. See scanned doucment.  CARDIAC CATHETERIZATION  5/14/14  MDL    normal LV function. EF 60    CHOLECYSTECTOMY      CLAVICLE SURGERY      COLONOSCOPY      Endoscopy    CYSTOSCOPY Right 7/8/2016    CYSTOSCOPY; RIGHT RETROGRADE PYELOGRAM; RIGHT URETERAL DILATATION; RIGHT URETEROSCOPY; RIGHT URETERAL LASER LITHOTRIPSY AND STONE EXTRACTION; INSERTION RIGHT URETERAL DOUBLE J STENT performed by More Silveira MD at [address] [facility] Left 1/14/2021    CYSTOSCOPY URETEROSCOPY LASER LITHO WITH STONE EXTRACTION AND STENT REMOVAL> performed by Sea Bowling MD at [address] [facility] Left     HAND SURGERY      HUMERUS SURGERY      KNEE SURGERY      Right    KNEE SURGERY Left     LITHOTRIPSY      MANDIBLE FRACTURE SURGERY      NECK SURGERY      cervical spine.  OTHER SURGICAL HISTORY      Brachytherapy of the core stents.  PACEMAKER PLACEMENT      [employer]    PTCA      TIBIA FRACTURE SURGERY      WRIST SURGERY         Current Outpatient Medications   Medication Sig Dispense Refill    naloxone 4 MG/0.1ML LIQD nasal spray 1 spray every 2 minutes as needed until breathing and responsive.  CALL 911      QUEtiapine (SEROQUEL) 25 MG tablet       scopolamine (TRANSDERM-SCOP) transdermal patch       ondansetron (ZOFRAN ODT) 4 MG disintegrating tablet Take 1 tablet by mouth every 8 hours as needed for Nausea or Vomiting 20 tablet 0    tamsulosin (FLOMAX) 0.4 MG capsule Take 1 capsule by mouth daily 30 capsule 0    Evolocumab with Infusor (REPATHA PUSHTRONEX SYSTEM) 420 MG/3.5ML SOCT INJECT THE CONTENTS OF ONE CARTRIDGE (420 MG) UNDER THE SKIN EVERY 30 DAYS (420 MG=3.5 ML)      oxyCODONE (OXY-IR) 15 MG immediate release tablet TAKE 1 TABLET BY MOUTH EVERY 6 HOURS      prochlorperazine (COMPAZINE) 5 MG tablet TAKE 1 TABLET BY MOUTH EVERY 6 HOURS AS NEEDED FOR NAUSEA      aspirin 325 MG tablet Take 325 mg by mouth daily      metoprolol tartrate (LOPRESSOR) 25 MG tablet Take 25 mg by mouth 2 times daily Indications: Pt unaware of dosage      isosorbide mononitrate (IMDUR) 120 MG CR tablet Take 120 mg by mouth 2 times daily       hydrochlorothiazide (HYDRODIURIL) 12.5 MG tablet Take 12.5 mg by mouth daily Indications: as needed      Coenzyme Q10 (COQ-10 PO) Take by mouth daily       Nutritional Supplements (BOOST PO) Take by mouth      NITROSTAT 0.4 MG SL tablet Place 1 tablet under the tongue 3 times daily as needed  0    LORazepam (ATIVAN) 1 MG tablet Take 1 mg by mouth 3 times daily as needed  5    amlodipine (NORVASC) 10 MG tablet Take 10 mg by mouth daily.  clopidogrel (PLAVIX) 75 MG tablet Take 75 mg by mouth daily. No current facility-administered medications for this visit. Allergies   Allergen Reactions    Ezetimibe-Simvastatin Other (See Comments)     Other reaction(s): myositis/elevated CPK  Other reaction(s):  Other (See Comments)  Myositis/ elevated CPK    Bee Venom     Darvocet [Propoxyphene N-Acetaminophen] Rash    Other Rash    Penicillins Other (See Comments)     childhood allergy    Phenergan [Promethazine Hcl] Anxiety and Other (See Comments)     Restless legs    Promethazine Other (See Comments)     Other reaction(s): jitters    Promethazine Hcl Other (See Comments)     Restless legs    Propoxyphene Rash       Social History     Socioeconomic History    Marital status:      Spouse name: None    Number of children: None    Years of education: None    Highest education level: None   Occupational History     Employer: DISABLED   Tobacco Use    Smoking status: Former Smoker     Types: Cigarettes    Smokeless tobacco: Current User   Vaping Use    Vaping Use: Never used   Substance and Sexual Activity    Alcohol use: No    Drug use: No    Sexual activity: None   Other Topics Concern    None   Social History Narrative    None     Social Determinants of Health     Financial Resource Strain:     ill-appearing. Pulmonary:      Effort: Pulmonary effort is normal. No respiratory distress. Abdominal:      General: There is no distension. Tenderness: There is no abdominal tenderness. There is left CVA tenderness. There is no right CVA tenderness. Neurological:      Mental Status: He is alert and oriented to person, place, and time. Mental status is at baseline. Psychiatric:         Mood and Affect: Mood normal.         Behavior: Behavior normal.       DATA:    Results for orders placed or performed in visit on 12/14/21   POCT Urinalysis no Micro   Result Value Ref Range    Color, UA YELLOW     Clarity, UA CLEAR     Glucose, UA POC NEG     Bilirubin, UA NEG     Ketones, UA NEG     Spec Grav, UA 1.025     Blood, UA POC NEG     pH, UA 7     Protein, UA POC NEG     Urobilinogen, UA 0.2     Leukocytes, UA NEG     Nitrite, UA NEG     Appearance, Fluid Clear Clear, Slightly Cloudy     Lab Results   Component Value Date    PSA 0.3 08/11/2016     Lab Results   Component Value Date    PSAFREEPCT 33 08/11/2016       IMAGING:  Narrative   CT KIDNEY WO CONTRAST 12/2/2021 6:30 PM   HISTORY: Left flank pain   COMPARISON: 1/13/2021    DLP: 737 mGy cm. All CT scans are performed using dose optimization   techniques as appropriate to the performed exam and include at least   one of the following: Automated exposure control, adjustment of the mA   and/or kV according to size, and the use of the iterative   reconstruction technique. TECHNIQUE: Noncontrast enhanced images of the abdomen and pelvis   obtained without oral contrast.    FINDINGS:    The lung bases and base of the heart are unremarkable. A transvenous   pacer is in place. There is no evidence of pericardial effusion. LIVER: No focal liver lesion. BILIARY SYSTEM: The gallbladder is surgically absent. There is no   evidence of biliary dilatation. Kelsi Karst PANCREAS: No focal pancreatic lesion. SPLEEN: Splenic granulomas are present.  There is no evidence of   splenomegaly. Algis Broaden KIDNEYS AND ADRENALS: The adrenals are unremarkable. No evidence of   renal mass. There are some surgical clips adjacent to the left renal   pelvis. Radiodensities are also noted along the anterior cortex of the   right kidney. Nonobstructing left-sided nephrolithiasis is present. There is moderate dilatation of the upper tracts of the left kidney. The left ureter is dilated to the level of the UVJ secondary to a 3 mm   stone at the level of the UVJ/left ureteral orifice. .  The right   ureter is decompressed and normal in appearance. Algis Broaden RETROPERITONEUM: No mass, lymphadenopathy or hemorrhage. GI TRACT: There is moderate constipation with increased stool   throughout the colon. A few diverticula are noted of the sigmoid colon   without evidence of diverticulitis. . The appendix is visualized and   unremarkable. OTHER: There is no mesenteric mass, lymphadenopathy or fluid   collection. The patient's undergone previous fixation for left femur   fracture. No acute or suspicious bony abnormalities are demonstrated. There is degenerative disc disease throughout the lumbar spine with   mild grade 1 anterolisthesis of L4 on L5 likely representing   subluxation at the level of the facets. . No evidence of a ventral wall   hernia. PELVIS: The prostate gland is mildly enlarged with central   calcification. There is no free fluid in the pelvis. . Urinary bladder   is otherwise unremarkable.       Impression   1.. Punctate nonobstructing nephrolithiasis of the upper tracts of the   left kidney. There is moderate dilatation of the upper tracts of the   left kidney with asymmetric left-sided mild ureteral distention. This   is noted to the level of the left UVJ where there is a 3 mm stone at   the level of the UVJ/left ureteral orifice. The right ureter is   decompressed and normal in appearance. 2. Moderate constipation with increased stool throughout the colon.    Mild diverticular disease is noted of the sigmoid colon without   evidence of diverticulitis. 3. Arthritic changes throughout the lumbar spine. .   Signed by Dr Kenneth Null reviewed the CT. He does have a 3 mm stone in the left distal ureter with upstream hydroureteronephrosis. This is at the level of the UVJ. 1. Left ureteral calculus  Patient has been unable to pass the stone on his own as of yet. He is not straining his urine although he states he does know when he passes a stone. He continues to have left flank pain that radiates to the left testicle. Discussed intervention with ureteroscopy although patient would like to continue with medical expulsion therapy at this time since he has passed 3 mm stone on his own in the past.  We will go ahead and have him follow-up in 1 week with CT prior. Continue Flomax and straining urine.    - CT ABDOMEN PELVIS WO CONTRAST Additional Contrast? None; Future  - POCT Urinalysis no Micro      Orders Placed This Encounter   Procedures    CT ABDOMEN PELVIS WO CONTRAST Additional Contrast? None     For renal stone protocol     Standing Status:   Future     Standing Expiration Date:   12/14/2022     Scheduling Instructions: For renal stone protocol     Order Specific Question:   Additional Contrast?     Answer:   None     Order Specific Question:   Reason for exam:     Answer:   renal colic for stone protocol    POCT Urinalysis no Micro        Return in about 1 week (around 12/21/2021) for follow up, CT prior to appt. All information inputted into the note by the MA to include chief complaint, past medical history, past surgical history, medications, allergies, social and family history and review of systems has been reviewed and updated as needed by me. EMR Dragon/transcription disclaimer: Much of this documentt is electronic  transcription/translation of spoken language to printed text.  The  electronic translation of spoken language may be erroneous, or at times,  nonsensical words or phrases may be inadvertently transcribed.  Although I  have reviewed the document for such errors, some may still exist.

## 2021-12-15 ASSESSMENT — ENCOUNTER SYMPTOMS
ABDOMINAL DISTENTION: 0
BACK PAIN: 0
VOMITING: 0
NAUSEA: 0
ABDOMINAL PAIN: 0

## 2021-12-23 ENCOUNTER — HOSPITAL ENCOUNTER (OUTPATIENT)
Dept: CT IMAGING | Age: 66
Discharge: HOME | End: 2021-12-23
Payer: MEDICARE

## 2021-12-23 DIAGNOSIS — N20.1 LEFT URETERAL CALCULUS: ICD-10-CM

## 2021-12-23 PROCEDURE — 74176 CT ABD & PELVIS W/O CONTRAST: CPT

## 2021-12-29 ENCOUNTER — ANESTHESIA (OUTPATIENT)
Dept: OPERATING ROOM | Age: 66
End: 2021-12-29
Payer: MEDICARE

## 2021-12-29 ENCOUNTER — HOSPITAL ENCOUNTER (OUTPATIENT)
Age: 66
Setting detail: OUTPATIENT SURGERY
Discharge: HOME | End: 2021-12-29
Attending: UROLOGY | Admitting: UROLOGY
Payer: MEDICARE

## 2021-12-29 ENCOUNTER — APPOINTMENT (OUTPATIENT)
Dept: GENERAL RADIOLOGY | Age: 66
End: 2021-12-29
Attending: UROLOGY
Payer: MEDICARE

## 2021-12-29 ENCOUNTER — OFFICE VISIT (OUTPATIENT)
Dept: UROLOGY | Age: 66
End: 2021-12-29
Payer: MEDICARE

## 2021-12-29 ENCOUNTER — ANESTHESIA EVENT (OUTPATIENT)
Dept: OPERATING ROOM | Age: 66
End: 2021-12-29
Payer: MEDICARE

## 2021-12-29 VITALS
OXYGEN SATURATION: 97 % | BODY MASS INDEX: 20.09 KG/M2 | WEIGHT: 128 LBS | RESPIRATION RATE: 16 BRPM | HEART RATE: 60 BPM | SYSTOLIC BLOOD PRESSURE: 108 MMHG | HEIGHT: 67 IN | TEMPERATURE: 97.3 F | DIASTOLIC BLOOD PRESSURE: 69 MMHG

## 2021-12-29 VITALS — HEIGHT: 67 IN | BODY MASS INDEX: 20 KG/M2 | WEIGHT: 127.4 LBS | TEMPERATURE: 97.5 F

## 2021-12-29 VITALS — DIASTOLIC BLOOD PRESSURE: 65 MMHG | OXYGEN SATURATION: 100 % | SYSTOLIC BLOOD PRESSURE: 102 MMHG | TEMPERATURE: 97 F

## 2021-12-29 DIAGNOSIS — N20.1 CALCULUS OF URETEROVESICAL JUNCTION (UVJ): Primary | ICD-10-CM

## 2021-12-29 DIAGNOSIS — N20.0 RENAL CALCULUS, LEFT: Primary | ICD-10-CM

## 2021-12-29 DIAGNOSIS — N21.0 BLADDER STONES: ICD-10-CM

## 2021-12-29 LAB
ALBUMIN SERPL-MCNC: 4.3 G/DL (ref 3.5–5.2)
ALP BLD-CCNC: 82 U/L (ref 40–130)
ALT SERPL-CCNC: 15 U/L (ref 5–41)
ANION GAP SERPL CALCULATED.3IONS-SCNC: 9 MMOL/L (ref 7–19)
APPEARANCE FLUID: CLEAR
AST SERPL-CCNC: 17 U/L (ref 5–40)
BILIRUB SERPL-MCNC: <0.2 MG/DL (ref 0.2–1.2)
BILIRUBIN, POC: NORMAL
BLOOD URINE, POC: NORMAL
BUN BLDV-MCNC: 19 MG/DL (ref 8–23)
CALCIUM SERPL-MCNC: 9 MG/DL (ref 8.8–10.2)
CHLORIDE BLD-SCNC: 103 MMOL/L (ref 98–111)
CLARITY, POC: CLEAR
CO2: 29 MMOL/L (ref 22–29)
COLOR, POC: YELLOW
CREAT SERPL-MCNC: 0.9 MG/DL (ref 0.5–1.2)
GFR AFRICAN AMERICAN: >59
GFR NON-AFRICAN AMERICAN: >60
GLUCOSE BLD-MCNC: 97 MG/DL (ref 74–109)
GLUCOSE URINE, POC: NORMAL
HCT VFR BLD CALC: 38.5 % (ref 42–52)
HEMOGLOBIN: 12.6 G/DL (ref 14–18)
KETONES, POC: NORMAL
LEUKOCYTE EST, POC: NORMAL
MCH RBC QN AUTO: 29 PG (ref 27–31)
MCHC RBC AUTO-ENTMCNC: 32.7 G/DL (ref 33–37)
MCV RBC AUTO: 88.5 FL (ref 80–94)
NITRITE, POC: NORMAL
PDW BLD-RTO: 12.6 % (ref 11.5–14.5)
PH, POC: 5.5
PLATELET # BLD: 135 K/UL (ref 130–400)
PMV BLD AUTO: 9 FL (ref 9.4–12.4)
POTASSIUM SERPL-SCNC: 4.1 MMOL/L (ref 3.5–4.9)
PROTEIN, POC: NORMAL
RBC # BLD: 4.35 M/UL (ref 4.7–6.1)
SODIUM BLD-SCNC: 141 MMOL/L (ref 136–145)
SPECIFIC GRAVITY, POC: 1.02
TOTAL PROTEIN: 6.6 G/DL (ref 6.6–8.7)
UROBILINOGEN, POC: 0.2
WBC # BLD: 4.3 K/UL (ref 4.8–10.8)

## 2021-12-29 PROCEDURE — 2580000003 HC RX 258

## 2021-12-29 PROCEDURE — 6360000002 HC RX W HCPCS

## 2021-12-29 PROCEDURE — 7100000000 HC PACU RECOVERY - FIRST 15 MIN: Performed by: UROLOGY

## 2021-12-29 PROCEDURE — 36415 COLL VENOUS BLD VENIPUNCTURE: CPT

## 2021-12-29 PROCEDURE — 6370000000 HC RX 637 (ALT 250 FOR IP): Performed by: UROLOGY

## 2021-12-29 PROCEDURE — 52005 CYSTO W/URTRL CATHJ: CPT | Performed by: UROLOGY

## 2021-12-29 PROCEDURE — 74420 UROGRAPHY RTRGR +-KUB: CPT | Performed by: UROLOGY

## 2021-12-29 PROCEDURE — 6360000002 HC RX W HCPCS: Performed by: UROLOGY

## 2021-12-29 PROCEDURE — G8420 CALC BMI NORM PARAMETERS: HCPCS | Performed by: NURSE PRACTITIONER

## 2021-12-29 PROCEDURE — 3700000000 HC ANESTHESIA ATTENDED CARE: Performed by: UROLOGY

## 2021-12-29 PROCEDURE — 3600000014 HC SURGERY LEVEL 4 ADDTL 15MIN: Performed by: UROLOGY

## 2021-12-29 PROCEDURE — 85027 COMPLETE CBC AUTOMATED: CPT

## 2021-12-29 PROCEDURE — 3600000004 HC SURGERY LEVEL 4 BASE: Performed by: UROLOGY

## 2021-12-29 PROCEDURE — 81002 URINALYSIS NONAUTO W/O SCOPE: CPT | Performed by: NURSE PRACTITIONER

## 2021-12-29 PROCEDURE — 88300 SURGICAL PATH GROSS: CPT

## 2021-12-29 PROCEDURE — 2709999900 HC NON-CHARGEABLE SUPPLY: Performed by: UROLOGY

## 2021-12-29 PROCEDURE — 80053 COMPREHEN METABOLIC PANEL: CPT

## 2021-12-29 PROCEDURE — 7100000010 HC PHASE II RECOVERY - FIRST 15 MIN: Performed by: UROLOGY

## 2021-12-29 PROCEDURE — 99214 OFFICE O/P EST MOD 30 MIN: CPT | Performed by: NURSE PRACTITIONER

## 2021-12-29 PROCEDURE — 3017F COLORECTAL CA SCREEN DOC REV: CPT | Performed by: NURSE PRACTITIONER

## 2021-12-29 PROCEDURE — G8427 DOCREV CUR MEDS BY ELIG CLIN: HCPCS | Performed by: NURSE PRACTITIONER

## 2021-12-29 PROCEDURE — 82360 CALCULUS ASSAY QUANT: CPT

## 2021-12-29 PROCEDURE — 3700000001 HC ADD 15 MINUTES (ANESTHESIA): Performed by: UROLOGY

## 2021-12-29 PROCEDURE — C1758 CATHETER, URETERAL: HCPCS | Performed by: UROLOGY

## 2021-12-29 PROCEDURE — 4004F PT TOBACCO SCREEN RCVD TLK: CPT | Performed by: NURSE PRACTITIONER

## 2021-12-29 PROCEDURE — 3209999900 FLUORO FOR SURGICAL PROCEDURES

## 2021-12-29 PROCEDURE — G8484 FLU IMMUNIZE NO ADMIN: HCPCS | Performed by: NURSE PRACTITIONER

## 2021-12-29 PROCEDURE — 4040F PNEUMOC VAC/ADMIN/RCVD: CPT | Performed by: NURSE PRACTITIONER

## 2021-12-29 PROCEDURE — 1123F ACP DISCUSS/DSCN MKR DOCD: CPT | Performed by: NURSE PRACTITIONER

## 2021-12-29 PROCEDURE — C1769 GUIDE WIRE: HCPCS | Performed by: UROLOGY

## 2021-12-29 PROCEDURE — 6360000002 HC RX W HCPCS: Performed by: ANESTHESIOLOGY

## 2021-12-29 PROCEDURE — 7100000001 HC PACU RECOVERY - ADDTL 15 MIN: Performed by: UROLOGY

## 2021-12-29 PROCEDURE — 2500000003 HC RX 250 WO HCPCS

## 2021-12-29 RX ORDER — HYDRALAZINE HYDROCHLORIDE 20 MG/ML
5 INJECTION INTRAMUSCULAR; INTRAVENOUS EVERY 10 MIN PRN
Status: DISCONTINUED | OUTPATIENT
Start: 2021-12-29 | End: 2021-12-29 | Stop reason: HOSPADM

## 2021-12-29 RX ORDER — CIPROFLOXACIN 2 MG/ML
400 INJECTION, SOLUTION INTRAVENOUS ONCE
Status: COMPLETED | OUTPATIENT
Start: 2021-12-29 | End: 2021-12-29

## 2021-12-29 RX ORDER — SODIUM CHLORIDE 0.9 % (FLUSH) 0.9 %
5-40 SYRINGE (ML) INJECTION EVERY 12 HOURS SCHEDULED
Status: DISCONTINUED | OUTPATIENT
Start: 2021-12-29 | End: 2021-12-29 | Stop reason: HOSPADM

## 2021-12-29 RX ORDER — PROPOFOL 10 MG/ML
INJECTION, EMULSION INTRAVENOUS PRN
Status: DISCONTINUED | OUTPATIENT
Start: 2021-12-29 | End: 2021-12-29 | Stop reason: SDUPTHER

## 2021-12-29 RX ORDER — DIPHENHYDRAMINE HYDROCHLORIDE 50 MG/ML
12.5 INJECTION INTRAMUSCULAR; INTRAVENOUS
Status: DISCONTINUED | OUTPATIENT
Start: 2021-12-29 | End: 2021-12-29 | Stop reason: HOSPADM

## 2021-12-29 RX ORDER — FENTANYL CITRATE 50 UG/ML
INJECTION, SOLUTION INTRAMUSCULAR; INTRAVENOUS PRN
Status: DISCONTINUED | OUTPATIENT
Start: 2021-12-29 | End: 2021-12-29 | Stop reason: SDUPTHER

## 2021-12-29 RX ORDER — ATROPA BELLADONNA AND OPIUM 16.2; 6 MG/1; MG/1
SUPPOSITORY RECTAL PRN
Status: DISCONTINUED | OUTPATIENT
Start: 2021-12-29 | End: 2021-12-29 | Stop reason: ALTCHOICE

## 2021-12-29 RX ORDER — LIDOCAINE HYDROCHLORIDE 10 MG/ML
INJECTION, SOLUTION EPIDURAL; INFILTRATION; INTRACAUDAL; PERINEURAL PRN
Status: DISCONTINUED | OUTPATIENT
Start: 2021-12-29 | End: 2021-12-29 | Stop reason: SDUPTHER

## 2021-12-29 RX ORDER — LABETALOL HYDROCHLORIDE 5 MG/ML
5 INJECTION, SOLUTION INTRAVENOUS EVERY 10 MIN PRN
Status: DISCONTINUED | OUTPATIENT
Start: 2021-12-29 | End: 2021-12-29 | Stop reason: HOSPADM

## 2021-12-29 RX ORDER — SODIUM CHLORIDE 0.9 % (FLUSH) 0.9 %
5-40 SYRINGE (ML) INJECTION PRN
Status: DISCONTINUED | OUTPATIENT
Start: 2021-12-29 | End: 2021-12-29 | Stop reason: HOSPADM

## 2021-12-29 RX ORDER — FENTANYL CITRATE 50 UG/ML
25 INJECTION, SOLUTION INTRAMUSCULAR; INTRAVENOUS EVERY 5 MIN PRN
Status: DISCONTINUED | OUTPATIENT
Start: 2021-12-29 | End: 2021-12-29 | Stop reason: HOSPADM

## 2021-12-29 RX ORDER — FENTANYL CITRATE 50 UG/ML
50 INJECTION, SOLUTION INTRAMUSCULAR; INTRAVENOUS
Status: DISCONTINUED | OUTPATIENT
Start: 2021-12-29 | End: 2021-12-29 | Stop reason: HOSPADM

## 2021-12-29 RX ORDER — ENALAPRILAT 2.5 MG/2ML
1.25 INJECTION INTRAVENOUS
Status: DISCONTINUED | OUTPATIENT
Start: 2021-12-29 | End: 2021-12-29 | Stop reason: HOSPADM

## 2021-12-29 RX ORDER — KETOROLAC TROMETHAMINE 30 MG/ML
15 INJECTION, SOLUTION INTRAMUSCULAR; INTRAVENOUS ONCE
Status: COMPLETED | OUTPATIENT
Start: 2021-12-29 | End: 2021-12-29

## 2021-12-29 RX ORDER — SODIUM CHLORIDE 9 MG/ML
INJECTION, SOLUTION INTRAVENOUS CONTINUOUS
Status: DISCONTINUED | OUTPATIENT
Start: 2021-12-29 | End: 2021-12-29 | Stop reason: HOSPADM

## 2021-12-29 RX ORDER — SODIUM CHLORIDE 9 MG/ML
25 INJECTION, SOLUTION INTRAVENOUS PRN
Status: DISCONTINUED | OUTPATIENT
Start: 2021-12-29 | End: 2021-12-29 | Stop reason: HOSPADM

## 2021-12-29 RX ORDER — SODIUM CHLORIDE, SODIUM LACTATE, POTASSIUM CHLORIDE, CALCIUM CHLORIDE 600; 310; 30; 20 MG/100ML; MG/100ML; MG/100ML; MG/100ML
INJECTION, SOLUTION INTRAVENOUS CONTINUOUS
Status: DISCONTINUED | OUTPATIENT
Start: 2021-12-29 | End: 2021-12-29 | Stop reason: HOSPADM

## 2021-12-29 RX ORDER — DEXAMETHASONE SODIUM PHOSPHATE 10 MG/ML
INJECTION, SOLUTION INTRAMUSCULAR; INTRAVENOUS PRN
Status: DISCONTINUED | OUTPATIENT
Start: 2021-12-29 | End: 2021-12-29 | Stop reason: SDUPTHER

## 2021-12-29 RX ORDER — ONDANSETRON 4 MG/1
4 TABLET, ORALLY DISINTEGRATING ORAL EVERY 8 HOURS PRN
Qty: 20 TABLET | Refills: 0 | Status: SHIPPED | OUTPATIENT
Start: 2021-12-29 | End: 2022-08-01

## 2021-12-29 RX ORDER — HYDROMORPHONE HYDROCHLORIDE 1 MG/ML
0.5 INJECTION, SOLUTION INTRAMUSCULAR; INTRAVENOUS; SUBCUTANEOUS EVERY 5 MIN PRN
Status: DISCONTINUED | OUTPATIENT
Start: 2021-12-29 | End: 2021-12-29 | Stop reason: HOSPADM

## 2021-12-29 RX ORDER — HYDROMORPHONE HYDROCHLORIDE 1 MG/ML
0.25 INJECTION, SOLUTION INTRAMUSCULAR; INTRAVENOUS; SUBCUTANEOUS EVERY 5 MIN PRN
Status: DISCONTINUED | OUTPATIENT
Start: 2021-12-29 | End: 2021-12-29 | Stop reason: HOSPADM

## 2021-12-29 RX ORDER — MIDAZOLAM HYDROCHLORIDE 1 MG/ML
2 INJECTION INTRAMUSCULAR; INTRAVENOUS
Status: DISCONTINUED | OUTPATIENT
Start: 2021-12-29 | End: 2021-12-29 | Stop reason: HOSPADM

## 2021-12-29 RX ORDER — MEPERIDINE HYDROCHLORIDE 25 MG/ML
12.5 INJECTION INTRAMUSCULAR; INTRAVENOUS; SUBCUTANEOUS EVERY 5 MIN PRN
Status: DISCONTINUED | OUTPATIENT
Start: 2021-12-29 | End: 2021-12-29 | Stop reason: HOSPADM

## 2021-12-29 RX ORDER — SODIUM CHLORIDE, SODIUM LACTATE, POTASSIUM CHLORIDE, CALCIUM CHLORIDE 600; 310; 30; 20 MG/100ML; MG/100ML; MG/100ML; MG/100ML
INJECTION, SOLUTION INTRAVENOUS CONTINUOUS PRN
Status: DISCONTINUED | OUTPATIENT
Start: 2021-12-29 | End: 2021-12-29 | Stop reason: SDUPTHER

## 2021-12-29 RX ORDER — ONDANSETRON 2 MG/ML
INJECTION INTRAMUSCULAR; INTRAVENOUS PRN
Status: DISCONTINUED | OUTPATIENT
Start: 2021-12-29 | End: 2021-12-29 | Stop reason: SDUPTHER

## 2021-12-29 RX ORDER — FENTANYL CITRATE 50 UG/ML
25 INJECTION, SOLUTION INTRAMUSCULAR; INTRAVENOUS
Status: DISCONTINUED | OUTPATIENT
Start: 2021-12-29 | End: 2021-12-29 | Stop reason: HOSPADM

## 2021-12-29 RX ORDER — DEXMEDETOMIDINE HYDROCHLORIDE 100 UG/ML
INJECTION, SOLUTION INTRAVENOUS PRN
Status: DISCONTINUED | OUTPATIENT
Start: 2021-12-29 | End: 2021-12-29 | Stop reason: SDUPTHER

## 2021-12-29 RX ORDER — FENTANYL CITRATE 50 UG/ML
50 INJECTION, SOLUTION INTRAMUSCULAR; INTRAVENOUS EVERY 5 MIN PRN
Status: DISCONTINUED | OUTPATIENT
Start: 2021-12-29 | End: 2021-12-29 | Stop reason: HOSPADM

## 2021-12-29 RX ORDER — ONDANSETRON 2 MG/ML
4 INJECTION INTRAMUSCULAR; INTRAVENOUS
Status: DISCONTINUED | OUTPATIENT
Start: 2021-12-29 | End: 2021-12-29 | Stop reason: HOSPADM

## 2021-12-29 RX ORDER — ROCURONIUM BROMIDE 10 MG/ML
INJECTION, SOLUTION INTRAVENOUS PRN
Status: DISCONTINUED | OUTPATIENT
Start: 2021-12-29 | End: 2021-12-29 | Stop reason: SDUPTHER

## 2021-12-29 RX ORDER — LIDOCAINE HYDROCHLORIDE 10 MG/ML
1 INJECTION, SOLUTION EPIDURAL; INFILTRATION; INTRACAUDAL; PERINEURAL
Status: DISCONTINUED | OUTPATIENT
Start: 2021-12-29 | End: 2021-12-29 | Stop reason: HOSPADM

## 2021-12-29 RX ORDER — ONDANSETRON 2 MG/ML
4 INJECTION INTRAMUSCULAR; INTRAVENOUS EVERY 4 HOURS PRN
Status: DISCONTINUED | OUTPATIENT
Start: 2021-12-29 | End: 2021-12-29 | Stop reason: HOSPADM

## 2021-12-29 RX ADMIN — ROCURONIUM BROMIDE 50 MG: 10 INJECTION, SOLUTION INTRAVENOUS at 14:17

## 2021-12-29 RX ADMIN — DEXMEDETOMIDINE HYDROCHLORIDE 20 MCG: 100 INJECTION, SOLUTION, CONCENTRATE INTRAVENOUS at 14:06

## 2021-12-29 RX ADMIN — SODIUM CHLORIDE, SODIUM LACTATE, POTASSIUM CHLORIDE, AND CALCIUM CHLORIDE: 600; 310; 30; 20 INJECTION, SOLUTION INTRAVENOUS at 14:09

## 2021-12-29 RX ADMIN — ONDANSETRON 4 MG: 2 INJECTION INTRAMUSCULAR; INTRAVENOUS at 14:44

## 2021-12-29 RX ADMIN — PHENYLEPHRINE HYDROCHLORIDE 50 MCG: 10 INJECTION INTRAVENOUS at 14:35

## 2021-12-29 RX ADMIN — DEXAMETHASONE SODIUM PHOSPHATE 10 MG: 10 INJECTION, SOLUTION INTRAMUSCULAR; INTRAVENOUS at 14:19

## 2021-12-29 RX ADMIN — FENTANYL CITRATE 25 MCG: 50 INJECTION, SOLUTION INTRAMUSCULAR; INTRAVENOUS at 14:47

## 2021-12-29 RX ADMIN — FENTANYL CITRATE 50 MCG: 50 INJECTION, SOLUTION INTRAMUSCULAR; INTRAVENOUS at 14:42

## 2021-12-29 RX ADMIN — KETOROLAC TROMETHAMINE 15 MG: 30 INJECTION, SOLUTION INTRAMUSCULAR; INTRAVENOUS at 15:40

## 2021-12-29 RX ADMIN — HYDROMORPHONE HYDROCHLORIDE 0.25 MG: 1 INJECTION, SOLUTION INTRAMUSCULAR; INTRAVENOUS; SUBCUTANEOUS at 15:36

## 2021-12-29 RX ADMIN — PROPOFOL 100 MG: 10 INJECTION, EMULSION INTRAVENOUS at 14:15

## 2021-12-29 RX ADMIN — PROPOFOL 30 MG: 10 INJECTION, EMULSION INTRAVENOUS at 14:17

## 2021-12-29 RX ADMIN — CIPROFLOXACIN 400 MG: 2 INJECTION, SOLUTION INTRAVENOUS at 14:09

## 2021-12-29 RX ADMIN — HYDROMORPHONE HYDROCHLORIDE 0.5 MG: 1 INJECTION, SOLUTION INTRAMUSCULAR; INTRAVENOUS; SUBCUTANEOUS at 15:15

## 2021-12-29 RX ADMIN — FENTANYL CITRATE 100 MCG: 50 INJECTION, SOLUTION INTRAMUSCULAR; INTRAVENOUS at 14:57

## 2021-12-29 RX ADMIN — HYDROMORPHONE HYDROCHLORIDE 0.5 MG: 1 INJECTION, SOLUTION INTRAMUSCULAR; INTRAVENOUS; SUBCUTANEOUS at 15:08

## 2021-12-29 RX ADMIN — FENTANYL CITRATE 75 MCG: 50 INJECTION, SOLUTION INTRAMUSCULAR; INTRAVENOUS at 14:15

## 2021-12-29 RX ADMIN — SUGAMMADEX 200 MG: 100 INJECTION, SOLUTION INTRAVENOUS at 14:47

## 2021-12-29 RX ADMIN — LIDOCAINE HYDROCHLORIDE 50 MG: 10 INJECTION, SOLUTION EPIDURAL; INFILTRATION; INTRACAUDAL; PERINEURAL at 14:15

## 2021-12-29 RX ADMIN — HYDROMORPHONE HYDROCHLORIDE 0.25 MG: 1 INJECTION, SOLUTION INTRAMUSCULAR; INTRAVENOUS; SUBCUTANEOUS at 15:28

## 2021-12-29 ASSESSMENT — PAIN DESCRIPTION - FREQUENCY
FREQUENCY: CONTINUOUS

## 2021-12-29 ASSESSMENT — ENCOUNTER SYMPTOMS
ABDOMINAL PAIN: 0
SHORTNESS OF BREATH: 1
VOMITING: 0
ABDOMINAL DISTENTION: 0
NAUSEA: 0
BACK PAIN: 0

## 2021-12-29 ASSESSMENT — PAIN DESCRIPTION - ONSET
ONSET: ON-GOING

## 2021-12-29 ASSESSMENT — PAIN DESCRIPTION - DESCRIPTORS
DESCRIPTORS: ACHING;SORE

## 2021-12-29 ASSESSMENT — PAIN DESCRIPTION - PAIN TYPE
TYPE: SURGICAL PAIN

## 2021-12-29 ASSESSMENT — PAIN DESCRIPTION - LOCATION
LOCATION: PENIS

## 2021-12-29 ASSESSMENT — PAIN SCALES - GENERAL
PAINLEVEL_OUTOF10: 0
PAINLEVEL_OUTOF10: 8
PAINLEVEL_OUTOF10: 4
PAINLEVEL_OUTOF10: 4
PAINLEVEL_OUTOF10: 8
PAINLEVEL_OUTOF10: 3

## 2021-12-29 ASSESSMENT — PAIN DESCRIPTION - PROGRESSION
CLINICAL_PROGRESSION: GRADUALLY IMPROVING
CLINICAL_PROGRESSION: GRADUALLY IMPROVING
CLINICAL_PROGRESSION: NOT CHANGED
CLINICAL_PROGRESSION: NOT CHANGED

## 2021-12-29 NOTE — PROGRESS NOTES
Es James is a 77 y.o. male who presents today   Chief Complaint   Patient presents with    Follow-up     I am here today for my 1 wk stone fu- had CT       Patient is a 51-year-old male presents the clinic today for follow-up. Patient was originally seen in the ED on 12/2/2021 with complaints of left flank pain radiates to his left testicle. He does have a long history of stone and is passed over 100. At last visit CT revealed obstructing 3 mm stone in the left UVJ with upstream hydroureteronephrosis. At that time he did want to try to pass the stone on his own. He comes in today with continued left flank pain, intermittent gross hematuria, nausea. Denies any vomiting, fever, chills, dysuria. He did have a repeat CT which indicates resolution of left hydroureteronephrosis with several stones noted in the bladder. He does have two calcifications in the left proximal ureter although these appear outside of the ureter. He is currently requesting surgical intervention at this point due to his ongoing pain. Currently on Flomax not straining his urine although he states he has not passed a stone yet. Past Medical History:   Diagnosis Date    CAD (coronary artery disease) 2010    [place] Vessel. S/p stenting wtih negative cardiac cath on January 18, 2010 and negative nuclear stress test on June 29, 2010.  Chest pain 1/28/2016    DJD (degenerative joint disease)     GERD (gastroesophageal reflux disease) 2010    HTN (hypertension)     Hyperlipidemia     Left knee pain 1/28/2016    Pacemaker 2010    [employer]    Polyarticular arthritis     Renal calculi 2010    S/p lithrotripsy    Right arm pain 1/28/2016    Right ureteral stone 7/8/2016    Tobacco abuse 2010    Prior. Past Surgical History:   Procedure Laterality Date    CARDIAC CATHETERIZATION  01/08/2010    EF is estimated to be 60%. See scanned document.     CARDIAC CATHETERIZATION  6/20/08, 10/2/08    EF is estimated to be 60%.  See scanned document.  CARDIAC CATHETERIZATION  2/13/07, 6/6/07    EF 50%. See scanned document.  CARDIAC CATHETERIZATION  1/8/07, 11/26/07    EF is estimated to be in excess of 50%. See scanned doucment.  CARDIAC CATHETERIZATION  08/17/2006    EF is estimated to be in excess of 50%. See scanned doucment.  CARDIAC CATHETERIZATION  5/14/14  MDL    normal LV function. EF 60    CHOLECYSTECTOMY      CLAVICLE SURGERY      COLONOSCOPY      Endoscopy    CYSTOSCOPY Right 7/8/2016    CYSTOSCOPY; RIGHT RETROGRADE PYELOGRAM; RIGHT URETERAL DILATATION; RIGHT URETEROSCOPY; RIGHT URETERAL LASER LITHOTRIPSY AND STONE EXTRACTION; INSERTION RIGHT URETERAL DOUBLE J STENT performed by Anais Can MD at [facility] Left 1/14/2021    CYSTOSCOPY URETEROSCOPY LASER LITHO WITH STONE EXTRACTION AND STENT REMOVAL> performed by Trae Miller MD at [address] [place] [address] Left     HAND SURGERY      HUMERUS SURGERY      KNEE SURGERY      Right    KNEE SURGERY Left     LITHOTRIPSY      MANDIBLE FRACTURE SURGERY      NECK SURGERY      cervical spine.  OTHER SURGICAL HISTORY      Brachytherapy of the core stents.  PACEMAKER PLACEMENT      [employer]    PTCA      TIBIA FRACTURE SURGERY      WRIST SURGERY         Current Outpatient Medications   Medication Sig Dispense Refill    ondansetron (ZOFRAN ODT) 4 MG disintegrating tablet Take 1 tablet by mouth every 8 hours as needed for Nausea or Vomiting 20 tablet 0    naloxone 4 MG/0.1ML LIQD nasal spray 1 spray every 2 minutes as needed until breathing and responsive.  CALL 911      QUEtiapine (SEROQUEL) 25 MG tablet       scopolamine (TRANSDERM-SCOP) transdermal patch       tamsulosin (FLOMAX) 0.4 MG capsule Take 1 capsule by mouth daily 30 capsule 0    Evolocumab with Infusor (REPATHA PUSHTRONEX SYSTEM) 420 MG/3.5ML SOCT INJECT THE CONTENTS OF ONE CARTRIDGE (420 MG) UNDER THE SKIN EVERY 30 DAYS (420 MG=3.5 ML)      oxyCODONE (OXY-IR) 15 MG immediate release tablet TAKE 1 TABLET BY MOUTH EVERY 6 HOURS      prochlorperazine (COMPAZINE) 5 MG tablet TAKE 1 TABLET BY MOUTH EVERY 6 HOURS AS NEEDED FOR NAUSEA      aspirin 325 MG tablet Take 325 mg by mouth daily      metoprolol tartrate (LOPRESSOR) 25 MG tablet Take 25 mg by mouth 2 times daily Indications: Pt unaware of dosage      isosorbide mononitrate (IMDUR) 120 MG CR tablet Take 120 mg by mouth 2 times daily       hydrochlorothiazide (HYDRODIURIL) 12.5 MG tablet Take 12.5 mg by mouth daily Indications: as needed      Coenzyme Q10 (COQ-10 PO) Take by mouth daily       Nutritional Supplements (BOOST PO) Take by mouth      NITROSTAT 0.4 MG SL tablet Place 1 tablet under the tongue 3 times daily as needed  0    LORazepam (ATIVAN) 1 MG tablet Take 1 mg by mouth 3 times daily as needed  5    amlodipine (NORVASC) 10 MG tablet Take 10 mg by mouth daily.  clopidogrel (PLAVIX) 75 MG tablet Take 75 mg by mouth daily. No current facility-administered medications for this visit. Allergies   Allergen Reactions    Ezetimibe-Simvastatin Other (See Comments)     Other reaction(s): myositis/elevated CPK  Other reaction(s):  Other (See Comments)  Myositis/ elevated CPK    Bee Venom     Darvocet [Propoxyphene N-Acetaminophen] Rash    Other Rash    Penicillins Other (See Comments)     childhood allergy    Phenergan [Promethazine Hcl] Anxiety and Other (See Comments)     Restless legs    Promethazine Other (See Comments)     Other reaction(s): jitters    Promethazine Hcl Other (See Comments)     Restless legs    Propoxyphene Rash       Social History     Socioeconomic History    Marital status:      Spouse name: None    Number of children: None    Years of education: None    Highest education level: None   Occupational History     Employer: DISABLED   Tobacco Use    Smoking status: Former Smoker     Types: Cigarettes    Smokeless tobacco: Current User   Vaping Use    Vaping Use: Never used   Substance and Sexual Activity    Alcohol use: No    Drug use: No    Sexual activity: None   Other Topics Concern    None   Social History Narrative    None     Social Determinants of Health     Financial Resource Strain:     Difficulty of Paying Living Expenses: Not on file   Food Insecurity:     Worried About Running Out of Food in the Last Year: Not on file    Javy of Food in the Last Year: Not on file   Transportation Needs:     Lack of Transportation (Medical): Not on file    Lack of Transportation (Non-Medical): Not on file   Physical Activity:     Days of Exercise per Week: Not on file    Minutes of Exercise per Session: Not on file   Stress:     Feeling of Stress : Not on file   Social Connections:     Frequency of Communication with Friends and Family: Not on file    Frequency of Social Gatherings with Friends and Family: Not on file    Attends [employer]ism Services: Not on file    Active Member of [address] [place] [address] or Organizations: Not on file    Attends Club or Organization Meetings: Not on file    Marital Status: Not on file   Intimate Partner Violence:     Fear of Current or Ex-Partner: Not on file    Emotionally Abused: Not on file    Physically Abused: Not on file    Sexually Abused: Not on file   Housing Stability:     Unable to Pay for Housing in the Last Year: Not on file    Number of [place] in the Last Year: Not on file    Unstable Housing in the Last Year: Not on file       No family history on file. REVIEW OF SYSTEMS:  Review of Systems   Constitutional: Negative for chills and fever. Gastrointestinal: Negative for abdominal distention, abdominal pain, nausea and vomiting. Genitourinary: Positive for flank pain and testicular pain. Negative for difficulty urinating, dysuria, frequency, hematuria and urgency. Musculoskeletal: Negative for back pain and gait problem. Psychiatric/Behavioral: Negative for agitation and confusion.        PHYSICAL EXAM:  Temp 97.5 °F (36.4 °C) (Temporal)   Ht 5' 7\" (1.702 m)   Wt 127 lb 6.4 oz (57.8 kg)   BMI 19.95 kg/m²   Physical Exam  Vitals and nursing note reviewed. Constitutional:       General: He is not in acute distress. Appearance: Normal appearance. He is not ill-appearing. Pulmonary:      Effort: Pulmonary effort is normal. No respiratory distress. Abdominal:      General: There is no distension. Tenderness: There is no abdominal tenderness. There is left CVA tenderness. There is no right CVA tenderness. Neurological:      Mental Status: He is alert and oriented to person, place, and time. Mental status is at baseline. Psychiatric:         Mood and Affect: Mood normal.         Behavior: Behavior normal.       DATA:  CMP:    Lab Results   Component Value Date     12/02/2021     12/21/2011    K 4.2 12/02/2021    K 3.7 12/21/2011     12/02/2021     12/21/2011    CO2 24 12/02/2021    BUN 12 12/02/2021    CREATININE 0.7 12/02/2021    CREATININE 0.9 12/21/2011    GFRAA >59 12/02/2021    LABGLOM >60 12/02/2021    GLUCOSE 95 12/02/2021    PROT 6.7 12/02/2021    LABALBU 4.1 12/02/2021    CALCIUM 8.6 12/02/2021    BILITOT 0.3 12/02/2021    ALKPHOS 89 12/02/2021    AST 96 12/02/2021    ALT 61 12/02/2021     Results for orders placed or performed in visit on 12/29/21   POCT Urinalysis no Micro   Result Value Ref Range    Color, UA YELLOW     Clarity, UA CLEAR     Glucose, UA POC 100mg/dL     Bilirubin, UA NEG     Ketones, UA NEG     Spec Grav, UA 1.025     Blood, UA POC NEG     pH, UA 5.5     Protein, UA POC NEG     Urobilinogen, UA 0.2     Leukocytes, UA NEG     Nitrite, UA NEG     Appearance, Fluid Clear Clear, Slightly Cloudy     Lab Results   Component Value Date    PSA 0.3 08/11/2016     Lab Results   Component Value Date    PSAFREEPCT 33 08/11/2016       IMAGING:  Narrative   Examination. CT ABDOMEN PELVIS WO CONTRAST 12/23/2021 10:38 AM   History: Left renal stone.    DLP: 765 mGycm. The CT scan of the abdomen and pelvis is performed without intravenous   contrast enhancement. The images are acquired in axial plane and   subsequent reconstruction in coronal and sagittal planes. The comparison is made with the previous study dated 12/2/2021. The lung bases included in the study appear unremarkable. The limited visualized cardiomediastinal structures are unremarkable   except for extensive artifacts produced by the distal cardiac pacer   leads. Unenhanced liver is unremarkable. Multiple small calcified granulomas   in the spleen are seen which is otherwise unremarkable.    The gallbladder is surgically absent. Unenhanced pancreas is unremarkable. The adrenal glands are unremarkable. Postsurgical changes are seen in the upper abdomen bilaterally around   both kidneys which is similar to the previous study. There is decompression of the left collecting system since the   previous study. There is a small calculus in the upper pole of the   left kidney similar to the previous study. There is moderate   dilatation of the left renal pelvis which is due to the extrarenal   position. This has significantly improved since the previous study. Small calcification in the area of the ureteropelvic junction are   similar to the previous study. These may be outside the ureter? Jill Fanning The   left ureter appear normal. The previously seen small calculus in the   left UV junction is not seen in this study. There are several small   calculi in the urinary bladder which was not noted in the previous   study. The prostate is moderately enlarged with heavily intrinsic   calcifications. The stomach and small bowel are normal. Normal appendix is seen. There   is large amount of stool in the colon. No finding to suggest   obstruction or inflammation. Atheromatous changes of the abdominal aorta and iliac arteries are   seen. No aneurysmal dilatation.    There is no evidence of abdominal or pelvic lymphadenopathy. The images reviewed in bone window show chronic degenerative changes   of the lumbar spine with a mild levoscoliosis. Severe degeneration   with intervertebral discs at multiple levels are seen. Old healed   fracture of the right lower ribs are noted.       Impression   1. Decompression of the left collecting system since the previous   study. The small calculus at the left UV junction has passed into the   urinary bladder. 2. Several small calculi in the urinary bladder which was not seen in   the previous study. 3. A small nonobstructing calculus in the upper pole of the left   kidney similar to the previous study. 4. A large amount of stool in the colon without evidence of   obstruction. Signed by Dr Brandon Banuelos       Have reviewed the CT. He does have decompression of his left collecting system when compared to last CT. Stone at UVJ appears to be in the bladder although is a possibility to be stuck at the UVJ. Several other punctate stones noted in his bladder as well. There is 2 calcifications likely outside of the left proximal ureter. 1. Calculus of ureterovesical junction (UVJ)  2. Bladder stones  Long discussion with the patient regarding CT. At this point he would like some intervention. He feels unchanged since 3 weeks ago. He has failed medical expulsion therapy. We will go ahead and add him on for surgery today for cystoscopy, possible ureteroscopy laser lithotripsy stone basket extraction and left ureteral stent placement, bladder stone removal.  Did have coffee and cream at 6 AM today. Risk and complications have been discussed with the patient to include bleeding, infection, damage to adjacent organ, stent colic. Patient understands these risk and is willing to proceed with surgery today. Orders Placed This Encounter   Procedures    POCT Urinalysis no Micro        No follow-ups on file.     All information inputted into the note by the MA to include chief complaint, past medical history, past surgical history, medications, allergies, social and family history and review of systems has been reviewed and updated as needed by me. EMR Dragon/transcription disclaimer: Much of this documentt is electronic  transcription/translation of spoken language to printed text. The  electronic translation of spoken language may be erroneous, or at times,  nonsensical words or phrases may be inadvertently transcribed.  Although I  have reviewed the document for such errors, some may still exist.

## 2021-12-29 NOTE — H&P
Brinda Thomason is a 77 y.o. male who presents today        Chief Complaint   Patient presents with    Follow-up       I am here today for my 1 wk stone fu- had CT         Patient is a 61-year-old male presents the clinic today for follow-up. Patient was originally seen in the ED on 12/2/2021 with complaints of left flank pain radiates to his left testicle. He does have a long history of stone and is passed over 100. At last visit CT revealed obstructing 3 mm stone in the left UVJ with upstream hydroureteronephrosis. At that time he did want to try to pass the stone on his own. He comes in today with continued left flank pain, intermittent gross hematuria, nausea. Denies any vomiting, fever, chills, dysuria. He did have a repeat CT which indicates resolution of left hydroureteronephrosis with several stones noted in the bladder. He does have two calcifications in the left proximal ureter although these appear outside of the ureter. He is currently requesting surgical intervention at this point due to his ongoing pain. Currently on Flomax not straining his urine although he states he has not passed a stone yet.     Past Medical History        Past Medical History:   Diagnosis Date    CAD (coronary artery disease) 2010     Native Vessel. S/p stenting wtih negative cardiac cath on January 18, 2010 and negative nuclear stress test on June 29, 2010.  Chest pain 1/28/2016    DJD (degenerative joint disease)      GERD (gastroesophageal reflux disease) 2010    HTN (hypertension)      Hyperlipidemia      Left knee pain 1/28/2016    Pacemaker 2010     [employer]    Polyarticular arthritis      Renal calculi 2010     S/p lithrotripsy    Right arm pain 1/28/2016    Right ureteral stone 7/8/2016    Tobacco abuse 2010     Prior.            Past Surgical History         Past Surgical History:   Procedure Laterality Date    CARDIAC CATHETERIZATION   01/08/2010     EF is estimated to be 60%.   See scanned document.  CARDIAC CATHETERIZATION   6/20/08, 10/2/08     EF is estimated to be 60%. See scanned document.  CARDIAC CATHETERIZATION   2/13/07, 6/6/07     EF 50%. See scanned document.  CARDIAC CATHETERIZATION   1/8/07, 11/26/07     EF is estimated to be in excess of 50%. See scanned doucment.  CARDIAC CATHETERIZATION   08/17/2006     EF is estimated to be in excess of 50%. See scanned doucment.  CARDIAC CATHETERIZATION   5/14/14  MDL     normal LV function. EF 60    CHOLECYSTECTOMY        CLAVICLE SURGERY        COLONOSCOPY         Endoscopy    CYSTOSCOPY Right 7/8/2016     CYSTOSCOPY; RIGHT RETROGRADE PYELOGRAM; RIGHT URETERAL DILATATION; RIGHT URETEROSCOPY; RIGHT URETERAL LASER LITHOTRIPSY AND STONE EXTRACTION; INSERTION RIGHT URETERAL DOUBLE J STENT performed by Lynda Cesar MD at [facility] Left 1/14/2021     CYSTOSCOPY URETEROSCOPY LASER LITHO WITH STONE EXTRACTION AND STENT REMOVAL> performed by Gregorio Herman MD at [address] [place] [address] Left      HAND SURGERY        HUMERUS SURGERY        KNEE SURGERY         Right    KNEE SURGERY Left      LITHOTRIPSY        MANDIBLE FRACTURE SURGERY        NECK SURGERY         cervical spine.  OTHER SURGICAL HISTORY         Brachytherapy of the core stents.  PACEMAKER PLACEMENT         [employer]    PTCA        TIBIA FRACTURE SURGERY        WRIST SURGERY                Current Facility-Administered Medications          Current Outpatient Medications   Medication Sig Dispense Refill    ondansetron (ZOFRAN ODT) 4 MG disintegrating tablet Take 1 tablet by mouth every 8 hours as needed for Nausea or Vomiting 20 tablet 0    naloxone 4 MG/0.1ML LIQD nasal spray 1 spray every 2 minutes as needed until breathing and responsive.  CALL 911        QUEtiapine (SEROQUEL) 25 MG tablet          scopolamine (TRANSDERM-SCOP) transdermal patch          tamsulosin (FLOMAX) 0.4 MG capsule Take 1 capsule by mouth daily 30 capsule 0  Evolocumab with Infusor ([address] [place] [address]) 420 MG/3.5ML SOCT INJECT THE CONTENTS OF ONE CARTRIDGE (420 MG) UNDER THE SKIN EVERY 30 DAYS (420 MG=3.5 ML)        oxyCODONE (OXY-IR) 15 MG immediate release tablet TAKE 1 TABLET BY MOUTH EVERY 6 HOURS        prochlorperazine (COMPAZINE) 5 MG tablet TAKE 1 TABLET BY MOUTH EVERY 6 HOURS AS NEEDED FOR NAUSEA        aspirin 325 MG tablet Take 325 mg by mouth daily        metoprolol tartrate (LOPRESSOR) 25 MG tablet Take 25 mg by mouth 2 times daily Indications: Pt unaware of dosage        isosorbide mononitrate (IMDUR) 120 MG CR tablet Take 120 mg by mouth 2 times daily         hydrochlorothiazide (HYDRODIURIL) 12.5 MG tablet Take 12.5 mg by mouth daily Indications: as needed        Coenzyme Q10 (COQ-10 PO) Take by mouth daily         Nutritional Supplements (BOOST PO) Take by mouth        NITROSTAT 0.4 MG SL tablet Place 1 tablet under the tongue 3 times daily as needed   0    LORazepam (ATIVAN) 1 MG tablet Take 1 mg by mouth 3 times daily as needed   5    amlodipine (NORVASC) 10 MG tablet Take 10 mg by mouth daily.          clopidogrel (PLAVIX) 75 MG tablet Take 75 mg by mouth daily.            No current facility-administered medications for this visit.                  Allergies   Allergen Reactions    Ezetimibe-Simvastatin Other (See Comments)       Other reaction(s): myositis/elevated CPK  Other reaction(s):  Other (See Comments)  Myositis/ elevated CPK    Bee Venom      Darvocet [Propoxyphene N-Acetaminophen] Rash    Other Rash    Penicillins Other (See Comments)       childhood allergy    Phenergan [Promethazine Hcl] Anxiety and Other (See Comments)       Restless legs    Promethazine Other (See Comments)       Other reaction(s): jitters    Promethazine Hcl Other (See Comments)       Restless legs    Propoxyphene Rash         Social History   Social History            Socioeconomic History    Marital status:        Spouse name: None    Number of children: None    Years of education: None    Highest education level: None   Occupational History       Employer: DISABLED   Tobacco Use    Smoking status: Former Smoker       Types: Cigarettes    Smokeless tobacco: Current User   Vaping Use    Vaping Use: Never used   Substance and Sexual Activity    Alcohol use: No    Drug use: No    Sexual activity: None   Other Topics Concern    None   Social History Narrative    None      Social Determinants of Health          Financial Resource Strain:     Difficulty of Paying Living Expenses: Not on file   Food Insecurity:     Worried About Running Out of Food in the Last Year: Not on file    Javy of Food in the Last Year: Not on file   Transportation Needs:     Lack of Transportation (Medical): Not on file    Lack of Transportation (Non-Medical): Not on file   Physical Activity:     Days of Exercise per Week: Not on file    Minutes of Exercise per Session: Not on file   Stress:     Feeling of Stress : Not on file   Social Connections:     Frequency of Communication with Friends and Family: Not on file    Frequency of Social Gatherings with Friends and Family: Not on file    Attends Mu-ism Services: Not on file    Active Member of [address] [place] [address] or Organizations: Not on file    Attends Club or Organization Meetings: Not on file    Marital Status: Not on file   Intimate Partner Violence:     Fear of Current or Ex-Partner: Not on file    Emotionally Abused: Not on file    Physically Abused: Not on file    Sexually Abused: Not on file   Housing Stability:     Unable to Pay for Housing in the Last Year: Not on file    Number of [place] in the Last Year: Not on file    Unstable Housing in the Last Year: Not on file            Family History   No family history on file.        REVIEW OF SYSTEMS:  Review of Systems   Constitutional: Negative for chills and fever.    Gastrointestinal: Negative for abdominal distention, abdominal pain, nausea and vomiting. Genitourinary: Positive for flank pain and testicular pain. Negative for difficulty urinating, dysuria, frequency, hematuria and urgency. Musculoskeletal: Negative for back pain and gait problem. Psychiatric/Behavioral: Negative for agitation and confusion.         PHYSICAL EXAM:  Temp 97.5 °F (36.4 °C) (Temporal)   Ht 5' 7\" (1.702 m)   Wt 127 lb 6.4 oz (57.8 kg)   BMI 19.95 kg/m²   Physical Exam  Vitals and nursing note reviewed. Constitutional:       General: He is not in acute distress. Appearance: Normal appearance. He is not ill-appearing. Pulmonary:      Effort: Pulmonary effort is normal. No respiratory distress. Abdominal:      General: There is no distension. Tenderness: There is no abdominal tenderness. There is left CVA tenderness. There is no right CVA tenderness. Neurological:      Mental Status: He is alert and oriented to person, place, and time. Mental status is at baseline.    Psychiatric:         Mood and Affect: Mood normal.         Behavior: Behavior normal.         DATA:  CMP:          Lab Results   Component Value Date      12/02/2021      12/21/2011     K 4.2 12/02/2021     K 3.7 12/21/2011      12/02/2021      12/21/2011     CO2 24 12/02/2021     BUN 12 12/02/2021     CREATININE 0.7 12/02/2021     CREATININE 0.9 12/21/2011     GFRAA >59 12/02/2021     LABGLOM >60 12/02/2021     GLUCOSE 95 12/02/2021     PROT 6.7 12/02/2021     LABALBU 4.1 12/02/2021     CALCIUM 8.6 12/02/2021     BILITOT 0.3 12/02/2021     ALKPHOS 89 12/02/2021     AST 96 12/02/2021     ALT 61 12/02/2021            Results for orders placed or performed in visit on 12/29/21   POCT Urinalysis no Micro   Result Value Ref Range     Color, UA YELLOW       Clarity, UA CLEAR       Glucose, UA POC 100mg/dL       Bilirubin, UA NEG       Ketones, UA NEG       Spec Grav, UA 1.025       Blood, UA POC NEG       pH, UA 5.5       Protein, UA POC NEG       Urobilinogen, UA 0.2       Leukocytes, UA NEG       Nitrite, UA NEG       Appearance, Fluid Clear Clear, Slightly Cloudy            Lab Results   Component Value Date     PSA 0.3 08/11/2016            Lab Results   Component Value Date     PSAFREEPCT 33 08/11/2016         IMAGING:  Narrative   Examination. CT ABDOMEN PELVIS WO CONTRAST 12/23/2021 10:38 AM   History: Left renal stone. DLP: 765 mGycm. The CT scan of the abdomen and pelvis is performed without intravenous   contrast enhancement. The images are acquired in axial plane and   subsequent reconstruction in coronal and sagittal planes. The comparison is made with the previous study dated 12/2/2021. The lung bases included in the study appear unremarkable. The limited visualized cardiomediastinal structures are unremarkable   except for extensive artifacts produced by the distal cardiac pacer   leads. Unenhanced liver is unremarkable. Multiple small calcified granulomas   in the spleen are seen which is otherwise unremarkable.    The gallbladder is surgically absent. Unenhanced pancreas is unremarkable. The adrenal glands are unremarkable. Postsurgical changes are seen in the upper abdomen bilaterally around   both kidneys which is similar to the previous study. There is decompression of the left collecting system since the   previous study. There is a small calculus in the upper pole of the   left kidney similar to the previous study. There is moderate   dilatation of the left renal pelvis which is due to the extrarenal   position. This has significantly improved since the previous study. Small calcification in the area of the ureteropelvic junction are   similar to the previous study. These may be outside the ureter? Funmi Rod The   left ureter appear normal. The previously seen small calculus in the   left UV junction is not seen in this study.  There are several small   calculi in the urinary bladder which was not noted in the previous study.   The prostate is moderately enlarged with heavily intrinsic   calcifications. The stomach and small bowel are normal. Normal appendix is seen. There   is large amount of stool in the colon. No finding to suggest   obstruction or inflammation. Atheromatous changes of the abdominal aorta and iliac arteries are   seen. No aneurysmal dilatation. There is no evidence of abdominal or pelvic lymphadenopathy. The images reviewed in bone window show chronic degenerative changes   of the lumbar spine with a mild levoscoliosis. Severe degeneration   with intervertebral discs at multiple levels are seen. Old healed   fracture of the right lower ribs are noted.       Impression   1. Decompression of the left collecting system since the previous   study. The small calculus at the left UV junction has passed into the   urinary bladder. 2. Several small calculi in the urinary bladder which was not seen in   the previous study. 3. A small nonobstructing calculus in the upper pole of the left   kidney similar to the previous study. 4. A large amount of stool in the colon without evidence of   obstruction. Signed by Dr Rhonda Berman       Have reviewed the CT. He does have decompression of his left collecting system when compared to last CT. Stone at UVJ appears to be in the bladder although is a possibility to be stuck at the UVJ. Several other punctate stones noted in his bladder as well. There is 2 calcifications likely outside of the left proximal ureter.     1. Calculus of ureterovesical junction (UVJ)  2. Bladder stones  Long discussion with the patient regarding CT. At this point he would like some intervention. He feels unchanged since 3 weeks ago. He has failed medical expulsion therapy.   We will go ahead and add him on for surgery today for cystoscopy, possible ureteroscopy laser lithotripsy stone basket extraction and left ureteral stent placement, bladder stone removal.  Did have coffee and cream at 6 AM today. Risk and complications have been discussed with the patient to include bleeding, infection, damage to adjacent organ, stent colic. Patient understands these risk and is willing to proceed with surgery today.            Orders Placed This Encounter   Procedures    POCT Urinalysis no Micro         No follow-ups on file.     All information inputted into the note by the MA to include chief complaint, past medical history, past surgical history, medications, allergies, social and family history and review of systems has been reviewed and updated as needed by me.     EMR Dragon/transcription disclaimer: Much of this documentt is electronic  transcription/translation of spoken language to printed text. The  electronic translation of spoken language may be erroneous, or at times,  nonsensical words or phrases may be inadvertently transcribed.  Although I  have reviewed the document for such errors, some may still exist.

## 2021-12-29 NOTE — INTERVAL H&P NOTE
Update History & Physical    The patient's History and Physical of December 29, 2021 was reviewed with the patient and I examined the patient. There was no change. The surgical site was confirmed by the patient and me. Plan: The risks, benefits, expected outcome, and alternative to the recommended procedure have been discussed with the patient. Patient understands and wants to proceed with the procedure.      Electronically signed by Bri Mcdermott MD on 12/29/2021 at 1:10 PM

## 2021-12-29 NOTE — ANESTHESIA PRE PROCEDURE
Department of Anesthesiology  Preprocedure Note       Name:  Loida Carroll   Age:  77 y.o.  :  1955                                          MRN:  [id number]         Date:  2021      Surgeon: Cash Evans):  Zuleyma Vogt MD    Procedure: Procedure(s):  CYSTOSCOPY LEFT URETEROSCOPY LASER LITHOTRIPSY, STONE EXTRACTION, BLADDER STONE REMOVAL  POSSIBLE LEFT STENT INSERTION    Medications prior to admission:   Prior to Admission medications    Medication Sig Start Date End Date Taking? Authorizing Provider   ondansetron (ZOFRAN ODT) 4 MG disintegrating tablet Take 1 tablet by mouth every 8 hours as needed for Nausea or Vomiting 21  Yes BHUMIKA Reyna Ra - DONNIE   QUEtiapine (SEROQUEL) 25 MG tablet  21  Yes Historical Provider, MD   tamsulosin (FLOMAX) 0.4 MG capsule Take 1 capsule by mouth daily 21  Yes BHUMIKA Brunson   oxyCODONE (OXY-IR) 15 MG immediate release tablet TAKE 1 TABLET BY MOUTH EVERY 6 HOURS 1/15/21  Yes Historical Provider, MD   aspirin 325 MG tablet Take 325 mg by mouth daily   Yes Historical Provider, MD   metoprolol tartrate (LOPRESSOR) 25 MG tablet Take 25 mg by mouth 2 times daily Indications: Pt unaware of dosage   Yes Historical Provider, MD   isosorbide mononitrate (IMDUR) 120 MG CR tablet Take 120 mg by mouth 2 times daily    Yes Historical Provider, MD   Coenzyme Q10 (COQ-10 PO) Take by mouth daily    Yes Historical Provider, MD   NITROSTAT 0.4 MG SL tablet Place 1 tablet under the tongue 3 times daily as needed 10/23/15  Yes Historical Provider, MD   LORazepam (ATIVAN) 1 MG tablet Take 1 mg by mouth 3 times daily as needed 16  Yes Historical Provider, MD   amlodipine (NORVASC) 10 MG tablet Take 10 mg by mouth daily. Yes Historical Provider, MD   naloxone 4 MG/0.1ML LIQD nasal spray 1 spray every 2 minutes as needed until breathing and responsive.  CALL 911 21  Historical Provider, MD   scopolamine (TRANSDERM-SCOP) transdermal patch 11/16/21   Historical Provider, MD   Evolocumab with Infusor ([address] [place] [address]) 420 MG/3.5ML SOCT INJECT THE CONTENTS OF ONE CARTRIDGE (420 MG) UNDER THE SKIN EVERY 30 DAYS (420 MG=3.5 ML) 1/15/21 1/16/22  Historical Provider, MD   prochlorperazine (COMPAZINE) 5 MG tablet TAKE 1 TABLET BY MOUTH EVERY 6 HOURS AS NEEDED FOR NAUSEA 1/4/21   Historical Provider, MD   hydrochlorothiazide (HYDRODIURIL) 12.5 MG tablet Take 12.5 mg by mouth daily Indications: as needed    Historical Provider, MD   Nutritional Supplements (BOOST PO) Take by mouth    Historical Provider, MD   clopidogrel (PLAVIX) 75 MG tablet Take 75 mg by mouth daily. Historical Provider, MD       Current medications:    Current Facility-Administered Medications   Medication Dose Route Frequency Provider Last Rate Last Admin    ciprofloxacin (CIPRO) IVPB 400 mg  400 mg IntraVENous Once Marianela Colindres MD           Allergies: Allergies   Allergen Reactions    Ezetimibe-Simvastatin Other (See Comments)     Other reaction(s): myositis/elevated CPK  Other reaction(s):  Other (See Comments)  Myositis/ elevated CPK    Bee Venom     Darvocet [Propoxyphene N-Acetaminophen] Rash    Other Rash    Penicillins Other (See Comments)     childhood allergy    Phenergan [Promethazine Hcl] Anxiety and Other (See Comments)     Restless legs    Promethazine Other (See Comments)     Other reaction(s): jitters    Promethazine Hcl Other (See Comments)     Restless legs    Propoxyphene Rash       Problem List:    Patient Active Problem List   Diagnosis Code    Coronary artery disease involving native coronary artery of native heart I25.10    Tobacco abuse Z72.0    Hyperlipidemia E78.5    HTN (hypertension) I10    Chest pain R07.9    Right arm pain M79.601    Left knee pain M25.562    History of kidney stones Z87.442    Benign prostatic hyperplasia with urinary frequency N40.1, R35.0    Angina pectoris, crescendo ([facility] [address] I20.0    ACS (acute coronary syndrome) (HCC) I24.9    Nocturia R35.1    Left ureteral calculus Q37.9    Renal colic on left side C82    Ureterolithiasis N20.1    Imbalance R26.89    Slurred speech R47.81    Confusion R41.0    Dyspnea on exertion R06.00       Past Medical History:        Diagnosis Date    CAD (coronary artery disease) 2010    Native Vessel. S/p stenting wtih negative cardiac cath on January 18, 2010 and negative nuclear stress test on June 29, 2010.  Chest pain 1/28/2016    DJD (degenerative joint disease)     GERD (gastroesophageal reflux disease) 2010    HTN (hypertension)     Hyperlipidemia     Left knee pain 1/28/2016    Pacemaker 2010    [employer]    Polyarticular arthritis     Renal calculi 2010    S/p lithrotripsy    Right arm pain 1/28/2016    Right ureteral stone 7/8/2016    Tobacco abuse 2010    Prior. Past Surgical History:        Procedure Laterality Date    CARDIAC CATHETERIZATION  01/08/2010    EF is estimated to be 60%. See scanned document.  CARDIAC CATHETERIZATION  6/20/08, 10/2/08    EF is estimated to be 60%. See scanned document.  CARDIAC CATHETERIZATION  2/13/07, 6/6/07    EF 50%. See scanned document.  CARDIAC CATHETERIZATION  1/8/07, 11/26/07    EF is estimated to be in excess of 50%. See scanned doucment.  CARDIAC CATHETERIZATION  08/17/2006    EF is estimated to be in excess of 50%. See scanned doucment.  CARDIAC CATHETERIZATION  5/14/14  MDL    normal LV function.  EF 60    CHOLECYSTECTOMY      CLAVICLE SURGERY      COLONOSCOPY      Endoscopy    CYSTOSCOPY Right 7/8/2016    CYSTOSCOPY; RIGHT RETROGRADE PYELOGRAM; RIGHT URETERAL DILATATION; RIGHT URETEROSCOPY; RIGHT URETERAL LASER LITHOTRIPSY AND STONE EXTRACTION; INSERTION RIGHT URETERAL DOUBLE J STENT performed by Savanna La MD at [address] [place] [address] Left 1/14/2021    CYSTOSCOPY URETEROSCOPY LASER LITHO WITH STONE EXTRACTION AND STENT REMOVAL> performed by Janessa Niño MD at 12/21/2011    GFRAA >59 12/29/2021    LABGLOM >60 12/29/2021    GLUCOSE 97 12/29/2021    PROT 6.6 12/29/2021    CALCIUM 9.0 12/29/2021    BILITOT <0.2 12/29/2021    ALKPHOS 82 12/29/2021    AST 17 12/29/2021    ALT 15 12/29/2021       POC Tests: No results for input(s): POCGLU, POCNA, POCK, POCCL, POCBUN, POCHEMO, POCHCT in the last 72 hours. Coags:   Lab Results   Component Value Date    PROTIME 12.9 07/12/2021    PROTIME 12.76 12/18/2011    INR 0.95 07/12/2021    APTT 28.7 07/12/2021       HCG (If Applicable): No results found for: PREGTESTUR, PREGSERUM, HCG, HCGQUANT     ABGs: No results found for: PHART, PO2ART, XUF9PTW, DAU4XNF, BEART, [id number]     Type & Screen (If Applicable):  No results found for: LABABO, LABRH    Drug/Infectious Status (If Applicable):  No results found for: HIV, HEPCAB    COVID-19 Screening (If Applicable):   Lab Results   Component Value Date    COVID19 Not Detected 07/12/2021           Anesthesia Evaluation  Patient summary reviewed and Nursing notes reviewed no history of anesthetic complications:   Airway: Mallampati: II  TM distance: >3 FB   Neck ROM: full  Mouth opening: > = 3 FB Dental:          Pulmonary:   (+) shortness of breath:                             Cardiovascular:  Exercise tolerance: good (>4 METS),   (+) hypertension:, angina:, pacemaker: pacemaker and dependent, CAD: no interval change, CABG/stent (stenting x 6 last placed ~ 5 years ago):, ACEVEDO:, hyperlipidemia         Beta Blocker:  Dose within 24 Hrs         Neuro/Psych:   (+) psychiatric history:            GI/Hepatic/Renal:   (+) GERD:, renal disease: kidney stones,      (-) liver disease       Endo/Other:    (+) blood dyscrasia (off plavix since end of November): anemia:., no malignancy/cancer. (-) diabetes mellitus, no malignancy/cancer               Abdominal:             Vascular:     - DVT and PE.       Other Findings:             Anesthesia Plan      general     ASA 3       Induction: intravenous. MIPS: Postoperative opioids intended and Prophylactic antiemetics administered. Anesthetic plan and risks discussed with patient.                       Federico Holliday DO   12/29/2021

## 2021-12-29 NOTE — ANESTHESIA POSTPROCEDURE EVALUATION
Department of Anesthesiology  Postprocedure Note    Patient: Taina Sams  MRN: [id number]  YOB: 1955  Date of evaluation: 12/29/2021  Time:  3:01 PM     Procedure Summary     Date: 12/29/21 Room / Location: [facility] OR [facility]    Anesthesia Start: 7724 Anesthesia Stop: 1500    Procedure: CYSTOSCOPY; LEFT URTERAL CATHERIZATION; LEFT RETORGRADE PYLEOGRAM; REMOVAL BLADDER CALCULI (Left ) Diagnosis: (792012)    Surgeons: Carlton Hatch MD Responsible Provider: BHUMIKA Hernadez CRNA    Anesthesia Type: general ASA Status: 3          Anesthesia Type: general    Brenna Phase I: Brenna Score: 10    Brenna Phase II:      Last vitals: Reviewed and per EMR flowsheets.        Anesthesia Post Evaluation    Patient location during evaluation: PACU  Patient participation: complete - patient participated  Level of consciousness: awake  Pain score: 0  Airway patency: patent  Nausea & Vomiting: no vomiting and no nausea  Complications: no  Cardiovascular status: hemodynamically stable  Respiratory status: room air, spontaneous ventilation and acceptable  Hydration status: stable  Multimodal analgesia pain management approach

## 2021-12-30 NOTE — OP NOTE
BRYANT Popset OF [facility] PATIENCE [address], [address] [facility]                                OPERATIVE REPORT    PATIENT NAME: Dalila Gee                    :        1955  MED REC NO:   [id number]                              ROOM:  ACCOUNT NO:   [de-identified]                           ADMIT DATE: 2021  PROVIDER:     Cindy Ibarra MD    DATE OF PROCEDURE:  2021    TITLE OF OPERATION:  1. Cystoscopy, left ureteral catheterization; left retrograde  pyelogram.  2.  Removal of bladder calculi, simple. PREOPERATIVE DIAGNOSES:  1. Left UVJ ureteral calculus. 2.  Bladder calculus. POSTOPERATIVE DIAGNOSES:  1. Left UVJ ureteral calculus. 2.  Bladder calculus. ANESTHETIC:  General anesthetic. ATTENDING SURGEON:  Cindy Ibarra MD    ESTIMATED BLOOD LOSS:  0 mL. HISTORY:  The patient is a 70-year-old gentleman who has a prior history  of stones. He was seen in the office today by the nurse practitioner  after being seen in the emergency department on 2021 with symptoms  of left flank pain radiating into his left testicle. CT showed a 3 mm  stone at the left UVJ with hydronephrosis. He was given an opportunity  to try to pass these stones on his own. He came into the office today  complaining of continuous flank pain, some intermittent gross hematuria  and nausea. A repeat CT scan was done, which showed resolution of the  left hydronephrosis. There were several stones noted into the bladder  and he had a nonobstructing stone in the upper pole of the left kidney. The stone previously seen at the left UVJ was felt to now have  transitioned and moved into the bladder, however, was still in the  position of the left side of the bladder, so we could not be for certain  that this had passed particularly since he was having some symptoms.   It  should be noted that right before the procedure, he had to go to the  bathroom and he thought he passed the stone then. Because of continued  symptoms and because of the CT findings today, we were not completely  convinced that the stone was not still present at the orifice, so he was  offered surgical intervention. Therefore, we planned to take him to the  operating room for cystoscopy, left retrograde pyelogram and if the  stone is still present, we will proceed with left ureteroscopy, laser  lithotripsy, stone extraction and stent placement. This was discussed  with him. He does understand if the stone has passed the bladder, we  will simply just remove the bladder stones. He understood and was  agreeable to proceed. He does also have a KUB that showed some metal  surgical clips just medial to the left renal pelvis. These showed up on  CT scans and are clearly outside the left kidney as surgical clips. DESCRIPTION OF PROCEDURE:  The patient was brought to the operating  room, underwent general anesthetic. He was placed in the lithotomy  position. His genitalia was prepped and draped per routine sterile  fashion. He received a preoperative antibiotic and a time-out was  performed. The 22-Syriac cystoscope was inserted into the meatus. This was  advanced under direct vision. The penile, bulbar and prostatic urethra  all appeared to be normal.  I entered the patient's bladder. He did  have some mild trabeculation. The bladder was inspected and the mucosa  appeared normal.  There were no mucosal lesions or abnormalities. The  right and left ureteral orifices also appeared to be normal.  There was  efflux from both the right and left ureteral orifices. Specifically, on  the left side, the orifice appeared to be normal.  There was no stone  seen in the orifice or associated with the left ureter. There were,  however, some stones of varying size in the bladder with the large one  being about 3 to 4 mm in size, most of the other one were small little  gravely stones.     I then performed retrograde pyelogram.  Under direct cystoscopic  visualization, the left ureter was intubated with a 5-Macedonian ureteral  catheter and contrast injected retrograde. This showed no filling  defect at the UVJ or distal left ureter. The ureter itself was without  dilation. There was a little bit of narrowing at the proximal ureter  just at the UPJ with some mild left pelviectasis. This is chronic as he  has had prior open stone surgeries in the remote past.  This appears to  be unchanged compared to his CT. The surgical clips are clearly outside  the tract of the ureter. The ureter drained readily with efflux of the  contrast and again, no stone was identified. Therefore, it was  concluded that the stone most likely had passed and had passed into the  bladder. The [facility] evacuator was used to aid in removing the stones, some of them  were washed through the sheath and the rest of them were evacuated using  the evacuator. At the end of the procedure, I inspected the bladder  very carefully with 30 and 70-degree lens and I saw no other stones. The patient's bladder was emptied and the procedure was concluded and  the scope was removed. He was awakened from his anesthetic and taken to  the recovery room in stable condition. We will have him return in  approximately 1 month with a KUB to see if the stone in the upper pole  of the left kidney can be visualized for future treatment with shockwave  lithotripsy.         Erling Phoenix, MD    D: 12/29/2021 16:30:49      T: 12/29/2021 23:40:41     [email or web address]  Job#: [id number]     Doc#: [id number]    CC:

## 2021-12-30 NOTE — OP NOTE
BRYANT Custora OF [facility] [address], [address] [facility]                                OPERATIVE REPORT    PATIENT NAME: Donald Tran                    :        1955  MED REC NO:   [id number]                              ROOM:  ACCOUNT NO:   [de-identified]                           ADMIT DATE: 2021  PROVIDER:     Carlos Schaefer MD    DATE OF PROCEDURE:  2021    RADIOGRAPHIC INTERPRETATION OF LEFT RETROGRADE PYELOGRAM    1. Left retrograde pyelogram.    INTERPRETATION:  The left ureter was intubated cystoscopically with  ureteral catheter, contrast injected retrograde. This shows a  normal-appearing distal left ureter specifically at the UVJ. There is  no filling defect. There is no hydronephrosis. There is no dilation of  the left ureter and in its entirety, the ureter appears to be normal.   There is some tapered narrowing at the UPJ or proximal left ureter. Some surgical clips were seen outside of the urinary tract. There is  some mild left pelviectasis without filling defect. Post-injection  imaging shows good drainage of contrast from the left ureter with no  obstruction. CONCLUSION:  Normal-appearing left ureter. Specifically, no stone or  filling defect is seen in the distal left ureter at the UVJ, which is  consistent with the stone being passed into the bladder as compared to  CT scan. Mild left pelviectasis unchanged from previous. Otherwise,  normal-appearing left retrograde pyelogram without obstruction.         Irena Moreno MD    D: 2021 16:30:49      T: 2021 23:44:45     [email or web address]  Job#: [id number]     Doc#: [id number]    CC:

## 2022-01-03 LAB
CALCULI COMPOSITION: NORMAL
MASS: 17 MG
STONE DESCRIPTION: NORMAL

## 2022-01-06 ENCOUNTER — HOSPITAL ENCOUNTER (OUTPATIENT)
Dept: GENERAL RADIOLOGY | Facility: HOSPITAL | Age: 67
Discharge: HOME | End: 2022-01-06
Admitting: NURSE PRACTITIONER

## 2022-01-06 ENCOUNTER — TRANSCRIBE ORDERS (OUTPATIENT)
Dept: ADMINISTRATIVE | Facility: HOSPITAL | Age: 67
End: 2022-01-06

## 2022-01-06 DIAGNOSIS — M47.816 LUMBAR SPONDYLOSIS: ICD-10-CM

## 2022-01-06 DIAGNOSIS — M47.816 LUMBAR SPONDYLOSIS: Primary | ICD-10-CM

## 2022-01-06 PROCEDURE — 72110 X-RAY EXAM L-2 SPINE 4/>VWS: CPT

## 2022-01-10 ENCOUNTER — OFFICE VISIT (OUTPATIENT)
Dept: FAMILY MEDICINE CLINIC | Facility: CLINIC | Age: 67
End: 2022-01-10

## 2022-01-10 VITALS
BODY MASS INDEX: 19.78 KG/M2 | WEIGHT: 126 LBS | HEART RATE: 64 BPM | SYSTOLIC BLOOD PRESSURE: 116 MMHG | DIASTOLIC BLOOD PRESSURE: 70 MMHG | RESPIRATION RATE: 16 BRPM | HEIGHT: 67 IN | OXYGEN SATURATION: 97 %

## 2022-01-10 DIAGNOSIS — J01.90 ACUTE SINUSITIS, RECURRENCE NOT SPECIFIED, UNSPECIFIED LOCATION: ICD-10-CM

## 2022-01-10 DIAGNOSIS — Z20.822 SUSPECTED 2019 NOVEL CORONAVIRUS INFECTION: Primary | ICD-10-CM

## 2022-01-10 PROCEDURE — 99213 OFFICE O/P EST LOW 20 MIN: CPT | Performed by: FAMILY MEDICINE

## 2022-01-10 RX ORDER — AZITHROMYCIN 250 MG/1
TABLET, FILM COATED ORAL
Qty: 6 TABLET | Refills: 0 | Status: SHIPPED | OUTPATIENT
Start: 2022-01-10 | End: 2022-07-06
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
## 2022-01-11 ENCOUNTER — HOSPITAL ENCOUNTER (OUTPATIENT)
Dept: CT IMAGING | Facility: HOSPITAL | Age: 67
Discharge: HOME | End: 2022-01-11
Admitting: NURSE PRACTITIONER

## 2022-01-11 DIAGNOSIS — M47.816 LUMBAR SPONDYLOSIS: ICD-10-CM

## 2022-01-11 PROCEDURE — 72131 CT LUMBAR SPINE W/O DYE: CPT

## 2022-02-07 ENCOUNTER — TRANSCRIBE ORDERS (OUTPATIENT)
Dept: ADMINISTRATIVE | Facility: HOSPITAL | Age: 67
End: 2022-02-07

## 2022-02-07 DIAGNOSIS — M54.2 CERVICALGIA: Primary | ICD-10-CM

## 2022-02-14 ENCOUNTER — OFFICE VISIT (OUTPATIENT)
Dept: UROLOGY | Age: 67
End: 2022-02-14
Payer: MEDICARE

## 2022-02-14 ENCOUNTER — HOSPITAL ENCOUNTER (OUTPATIENT)
Dept: GENERAL RADIOLOGY | Age: 67
Discharge: HOME | End: 2022-02-14
Payer: MEDICARE

## 2022-02-14 VITALS — BODY MASS INDEX: 20.21 KG/M2 | HEIGHT: 67 IN | WEIGHT: 128.8 LBS | TEMPERATURE: 98.7 F

## 2022-02-14 DIAGNOSIS — N20.0 LEFT RENAL STONE: Primary | ICD-10-CM

## 2022-02-14 DIAGNOSIS — N20.0 RENAL CALCULUS, LEFT: ICD-10-CM

## 2022-02-14 LAB
APPEARANCE FLUID: CLEAR
BILIRUBIN, POC: NORMAL
BLOOD URINE, POC: NORMAL
CLARITY, POC: CLEAR
COLOR, POC: YELLOW
GLUCOSE URINE, POC: NORMAL
KETONES, POC: NORMAL
LEUKOCYTE EST, POC: NORMAL
NITRITE, POC: NORMAL
PH, POC: 7.5
PROTEIN, POC: NORMAL
SPECIFIC GRAVITY, POC: 1.02
UROBILINOGEN, POC: 0.2

## 2022-02-14 PROCEDURE — 3017F COLORECTAL CA SCREEN DOC REV: CPT | Performed by: NURSE PRACTITIONER

## 2022-02-14 PROCEDURE — G8420 CALC BMI NORM PARAMETERS: HCPCS | Performed by: NURSE PRACTITIONER

## 2022-02-14 PROCEDURE — G8427 DOCREV CUR MEDS BY ELIG CLIN: HCPCS | Performed by: NURSE PRACTITIONER

## 2022-02-14 PROCEDURE — 99214 OFFICE O/P EST MOD 30 MIN: CPT | Performed by: NURSE PRACTITIONER

## 2022-02-14 PROCEDURE — 1123F ACP DISCUSS/DSCN MKR DOCD: CPT | Performed by: NURSE PRACTITIONER

## 2022-02-14 PROCEDURE — 81002 URINALYSIS NONAUTO W/O SCOPE: CPT | Performed by: NURSE PRACTITIONER

## 2022-02-14 PROCEDURE — 4040F PNEUMOC VAC/ADMIN/RCVD: CPT | Performed by: NURSE PRACTITIONER

## 2022-02-14 PROCEDURE — 4004F PT TOBACCO SCREEN RCVD TLK: CPT | Performed by: NURSE PRACTITIONER

## 2022-02-14 PROCEDURE — G8484 FLU IMMUNIZE NO ADMIN: HCPCS | Performed by: NURSE PRACTITIONER

## 2022-02-14 PROCEDURE — 74018 RADEX ABDOMEN 1 VIEW: CPT

## 2022-02-14 ASSESSMENT — ENCOUNTER SYMPTOMS
NAUSEA: 0
ABDOMINAL DISTENTION: 0
ABDOMINAL PAIN: 0
VOMITING: 0
BACK PAIN: 0

## 2022-02-14 NOTE — PROGRESS NOTES
Tone Tejada is a 79 y.o., male, Established patient who presents today   Chief Complaint   Patient presents with    1 Month Follow-Up     Stone FU       HPI   Patient presents for follow-up after removal of bladder stones on 12/29/2021. He has had several stones and reports he is passed over 120 stones since 1980. Today, he is not experiencing any flank pain, nausea, vomiting, fevers. He has undergone metabolic work-up at the [facility] twice sometime in early 90s and once again after that. CT scan of the time of surgery revealed 1 small nonobstructing stone in the upper pole of the left kidney. However, patient reports he has passed one other stone since his surgery. He does have a KUB to review for this appointment to determine if he may be a candidate for ESWL. His chronic urinary symptoms persist including frequency, intermittency, urgency, straining, nocturia x3. He does have a AUA symptom score of 10/35 with a bother score of 4. He has been maintained on Flomax in the past but reports that the medication makes him go more often and he does not like to take it. REVIEW OF SYSTEMS:  Review of Systems   Constitutional: Negative for chills and fever. Gastrointestinal: Negative for abdominal distention, abdominal pain, nausea and vomiting. Genitourinary: Positive for difficulty urinating, frequency and urgency. Negative for dysuria, flank pain and hematuria. Musculoskeletal: Negative for back pain and gait problem. Psychiatric/Behavioral: Negative for agitation and confusion. PHYSICAL EXAM:  Temp 98.7 °F (37.1 °C)   Ht 5' 7\" (1.702 m)   Wt 128 lb 12.8 oz (58.4 kg)   BMI 20.17 kg/m²   Physical Exam  Vitals and nursing note reviewed. Constitutional:       General: He is not in acute distress. Appearance: Normal appearance. He is not ill-appearing. Pulmonary:      Effort: Pulmonary effort is normal. No respiratory distress. Abdominal:      General: There is no distension. Tenderness: There is no abdominal tenderness. There is no right CVA tenderness or left CVA tenderness. Neurological:      Mental Status: He is alert and oriented to person, place, and time. Mental status is at baseline. Psychiatric:         Mood and Affect: Mood normal.         Behavior: Behavior normal.       DATA:  Results for orders placed or performed in visit on 02/14/22   POCT Urinalysis no Micro   Result Value Ref Range    Color, UA yellow     Clarity, UA clear     Glucose, UA POC neg     Bilirubin, UA neg     Ketones, UA neg     Spec Grav, UA 1.020     Blood, UA POC neg     pH, UA 7.5     Protein, UA POC neg     Urobilinogen, UA 0.2     Leukocytes, UA neg     Nitrite, UA neg     Appearance, Fluid Clear Clear, Slightly Cloudy       IMAGING:     Impression   1. Prominent fecal material.   2. No renal or ureteral stone can be seen. Signed by Dr Nini Alarcon     I have reviewed the images and agree with the radiologist interpretation. ASSESSMENT/PLAN  1. Left renal stone  Patient presents for follow-up of previously identified left upper pole stone. This stone is not visible on KUB. The patient does report one additional stone episode since his surgery in December. It is likely that he has already passed this tomorrow stone that was previously identified in his left upper pole. If in fact he did not pass a stone, it is not visible today secondary to overlying bowel artifact. We will plan to follow-up in about 6 months with KUB prior to determine if any further intervention can be taken. - POCT Urinalysis no Micro  - XR ABDOMEN (KUB) (SINGLE AP VIEW); Future      Orders Placed This Encounter   Procedures    XR ABDOMEN (KUB) (SINGLE AP VIEW)     Standing Status:   Future     Standing Expiration Date:   2/14/2023    POCT Urinalysis no Micro        Return in about 6 months (around 8/14/2022) for KUB prior. An electronic signature was used to authenticate this note.     ARNOLD RUVALCABA APRN - CNP    All information inputted into the note by the MA to include chief complaint, past medical history, past surgical history, medications, allergies, social and family history and review of systems has been reviewed and updated as needed by me. EMR Dragon/transcription disclaimer: Much of this document is electronic transcription/translation of spoken language to printed text. The electronic translation of spoken language may be erroneous or, at times, nonsensical words or phrases may be inadvertently transcribed.  Although I have reviewed the document for such errors, some may still exist.

## 2022-02-15 ENCOUNTER — HOSPITAL ENCOUNTER (OUTPATIENT)
Dept: GENERAL RADIOLOGY | Facility: HOSPITAL | Age: 67
Discharge: HOME | End: 2022-02-15

## 2022-02-15 ENCOUNTER — HOSPITAL ENCOUNTER (OUTPATIENT)
Dept: CT IMAGING | Facility: HOSPITAL | Age: 67
Discharge: HOME | End: 2022-02-15

## 2022-02-15 ENCOUNTER — TRANSCRIBE ORDERS (OUTPATIENT)
Dept: ADMINISTRATIVE | Facility: HOSPITAL | Age: 67
End: 2022-02-15

## 2022-02-15 DIAGNOSIS — M17.12 ARTHRITIS OF LEFT KNEE: Primary | ICD-10-CM

## 2022-02-15 DIAGNOSIS — M47.816 LUMBAR SPONDYLOSIS: ICD-10-CM

## 2022-02-15 DIAGNOSIS — M17.12 ARTHRITIS OF LEFT KNEE: ICD-10-CM

## 2022-02-15 DIAGNOSIS — M54.2 CERVICALGIA: ICD-10-CM

## 2022-02-15 PROCEDURE — 72125 CT NECK SPINE W/O DYE: CPT

## 2022-02-15 PROCEDURE — 73562 X-RAY EXAM OF KNEE 3: CPT

## 2022-02-15 PROCEDURE — 72052 X-RAY EXAM NECK SPINE 6/>VWS: CPT

## 2022-02-27 ENCOUNTER — APPOINTMENT (OUTPATIENT)
Dept: GENERAL RADIOLOGY | Age: 67
End: 2022-02-27
Payer: MEDICARE

## 2022-02-27 ENCOUNTER — APPOINTMENT (OUTPATIENT)
Dept: CT IMAGING | Age: 67
End: 2022-02-27
Payer: MEDICARE

## 2022-02-27 ENCOUNTER — HOSPITAL ENCOUNTER (OUTPATIENT)
Age: 67
Setting detail: OBSERVATION
Discharge: HOME | End: 2022-03-01
Attending: INTERNAL MEDICINE
Payer: MEDICARE

## 2022-02-27 DIAGNOSIS — R42 DIZZINESS: Primary | ICD-10-CM

## 2022-02-27 PROBLEM — R55 PRE-SYNCOPE: Status: ACTIVE | Noted: 2022-02-27

## 2022-02-27 LAB
ALBUMIN SERPL-MCNC: 4.2 G/DL (ref 3.5–5.2)
ALP BLD-CCNC: 91 U/L (ref 40–130)
ALT SERPL-CCNC: 19 U/L (ref 5–41)
ANION GAP SERPL CALCULATED.3IONS-SCNC: 8 MMOL/L (ref 7–19)
AST SERPL-CCNC: 29 U/L (ref 5–40)
BACTERIA: NEGATIVE /HPF
BASOPHILS ABSOLUTE: 0 K/UL (ref 0–0.2)
BASOPHILS RELATIVE PERCENT: 0.1 % (ref 0–1)
BILIRUB SERPL-MCNC: 0.4 MG/DL (ref 0.2–1.2)
BILIRUBIN URINE: NEGATIVE
BLOOD, URINE: NEGATIVE
BUN BLDV-MCNC: 13 MG/DL (ref 8–23)
CALCIUM SERPL-MCNC: 9 MG/DL (ref 8.8–10.2)
CHLORIDE BLD-SCNC: 102 MMOL/L (ref 98–111)
CLARITY: CLEAR
CO2: 27 MMOL/L (ref 22–29)
COLOR: YELLOW
CREAT SERPL-MCNC: 0.6 MG/DL (ref 0.5–1.2)
CRYSTALS, UA: ABNORMAL /HPF
EOSINOPHILS ABSOLUTE: 0 K/UL (ref 0–0.6)
EOSINOPHILS RELATIVE PERCENT: 0.1 % (ref 0–5)
EPITHELIAL CELLS, UA: 1 /HPF (ref 0–5)
GFR AFRICAN AMERICAN: >59
GFR NON-AFRICAN AMERICAN: >60
GLUCOSE BLD-MCNC: 121 MG/DL (ref 74–109)
GLUCOSE URINE: NEGATIVE MG/DL
HCT VFR BLD CALC: 41.9 % (ref 42–52)
HEMOGLOBIN: 13.5 G/DL (ref 14–18)
HYALINE CASTS: 2 /HPF (ref 0–8)
IMMATURE GRANULOCYTES #: 0 K/UL
KETONES, URINE: 15 MG/DL
LEUKOCYTE ESTERASE, URINE: NEGATIVE
LIPASE: 66 U/L (ref 13–60)
LYMPHOCYTES ABSOLUTE: 0.7 K/UL (ref 1.1–4.5)
LYMPHOCYTES RELATIVE PERCENT: 9.6 % (ref 20–40)
MCH RBC QN AUTO: 28.9 PG (ref 27–31)
MCHC RBC AUTO-ENTMCNC: 32.2 G/DL (ref 33–37)
MCV RBC AUTO: 89.7 FL (ref 80–94)
MONOCYTES ABSOLUTE: 0.5 K/UL (ref 0–0.9)
MONOCYTES RELATIVE PERCENT: 6.5 % (ref 0–10)
NEUTROPHILS ABSOLUTE: 5.9 K/UL (ref 1.5–7.5)
NEUTROPHILS RELATIVE PERCENT: 83.6 % (ref 50–65)
NITRITE, URINE: NEGATIVE
PDW BLD-RTO: 13.3 % (ref 11.5–14.5)
PH UA: 5.5 (ref 5–8)
PLATELET # BLD: 165 K/UL (ref 130–400)
PMV BLD AUTO: 8.9 FL (ref 9.4–12.4)
POTASSIUM REFLEX MAGNESIUM: 4.2 MMOL/L (ref 3.5–5)
PROTEIN UA: NEGATIVE MG/DL
RBC # BLD: 4.67 M/UL (ref 4.7–6.1)
RBC UA: 1 /HPF (ref 0–4)
SARS-COV-2, NAAT: NOT DETECTED
SODIUM BLD-SCNC: 137 MMOL/L (ref 136–145)
SPECIFIC GRAVITY UA: 1.02 (ref 1–1.03)
TOTAL PROTEIN: 6.6 G/DL (ref 6.6–8.7)
TROPONIN: <0.01 NG/ML (ref 0–0.03)
UROBILINOGEN, URINE: 1 E.U./DL
WBC # BLD: 7.1 K/UL (ref 4.8–10.8)
WBC UA: 2 /HPF (ref 0–5)

## 2022-02-27 PROCEDURE — 2580000003 HC RX 258

## 2022-02-27 PROCEDURE — 36415 COLL VENOUS BLD VENIPUNCTURE: CPT

## 2022-02-27 PROCEDURE — 96361 HYDRATE IV INFUSION ADD-ON: CPT

## 2022-02-27 PROCEDURE — 93005 ELECTROCARDIOGRAM TRACING: CPT | Performed by: INTERNAL MEDICINE

## 2022-02-27 PROCEDURE — 85025 COMPLETE CBC W/AUTO DIFF WBC: CPT

## 2022-02-27 PROCEDURE — 96372 THER/PROPH/DIAG INJ SC/IM: CPT

## 2022-02-27 PROCEDURE — 96374 THER/PROPH/DIAG INJ IV PUSH: CPT

## 2022-02-27 PROCEDURE — 71045 X-RAY EXAM CHEST 1 VIEW: CPT

## 2022-02-27 PROCEDURE — 93005 ELECTROCARDIOGRAM TRACING: CPT | Performed by: NURSE PRACTITIONER

## 2022-02-27 PROCEDURE — 84484 ASSAY OF TROPONIN QUANT: CPT

## 2022-02-27 PROCEDURE — 81001 URINALYSIS AUTO W/SCOPE: CPT

## 2022-02-27 PROCEDURE — G0378 HOSPITAL OBSERVATION PER HR: HCPCS

## 2022-02-27 PROCEDURE — 99285 EMERGENCY DEPT VISIT HI MDM: CPT

## 2022-02-27 PROCEDURE — 80053 COMPREHEN METABOLIC PANEL: CPT

## 2022-02-27 PROCEDURE — 96376 TX/PRO/DX INJ SAME DRUG ADON: CPT

## 2022-02-27 PROCEDURE — 6370000000 HC RX 637 (ALT 250 FOR IP)

## 2022-02-27 PROCEDURE — 96375 TX/PRO/DX INJ NEW DRUG ADDON: CPT

## 2022-02-27 PROCEDURE — 2580000003 HC RX 258: Performed by: NURSE PRACTITIONER

## 2022-02-27 PROCEDURE — 83690 ASSAY OF LIPASE: CPT

## 2022-02-27 PROCEDURE — 6360000002 HC RX W HCPCS: Performed by: NURSE PRACTITIONER

## 2022-02-27 PROCEDURE — 70450 CT HEAD/BRAIN W/O DYE: CPT

## 2022-02-27 PROCEDURE — 6370000000 HC RX 637 (ALT 250 FOR IP): Performed by: NURSE PRACTITIONER

## 2022-02-27 PROCEDURE — 6370000000 HC RX 637 (ALT 250 FOR IP): Performed by: INTERNAL MEDICINE

## 2022-02-27 PROCEDURE — 87635 SARS-COV-2 COVID-19 AMP PRB: CPT

## 2022-02-27 PROCEDURE — 6360000002 HC RX W HCPCS

## 2022-02-27 RX ORDER — OXYCODONE HYDROCHLORIDE 5 MG/1
15 TABLET ORAL EVERY 6 HOURS PRN
Status: DISCONTINUED | OUTPATIENT
Start: 2022-02-27 | End: 2022-03-01 | Stop reason: HOSPADM

## 2022-02-27 RX ORDER — SODIUM CHLORIDE, SODIUM LACTATE, POTASSIUM CHLORIDE, AND CALCIUM CHLORIDE .6; .31; .03; .02 G/100ML; G/100ML; G/100ML; G/100ML
1000 INJECTION, SOLUTION INTRAVENOUS ONCE
Status: COMPLETED | OUTPATIENT
Start: 2022-02-27 | End: 2022-02-27

## 2022-02-27 RX ORDER — SODIUM CHLORIDE 0.9 % (FLUSH) 0.9 %
5-40 SYRINGE (ML) INJECTION EVERY 12 HOURS SCHEDULED
Status: DISCONTINUED | OUTPATIENT
Start: 2022-02-27 | End: 2022-03-01 | Stop reason: HOSPADM

## 2022-02-27 RX ORDER — ONDANSETRON 2 MG/ML
4 INJECTION INTRAMUSCULAR; INTRAVENOUS ONCE
Status: COMPLETED | OUTPATIENT
Start: 2022-02-27 | End: 2022-02-27

## 2022-02-27 RX ORDER — LORAZEPAM 1 MG/1
1 TABLET ORAL 3 TIMES DAILY PRN
Status: DISCONTINUED | OUTPATIENT
Start: 2022-02-27 | End: 2022-03-01 | Stop reason: HOSPADM

## 2022-02-27 RX ORDER — SODIUM CHLORIDE 9 MG/ML
INJECTION, SOLUTION INTRAVENOUS CONTINUOUS
Status: DISCONTINUED | OUTPATIENT
Start: 2022-02-27 | End: 2022-03-01 | Stop reason: HOSPADM

## 2022-02-27 RX ORDER — ISOSORBIDE MONONITRATE 60 MG/1
120 TABLET, EXTENDED RELEASE ORAL 2 TIMES DAILY
Status: DISCONTINUED | OUTPATIENT
Start: 2022-02-27 | End: 2022-03-01 | Stop reason: HOSPADM

## 2022-02-27 RX ORDER — SODIUM CHLORIDE 9 MG/ML
25 INJECTION, SOLUTION INTRAVENOUS PRN
Status: DISCONTINUED | OUTPATIENT
Start: 2022-02-27 | End: 2022-03-01 | Stop reason: HOSPADM

## 2022-02-27 RX ORDER — MECLIZINE HCL 12.5 MG/1
25 TABLET ORAL ONCE
Status: COMPLETED | OUTPATIENT
Start: 2022-02-27 | End: 2022-02-27

## 2022-02-27 RX ORDER — SODIUM CHLORIDE 0.9 % (FLUSH) 0.9 %
5-40 SYRINGE (ML) INJECTION PRN
Status: DISCONTINUED | OUTPATIENT
Start: 2022-02-27 | End: 2022-03-01 | Stop reason: HOSPADM

## 2022-02-27 RX ORDER — MORPHINE SULFATE 4 MG/ML
4 INJECTION, SOLUTION INTRAMUSCULAR; INTRAVENOUS ONCE
Status: COMPLETED | OUTPATIENT
Start: 2022-02-27 | End: 2022-02-27

## 2022-02-27 RX ORDER — ASPIRIN 325 MG
325 TABLET ORAL DAILY
Status: DISCONTINUED | OUTPATIENT
Start: 2022-02-27 | End: 2022-03-01 | Stop reason: HOSPADM

## 2022-02-27 RX ORDER — POLYETHYLENE GLYCOL 3350 17 G/17G
17 POWDER, FOR SOLUTION ORAL DAILY PRN
Status: DISCONTINUED | OUTPATIENT
Start: 2022-02-27 | End: 2022-03-01 | Stop reason: HOSPADM

## 2022-02-27 RX ORDER — CLOPIDOGREL BISULFATE 75 MG/1
75 TABLET ORAL DAILY
Status: DISCONTINUED | OUTPATIENT
Start: 2022-02-27 | End: 2022-03-01 | Stop reason: HOSPADM

## 2022-02-27 RX ORDER — AMLODIPINE BESYLATE 5 MG/1
10 TABLET ORAL DAILY
Status: DISCONTINUED | OUTPATIENT
Start: 2022-02-27 | End: 2022-03-01 | Stop reason: HOSPADM

## 2022-02-27 RX ORDER — ONDANSETRON 2 MG/ML
4 INJECTION INTRAMUSCULAR; INTRAVENOUS EVERY 6 HOURS PRN
Status: DISCONTINUED | OUTPATIENT
Start: 2022-02-27 | End: 2022-03-01 | Stop reason: HOSPADM

## 2022-02-27 RX ORDER — ACETAMINOPHEN 325 MG/1
650 TABLET ORAL EVERY 6 HOURS PRN
Status: DISCONTINUED | OUTPATIENT
Start: 2022-02-27 | End: 2022-03-01 | Stop reason: HOSPADM

## 2022-02-27 RX ORDER — ACETAMINOPHEN 650 MG/1
650 SUPPOSITORY RECTAL EVERY 6 HOURS PRN
Status: DISCONTINUED | OUTPATIENT
Start: 2022-02-27 | End: 2022-03-01 | Stop reason: HOSPADM

## 2022-02-27 RX ORDER — ONDANSETRON 4 MG/1
4 TABLET, ORALLY DISINTEGRATING ORAL EVERY 8 HOURS PRN
Status: DISCONTINUED | OUTPATIENT
Start: 2022-02-27 | End: 2022-03-01 | Stop reason: HOSPADM

## 2022-02-27 RX ORDER — UBIDECARENONE 100 MG
100 CAPSULE ORAL DAILY
Status: DISCONTINUED | OUTPATIENT
Start: 2022-02-27 | End: 2022-03-01 | Stop reason: HOSPADM

## 2022-02-27 RX ORDER — MORPHINE SULFATE 2 MG/ML
2 INJECTION, SOLUTION INTRAMUSCULAR; INTRAVENOUS EVERY 4 HOURS PRN
Status: DISCONTINUED | OUTPATIENT
Start: 2022-02-27 | End: 2022-03-01 | Stop reason: HOSPADM

## 2022-02-27 RX ORDER — QUETIAPINE FUMARATE 25 MG/1
25 TABLET, FILM COATED ORAL NIGHTLY
Status: DISCONTINUED | OUTPATIENT
Start: 2022-02-27 | End: 2022-03-01 | Stop reason: HOSPADM

## 2022-02-27 RX ADMIN — QUETIAPINE FUMARATE 25 MG: 25 TABLET ORAL at 20:35

## 2022-02-27 RX ADMIN — SODIUM CHLORIDE: 9 INJECTION, SOLUTION INTRAVENOUS at 20:39

## 2022-02-27 RX ADMIN — METOPROLOL TARTRATE 25 MG: 25 TABLET, FILM COATED ORAL at 20:35

## 2022-02-27 RX ADMIN — ISOSORBIDE MONONITRATE 120 MG: 60 TABLET, EXTENDED RELEASE ORAL at 20:34

## 2022-02-27 RX ADMIN — SODIUM CHLORIDE, POTASSIUM CHLORIDE, SODIUM LACTATE AND CALCIUM CHLORIDE 1000 ML: 600; 310; 30; 20 INJECTION, SOLUTION INTRAVENOUS at 10:48

## 2022-02-27 RX ADMIN — MORPHINE SULFATE 2 MG: 2 INJECTION, SOLUTION INTRAMUSCULAR; INTRAVENOUS at 23:05

## 2022-02-27 RX ADMIN — ONDANSETRON 4 MG: 2 INJECTION INTRAMUSCULAR; INTRAVENOUS at 13:53

## 2022-02-27 RX ADMIN — ASPIRIN 325 MG: 325 TABLET ORAL at 20:35

## 2022-02-27 RX ADMIN — OXYCODONE HYDROCHLORIDE 15 MG: 5 TABLET ORAL at 20:35

## 2022-02-27 RX ADMIN — ENOXAPARIN SODIUM 40 MG: 100 INJECTION SUBCUTANEOUS at 20:35

## 2022-02-27 RX ADMIN — CLOPIDOGREL BISULFATE 75 MG: 75 TABLET ORAL at 20:34

## 2022-02-27 RX ADMIN — MORPHINE SULFATE 4 MG: 4 INJECTION INTRAVENOUS at 10:50

## 2022-02-27 RX ADMIN — AMLODIPINE BESYLATE 10 MG: 5 TABLET ORAL at 20:34

## 2022-02-27 RX ADMIN — Medication 100 MG: at 20:34

## 2022-02-27 RX ADMIN — MECLIZINE 25 MG: 12.5 TABLET ORAL at 13:56

## 2022-02-27 RX ADMIN — ONDANSETRON 4 MG: 2 INJECTION INTRAMUSCULAR; INTRAVENOUS at 10:48

## 2022-02-27 RX ADMIN — SODIUM CHLORIDE, POTASSIUM CHLORIDE, SODIUM LACTATE AND CALCIUM CHLORIDE 1000 ML: 600; 310; 30; 20 INJECTION, SOLUTION INTRAVENOUS at 12:39

## 2022-02-27 RX ADMIN — SODIUM CHLORIDE, PRESERVATIVE FREE 10 ML: 5 INJECTION INTRAVENOUS at 20:35

## 2022-02-27 RX ADMIN — ONDANSETRON 4 MG: 2 INJECTION INTRAMUSCULAR; INTRAVENOUS at 19:25

## 2022-02-27 ASSESSMENT — ENCOUNTER SYMPTOMS
VOMITING: 1
RHINORRHEA: 0
ABDOMINAL DISTENTION: 0
CONSTIPATION: 0
NAUSEA: 1
SHORTNESS OF BREATH: 0
COLOR CHANGE: 0
CHEST TIGHTNESS: 0
DIARRHEA: 0
COUGH: 0
ABDOMINAL PAIN: 0
BACK PAIN: 0
WHEEZING: 0

## 2022-02-27 ASSESSMENT — PAIN SCALES - GENERAL
PAINLEVEL_OUTOF10: 6
PAINLEVEL_OUTOF10: 6
PAINLEVEL_OUTOF10: 9
PAINLEVEL_OUTOF10: 8
PAINLEVEL_OUTOF10: 7

## 2022-02-27 ASSESSMENT — PAIN DESCRIPTION - ORIENTATION: ORIENTATION: LOWER;LEFT

## 2022-02-27 ASSESSMENT — PAIN DESCRIPTION - ONSET: ONSET: ON-GOING

## 2022-02-27 ASSESSMENT — PAIN - FUNCTIONAL ASSESSMENT: PAIN_FUNCTIONAL_ASSESSMENT: PREVENTS OR INTERFERES SOME ACTIVE ACTIVITIES AND ADLS

## 2022-02-27 ASSESSMENT — PAIN DESCRIPTION - LOCATION: LOCATION: BACK;LEG

## 2022-02-27 ASSESSMENT — PAIN DESCRIPTION - PROGRESSION: CLINICAL_PROGRESSION: NOT CHANGED

## 2022-02-27 ASSESSMENT — PAIN DESCRIPTION - FREQUENCY: FREQUENCY: CONTINUOUS

## 2022-02-27 ASSESSMENT — PAIN DESCRIPTION - DESCRIPTORS: DESCRIPTORS: CONSTANT

## 2022-02-27 ASSESSMENT — PAIN DESCRIPTION - PAIN TYPE: TYPE: CHRONIC PAIN

## 2022-02-27 NOTE — ED PROVIDER NOTES
[facility] EMERGENCY DEPT  EMERGENCY DEPARTMENT ENCOUNTER      Pt Name: Sagar Lucia  MRN: [id number]  [place] 1955  Date of evaluation: 2/27/2022  Provider: BHUMIKA Woodruff 4508       Chief Complaint   Patient presents with    Fatigue     symptoms started yesterday afternoon, n/v all night last night, weakness and dizzy. HISTORY OF PRESENT ILLNESS   (Location/Symptom, Timing/Onset, Context/Setting, Quality, Duration, Modifying Factors, Severity)  Note limiting factors. Sagar Lucia is a 79 y.o. male who presents to the emergency department with n/v since yesterday afternoon. He tells me that it is making him lightheaded when he sits up or stands and that it is progressively worse. He has had no urinary symptoms, flank pain, abd pain or diarrhea. Nothing makes better. No recent travel, hospitalization, suspicious food, known sick contacts or antibiotic usage. HPI    Nursing Notes were reviewed. REVIEW OF SYSTEMS    (2-9 systems for level 4, 10 or more for level 5)     Review of Systems   Constitutional: Negative for chills and fever. HENT: Negative for congestion and rhinorrhea. Respiratory: Negative for cough and shortness of breath. Cardiovascular: Negative for chest pain, palpitations and leg swelling. Gastrointestinal: Positive for nausea and vomiting. Negative for abdominal pain and diarrhea. Genitourinary: Negative for dysuria, flank pain and urgency. Musculoskeletal: Negative for back pain, joint swelling, myalgias and neck pain. Neurological: Positive for weakness and light-headedness. Negative for dizziness, syncope, speech difficulty, numbness and headaches. Except as noted above the remainder of the review of systems was reviewed and negative. PAST MEDICAL HISTORY     Past Medical History:   Diagnosis Date    CAD (coronary artery disease) 2010    Native Vessel.   S/p stenting wt negative cardiac cath on January 18, 2010 and negative nuclear stress test on June 29, 2010.  Chest pain 1/28/2016    DJD (degenerative joint disease)     GERD (gastroesophageal reflux disease) 2010    HTN (hypertension)     Hyperlipidemia     Left knee pain 1/28/2016    Pacemaker 2010    MEDTRONIC    Polyarticular arthritis     Renal calculi 2010    S/p lithrotripsy    Right arm pain 1/28/2016    Right ureteral stone 7/8/2016    Stones in the urinary tract 02/02/2022    Tobacco abuse 2010    Prior. SURGICAL HISTORY       Past Surgical History:   Procedure Laterality Date    CARDIAC CATHETERIZATION  01/08/2010    EF is estimated to be 60%. See scanned document.  CARDIAC CATHETERIZATION  6/20/08, 10/2/08    EF is estimated to be 60%. See scanned document.  CARDIAC CATHETERIZATION  2/13/07, 6/6/07    EF 50%. See scanned document.  CARDIAC CATHETERIZATION  1/8/07, 11/26/07    EF is estimated to be in excess of 50%. See scanned doucment.  CARDIAC CATHETERIZATION  08/17/2006    EF is estimated to be in excess of 50%. See scanned doucment.  CARDIAC CATHETERIZATION  5/14/14  MDL    normal LV function.  EF 60    CHOLECYSTECTOMY      CLAVICLE SURGERY      COLONOSCOPY      Endoscopy    CYSTOSCOPY Right 7/8/2016    CYSTOSCOPY; RIGHT RETROGRADE PYELOGRAM; RIGHT URETERAL DILATATION; RIGHT URETEROSCOPY; RIGHT URETERAL LASER LITHOTRIPSY AND STONE EXTRACTION; INSERTION RIGHT URETERAL DOUBLE J STENT performed by Génesis Grimes MD at [address] [place] [address] Left 1/14/2021    CYSTOSCOPY URETEROSCOPY LASER LITHO WITH STONE EXTRACTION AND STENT REMOVAL> performed by Deepa Burroughs MD at [address] [place] [address] Left 12/29/2021    CYSTOSCOPY; LEFT URTERAL CATHERIZATION; LEFT RETORGRADE PYLEOGRAM; REMOVAL BLADDER CALCULI performed by Ann-Marie Garvey MD at [address] [place] [address] Left     HAND SURGERY      HUMERUS SURGERY      KNEE SURGERY      Right    KNEE SURGERY Left     LITHOTRIPSY      MANDIBLE FRACTURE SURGERY      NECK SURGERY      cervical spine.  OTHER SURGICAL HISTORY      Brachytherapy of the core stents.  PACEMAKER PLACEMENT      [employer]TRONIC    PTCA      TIBIA FRACTURE SURGERY      WRIST SURGERY           CURRENT MEDICATIONS       Previous Medications    AMLODIPINE (NORVASC) 10 MG TABLET    Take 10 mg by mouth daily. ASPIRIN 325 MG TABLET    Take 325 mg by mouth daily    CLOPIDOGREL (PLAVIX) 75 MG TABLET    Take 75 mg by mouth daily. COENZYME Q10 (COQ-10 PO)    Take by mouth daily     HYDROCHLOROTHIAZIDE (HYDRODIURIL) 12.5 MG TABLET    Take 12.5 mg by mouth daily Indications: as needed    ISOSORBIDE MONONITRATE (IMDUR) 120 MG CR TABLET    Take 120 mg by mouth 2 times daily     LORAZEPAM (ATIVAN) 1 MG TABLET    Take 1 mg by mouth 3 times daily as needed    METOPROLOL TARTRATE (LOPRESSOR) 25 MG TABLET    Take 25 mg by mouth 2 times daily Indications: Pt unaware of dosage    NALOXONE 4 MG/0.1ML LIQD NASAL SPRAY    1 spray every 2 minutes as needed until breathing and responsive. CALL 911    NITROSTAT 0.4 MG SL TABLET    Place 1 tablet under the tongue 3 times daily as needed    NUTRITIONAL SUPPLEMENTS (BOOST PO)    Take by mouth    ONDANSETRON (ZOFRAN ODT) 4 MG DISINTEGRATING TABLET    Take 1 tablet by mouth every 8 hours as needed for Nausea or Vomiting    OXYCODONE (OXY-IR) 15 MG IMMEDIATE RELEASE TABLET    TAKE 1 TABLET BY MOUTH EVERY 6 HOURS    PROCHLORPERAZINE (COMPAZINE) 5 MG TABLET    TAKE 1 TABLET BY MOUTH EVERY 6 HOURS AS NEEDED FOR NAUSEA    QUETIAPINE (SEROQUEL) 25 MG TABLET        SCOPOLAMINE (TRANSDERM-SCOP) TRANSDERMAL PATCH        TAMSULOSIN (FLOMAX) 0.4 MG CAPSULE    Take 1 capsule by mouth daily       ALLERGIES     Ezetimibe-simvastatin, Bee venom, Darvocet [propoxyphene n-acetaminophen], Other, Penicillins, Phenergan [promethazine hcl], Promethazine, Promethazine hcl, and Propoxyphene    FAMILY HISTORY     History reviewed. No pertinent family history.        SOCIAL HISTORY       Social History Socioeconomic History    Marital status:      Spouse name: None    Number of children: None    Years of education: None    Highest education level: None   Occupational History     Employer: DISABLED   Tobacco Use    Smoking status: Former Smoker     Types: Cigarettes    Smokeless tobacco: Current User   Vaping Use    Vaping Use: Never used   Substance and Sexual Activity    Alcohol use: No    Drug use: No    Sexual activity: None   Other Topics Concern    None   Social History Narrative    None     Social Determinants of Health     Financial Resource Strain:     Difficulty of Paying Living Expenses: Not on file   Food Insecurity:     Worried About Running Out of Food in the Last Year: Not on file    Javy of Food in the Last Year: Not on file   Transportation Needs:     Lack of Transportation (Medical): Not on file    Lack of Transportation (Non-Medical):  Not on file   Physical Activity:     Days of Exercise per Week: Not on file    Minutes of Exercise per Session: Not on file   Stress:     Feeling of Stress : Not on file   Social Connections:     Frequency of Communication with Friends and Family: Not on file    Frequency of Social Gatherings with Friends and Family: Not on file    Attends Alevism Services: Not on file    Active Member of [address] [place] [address] or Organizations: Not on file    Attends Club or Organization Meetings: Not on file    Marital Status: Not on file   Intimate Partner Violence:     Fear of Current or Ex-Partner: Not on file    Emotionally Abused: Not on file    Physically Abused: Not on file    Sexually Abused: Not on file   Housing Stability:     Unable to Pay for Housing in the Last Year: Not on file    Number of [place] in the Last Year: Not on file    Unstable Housing in the Last Year: Not on file       SCREENINGS         [place] Coma Scale  Eye Opening: Spontaneous  Best Verbal Response: Oriented  Best Motor Response: Obeys commands  [place] Coma Scale Score: 15                     [place] Assessment  BP: 133/76  Pulse: 60                 PHYSICAL EXAM    (up to 7 for level 4, 8 or more for level 5)     ED Triage Vitals [02/27/22 1013]   BP Temp Temp Source Pulse Resp SpO2 Height Weight   137/80 98.1 °F (36.7 °C) Oral 60 16 97 % -- --       Physical Exam  Vitals reviewed. Constitutional:       General: He is not in acute distress. Appearance: Normal appearance. He is not ill-appearing, toxic-appearing or diaphoretic. HENT:      Head: Normocephalic and atraumatic. Right Ear: Tympanic membrane, ear canal and external ear normal.      Left Ear: Tympanic membrane, ear canal and external ear normal.      Nose: Nose normal.      Mouth/Throat:      Mouth: Mucous membranes are moist.      Pharynx: Oropharynx is clear. Eyes:      Extraocular Movements: Extraocular movements intact. Conjunctiva/sclera: Conjunctivae normal.      Pupils: Pupils are equal, round, and reactive to light. Cardiovascular:      Rate and Rhythm: Normal rate and regular rhythm. Pulses: Normal pulses. Heart sounds: Normal heart sounds. Pulmonary:      Effort: Pulmonary effort is normal.      Breath sounds: Normal breath sounds. Abdominal:      General: Bowel sounds are normal. There is no distension. Palpations: Abdomen is soft. Tenderness: There is no abdominal tenderness. There is no right CVA tenderness, left CVA tenderness or guarding. Musculoskeletal:         General: No tenderness. Normal range of motion. Cervical back: Neck supple. No tenderness. Right lower leg: No edema. Left lower leg: No edema. Skin:     General: Skin is warm and dry. Capillary Refill: Capillary refill takes less than 2 seconds. Neurological:      General: No focal deficit present. Mental Status: He is alert and oriented to person, place, and time.          DIAGNOSTIC RESULTS     EKG: All EKG's are interpreted by the Emergency Department Physician who either signs or Co-signs this chart in the absence of a cardiologist.    Atrial paced rhythm, rate 60. No stemi. Reviewed by Dr Taisha Mir:   Non-plain film images such as CT, Ultrasound and MRI are read by the radiologist. Plain radiographic images are visualized and preliminarily interpreted by the emergency physician with the below findings:      Interpretation per the Radiologist below, if available at the time of this note:    [address]   Final Result   1. No acute intracranial process. Signed by Dr Cayden Pack   Final Result   Stable chest exam without acute process. Signed by Dr Jarad Wilson            ED BEDSIDE ULTRASOUND:   Performed by ED Physician - none    LABS:  Labs Reviewed   CBC WITH AUTO DIFFERENTIAL - Abnormal; Notable for the following components:       Result Value    RBC 4.67 (*)     Hemoglobin 13.5 (*)     Hematocrit 41.9 (*)     MCHC 32.2 (*)     MPV 8.9 (*)     Neutrophils % 83.6 (*)     Lymphocytes % 9.6 (*)     Lymphocytes Absolute 0.7 (*)     All other components within normal limits   COMPREHENSIVE METABOLIC PANEL W/ REFLEX TO MG FOR LOW K - Abnormal; Notable for the following components:    Glucose 121 (*)     All other components within normal limits   LIPASE - Abnormal; Notable for the following components:    Lipase 66 (*)     All other components within normal limits   URINALYSIS WITH MICROSCOPIC - Abnormal; Notable for the following components:    Ketones, Urine 15 (*)     Bacteria, UA NEGATIVE (*)     Crystals, UA NEG (*)     All other components within normal limits   COVID-19, RAPID   TROPONIN       All other labs were within normal range or not returned as of this dictation.     EMERGENCY DEPARTMENT COURSE and DIFFERENTIAL DIAGNOSIS/MDM:   Vitals:    Vitals:    02/27/22 1109 02/27/22 1126 02/27/22 1249 02/27/22 1254   BP: 133/76      Pulse: 60  60 60   Resp: 11 18 11 10   Temp:       TempSrc:       SpO2: 95% 96% 93% 94%         MDM      REASSESSMENT     ED Course as of 02/27/22 1441   Sun Feb 27, 2022   1351 Reexamine after 2 L fluid. Continues to feel lightheaded/dizzy. Repeat orthostatics are again normal, however he does not tolerate moving past sitting and lays down prior to completion. [BW]      ED Course User Index  [BW] Suni Calix, APRN - CNP     Well appearing, nontoxic, no n/v/d in department. Hemodynamically stable. Neurologically appropriate with previous CVA on plavix and full strength aspirin. Vitals remained similar throughout orthostatics although patient did not tolerate, citing \"I feel like I'm going to pass out\" and having to sit back down. CT head normal. Labs reassuring. Will give another 1L fluid and reassess. After second liter of fluid patient continues to feel lightheaded and unable to walk. I got him to sitting and he laid back down feeling like he was going to pass out. After meclizine nursing tried to mobilize him and he made it 3 or 4 steps before being unable to walk any further and wanting to get back in the bed. He endorses continued lightheadedness. Case d/w Dr Judith Chang who accepts admission. CRITICAL CARE TIME       CONSULTS:  None    PROCEDURES:  Unless otherwise noted below, none     Procedures         FINAL IMPRESSION      1. Dizziness          DISPOSITION/PLAN   DISPOSITION        PATIENT REFERRED TO:  No follow-up provider specified. DISCHARGE MEDICATIONS:  New Prescriptions    No medications on file     Controlled Substances Monitoring:     RX Monitoring 10/29/2018   Attestation The Prescription Monitoring Report for this patient was reviewed today. Periodic Controlled Substance Monitoring Possible medication side effects, risk of tolerance/dependence & alternative treatments discussed.        (Please note that portions of this note were completed with a voice recognition program.  Efforts were made to edit the dictations but occasionally words are mis-transcribed.)    BHUMIKA Camarena CNP (electronically signed)  Attending Emergency Physician         BHUMIKA Camarena CNP  02/27/22 8268

## 2022-02-27 NOTE — H&P
[facility]      Hospitalist - History & Physical      PCP: Wang Fisher MD    Date of Admission: 2/27/2022    Date of Service: 2/27/2022    Chief Complaint:  Fatigue     History Of Present Illness: The patient is a 79 y.o. male who presented to [facility] ER with PMH HTN, hyperlipidemia, PM, kidney stones, TIA, chronic leg pain  complaining of fatigue. Patient states he started feeling dizzy yesterday afternoon with position change. He states that he had not eaten and felt fatigued. He was able to eat later that afternoon and stated that he felt better. Today he woke up and continued to experience lightheadedness upon position change, grew concerned and came in to [address] [place] [address] ER. Patient states that he does have chronic leg pain, takes pain medication, however at times when his pain is severe he is unable to ambulate and has had several falls in the past few months. Endorses poor appetite, fatigue, dizziness, lightheadedness, nausea, vomiting, and weakness. Denies fever, chills, shortness of breath, abdominal pain, and chest pain. Work up in [address] no acute process, CT head WO, troponin negative, CMP WNL, and urinalysis negative. Received 2L LR bolus, Antivert 25mg PO, Morphine 4mg IV, and Zofran total 8mg IV. Patient is to be admitted to hospitalist service due to pre-syncope with consultation to neurology. Past Medical History:        Diagnosis Date    CAD (coronary artery disease) 2010    Native Vessel. S/p stenting wtih negative cardiac cath on January 18, 2010 and negative nuclear stress test on June 29, 2010.     Chest pain 1/28/2016    DJD (degenerative joint disease)     GERD (gastroesophageal reflux disease) 2010    HTN (hypertension)     Hyperlipidemia     Left knee pain 1/28/2016    Pacemaker 2010    [employer]TR[employer]IC    Polyarticular arthritis     Renal calculi 2010    S/p lithrotripsy    Right arm pain 1/28/2016    Right ureteral stone 7/8/2016    Stones in the urinary tract 02/02/2022    BHUMIKA Mcdaniels - CNP   naloxone 4 MG/0.1ML LIQD nasal spray 1 spray every 2 minutes as needed until breathing and responsive. CALL 911 6/1/21 6/2/22  Historical Provider, MD   QUEtiapine (SEROQUEL) 25 MG tablet  12/12/21   Historical Provider, MD   scopolamine (TRANSDERM-SCOP) transdermal patch  11/16/21   Historical Provider, MD   tamsulosin (FLOMAX) 0.4 MG capsule Take 1 capsule by mouth daily 12/2/21   BHUMIKA Serra   oxyCODONE (OXY-IR) 15 MG immediate release tablet TAKE 1 TABLET BY MOUTH EVERY 6 HOURS 1/15/21   Historical Provider, MD   prochlorperazine (COMPAZINE) 5 MG tablet TAKE 1 TABLET BY MOUTH EVERY 6 HOURS AS NEEDED FOR NAUSEA 1/4/21   Historical Provider, MD   aspirin 325 MG tablet Take 325 mg by mouth daily    Historical Provider, MD   metoprolol tartrate (LOPRESSOR) 25 MG tablet Take 25 mg by mouth 2 times daily Indications: Pt unaware of dosage    Historical Provider, MD   isosorbide mononitrate (IMDUR) 120 MG CR tablet Take 120 mg by mouth 2 times daily     Historical Provider, MD   hydrochlorothiazide (HYDRODIURIL) 12.5 MG tablet Take 12.5 mg by mouth daily Indications: as needed    Historical Provider, MD   Coenzyme Q10 (COQ-10 PO) Take by mouth daily     Historical Provider, MD   Nutritional Supplements (BOOST PO) Take by mouth    Historical Provider, MD   NITROSTAT 0.4 MG SL tablet Place 1 tablet under the tongue 3 times daily as needed 10/23/15   Historical Provider, MD   LORazepam (ATIVAN) 1 MG tablet Take 1 mg by mouth 3 times daily as needed 1/18/16   Historical Provider, MD   amlodipine (NORVASC) 10 MG tablet Take 10 mg by mouth daily. Historical Provider, MD   clopidogrel (PLAVIX) 75 MG tablet Take 75 mg by mouth daily.       Historical Provider, MD       Allergies:    Ezetimibe-simvastatin, Bee venom, Darvocet [propoxyphene n-acetaminophen], Other, Penicillins, Phenergan [promethazine hcl], Promethazine, Promethazine hcl, and Propoxyphene    Social History:    The patient There is no distension. Palpations: Abdomen is soft. Tenderness: There is no abdominal tenderness. There is no guarding or rebound. Musculoskeletal:         General: No swelling. Normal range of motion. Cervical back: Normal range of motion and neck supple. No rigidity or tenderness. Right lower leg: No edema. Left lower leg: No edema. Skin:     General: Skin is warm and dry. Capillary Refill: Capillary refill takes less than 2 seconds. Neurological:      General: No focal deficit present. Mental Status: He is alert and oriented to person, place, and time. Cranial Nerves: No cranial nerve deficit. Motor: Weakness (generalized) present. Psychiatric:         Mood and Affect: Mood normal.         Behavior: Behavior normal.        Diagnostic Data:  CBC:  Recent Labs     02/27/22  1041   WBC 7.1   HGB 13.5*   HCT 41.9*        BMP:  Recent Labs     02/27/22  1041      K 4.2      CO2 27   BUN 13   CREATININE 0.6   CALCIUM 9.0     Recent Labs     02/27/22  1041   AST 29   ALT 19   BILITOT 0.4   ALKPHOS 91     Cardiac Enzymes:   Recent Labs     02/27/22  1041   TROPONINI <0.01     Urinalysis:  Lab Results   Component Value Date    NITRU Negative 02/27/2022    WBCUA 2 02/27/2022    BACTERIA NEGATIVE 02/27/2022    RBCUA 1 02/27/2022    RBCUA 46 08/24/2015    BLOODU Negative 02/27/2022    SPECGRAV 1.022 02/27/2022    GLUCOSEU Negative 02/27/2022       CT HEAD WO CONTRAST    Result Date: 2/27/2022  CT HEAD WO CONTRAST 2/27/2022 12:36 PM HISTORY: Dizziness COMPARISON: 7/12/2021 DOSE LENGTH PRODUCT: 591 mGy cm TECHNIQUE: Helical tomographic images of the brain were obtained without the use of intravenous contrast. Automated exposure control was also utilized to decrease patient radiation dose. FINDINGS: There is no evidence of evolving large vascular territory infarct. No visualized intra-axial or extra-axial hemorrhage. No mass lesion is identified.  Normal size and configuration of the ventricular system. The basal cisterns are symmetric. Posterior fossa structures are unremarkable. The included orbits and their contents are unremarkable. The visualized paranasal sinuses, mastoid air cells and middle ear cavities are clear. The visualized osseous structures and overlying soft tissues of the skull and face are unremarkable. 1. No acute intracranial process. Signed by Dr Valeria Ann    Result Date: 2/27/2022  XR CHEST PORTABLE 2/27/2022 10:26 AM HISTORY: Lightheaded  Technique: Single AP view of the chest COMPARISONS: 7/12/2021 FINDINGS: No lung consolidation. No pleural effusion or pneumothorax. Cardiomediastinal silhouette and pulmonary vasculature are unremarkable. Left chest wall dual chamber pacemaker. No acute bony abnormality. Previous left clavicle plating. Stable chest exam without acute process.  Signed by Dr Lucia Faith    Assessment/Plan:    Pre-syncope   -Consultation to neurology   -IVF   -Interrogate pacemaker     - ECHO               - Neuro checks              - Orthostatic blood pressure and pulse daily               - PT/OT              - Fall precaution              - Bed alarm on              - Monitor on telemetry              -Daily labs      DVT prophylactic: Lovenox    Signed:  BHUMIKA Yin - CNP, 2/27/2022 2:39 PM

## 2022-02-28 LAB
ANION GAP SERPL CALCULATED.3IONS-SCNC: 8 MMOL/L (ref 7–19)
BUN BLDV-MCNC: 12 MG/DL (ref 8–23)
CALCIUM SERPL-MCNC: 8.1 MG/DL (ref 8.8–10.2)
CHLORIDE BLD-SCNC: 104 MMOL/L (ref 98–111)
CO2: 27 MMOL/L (ref 22–29)
CREAT SERPL-MCNC: 0.6 MG/DL (ref 0.5–1.2)
EKG P AXIS: NORMAL DEGREES
EKG P AXIS: NORMAL DEGREES
EKG P-R INTERVAL: NORMAL MS
EKG P-R INTERVAL: NORMAL MS
EKG Q-T INTERVAL: 404 MS
EKG Q-T INTERVAL: 420 MS
EKG QRS DURATION: 82 MS
EKG QRS DURATION: 86 MS
EKG QTC CALCULATION (BAZETT): 404 MS
EKG QTC CALCULATION (BAZETT): 420 MS
EKG T AXIS: 57 DEGREES
EKG T AXIS: 65 DEGREES
GFR AFRICAN AMERICAN: >59
GFR NON-AFRICAN AMERICAN: >60
GLUCOSE BLD-MCNC: 97 MG/DL (ref 74–109)
HCT VFR BLD CALC: 35.4 % (ref 42–52)
HEMOGLOBIN: 11.6 G/DL (ref 14–18)
LV EF: 60 %
LVEF MODALITY: NORMAL
MCH RBC QN AUTO: 29 PG (ref 27–31)
MCHC RBC AUTO-ENTMCNC: 32.8 G/DL (ref 33–37)
MCV RBC AUTO: 88.5 FL (ref 80–94)
PDW BLD-RTO: 13.2 % (ref 11.5–14.5)
PLATELET # BLD: 151 K/UL (ref 130–400)
PMV BLD AUTO: 9 FL (ref 9.4–12.4)
POTASSIUM REFLEX MAGNESIUM: 3.7 MMOL/L (ref 3.5–5)
RBC # BLD: 4 M/UL (ref 4.7–6.1)
SODIUM BLD-SCNC: 139 MMOL/L (ref 136–145)
WBC # BLD: 5 K/UL (ref 4.8–10.8)

## 2022-02-28 PROCEDURE — 93010 ELECTROCARDIOGRAM REPORT: CPT | Performed by: INTERNAL MEDICINE

## 2022-02-28 PROCEDURE — 80048 BASIC METABOLIC PNL TOTAL CA: CPT

## 2022-02-28 PROCEDURE — 6370000000 HC RX 637 (ALT 250 FOR IP): Performed by: INTERNAL MEDICINE

## 2022-02-28 PROCEDURE — 6360000002 HC RX W HCPCS

## 2022-02-28 PROCEDURE — 85027 COMPLETE CBC AUTOMATED: CPT

## 2022-02-28 PROCEDURE — 96376 TX/PRO/DX INJ SAME DRUG ADON: CPT

## 2022-02-28 PROCEDURE — 36415 COLL VENOUS BLD VENIPUNCTURE: CPT

## 2022-02-28 PROCEDURE — G0378 HOSPITAL OBSERVATION PER HR: HCPCS

## 2022-02-28 PROCEDURE — 99223 1ST HOSP IP/OBS HIGH 75: CPT | Performed by: PSYCHIATRY & NEUROLOGY

## 2022-02-28 PROCEDURE — 97116 GAIT TRAINING THERAPY: CPT

## 2022-02-28 PROCEDURE — 96361 HYDRATE IV INFUSION ADD-ON: CPT

## 2022-02-28 PROCEDURE — 93306 TTE W/DOPPLER COMPLETE: CPT

## 2022-02-28 PROCEDURE — 6370000000 HC RX 637 (ALT 250 FOR IP)

## 2022-02-28 PROCEDURE — 97161 PT EVAL LOW COMPLEX 20 MIN: CPT

## 2022-02-28 PROCEDURE — 2580000003 HC RX 258

## 2022-02-28 PROCEDURE — 96372 THER/PROPH/DIAG INJ SC/IM: CPT

## 2022-02-28 RX ADMIN — ASPIRIN 325 MG: 325 TABLET ORAL at 09:57

## 2022-02-28 RX ADMIN — ISOSORBIDE MONONITRATE 120 MG: 60 TABLET, EXTENDED RELEASE ORAL at 20:13

## 2022-02-28 RX ADMIN — OXYCODONE HYDROCHLORIDE 15 MG: 5 TABLET ORAL at 03:11

## 2022-02-28 RX ADMIN — OXYCODONE HYDROCHLORIDE 15 MG: 5 TABLET ORAL at 15:58

## 2022-02-28 RX ADMIN — SODIUM CHLORIDE: 9 INJECTION, SOLUTION INTRAVENOUS at 18:13

## 2022-02-28 RX ADMIN — CLOPIDOGREL BISULFATE 75 MG: 75 TABLET ORAL at 09:57

## 2022-02-28 RX ADMIN — LORAZEPAM 1 MG: 1 TABLET ORAL at 10:14

## 2022-02-28 RX ADMIN — AMLODIPINE BESYLATE 10 MG: 5 TABLET ORAL at 09:58

## 2022-02-28 RX ADMIN — Medication 100 MG: at 09:57

## 2022-02-28 RX ADMIN — QUETIAPINE FUMARATE 25 MG: 25 TABLET ORAL at 20:13

## 2022-02-28 RX ADMIN — MORPHINE SULFATE 2 MG: 2 INJECTION, SOLUTION INTRAMUSCULAR; INTRAVENOUS at 18:13

## 2022-02-28 RX ADMIN — SODIUM CHLORIDE, PRESERVATIVE FREE 10 ML: 5 INJECTION INTRAVENOUS at 20:13

## 2022-02-28 RX ADMIN — METOPROLOL TARTRATE 25 MG: 25 TABLET, FILM COATED ORAL at 20:13

## 2022-02-28 RX ADMIN — ENOXAPARIN SODIUM 40 MG: 100 INJECTION SUBCUTANEOUS at 15:58

## 2022-02-28 RX ADMIN — SODIUM CHLORIDE: 9 INJECTION, SOLUTION INTRAVENOUS at 06:21

## 2022-02-28 RX ADMIN — MORPHINE SULFATE 2 MG: 2 INJECTION, SOLUTION INTRAMUSCULAR; INTRAVENOUS at 04:41

## 2022-02-28 RX ADMIN — METOPROLOL TARTRATE 25 MG: 25 TABLET, FILM COATED ORAL at 09:58

## 2022-02-28 RX ADMIN — OXYCODONE HYDROCHLORIDE 15 MG: 5 TABLET ORAL at 09:58

## 2022-02-28 RX ADMIN — LORAZEPAM 1 MG: 1 TABLET ORAL at 20:18

## 2022-02-28 RX ADMIN — OXYCODONE HYDROCHLORIDE 15 MG: 5 TABLET ORAL at 22:07

## 2022-02-28 RX ADMIN — MORPHINE SULFATE 2 MG: 2 INJECTION, SOLUTION INTRAMUSCULAR; INTRAVENOUS at 12:31

## 2022-02-28 RX ADMIN — SODIUM CHLORIDE, PRESERVATIVE FREE 5 ML: 5 INJECTION INTRAVENOUS at 09:59

## 2022-02-28 RX ADMIN — MORPHINE SULFATE 2 MG: 2 INJECTION, SOLUTION INTRAMUSCULAR; INTRAVENOUS at 23:21

## 2022-02-28 RX ADMIN — ISOSORBIDE MONONITRATE 120 MG: 60 TABLET, EXTENDED RELEASE ORAL at 09:58

## 2022-02-28 ASSESSMENT — PAIN DESCRIPTION - ORIENTATION
ORIENTATION: LOWER;LEFT
ORIENTATION: LOWER;LEFT

## 2022-02-28 ASSESSMENT — PAIN DESCRIPTION - ONSET
ONSET: ON-GOING
ONSET: ON-GOING
ONSET: GRADUAL

## 2022-02-28 ASSESSMENT — PAIN DESCRIPTION - LOCATION
LOCATION: LEG;KNEE
LOCATION: BACK;LEG
LOCATION: BACK;LEG

## 2022-02-28 ASSESSMENT — PAIN SCALES - GENERAL
PAINLEVEL_OUTOF10: 4
PAINLEVEL_OUTOF10: 6
PAINLEVEL_OUTOF10: 7
PAINLEVEL_OUTOF10: 6
PAINLEVEL_OUTOF10: 7
PAINLEVEL_OUTOF10: 8
PAINLEVEL_OUTOF10: 6
PAINLEVEL_OUTOF10: 7
PAINLEVEL_OUTOF10: 3
PAINLEVEL_OUTOF10: 6
PAINLEVEL_OUTOF10: 7
PAINLEVEL_OUTOF10: 0
PAINLEVEL_OUTOF10: 4
PAINLEVEL_OUTOF10: 0

## 2022-02-28 ASSESSMENT — PAIN DESCRIPTION - PAIN TYPE
TYPE: CHRONIC PAIN

## 2022-02-28 ASSESSMENT — PAIN DESCRIPTION - FREQUENCY
FREQUENCY: CONTINUOUS
FREQUENCY: CONTINUOUS

## 2022-02-28 ASSESSMENT — PAIN DESCRIPTION - PROGRESSION
CLINICAL_PROGRESSION: NOT CHANGED

## 2022-02-28 ASSESSMENT — PAIN DESCRIPTION - DESCRIPTORS
DESCRIPTORS: DISCOMFORT;ACHING
DESCRIPTORS: CONSTANT
DESCRIPTORS: CONSTANT

## 2022-02-28 ASSESSMENT — PAIN - FUNCTIONAL ASSESSMENT
PAIN_FUNCTIONAL_ASSESSMENT: ACTIVITIES ARE NOT PREVENTED
PAIN_FUNCTIONAL_ASSESSMENT: ACTIVITIES ARE NOT PREVENTED
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
## 2022-02-28 NOTE — CARE COORDINATION
Initial CM Assessment    Initial Assessment Completed at bedside with:    [x]   Patient  []   Family/Caregiver/Guardian   []   Other:      Patient Contact Information:  [address] [place] [address]  [phone] (home)   Above information verified? [x]   Yes  []   No    ADLS:   Usually independent - sometimes uses a cane  Support System:    Spouse   Plan to return to current housing:   [x]   Yes  []   No    Transportation plan for Discharge:  Spouse     Do you have any unmet social needs that would keep you from returning home safely:  []   Yes  [x]   No              Unmet Social Needs Notes:       Had [facility] prior to admission:    []   Yes  [x]   No    Currently ACTIVE with Home Health CARE:    []   Yes  [x]   No  []   Interested at discharge  [facility]:      Current PCP:  Herman Hammans, MD  PCP verified? [x]   Yes  []   No    Have you been vaccinated for COVID-19 (SARS-CoV-2)? [x]   Yes  []   No                   If so, when? Which :  [x]   [employer]  []   Moderna  []   [employer]  []   Other:       Pharmacy:    [address] [facility], [address] [place] [address] [address] [place] [address]  Phone: [phone] Fax: [phone]    Prefer to use Meds to Bed? [x]   Yes  []   No  Potential assistance purchasing medications?      []   Yes  [x]   No    Active with HD/PD prior to admission:           []   Yes  [x]   No  HD Center:       Financial:  Payor: Noa Livers / Plan: MEDICARE PART A AND B / Product Type: *No Product type* /     Pre-Cert required for SNF:   []   Yes  [x]   No    Patient Deficits:  []   Yes   [x]   No    If yes:  []   Confusion/Memory  []   Visual  []   Motor/Sensory         []   Right arm         []   Right leg         []   Left arm         []   Left leg  []   Language/Speech         []   Aphasia         []   Dysarthria         []   Swallow         Ely Coma Scale  Eye Opening: Spontaneous  Best Verbal Response: Oriented  Best Motor Response: Obeys commands  Denver Coma Scale Score: 15    Patient Deficit Notes: Additional CM/SW Notes:   Pt reports that he has been up throughout the night/morning and is doing better. PTOT to see pt, SW will follow.      Micky Klein and/or his family were provided with choice of provider:  [x]   Yes   []   No        Patient Admission Status:       [address]

## 2022-02-28 NOTE — PROGRESS NOTES
Physical Therapy    Facility/Department: [facility] ONCOLOGY UNIT  Initial Assessment    NAME: Vaishnavi Morales  : 1955  MRN: [id number]    Date of Service: 2022    Discharge Recommendations:  Home with assist PRN   PT Equipment Recommendations  Other: assessing    Assessment   Body structures, Functions, Activity limitations: Decreased functional mobility   Assessment: PT would benefit from skilled PT in this setting to progress his mobility with plan toward ind at d/c home  Prognosis: Good  Decision Making: Low Complexity  PT Education: Goals;PT Role;Plan of Care;General Safety;Gait Training;Transfer Training;Functional Mobility Training  REQUIRES PT FOLLOW UP: Yes  Activity Tolerance  Activity Tolerance: Patient Tolerated treatment well       Patient Diagnosis(es): The encounter diagnosis was Dizziness. has a past medical history of CAD (coronary artery disease), Chest pain, DJD (degenerative joint disease), GERD (gastroesophageal reflux disease), HTN (hypertension), Hyperlipidemia, Left knee pain, Pacemaker, Polyarticular arthritis, Renal calculi, Right arm pain, Right ureteral stone, Stones in the urinary tract, and Tobacco abuse.   has a past surgical history that includes Cardiac catheterization (2010); Cholecystectomy; Lithotripsy; Neck surgery; knee surgery; Cardiac catheterization (08, 10/2/08); Cardiac catheterization (07, 07); Percutaneous Transluminal Coronary Angio; other surgical history; Colonoscopy; Cardiac catheterization (07, 07); Cardiac catheterization (2006); Cardiac catheterization (14  MDL); Femur Surgery (Left); knee surgery (Left); pacemaker placement; Cystoscopy (Right, 2016); Tibia fracture surgery; Mandible fracture surgery; Clavicle surgery; Wrist surgery; Hand surgery; Humerus surgery; Cystoscopy (Left, 2021); and Cystoscopy (Left, 2021).     Restrictions     Vision/Hearing  Vision: Within Functional Limits  Hearing: Within functional limits     Subjective  General  Diagnosis: n/v, pre cyncope  Follows Commands: Within Functional Limits  Subjective  Subjective: Pt reported mild sensation of being \"lightheaded\" when first sitting EOB but this improved. noted BP to be 97-63 in chart. Pain Screening  Patient Currently in Pain: Denies  Vital Signs  Patient Currently in Pain: Denies       Orientation  Orientation  Overall Orientation Status: Within Functional Limits  Social/Functional History  Social/Functional History  Lives With: Spouse  ADL Assistance: Independent  Ambulation Assistance: Independent  Transfer Assistance: Independent  Cognition        Objective          AROM RLE (degrees)  RLE AROM: WFL  AROM LLE (degrees)  LLE AROM : WFL  Strength RLE  Strength RLE: WFL  Strength LLE  Strength LLE: WFL     Sensation  Overall Sensation Status: WFL  Bed mobility  Supine to Sit: Independent  Sit to Supine: Independent  Transfers  Sit to Stand: Stand by assistance  Stand to sit: Stand by assistance  Ambulation  Ambulation?: Yes  Ambulation 1  Surface: level tile  Device: No Device (held to iv pole)  Assistance: Stand by assistance;Contact guard assistance  Quality of Gait: mildlly guarded. no lob or path deviation. did not report s/s of pre syncope  Gait Deviations: Slow Codi  Distance: 60 ft              Plan   Plan  Times per week: 3-4  Plan weeks: 2  Current Treatment Recommendations: Balance Training,Functional Mobility Training,Transfer RadioSck Education & Training  Plan Comment: likely short term.  d/c if basline  Safety Devices  Type of devices: Call light within reach,Gait belt,Left in bed (pT states he has not had  bed alarm on by nursing today)      Goals  Short term goals  Time Frame for Short term goals: 2 wks  Short term goal 1: amb 200 ft, sba       Therapy Time   Individual Concurrent Group Co-treatment   Time In           Time Out           Minutes                   Vladimir Canas PT    Electronically signed by Piper Her, PT on 2/28/2022 at 10:47 AM

## 2022-02-28 NOTE — CONSULTS
[facility] Neurology Consult  ? ? Patient: Shemar [address]  MR#: [id number]  Account Number: [de-identified]   Room: [facility]/918-81   YOB: 1955  Date of Progress Note: 2/28/2022  Time of Note 7:55 AM  Attending Physician: Mary Medina MD  Consulting Physician: Melita Carpenter M.D.  ?  ? CHIEF COMPLAINT: Dizziness  ? HISTORY OF PRESENT ILLNESS:   This 79 y.o. male who presents with dizziness. He came into the ED on 3/27 with the symptoms. Says that they actually began the day before that. He had been working on a house and sat down to rest and when he stood up he felt lightheaded. Lightheadedness is what he means by dizziness. No true vertigo. Subsequently he began to have nausea and vomiting and had not eaten and felt fatigued so he came through the ED. Unable to have an MRI due to some pacemaker leads. He has had some dizziness symptoms in the past. CT of the head unremarkable. In 7/21 he had an completely unremarkable CTA of the head and neck. Denies any focal neurological difficulties or double vision with these current symptoms. He feels better this morning after 24 hours of IV fluids. Blood pressure was a little low this morning but he says it is because they resumed all of his home medications at once. He last saw Dr. Sukh Lee in our office 7/21. Those records are reviewed. REVIEW OF SYSTEMS:  Constitutional - No fever or chills. No diaphoresis or significant fatigue. HENT - No tinnitus or significant hearing loss. Eyes - no sudden vision change or eye pain  Respiratory - no significant shortness of breath or cough  Cardiovascular - no chest pain No palpitations or significant leg swelling  Gastrointestinal - no abdominal swelling or pain. Genitourinary - No difficulty urinating, dysuria  Musculoskeletal - no back pain or myalgia. Skin - no color change or rash  Neurologic - No seizures. No lateralizing weakness. Hematologic - no easy bruising or excessive bleeding.   Psychiatric - no severe anxiety or nervousness. All other review of systems are negative. ? Past Medical History:      Diagnosis Date    CAD (coronary artery disease) 2010    Native Vessel. S/p stenting wtih negative cardiac cath on January 18, 2010 and negative nuclear stress test on June 29, 2010.  Chest pain 1/28/2016    DJD (degenerative joint disease)     GERD (gastroesophageal reflux disease) 2010    HTN (hypertension)     Hyperlipidemia     Left knee pain 1/28/2016    Pacemaker 2010    [employer]    Polyarticular arthritis     Renal calculi 2010    S/p lithrotripsy    Right arm pain 1/28/2016    Right ureteral stone 7/8/2016    Stones in the urinary tract 02/02/2022    Tobacco abuse 2010    Prior. Past Surgical History:      Procedure Laterality Date    CARDIAC CATHETERIZATION  01/08/2010    EF is estimated to be 60%. See scanned document.  CARDIAC CATHETERIZATION  6/20/08, 10/2/08    EF is estimated to be 60%. See scanned document.  CARDIAC CATHETERIZATION  2/13/07, 6/6/07    EF 50%. See scanned document.  CARDIAC CATHETERIZATION  1/8/07, 11/26/07    EF is estimated to be in excess of 50%. See scanned doucment.  CARDIAC CATHETERIZATION  08/17/2006    EF is estimated to be in excess of 50%. See scanned doucment.  CARDIAC CATHETERIZATION  5/14/14  MDL    normal LV function.  EF 60    CHOLECYSTECTOMY      CLAVICLE SURGERY      COLONOSCOPY      Endoscopy    CYSTOSCOPY Right 7/8/2016    CYSTOSCOPY; RIGHT RETROGRADE PYELOGRAM; RIGHT URETERAL DILATATION; RIGHT URETEROSCOPY; RIGHT URETERAL LASER LITHOTRIPSY AND STONE EXTRACTION; INSERTION RIGHT URETERAL DOUBLE J STENT performed by Joselin Louie MD at [address] Left 1/14/2021    CYSTOSCOPY URETEROSCOPY LASER LITHO WITH STONE EXTRACTION AND STENT REMOVAL> performed by Harini Moody MD at [address] Left 12/29/2021    CYSTOSCOPY; LEFT URTERAL CATHERIZATION; LEFT RETORGRADE PYLEOGRAM; REMOVAL BLADDER CALCULI performed by Tony Gallegos MD at [facility] Left     HAND SURGERY      HUMERUS SURGERY      KNEE SURGERY      Right    KNEE SURGERY Left     LITHOTRIPSY      MANDIBLE FRACTURE SURGERY      NECK SURGERY      cervical spine.  OTHER SURGICAL HISTORY      Brachytherapy of the core stents.     PACEMAKER PLACEMENT      [employer]    PTCA      TIBIA FRACTURE SURGERY      WRIST SURGERY       Medications in Hospital:     Current Facility-Administered Medications:     sodium chloride flush 0.9 % injection 5-40 mL, 5-40 mL, IntraVENous, 2 times per day, Callie Quezada, APRN - CNP, 10 mL at 02/27/22 2035    sodium chloride flush 0.9 % injection 5-40 mL, 5-40 mL, IntraVENous, PRN, Callie Quezada, APRN - CNP    0.9 % sodium chloride infusion, 25 mL, IntraVENous, PRN, Callie Quezada, APRN - CNP    enoxaparin (LOVENOX) injection 40 mg, 40 mg, SubCUTAneous, Q24H, Callie Quezada APRN - CNP, 40 mg at 02/27/22 2035    ondansetron (ZOFRAN-ODT) disintegrating tablet 4 mg, 4 mg, Oral, Q8H PRN **OR** ondansetron (ZOFRAN) injection 4 mg, 4 mg, IntraVENous, Q6H PRN, Callie Quezada, APRN - CNP, 4 mg at 02/27/22 1925    polyethylene glycol (GLYCOLAX) packet 17 g, 17 g, Oral, Daily PRN, Callie Quezada APRN - CNP    acetaminophen (TYLENOL) tablet 650 mg, 650 mg, Oral, Q6H PRN **OR** acetaminophen (TYLENOL) suppository 650 mg, 650 mg, Rectal, Q6H PRN, Callie Quezada APRN - CNP    0.9 % sodium chloride infusion, , IntraVENous, Continuous, Callie Quezada APRN - CNP, Last Rate: 100 mL/hr at 02/28/22 8247, New Bag at 02/28/22 0621    perflutren lipid microspheres (DEFINITY) injection 1.65 mg, 1.5 mL, IntraVENous, ONCE PRN, Callie Quezada APRN - CNP    amLODIPine (NORVASC) tablet 10 mg, 10 mg, Oral, Daily, Callie Pilar APRN - CNP, 10 mg at 02/27/22 2034    aspirin tablet 325 mg, 325 mg, Oral, Daily, Callie Quezada, APRN - CNP, 325 mg at 02/27/22 2035    clopidogrel (PLAVIX) tablet 75 mg, 75 mg, Oral, Daily, Darvin Alfred Lockett, APRN - CNP, 75 mg at 02/27/22 2034    coenzyme Q10 capsule 100 mg, 100 mg, Oral, Daily, Aston Comp, APRN - CNP, 100 mg at 02/27/22 2034    isosorbide mononitrate (IMDUR) extended release tablet 120 mg, 120 mg, Oral, BID, Aston Comp, APRN - CNP, 120 mg at 02/27/22 2034    metoprolol tartrate (LOPRESSOR) tablet 25 mg, 25 mg, Oral, BID, Aston Comp, APRN - CNP, 25 mg at 02/27/22 2035    LORazepam (ATIVAN) tablet 1 mg, 1 mg, Oral, TID PRN, Aston Comp, APRN - CNP    oxyCODONE (ROXICODONE) immediate release tablet 15 mg, 15 mg, Oral, Q6H PRN, Aston Comp, APRN - CNP, 15 mg at 02/28/22 7438    morphine (PF) injection 2 mg, 2 mg, IntraVENous, Q4H PRN, Aston Comp, APRN - CNP, 2 mg at 02/28/22 0441    QUEtiapine (SEROQUEL) tablet 25 mg, 25 mg, Oral, Nightly, Abad Doty MD, 25 mg at 02/27/22 2035  Allergies: Ezetimibe-simvastatin, Bee venom, Darvocet [propoxyphene n-acetaminophen], Other, Penicillins, Phenergan [promethazine hcl], Promethazine, Promethazine hcl, and Propoxyphene  Social History:   TOBACCO:  reports that he has quit smoking. His smoking use included cigarettes. He uses smokeless tobacco.  ETOH:  reports no history of alcohol use. Family History:   History reviewed. No pertinent family history. ?  ?  Physical Exam:    Vitals: BP 97/63   Pulse 60   Temp 97.6 °F (36.4 °C) (Temporal)   Resp 14   Wt 122 lb 9 oz (55.6 kg)   SpO2 95%   BMI 19.20 kg/m²   Constitutional - well developed, well nourished.    Eyes - conjunctiva normal. Pupils react to light  Ear, nose, throat -hearing intact to finger rub No scars, masses, or lesions over external nose or ears, no atrophy of tongue  Neck-symmetric, no masses noted, no jugular vein distension  Respiration- chest wall appears symmetric, good expansion,   normal effort without use of accessory muscles  Musculoskeletal - no significant wasting of muscles noted, no bony deformities  Extremities-no clubbing, cyanosis or edema  Skin - warm, dry, and intact. No rash, erythema, or pallor. Psychiatric - mood, affect, and behavior appear normal.   Neurological exam  Awake, alert, fluent oriented x 3 appropriate affect  Attention and concentration appear appropriate  Recent and remote memory appears unremarkable  Speech normal without dysarthria  No clear issues with language of fund of knowledge  Cranial Nerve Exam   CN II- Visual fields grossly unremarkable  CN III, IV,VI-EOMI, No nystagmus, conjugate eye movements, right-sided ptosis    CN VII-slightly asymmetric smile with an increased palpebral fissure on the left  CN VIII-Hearing intact to voice  CN IX and X- Palate elevates in midline  CN XI-good shoulder shrug  CN XII-Tongue midline with no fasciculations or fibrillations  Motor Exam  V/V throughout upper and lower extremities bilaterally, no cogwheeling, normal tone  Sensory Exam  Sensation intact to light touch and temperature upper and lower extremities bilaterally  Reflexes   Absent throughout  Tremors- no tremors in hands or head noted  Gait  Not tested  Coordination  Finger to nose-unremarkable  ? ?  CBC:   Recent Labs     02/27/22  1041 02/28/22  0508   WBC 7.1 5.0   HGB 13.5* 11.6*    151     BMP:   Recent Labs     02/27/22  1041 02/28/22  0508    139   K 4.2 3.7    104   CO2 27 27   BUN 13 12   CREATININE 0.6 0.6   GLUCOSE 121* 97     Hepatic:   Recent Labs     02/27/22  1041   AST 29   ALT 19   BILITOT 0.4   ALKPHOS 91     Lipids: No results for input(s): CHOL, HDL in the last 72 hours. Invalid input(s): LDLCALCU  INR: No results for input(s): INR in the last 72 hours. ?  ?  Assessment and Plan   1. Nonspecific dizziness associated with nausea and vomiting. His wife had a GI virus about a week ago although her symptoms were somewhat different. Symptoms could be related to this. Otherwise it is different to tell. No clear evidence of stroke. Unclear if his right-sided ptosis is new or not.  He is already on Plavix and a full dose aspirin. CTA of the head and neck were unremarkable less than a year ago. He feels better this morning. Lets see how he does today with PT/OT. Discharge when feeling better.   ?  ?      Akash Prince MD  Board Certified in Neurology

## 2022-02-28 NOTE — PLAN OF CARE
Problem: Falls - Risk of:  Goal: Will remain free from falls  Description: Will remain free from falls  2/28/2022 1642 by Rosales Webb RN  Outcome: Ongoing  2/28/2022 0406 by Stanley Sow LPN  Outcome: Ongoing  2/28/2022 0406 by Stanley Sow LPN  Outcome: Ongoing  Goal: Absence of physical injury  Description: Absence of physical injury  2/28/2022 1642 by Rosales Webb RN  Outcome: Ongoing  2/28/2022 0406 by Stanley Sow LPN  Outcome: Ongoing  2/28/2022 0406 by Stanley Sow LPN  Outcome: Ongoing     Problem: Pain:  Goal: Pain level will decrease  Description: Pain level will decrease  2/28/2022 1642 by Rosales Webb RN  Outcome: Ongoing  2/28/2022 0406 by Stanley Sow LPN  Outcome: Ongoing  2/28/2022 0406 by Stanley Sow LPN  Outcome: Ongoing  Goal: Control of acute pain  Description: Control of acute pain  2/28/2022 1642 by Rosales Webb RN  Outcome: Ongoing  2/28/2022 0406 by Stanley Sow LPN  Outcome: Ongoing  2/28/2022 0406 by Stanley Sow LPN  Outcome: Ongoing  Goal: Control of chronic pain  Description: Control of chronic pain  2/28/2022 1642 by Rosales Webb RN  Outcome: Ongoing  2/28/2022 0406 by Stanley oSw LPN  Outcome: Ongoing  2/28/2022 0406 by Stanley Sow LPN  Outcome: Ongoing

## 2022-02-28 NOTE — PLAN OF CARE
Problem: Falls - Risk of:  Goal: Will remain free from falls  Description: Will remain free from falls  2/28/2022 0406 by Marin Simmons LPN  Outcome: Ongoing  2/28/2022 0406 by Marin Simmons LPN  Outcome: Ongoing  Goal: Absence of physical injury  Description: Absence of physical injury  2/28/2022 0406 by Marin Simmons LPN  Outcome: Ongoing  2/28/2022 0406 by Marin Simmons LPN  Outcome: Ongoing     Problem: Pain:  Goal: Pain level will decrease  Description: Pain level will decrease  2/28/2022 0406 by Marin Simmons LPN  Outcome: Ongoing  2/28/2022 0406 by Marin Simmons LPN  Outcome: Ongoing  Goal: Control of acute pain  Description: Control of acute pain  2/28/2022 0406 by Marin Simmons LPN  Outcome: Ongoing  2/28/2022 0406 by Marin Simmons LPN  Outcome: Ongoing  Goal: Control of chronic pain  Description: Control of chronic pain  2/28/2022 0406 by Marin Simmons LPN  Outcome: Ongoing  2/28/2022 0406 by Marin Simmons LPN  Outcome: Ongoing

## 2022-03-01 VITALS
BODY MASS INDEX: 19.2 KG/M2 | HEART RATE: 99 BPM | TEMPERATURE: 98.4 F | RESPIRATION RATE: 16 BRPM | WEIGHT: 122.56 LBS | DIASTOLIC BLOOD PRESSURE: 96 MMHG | SYSTOLIC BLOOD PRESSURE: 147 MMHG | OXYGEN SATURATION: 96 %

## 2022-03-01 PROBLEM — R55 PRE-SYNCOPE: Status: RESOLVED | Noted: 2022-02-27 | Resolved: 2022-03-01

## 2022-03-01 LAB
ANION GAP SERPL CALCULATED.3IONS-SCNC: 10 MMOL/L (ref 7–19)
BUN BLDV-MCNC: 14 MG/DL (ref 8–23)
CALCIUM SERPL-MCNC: 8.3 MG/DL (ref 8.8–10.2)
CHLORIDE BLD-SCNC: 106 MMOL/L (ref 98–111)
CO2: 26 MMOL/L (ref 22–29)
CREAT SERPL-MCNC: 0.7 MG/DL (ref 0.5–1.2)
GFR AFRICAN AMERICAN: >59
GFR NON-AFRICAN AMERICAN: >60
GLUCOSE BLD-MCNC: 100 MG/DL (ref 74–109)
HCT VFR BLD CALC: 35 % (ref 42–52)
HEMOGLOBIN: 11.2 G/DL (ref 14–18)
MCH RBC QN AUTO: 29.3 PG (ref 27–31)
MCHC RBC AUTO-ENTMCNC: 32 G/DL (ref 33–37)
MCV RBC AUTO: 91.6 FL (ref 80–94)
PDW BLD-RTO: 13.2 % (ref 11.5–14.5)
PLATELET # BLD: 159 K/UL (ref 130–400)
PMV BLD AUTO: 9.2 FL (ref 9.4–12.4)
POTASSIUM REFLEX MAGNESIUM: 3.9 MMOL/L (ref 3.5–5)
RBC # BLD: 3.82 M/UL (ref 4.7–6.1)
SODIUM BLD-SCNC: 142 MMOL/L (ref 136–145)
WBC # BLD: 4.1 K/UL (ref 4.8–10.8)

## 2022-03-01 PROCEDURE — 2580000003 HC RX 258

## 2022-03-01 PROCEDURE — 97165 OT EVAL LOW COMPLEX 30 MIN: CPT

## 2022-03-01 PROCEDURE — G0378 HOSPITAL OBSERVATION PER HR: HCPCS

## 2022-03-01 PROCEDURE — 85027 COMPLETE CBC AUTOMATED: CPT

## 2022-03-01 PROCEDURE — 99232 SBSQ HOSP IP/OBS MODERATE 35: CPT | Performed by: PSYCHIATRY & NEUROLOGY

## 2022-03-01 PROCEDURE — 96361 HYDRATE IV INFUSION ADD-ON: CPT

## 2022-03-01 PROCEDURE — 36415 COLL VENOUS BLD VENIPUNCTURE: CPT

## 2022-03-01 PROCEDURE — 80048 BASIC METABOLIC PNL TOTAL CA: CPT

## 2022-03-01 PROCEDURE — 6370000000 HC RX 637 (ALT 250 FOR IP)

## 2022-03-01 PROCEDURE — 96376 TX/PRO/DX INJ SAME DRUG ADON: CPT

## 2022-03-01 PROCEDURE — 6360000002 HC RX W HCPCS

## 2022-03-01 RX ADMIN — METOPROLOL TARTRATE 25 MG: 25 TABLET, FILM COATED ORAL at 08:31

## 2022-03-01 RX ADMIN — Medication 100 MG: at 08:31

## 2022-03-01 RX ADMIN — ISOSORBIDE MONONITRATE 120 MG: 60 TABLET, EXTENDED RELEASE ORAL at 08:31

## 2022-03-01 RX ADMIN — SODIUM CHLORIDE, PRESERVATIVE FREE 10 ML: 5 INJECTION INTRAVENOUS at 08:30

## 2022-03-01 RX ADMIN — LORAZEPAM 1 MG: 1 TABLET ORAL at 08:36

## 2022-03-01 RX ADMIN — MORPHINE SULFATE 2 MG: 2 INJECTION, SOLUTION INTRAMUSCULAR; INTRAVENOUS at 13:25

## 2022-03-01 RX ADMIN — AMLODIPINE BESYLATE 10 MG: 5 TABLET ORAL at 08:31

## 2022-03-01 RX ADMIN — CLOPIDOGREL BISULFATE 75 MG: 75 TABLET ORAL at 08:31

## 2022-03-01 RX ADMIN — OXYCODONE HYDROCHLORIDE 15 MG: 5 TABLET ORAL at 05:34

## 2022-03-01 RX ADMIN — OXYCODONE HYDROCHLORIDE 15 MG: 5 TABLET ORAL at 11:53

## 2022-03-01 RX ADMIN — SODIUM CHLORIDE: 9 INJECTION, SOLUTION INTRAVENOUS at 04:24

## 2022-03-01 RX ADMIN — ASPIRIN 325 MG: 325 TABLET ORAL at 08:31

## 2022-03-01 RX ADMIN — MORPHINE SULFATE 2 MG: 2 INJECTION, SOLUTION INTRAMUSCULAR; INTRAVENOUS at 04:24

## 2022-03-01 RX ADMIN — MORPHINE SULFATE 2 MG: 2 INJECTION, SOLUTION INTRAMUSCULAR; INTRAVENOUS at 08:30

## 2022-03-01 ASSESSMENT — PAIN SCALES - GENERAL
PAINLEVEL_OUTOF10: 6
PAINLEVEL_OUTOF10: 6
PAINLEVEL_OUTOF10: 7
PAINLEVEL_OUTOF10: 5
PAINLEVEL_OUTOF10: 7
PAINLEVEL_OUTOF10: 5
PAINLEVEL_OUTOF10: 5

## 2022-03-01 NOTE — PLAN OF CARE
Problem: Falls - Risk of:  Goal: Will remain free from falls  Description: Will remain free from falls  2/28/2022 2040 by Denice Tovar RN  Outcome: Ongoing  2/28/2022 1642 by Aria Moreno RN  Outcome: Ongoing  Goal: Absence of physical injury  Description: Absence of physical injury  2/28/2022 2040 by Denice Tovar RN  Outcome: Ongoing  2/28/2022 1642 by Aria Moreno RN  Outcome: Ongoing     Problem: Pain:  Goal: Pain level will decrease  Description: Pain level will decrease  2/28/2022 2040 by Denice Tovar RN  Outcome: Ongoing  2/28/2022 1642 by Aria Moreno RN  Outcome: Ongoing  Goal: Control of acute pain  Description: Control of acute pain  2/28/2022 2040 by Denice Tovar RN  Outcome: Ongoing  2/28/2022 1642 by Aria Moreno RN  Outcome: Ongoing  Goal: Control of chronic pain  Description: Control of chronic pain  2/28/2022 2040 by Denice Tovar RN  Outcome: Ongoing  2/28/2022 1642 by Aria Moreno RN  Outcome: Ongoing

## 2022-03-01 NOTE — PROGRESS NOTES
Patient:   Karely Dudley  MR#:    [id number]   Room:    [facility]9/979-21   YOB: 1955  Date of Progress Note: 3/1/2022  Time of Note                           10:09 AM  Consulting Physician:   Wanda Rock M.D. Attending Physician:  Bailey Lomeli, DO       CHIEF COMPLAINT: Dizziness  ? Subjective  This 79 y.o. male who presents with dizziness. He came into the ED on 3/27 with the symptoms. Says that they actually began the day before that. He had been working on a house and sat down to rest and when he stood up he felt lightheaded. Lightheadedness is what he means by dizziness. No true vertigo. Subsequently he began to have nausea and vomiting and had not eaten and felt fatigued so he came through the ED. Unable to have an MRI due to some pacemaker leads. He has had some dizziness symptoms in the past. CT of the head unremarkable. In 7/21 he had an completely unremarkable CTA of the head and neck. Denies any focal neurological difficulties or double vision with these current symptoms. Meryle Sandman He last saw Dr. Nathaniel Leigh in our office 7/21. No new problems  REVIEW OF SYSTEMS:  Constitutional: No fevers No chills  Neck:No stiffness  Respiratory: No shortness of breath  Cardiovascular: No chest pain No palpitations  Gastrointestinal: No abdominal pain    Genitourinary: No Dysuria  Neurological: No headache, no confusion      PHYSICAL EXAM:  /66   Pulse 59   Temp 97.1 °F (36.2 °C)   Resp 16   Wt 122 lb 9 oz (55.6 kg)   SpO2 96%   BMI 19.20 kg/m²     Constitutional: he appears well-developed and well-nourished. Eyes - conjunctiva normal.  Pupils react to light  Ear, nose, throat -hearing intact to voice.  No scars, masses, or lesions over external nose or ears, no atrophy of tongue  Neck-symmetric, no masses noted, no jugular vein distension  Respiration- chest wall appears symmetric, good expansion,   normal effort without use of accessory muscles  Cardiovascular- RRR  Musculoskeletal - no significant wasting of muscles noted, no bony deformities, gait no gross ataxia  Extremities-no clubbing, cyanosis or edema  Skin - warm, dry, and intact. No rash, erythema, or pallor. Psychiatric - mood, affect, and behavior appear normal.      Neurology  NEUROLOGICAL EXAM:      Mental status   Awake, alert, fluent oriented x 3 appropriate affect  Attention and concentration appear appropriate  Recent and remote memory appears unremarkable  Speech normal without dysarthria  No clear issues with language       Cranial Nerves   CN II- Visual fields grossly unremarkable  CN III, IV,VI-EOMI, No nystagmus, conjugate eye movements, no ptosis  CN VII-no facial asymmetry  CN VIII-Hearing intact      Motor function  Antigravity x 4     Sensory function Intact to light touch     Cerebellar F-N intact     Tremor None present     Gait                  Not tested           Nursing/pcp notes, imaging,labs and vitals reviewed. PT,OT and/or speech notes reviewed    Lab Results   Component Value Date    WBC 4.1 (L) 03/01/2022    HGB 11.2 (L) 03/01/2022    HCT 35.0 (L) 03/01/2022    MCV 91.6 03/01/2022     03/01/2022     Lab Results   Component Value Date     03/01/2022    K 3.9 03/01/2022     03/01/2022    CO2 26 03/01/2022    BUN 14 03/01/2022    CREATININE 0.7 03/01/2022    GLUCOSE 100 03/01/2022    CALCIUM 8.3 (L) 03/01/2022    PROT 6.6 02/27/2022    LABALBU 4.2 02/27/2022    BILITOT 0.4 02/27/2022    ALKPHOS 91 02/27/2022    AST 29 02/27/2022    ALT 19 02/27/2022    LABGLOM >60 03/01/2022    GFRAA >59 03/01/2022    GLOB 2.5 04/07/2017     Lab Results   Component Value Date    INR 0.95 07/12/2021    INR 1.02 10/04/2020    INR 0.96 08/09/2020     Lab Results   Component Value Date    CRP < 0.03 05/14/2014             RECORD REVIEW: Previous medical records, medications were reviewed at today's visit    IMPRESSION:   Nonspecific dizziness associated with nausea and vomiting.  His wife had a GI virus about a week ago although her symptoms were somewhat different. Symptoms could be related to this. Otherwise it is different to tell. No clear evidence of stroke. Unclear if his right-sided ptosis is new or not. He is already on Plavix and a full dose aspirin. CTA of the head and neck were unremarkable less than a year ago. He feels better again this morning. Etiology of symptoms not clear. Systolic blood pressure in the 90s at times. Consider reducing some of his blood pressure medications. IV fluids seems to have been beneficial to the patient. Perhaps he was dehydrated. Okay to go home when okay with others. Follow-up as needed.

## 2022-03-01 NOTE — PROGRESS NOTES
IV Removed. Tele removed. Patient education given to him and spouse. Patient refused transport, but wife was walking patient down to car and both felt comfortable doing so.    Electronically signed by oJse Kenney RN on 3/1/2022 at 2:30 PM

## 2022-03-01 NOTE — PROGRESS NOTES
Occupational Therapy   Occupational Therapy Initial Assessment  Date: 3/1/2022   Patient Name: Leyla Freeman  MRN: [id number]     : 1955    Date of Service: 3/1/2022    Discharge Recommendations:          Assessment   Assessment: Pt. independent with self care and mobility , OT eval only  Treatment Diagnosis: Dizziness, pre-syncope  Prognosis: Good  Decision Making: Low Complexity  REQUIRES OT FOLLOW UP: No  Activity Tolerance  Activity Tolerance: Patient Tolerated treatment well           Patient Diagnosis(es): The encounter diagnosis was Dizziness. has a past medical history of CAD (coronary artery disease), Chest pain, DJD (degenerative joint disease), GERD (gastroesophageal reflux disease), HTN (hypertension), Hyperlipidemia, Left knee pain, Pacemaker, Polyarticular arthritis, Renal calculi, Right arm pain, Right ureteral stone, Stones in the urinary tract, and Tobacco abuse.   has a past surgical history that includes Cardiac catheterization (2010); Cholecystectomy; Lithotripsy; Neck surgery; knee surgery; Cardiac catheterization (08, 10/2/08); Cardiac catheterization (07, 07); Percutaneous Transluminal Coronary Angio; other surgical history; Colonoscopy; Cardiac catheterization (07, 07); Cardiac catheterization (2006); Cardiac catheterization (14  MDL); Femur Surgery (Left); knee surgery (Left); pacemaker placement; Cystoscopy (Right, 2016); Tibia fracture surgery; Mandible fracture surgery; Clavicle surgery; Wrist surgery; Hand surgery; Humerus surgery; Cystoscopy (Left, 2021); and Cystoscopy (Left, 2021).     Treatment Diagnosis: Dizziness, pre-syncope      Restrictions       Subjective   General  Chart Reviewed: Yes  Patient assessed for rehabilitation services?: Yes  Family / Caregiver Present: Yes  Diagnosis: Dizziness, pre-syncope  Patient Currently in Pain: Denies  Pain Assessment  Pain Level: 6  Vital Signs  Patient Currently in Pain: Denies  Social/Functional History  Social/Functional History  Lives With: Spouse  Type of Home: House  Home Layout: One level  ADL Assistance: Independent  Ambulation Assistance: Independent  Transfer Assistance: Independent       Objective   Vision: Within Functional Limits  Hearing: Within functional limits    Orientation  Overall Orientation Status: Within Normal Limits        ADL  Feeding: Independent  Grooming: Independent  UE Bathing: Independent  LE Bathing: Independent  UE Dressing: Independent  LE Dressing: Independent  Toileting: Independent           Transfers  Stand Step Transfers: Independent  Sit to stand: Independent  Transfer Comments: No AD     Cognition  Overall Cognitive Status: WNL                 LUE AROM (degrees)  LUE AROM : WFL  RUE AROM (degrees)  RUE AROM : WFL  LUE Strength  Gross LUE Strength: WFL  RUE Strength  Gross RUE Strength: WFL                   Plan   Plan  Times per week: OT eval only    G-Code     OutComes Score                                                  AM-PAC Score             Goals  Short term goals  Time Frame for Short term goals: OT eval only  Long term goals  Time Frame for Long term goals : OT eval only       Therapy Time   Individual Concurrent Group Co-treatment   Time In           Time Out           Minutes                   Gerhardt Duncans, OT

## 2022-03-01 NOTE — DISCHARGE SUMMARY
Discharge Summary    Panfilo Barragan  :  1955  MRN:  [id number]    Admit date:  2022  Discharge date:  3/1/2022    Admitting Physician:  No admitting provider for patient encounter. Advance Directive: Full Code    Consults: neurology    Primary Care Physician:  Gerard Hemphill MD    Discharge Diagnoses:  Principal Problem (Resolved):    Pre-syncope  Active Problems:    * No active hospital problems. *      Significant Diagnostic Studies:   Echo Complete    Result Date: 2022  Transthoracic Echocardiography Report (TTE)  Demographics   Patient Name  Nic Kaminski Date of Study         2022   MRN           [id number]          Gender                Male   Date of Birth 1955      Room Number           DLY-3442   Age           79 year(s)   Height:       67 inches       Referring Physician   Aston Beckford   Weight:       122 pounds      Sonographer           Espinoza Sofia RDCS   BSA:          1.64 m^2        Interpreting          Daniel Lewis DO                                Physician   BMI:          19.11 kg/m^2  Procedure Type of Study   TTE procedure:ECHO NO CONTRAST WITH DOP/COLR. Study Location: Echo Lab Technical Quality: Adequate visualization Patient Status: Inpatient HR: 60 bpm BP: 97/63 mmHg Indications:Syncope. Conclusions   Summary  Normal left ventricular chamber size and systolic function. Left ventricular ejection fraction is visually estimated at 60%. Severe right atrial enlargement. Increased IVC diameter and decreased inspiratory collapse suggesting RA  pressure of 15  Aortic valve leaflets are mildly thickened. There is mildly decreased  excursion. Mild aortic stenosis is present. The peak gradient across the aortic valve is 38 mmHg. The mean gradient across the aortic valve is 17 mmHg. Moderate-to-severe tricuspid regurgitation . There is moderate pulmonary hypertension.    Signature   ---------------------------------------------------------------- Electronically signed by Tristan Gastelum DO(Interpreting  physician) on 02/28/2022 11:47 AM  ----------------------------------------------------------------   Findings   Mitral Valve  Structurally normal mitral valve leaflets. There is trace mitral regurgitation. Aortic Valve  Aortic valve leaflets are mildly thickened. There is mildly decreased  excursion. Mild aortic stenosis is present. The peak gradient across the aortic valve is 38 mmHg. The mean gradient across the aortic valve is 17 mmHg. Trace aortic regurgitation. Tricuspid Valve  Normal tricuspid valve leaflet thickness and excursion. Moderate-to-severe tricuspid regurgitation . Estimated PA systolic pressure is 29BP mercury. There is moderate pulmonary hypertension. Pulmonic Valve  No significant pulmonic regurgitation. Left Atrium  Left atrial size is normal.   Left Ventricle  Normal left ventricular chamber size and systolic function. Normal left ventricular wall thickness. Left ventricular ejection fraction is visually estimated at 60%. Normal diastolic function. Right Atrium  Severe right atrial enlargement. Right Ventricle  Normal right ventricular chamber size and function. Device catheter (pacemaker or defibrillator) noted in the RV. Pericardial Effusion  No pericardial effusion. Miscellaneous  Increased IVC diameter and decreased inspiratory collapse suggesting RA  pressure of 15  Allergies   - Morphine.   - Lortab. - Darvocet. - Phenergan. - Penicillins.   - Other allergy:(Hydrocodone). M-Mode Measurements (cm)   LVIDd: 4.8 cm                        LVIDs: 3.51 cm  IVSd: 0.93 cm  LVPWd: 0.93 cm                       AO Root Dimension: 2.9 cm  % Ejection Fraction: 61 %            LA:  3.6 cm                                       LVOT: 1.9 cm  Doppler Measurements:   AV Peak Velocity:307 cm/s            MV Peak E-Wave: 76.3 cm/s  AV Peak Gradient: 37.7 mmHg          MV Peak A-Wave: 65.6 cm/s  AV Mean Gradient: 19 mmHg            MV E/A Ratio: 1.16 %  AV Area (Continuity):1.01 cm^2       MV Peak Gradient: 2.33 mmHg  TR Velocity:282 cm/s                 MV P1/2t: 57 msec  TR Gradient:31.81 mmHg               MVA by PHT3.86 cm^2  Estimated RAP:10 mmHg  RVSP:41 mmHg      CT HEAD WO CONTRAST    Result Date: 2/27/2022  CT HEAD WO CONTRAST 2/27/2022 12:36 PM HISTORY: Dizziness COMPARISON: 7/12/2021 DOSE LENGTH PRODUCT: 591 mGy cm TECHNIQUE: Helical tomographic images of the brain were obtained without the use of intravenous contrast. Automated exposure control was also utilized to decrease patient radiation dose. FINDINGS: There is no evidence of evolving large vascular territory infarct. No visualized intra-axial or extra-axial hemorrhage. No mass lesion is identified. Normal size and configuration of the ventricular system. The basal cisterns are symmetric. Posterior fossa structures are unremarkable. The included orbits and their contents are unremarkable. The visualized paranasal sinuses, mastoid air cells and middle ear cavities are clear. The visualized osseous structures and overlying soft tissues of the skull and face are unremarkable. 1. No acute intracranial process. Signed by Dr Randi Barraza    Result Date: 2/27/2022  XR CHEST PORTABLE 2/27/2022 10:26 AM HISTORY: Lightheaded  Technique: Single AP view of the chest COMPARISONS: 7/12/2021 FINDINGS: No lung consolidation. No pleural effusion or pneumothorax. Cardiomediastinal silhouette and pulmonary vasculature are unremarkable. Left chest wall dual chamber pacemaker. No acute bony abnormality. Previous left clavicle plating. Stable chest exam without acute process.  Signed by Dr Sly Weaver      CBC:   Recent Labs     02/27/22  1041 02/28/22  0508 03/01/22 0228   WBC 7.1 5.0 4.1*   HGB 13.5* 11.6* 11.2*    151 159     BMP:    Recent Labs     02/27/22  1041 02/28/22  0508 03/01/22 0228    139 142   K 4.2 3.7 3.9    104 106 CO2 27 27 26   BUN 13 12 14   CREATININE 0.6 0.6 0.7   GLUCOSE 121* 97 100     INR: No results for input(s): INR in the last 72 hours. Lipids: No results for input(s): CHOL, HDL in the last 72 hours. Invalid input(s): LDLCALCU  ABGs:No results for input(s): PHART, JDN5LRC, PO2ART, CDE8BWD, BEART, HGBAE, V9WVZLFN, CARBOXHGBART, 02THERAPY in the last 72 hours. HgBA1c:  No results for input(s): LABA1C in the last 72 hours. Procedures: None  Hospital Course: Mr. Sasha Polk was admitted on 2/27 because of near syncopal episode. He underwent extensive work-up during the course of his hospitalization. Consultation was obtained with neurology. There was no significant difference in his CTA of his head compared to a year ago. He has a history of coronary artery disease. He is on a significant amount of isosorbide 120 mg twice a day. I felt that medication effect was most likely. On the afternoon of 3/1 he was back to baseline and ready for discharge. Physical Exam:  Vital Signs: BP (!) 147/96   Pulse 99   Temp 98.4 °F (36.9 °C)   Resp 16   Wt 122 lb 9 oz (55.6 kg)   SpO2 96%   BMI 19.20 kg/m²   General appearance:. Alert and Cooperative   HEENT: Normocephalic. Chest: clear to auscultation bilaterally without wheezes or rhonchi. Cardiac: Normal heart tones with regular rate and rhythm, S1, S2 normal. No murmurs, gallops, or rubs auscultated. Abdomen:soft, non-tender; normal bowel sounds, no masses, no organomegaly. Extremities: No clubbing or cyanosis. No peripheral edema. Peripheral pulses palpable. Neurologic: Grossly intact.     Discharge Medications:         Medication List      CONTINUE taking these medications    aspirin 325 MG tablet     BOOST PO     COQ-10 PO     hydroCHLOROthiazide 12.5 MG tablet  Commonly known as: HYDRODIURIL     isosorbide mononitrate 120 MG extended release tablet  Commonly known as: IMDUR     LORazepam 1 MG tablet  Commonly known as: ATIVAN     metoprolol tartrate 25 MG tablet  Commonly known as: LOPRESSOR     naloxone 4 MG/0.1ML Liqd nasal spray     Nitrostat 0.4 MG SL tablet  Generic drug: nitroGLYCERIN     Norvasc 10 MG tablet  Generic drug: amLODIPine     ondansetron 4 MG disintegrating tablet  Commonly known as: Zofran ODT  Take 1 tablet by mouth every 8 hours as needed for Nausea or Vomiting     oxyCODONE 15 MG immediate release tablet  Commonly known as: OXY-IR     Plavix 75 MG tablet  Generic drug: clopidogrel     prochlorperazine 5 MG tablet  Commonly known as: COMPAZINE     QUEtiapine 25 MG tablet  Commonly known as: SEROQUEL     scopolamine transdermal patch  Commonly known as: TRANSDERM-SCOP     tamsulosin 0.4 MG capsule  Commonly known as: FLOMAX  Take 1 capsule by mouth daily            Discharge Instructions: Follow up with Jennifer Man MD in 3-5 days. Take medications as directed. Resume activity as tolerated. Diet: ADULT DIET; Regular     Disposition: Patient is medically stable and will be discharged to home. Time spent on discharge 40 minutes.     Signed:  J Carlos Saunders DO

## 2022-03-14 ENCOUNTER — OFFICE VISIT (OUTPATIENT)
Dept: FAMILY MEDICINE CLINIC | Facility: CLINIC | Age: 67
End: 2022-03-14

## 2022-03-14 VITALS
WEIGHT: 133.2 LBS | BODY MASS INDEX: 20.91 KG/M2 | OXYGEN SATURATION: 95 % | TEMPERATURE: 97.3 F | HEART RATE: 61 BPM | DIASTOLIC BLOOD PRESSURE: 60 MMHG | SYSTOLIC BLOOD PRESSURE: 110 MMHG | HEIGHT: 67 IN

## 2022-03-14 DIAGNOSIS — I20.8 CHRONIC STABLE ANGINA: Primary | ICD-10-CM

## 2022-03-14 DIAGNOSIS — Z12.5 ENCOUNTER FOR SCREENING FOR MALIGNANT NEOPLASM OF PROSTATE: ICD-10-CM

## 2022-03-14 DIAGNOSIS — H26.9 CATARACT OF LEFT EYE, UNSPECIFIED CATARACT TYPE: ICD-10-CM

## 2022-03-14 PROCEDURE — 99213 OFFICE O/P EST LOW 20 MIN: CPT | Performed by: FAMILY MEDICINE

## 2022-03-14 RX ORDER — HYDROCORTISONE 25 MG/ML
LOTION TOPICAL
COMMUNITY
Start: 2022-02-03 | End: 2023-01-12

## 2022-03-14 RX ORDER — ONDANSETRON 4 MG/1
1 TABLET, ORALLY DISINTEGRATING ORAL EVERY 8 HOURS PRN
COMMUNITY
Start: 2021-12-29 | End: 2022-11-06

## 2022-03-14 RX ORDER — BROMFENAC SODIUM 0.7 MG/ML
SOLUTION/ DROPS OPHTHALMIC
COMMUNITY
Start: 2022-03-01 | End: 2023-01-12

## 2022-03-14 RX ORDER — LORAZEPAM 0.5 MG/1
0.5 TABLET ORAL 3 TIMES DAILY
COMMUNITY
Start: 2022-03-09 | End: 2022-05-09
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
## 2022-03-15 LAB
ALBUMIN SERPL-MCNC: 4.4 G/DL (ref 3.5–5.2)
ALBUMIN/GLOB SERPL: 1.9 G/DL
ALP SERPL-CCNC: 86 U/L (ref 39–117)
ALT SERPL-CCNC: 16 U/L (ref 1–41)
AST SERPL-CCNC: 18 U/L (ref 1–40)
BASOPHILS # BLD AUTO: 0.03 10*3/MM3 (ref 0–0.2)
BASOPHILS NFR BLD AUTO: 0.6 % (ref 0–1.5)
BILIRUB SERPL-MCNC: 0.4 MG/DL (ref 0–1.2)
BUN SERPL-MCNC: 20 MG/DL (ref 8–23)
BUN/CREAT SERPL: 23.3 (ref 7–25)
CALCIUM SERPL-MCNC: 9.1 MG/DL (ref 8.6–10.5)
CHLORIDE SERPL-SCNC: 101 MMOL/L (ref 98–107)
CHOLEST SERPL-MCNC: 138 MG/DL (ref 0–200)
CHOLEST/HDLC SERPL: 1.79 {RATIO}
CO2 SERPL-SCNC: 27.1 MMOL/L (ref 22–29)
CREAT SERPL-MCNC: 0.86 MG/DL (ref 0.76–1.27)
EGFR GENE MUT ANL BLD/T: 94.9 ML/MIN/1.73
EOSINOPHIL # BLD AUTO: 0.11 10*3/MM3 (ref 0–0.4)
EOSINOPHIL NFR BLD AUTO: 2.3 % (ref 0.3–6.2)
ERYTHROCYTE [DISTWIDTH] IN BLOOD BY AUTOMATED COUNT: 13.5 % (ref 12.3–15.4)
GLOBULIN SER CALC-MCNC: 2.3 GM/DL
GLUCOSE SERPL-MCNC: 102 MG/DL (ref 65–99)
HCT VFR BLD AUTO: 42 % (ref 37.5–51)
HDLC SERPL-MCNC: 77 MG/DL (ref 40–60)
HGB BLD-MCNC: 13.5 G/DL (ref 13–17.7)
IMM GRANULOCYTES # BLD AUTO: 0.02 10*3/MM3 (ref 0–0.05)
IMM GRANULOCYTES NFR BLD AUTO: 0.4 % (ref 0–0.5)
LDLC SERPL CALC-MCNC: 48 MG/DL (ref 0–100)
LYMPHOCYTES # BLD AUTO: 1.44 10*3/MM3 (ref 0.7–3.1)
LYMPHOCYTES NFR BLD AUTO: 29.6 % (ref 19.6–45.3)
MCH RBC QN AUTO: 29 PG (ref 26.6–33)
MCHC RBC AUTO-ENTMCNC: 32.1 G/DL (ref 31.5–35.7)
MCV RBC AUTO: 90.1 FL (ref 79–97)
MONOCYTES # BLD AUTO: 0.48 10*3/MM3 (ref 0.1–0.9)
MONOCYTES NFR BLD AUTO: 9.9 % (ref 5–12)
NEUTROPHILS # BLD AUTO: 2.78 10*3/MM3 (ref 1.7–7)
NEUTROPHILS NFR BLD AUTO: 57.2 % (ref 42.7–76)
NRBC BLD AUTO-RTO: 0 /100 WBC (ref 0–0.2)
PLATELET # BLD AUTO: 192 10*3/MM3 (ref 140–450)
POTASSIUM SERPL-SCNC: 4.6 MMOL/L (ref 3.5–5.2)
PROT SERPL-MCNC: 6.7 G/DL (ref 6–8.5)
PSA SERPL-MCNC: 0.13 NG/ML (ref 0–4)
RBC # BLD AUTO: 4.66 10*6/MM3 (ref 4.14–5.8)
SODIUM SERPL-SCNC: 142 MMOL/L (ref 136–145)
TRIGL SERPL-MCNC: 62 MG/DL (ref 0–150)
VLDLC SERPL CALC-MCNC: 13 MG/DL (ref 5–40)
WBC # BLD AUTO: 4.86 10*3/MM3 (ref 3.4–10.8)

## 2022-04-29 RX ORDER — QUETIAPINE FUMARATE 25 MG/1
TABLET, FILM COATED ORAL
Qty: 60 TABLET | Refills: 2 | Status: SHIPPED | OUTPATIENT
Start: 2022-04-29 | End: 2022-07-14 | Stop reason: SINTOL

## 2022-06-28 ENCOUNTER — HOSPITAL ENCOUNTER (INPATIENT)
Age: 67
LOS: 1 days | Discharge: HOME | DRG: 313 | End: 2022-06-29
Attending: EMERGENCY MEDICINE | Admitting: HOSPITALIST
Payer: MEDICARE

## 2022-06-28 ENCOUNTER — APPOINTMENT (OUTPATIENT)
Dept: GENERAL RADIOLOGY | Age: 67
DRG: 313 | End: 2022-06-28
Payer: MEDICARE

## 2022-06-28 DIAGNOSIS — R07.9 ACUTE CHEST PAIN: Primary | ICD-10-CM

## 2022-06-28 LAB
ALBUMIN SERPL-MCNC: 4.5 G/DL (ref 3.5–5.2)
ALP BLD-CCNC: 94 U/L (ref 40–130)
ALT SERPL-CCNC: 14 U/L (ref 5–41)
ANION GAP SERPL CALCULATED.3IONS-SCNC: 10 MMOL/L (ref 7–19)
APTT: 29.7 SEC (ref 26–36.2)
AST SERPL-CCNC: 18 U/L (ref 5–40)
BASOPHILS ABSOLUTE: 0 K/UL (ref 0–0.2)
BASOPHILS RELATIVE PERCENT: 0.3 % (ref 0–1)
BILIRUB SERPL-MCNC: 0.5 MG/DL (ref 0.2–1.2)
BUN BLDV-MCNC: 11 MG/DL (ref 8–23)
CALCIUM SERPL-MCNC: 9 MG/DL (ref 8.8–10.2)
CHLORIDE BLD-SCNC: 106 MMOL/L (ref 98–111)
CO2: 27 MMOL/L (ref 22–29)
CREAT SERPL-MCNC: 0.6 MG/DL (ref 0.5–1.2)
EOSINOPHILS ABSOLUTE: 0 K/UL (ref 0–0.6)
EOSINOPHILS RELATIVE PERCENT: 0.2 % (ref 0–5)
GFR AFRICAN AMERICAN: >59
GFR NON-AFRICAN AMERICAN: >60
GLUCOSE BLD-MCNC: 113 MG/DL (ref 74–109)
HCT VFR BLD CALC: 42.6 % (ref 42–52)
HEMOGLOBIN: 13.8 G/DL (ref 14–18)
IMMATURE GRANULOCYTES #: 0 K/UL
INR BLD: 1.03 (ref 0.88–1.18)
LYMPHOCYTES ABSOLUTE: 1.2 K/UL (ref 1.1–4.5)
LYMPHOCYTES RELATIVE PERCENT: 20 % (ref 20–40)
MCH RBC QN AUTO: 28.8 PG (ref 27–31)
MCHC RBC AUTO-ENTMCNC: 32.4 G/DL (ref 33–37)
MCV RBC AUTO: 88.9 FL (ref 80–94)
MONOCYTES ABSOLUTE: 0.4 K/UL (ref 0–0.9)
MONOCYTES RELATIVE PERCENT: 6.9 % (ref 0–10)
NEUTROPHILS ABSOLUTE: 4.2 K/UL (ref 1.5–7.5)
NEUTROPHILS RELATIVE PERCENT: 72.4 % (ref 50–65)
PDW BLD-RTO: 12.8 % (ref 11.5–14.5)
PLATELET # BLD: 197 K/UL (ref 130–400)
PMV BLD AUTO: 8.6 FL (ref 9.4–12.4)
POTASSIUM SERPL-SCNC: 3.9 MMOL/L (ref 3.5–5)
PRO-BNP: 157 PG/ML (ref 0–900)
PROTHROMBIN TIME: 13.4 SEC (ref 12–14.6)
RBC # BLD: 4.79 M/UL (ref 4.7–6.1)
SARS-COV-2, NAAT: NOT DETECTED
SODIUM BLD-SCNC: 143 MMOL/L (ref 136–145)
TOTAL PROTEIN: 6.5 G/DL (ref 6.6–8.7)
TROPONIN: <0.01 NG/ML (ref 0–0.03)
TSH SERPL DL<=0.05 MIU/L-ACNC: 0.33 UIU/ML (ref 0.27–4.2)
WBC # BLD: 5.8 K/UL (ref 4.8–10.8)

## 2022-06-28 PROCEDURE — 84484 ASSAY OF TROPONIN QUANT: CPT

## 2022-06-28 PROCEDURE — 87635 SARS-COV-2 COVID-19 AMP PRB: CPT

## 2022-06-28 PROCEDURE — 85025 COMPLETE CBC W/AUTO DIFF WBC: CPT

## 2022-06-28 PROCEDURE — 6370000000 HC RX 637 (ALT 250 FOR IP): Performed by: NURSE PRACTITIONER

## 2022-06-28 PROCEDURE — 80053 COMPREHEN METABOLIC PANEL: CPT

## 2022-06-28 PROCEDURE — 2140000000 HC CCU INTERMEDIATE R&B

## 2022-06-28 PROCEDURE — 83880 ASSAY OF NATRIURETIC PEPTIDE: CPT

## 2022-06-28 PROCEDURE — 6360000002 HC RX W HCPCS: Performed by: EMERGENCY MEDICINE

## 2022-06-28 PROCEDURE — 71045 X-RAY EXAM CHEST 1 VIEW: CPT

## 2022-06-28 PROCEDURE — 96374 THER/PROPH/DIAG INJ IV PUSH: CPT

## 2022-06-28 PROCEDURE — 6370000000 HC RX 637 (ALT 250 FOR IP): Performed by: INTERNAL MEDICINE

## 2022-06-28 PROCEDURE — 84443 ASSAY THYROID STIM HORMONE: CPT

## 2022-06-28 PROCEDURE — 71045 X-RAY EXAM CHEST 1 VIEW: CPT | Performed by: RADIOLOGY

## 2022-06-28 PROCEDURE — 6360000002 HC RX W HCPCS: Performed by: NURSE PRACTITIONER

## 2022-06-28 PROCEDURE — 99285 EMERGENCY DEPT VISIT HI MDM: CPT

## 2022-06-28 PROCEDURE — 93005 ELECTROCARDIOGRAM TRACING: CPT | Performed by: EMERGENCY MEDICINE

## 2022-06-28 PROCEDURE — 85730 THROMBOPLASTIN TIME PARTIAL: CPT

## 2022-06-28 PROCEDURE — 36415 COLL VENOUS BLD VENIPUNCTURE: CPT

## 2022-06-28 PROCEDURE — 85610 PROTHROMBIN TIME: CPT

## 2022-06-28 PROCEDURE — 2500000003 HC RX 250 WO HCPCS: Performed by: NURSE PRACTITIONER

## 2022-06-28 PROCEDURE — 96376 TX/PRO/DX INJ SAME DRUG ADON: CPT

## 2022-06-28 PROCEDURE — 96366 THER/PROPH/DIAG IV INF ADDON: CPT

## 2022-06-28 PROCEDURE — 83036 HEMOGLOBIN GLYCOSYLATED A1C: CPT

## 2022-06-28 PROCEDURE — 96375 TX/PRO/DX INJ NEW DRUG ADDON: CPT

## 2022-06-28 PROCEDURE — 96365 THER/PROPH/DIAG IV INF INIT: CPT

## 2022-06-28 RX ORDER — HYDROCHLOROTHIAZIDE 25 MG/1
12.5 TABLET ORAL DAILY
Status: DISCONTINUED | OUTPATIENT
Start: 2022-06-28 | End: 2022-06-29 | Stop reason: HOSPADM

## 2022-06-28 RX ORDER — OXYCODONE HYDROCHLORIDE 5 MG/1
15 TABLET ORAL EVERY 6 HOURS PRN
Status: DISCONTINUED | OUTPATIENT
Start: 2022-06-28 | End: 2022-06-29 | Stop reason: HOSPADM

## 2022-06-28 RX ORDER — TAMSULOSIN HYDROCHLORIDE 0.4 MG/1
0.4 CAPSULE ORAL DAILY
Status: DISCONTINUED | OUTPATIENT
Start: 2022-06-28 | End: 2022-06-29 | Stop reason: HOSPADM

## 2022-06-28 RX ORDER — ASPIRIN 325 MG
325 TABLET ORAL ONCE
Status: DISCONTINUED | OUTPATIENT
Start: 2022-06-28 | End: 2022-06-29 | Stop reason: HOSPADM

## 2022-06-28 RX ORDER — ISOSORBIDE MONONITRATE 60 MG/1
120 TABLET, EXTENDED RELEASE ORAL 2 TIMES DAILY
Status: DISCONTINUED | OUTPATIENT
Start: 2022-06-28 | End: 2022-06-29 | Stop reason: HOSPADM

## 2022-06-28 RX ORDER — POLYETHYLENE GLYCOL 3350 17 G/17G
17 POWDER, FOR SOLUTION ORAL DAILY PRN
Status: DISCONTINUED | OUTPATIENT
Start: 2022-06-28 | End: 2022-06-29 | Stop reason: HOSPADM

## 2022-06-28 RX ORDER — ONDANSETRON 4 MG/1
4 TABLET, ORALLY DISINTEGRATING ORAL EVERY 8 HOURS PRN
Status: DISCONTINUED | OUTPATIENT
Start: 2022-06-28 | End: 2022-06-29 | Stop reason: HOSPADM

## 2022-06-28 RX ORDER — LORAZEPAM 1 MG/1
1 TABLET ORAL 3 TIMES DAILY PRN
Status: DISCONTINUED | OUTPATIENT
Start: 2022-06-28 | End: 2022-06-29 | Stop reason: HOSPADM

## 2022-06-28 RX ORDER — ONDANSETRON 2 MG/ML
4 INJECTION INTRAMUSCULAR; INTRAVENOUS EVERY 6 HOURS PRN
Status: DISCONTINUED | OUTPATIENT
Start: 2022-06-28 | End: 2022-06-29 | Stop reason: HOSPADM

## 2022-06-28 RX ORDER — SODIUM CHLORIDE 0.9 % (FLUSH) 0.9 %
5-40 SYRINGE (ML) INJECTION PRN
Status: DISCONTINUED | OUTPATIENT
Start: 2022-06-28 | End: 2022-06-29 | Stop reason: HOSPADM

## 2022-06-28 RX ORDER — NITROGLYCERIN 20 MG/100ML
5-200 INJECTION INTRAVENOUS CONTINUOUS
Status: DISCONTINUED | OUTPATIENT
Start: 2022-06-28 | End: 2022-06-29 | Stop reason: HOSPADM

## 2022-06-28 RX ORDER — SODIUM CHLORIDE 9 MG/ML
INJECTION, SOLUTION INTRAVENOUS PRN
Status: DISCONTINUED | OUTPATIENT
Start: 2022-06-28 | End: 2022-06-29 | Stop reason: HOSPADM

## 2022-06-28 RX ORDER — CLOPIDOGREL BISULFATE 75 MG/1
75 TABLET ORAL DAILY
Status: DISCONTINUED | OUTPATIENT
Start: 2022-06-28 | End: 2022-06-29 | Stop reason: HOSPADM

## 2022-06-28 RX ORDER — SODIUM CHLORIDE 0.9 % (FLUSH) 0.9 %
5-40 SYRINGE (ML) INJECTION EVERY 12 HOURS SCHEDULED
Status: DISCONTINUED | OUTPATIENT
Start: 2022-06-28 | End: 2022-06-29 | Stop reason: HOSPADM

## 2022-06-28 RX ORDER — AMLODIPINE BESYLATE 5 MG/1
10 TABLET ORAL DAILY
Status: DISCONTINUED | OUTPATIENT
Start: 2022-06-28 | End: 2022-06-29 | Stop reason: HOSPADM

## 2022-06-28 RX ORDER — QUETIAPINE FUMARATE 50 MG/1
25 TABLET, FILM COATED ORAL 2 TIMES DAILY
Status: DISCONTINUED | OUTPATIENT
Start: 2022-06-28 | End: 2022-06-29

## 2022-06-28 RX ORDER — FENTANYL CITRATE 50 UG/ML
50 INJECTION, SOLUTION INTRAMUSCULAR; INTRAVENOUS ONCE
Status: COMPLETED | OUTPATIENT
Start: 2022-06-28 | End: 2022-06-28

## 2022-06-28 RX ORDER — ENOXAPARIN SODIUM 100 MG/ML
1 INJECTION SUBCUTANEOUS 2 TIMES DAILY
Status: DISCONTINUED | OUTPATIENT
Start: 2022-06-29 | End: 2022-06-29 | Stop reason: ALTCHOICE

## 2022-06-28 RX ORDER — NITROGLYCERIN 0.4 MG/1
0.4 TABLET SUBLINGUAL EVERY 5 MIN PRN
Status: DISCONTINUED | OUTPATIENT
Start: 2022-06-28 | End: 2022-06-29 | Stop reason: HOSPADM

## 2022-06-28 RX ORDER — SODIUM CHLORIDE 9 MG/ML
INJECTION, SOLUTION INTRAVENOUS CONTINUOUS
Status: DISCONTINUED | OUTPATIENT
Start: 2022-06-28 | End: 2022-06-29 | Stop reason: HOSPADM

## 2022-06-28 RX ORDER — ASPIRIN 81 MG/1
81 TABLET, CHEWABLE ORAL DAILY
Status: DISCONTINUED | OUTPATIENT
Start: 2022-06-29 | End: 2022-06-29 | Stop reason: HOSPADM

## 2022-06-28 RX ADMIN — QUETIAPINE FUMARATE 25 MG: 50 TABLET ORAL at 23:55

## 2022-06-28 RX ADMIN — FENTANYL CITRATE 50 MCG: 0.05 INJECTION, SOLUTION INTRAMUSCULAR; INTRAVENOUS at 20:26

## 2022-06-28 RX ADMIN — NITROGLYCERIN 5 MCG/MIN: 20 INJECTION INTRAVENOUS at 20:31

## 2022-06-28 RX ADMIN — FENTANYL CITRATE 50 MCG: 50 INJECTION, SOLUTION INTRAMUSCULAR; INTRAVENOUS at 18:56

## 2022-06-28 RX ADMIN — OXYCODONE 15 MG: 5 TABLET ORAL at 23:31

## 2022-06-28 RX ADMIN — NITROGLYCERIN 7.5 MCG/MIN: 20 INJECTION INTRAVENOUS at 23:52

## 2022-06-28 ASSESSMENT — PAIN DESCRIPTION - LOCATION
LOCATION: CHEST
LOCATION: CHEST

## 2022-06-28 ASSESSMENT — ENCOUNTER SYMPTOMS
DIARRHEA: 0
BACK PAIN: 0
NAUSEA: 0
SHORTNESS OF BREATH: 1
COUGH: 0
RHINORRHEA: 0
SORE THROAT: 0
ABDOMINAL PAIN: 0
VOMITING: 0

## 2022-06-28 ASSESSMENT — PAIN SCALES - GENERAL
PAINLEVEL_OUTOF10: 5
PAINLEVEL_OUTOF10: 5
PAINLEVEL_OUTOF10: 4
PAINLEVEL_OUTOF10: 7
PAINLEVEL_OUTOF10: 5
PAINLEVEL_OUTOF10: 4
PAINLEVEL_OUTOF10: 5
PAINLEVEL_OUTOF10: 4
PAINLEVEL_OUTOF10: 8
PAINLEVEL_OUTOF10: 4
PAINLEVEL_OUTOF10: 5

## 2022-06-28 NOTE — ED PROVIDER NOTES
[facility] EMERGENCY DEPT  eMERGENCY dEPARTMENT eNCOUnter      Pt Name: Emre Neal  MRN: [id number]  [place] 1955  Date of evaluation: 6/28/2022  Provider: Della Kinney MD    [address]       Chief Complaint   Patient presents with    Chest Pain     Chest pain and weakness X3 days. Pt staes taking 6 nitro today         HISTORY OF PRESENT ILLNESS   (Location/Symptom, Timing/Onset,Context/Setting, Quality, Duration, Modifying Factors, Severity)  Note limiting factors. Emre Neal is a 79 y.o. male who presents to the emergency department for chest pain. The patient reports generalized weakness and fatigue with chest pain intermittently for the past 3 days. Pain is centrally located radiating to his left arm described as a dull pressure. Symptoms seem somewhat exertional as well. He has 6 prior cardiac stents some which were placed here and others in [place]. Cardiac cath was December 2016. Tells me he has been taking nitroglycerin the past 3 days of unable to control it until this afternoon and ultimately decided to come in the day due to ongoing pain. HPI    NursingNotes were reviewed. REVIEW OF SYSTEMS    (2-9 systems for level 4, 10 or more for level 5)     Review of Systems   Constitutional: Positive for fatigue. Negative for chills and fever. HENT: Negative for rhinorrhea and sore throat. Respiratory: Positive for shortness of breath. Negative for cough. Cardiovascular: Positive for chest pain. Negative for leg swelling. Gastrointestinal: Negative for abdominal pain, diarrhea, nausea and vomiting. Genitourinary: Negative for dysuria and frequency. Musculoskeletal: Negative for back pain and neck pain. Neurological: Negative for dizziness and headaches. All other systems reviewed and are negative. PAST MEDICALHISTORY     Past Medical History:   Diagnosis Date    CAD (coronary artery disease) 2010    [place].   S/p stenting wtih negative cardiac cath on January 18, 2010 and negative nuclear stress test on June 29, 2010.  Chest pain 1/28/2016    DJD (degenerative joint disease)     GERD (gastroesophageal reflux disease) 2010    HTN (hypertension)     Hyperlipidemia     Left knee pain 1/28/2016    Pacemaker 2010    [employer]    Polyarticular arthritis     Renal calculi 2010    S/p lithrotripsy    Right arm pain 1/28/2016    Right ureteral stone 7/8/2016    Stones in the urinary tract 02/02/2022    Tobacco abuse 2010    Prior. SURGICAL HISTORY       Past Surgical History:   Procedure Laterality Date    CARDIAC CATHETERIZATION  01/08/2010    EF is estimated to be 60%. See scanned document.  CARDIAC CATHETERIZATION  6/20/08, 10/2/08    EF is estimated to be 60%. See scanned document.  CARDIAC CATHETERIZATION  2/13/07, 6/6/07    EF 50%. See scanned document.  CARDIAC CATHETERIZATION  1/8/07, 11/26/07    EF is estimated to be in excess of 50%. See scanned doucment.  CARDIAC CATHETERIZATION  08/17/2006    EF is estimated to be in excess of 50%. See scanned doucment.  CARDIAC CATHETERIZATION  5/14/14  MDL    normal LV function.  EF 60    CHOLECYSTECTOMY      CLAVICLE SURGERY      COLONOSCOPY      Endoscopy    CYSTOSCOPY Right 7/8/2016    CYSTOSCOPY; RIGHT RETROGRADE PYELOGRAM; RIGHT URETERAL DILATATION; RIGHT URETEROSCOPY; RIGHT URETERAL LASER LITHOTRIPSY AND STONE EXTRACTION; INSERTION RIGHT URETERAL DOUBLE J STENT performed by Aanis Can MD at [facility] Left 1/14/2021    CYSTOSCOPY URETEROSCOPY LASER LITHO WITH STONE EXTRACTION AND STENT REMOVAL> performed by Trae Miller MD at [facility] 12/29/2021    CYSTOSCOPY; LEFT URTERAL CATHERIZATION; LEFT RETORGRADE PYLEOGRAM; REMOVAL BLADDER CALCULI performed by Jeremy Jules MD at [address] [place] [address] Left     HAND SURGERY      HUMERUS SURGERY      KNEE SURGERY      Right    KNEE SURGERY Left     LITHOTRIPSY      MANDIBLE FRACTURE SURGERY      NECK SURGERY      cervical spine.  OTHER SURGICAL HISTORY      Brachytherapy of the core stents.  PACEMAKER PLACEMENT      [employer]    PTCA      TIBIA FRACTURE SURGERY      WRIST SURGERY           CURRENT MEDICATIONS     Previous Medications    AMLODIPINE (NORVASC) 10 MG TABLET    Take 10 mg by mouth daily. ASPIRIN 325 MG TABLET    Take 325 mg by mouth daily    CLOPIDOGREL (PLAVIX) 75 MG TABLET    Take 75 mg by mouth daily. COENZYME Q10 (COQ-10 PO)    Take by mouth daily     HYDROCHLOROTHIAZIDE (HYDRODIURIL) 12.5 MG TABLET    Take 12.5 mg by mouth daily Indications: as needed    ISOSORBIDE MONONITRATE (IMDUR) 120 MG CR TABLET    Take 120 mg by mouth 2 times daily     LORAZEPAM (ATIVAN) 1 MG TABLET    Take 1 mg by mouth 3 times daily as needed    METOPROLOL TARTRATE (LOPRESSOR) 25 MG TABLET    Take 25 mg by mouth 2 times daily Indications: Pt unaware of dosage    NITROSTAT 0.4 MG SL TABLET    Place 1 tablet under the tongue 3 times daily as needed    NUTRITIONAL SUPPLEMENTS (BOOST PO)    Take by mouth    ONDANSETRON (ZOFRAN ODT) 4 MG DISINTEGRATING TABLET    Take 1 tablet by mouth every 8 hours as needed for Nausea or Vomiting    OXYCODONE (OXY-IR) 15 MG IMMEDIATE RELEASE TABLET    TAKE 1 TABLET BY MOUTH EVERY 6 HOURS    PROCHLORPERAZINE (COMPAZINE) 5 MG TABLET    TAKE 1 TABLET BY MOUTH EVERY 6 HOURS AS NEEDED FOR NAUSEA    QUETIAPINE (SEROQUEL) 25 MG TABLET        SCOPOLAMINE (TRANSDERM-SCOP) TRANSDERMAL PATCH        TAMSULOSIN (FLOMAX) 0.4 MG CAPSULE    Take 1 capsule by mouth daily       ALLERGIES     Ezetimibe-simvastatin, Bee venom, Darvocet [propoxyphene n-acetaminophen], Other, Penicillins, Phenergan [promethazine hcl], Promethazine, Promethazine hcl, and Propoxyphene    FAMILY HISTORY     No family history on file.        SOCIAL HISTORY       Social History     Socioeconomic History    Marital status:      Spouse name: Not on file    Number of children: Not more for level 5)     ED Triage Vitals [06/28/22 1803]   BP Temp Temp Source Heart Rate Resp SpO2 Height Weight   96/66 98.4 °F (36.9 °C) Oral 63 18 95 % -- --       Physical Exam  Vitals and nursing note reviewed. Constitutional:       Appearance: He is well-developed. He is ill-appearing. He is not diaphoretic. HENT:      Head: Normocephalic and atraumatic. Eyes:      Conjunctiva/sclera: Conjunctivae normal.   Neck:      Trachea: No tracheal deviation. Cardiovascular:      Rate and Rhythm: Normal rate and regular rhythm. Heart sounds: Normal heart sounds. No murmur heard. Pulmonary:      Breath sounds: Normal breath sounds. No wheezing or rales. Abdominal:      Palpations: Abdomen is soft. There is no mass. Tenderness: There is no abdominal tenderness. Musculoskeletal:         General: Normal range of motion. Cervical back: Normal range of motion and neck supple. Right lower leg: No edema. Left lower leg: No edema. Skin:     General: Skin is warm and dry. Neurological:      Mental Status: He is alert and oriented to person, place, and time. DIAGNOSTIC RESULTS     EKG: All EKG's areinterpreted by the Emergency Department Physician who either signs or Co-signs this chart in the absence of a cardiologist.    60 atrial paced rhythm no obvious ST changes nondiagnostic EKG    2021--60 atrial paced rhythm no obvious ST changes nondiagnostic EKG    RADIOLOGY:  Non-plain film images such as CT, Ultrasound and MRI are read by the radiologist. Plain radiographic images are visualized and preliminarily interpreted bythe emergency physician with the below findings:      XR CHEST PORTABLE   Final Result   Bilateral lung hyperinflation with no radiographic evidence of acute pulmonary or pleural pathologic process. No interval change. Recommendation: Follow up as clinically indicated.     Electronically Signed by Edd Correia MD at 28-Jun-2022 07:50:40 PM Prescriptions    No medications on file          (Please note that portions of this note were completed with a voice recognition program.  Efforts were made to edit thedictations but occasionally words are mis-transcribed.)    Paco Miller MD (electronically signed)  Attending Emergency Physician        Diana Palomino MD  06/28/22 [id number]

## 2022-06-28 NOTE — ED NOTES
Patient states he already took \"full strength aspirin\" at home today, 325mg confirmed. Will return dispensed medication.      Marlon Villeda RN  06/28/22 [id number]

## 2022-06-29 ENCOUNTER — TELEPHONE (OUTPATIENT)
Dept: CARDIOLOGY CLINIC | Age: 67
End: 2022-06-29

## 2022-06-29 VITALS
RESPIRATION RATE: 18 BRPM | TEMPERATURE: 97 F | BODY MASS INDEX: 19.12 KG/M2 | WEIGHT: 121.8 LBS | OXYGEN SATURATION: 96 % | DIASTOLIC BLOOD PRESSURE: 77 MMHG | HEIGHT: 67 IN | HEART RATE: 59 BPM | SYSTOLIC BLOOD PRESSURE: 113 MMHG

## 2022-06-29 LAB
ANION GAP SERPL CALCULATED.3IONS-SCNC: 10 MMOL/L (ref 7–19)
APTT: 29.2 SEC (ref 26–36.2)
APTT: 65.4 SEC (ref 26–36.2)
BUN BLDV-MCNC: 11 MG/DL (ref 8–23)
CALCIUM SERPL-MCNC: 8.8 MG/DL (ref 8.8–10.2)
CHLORIDE BLD-SCNC: 105 MMOL/L (ref 98–111)
CHOLESTEROL, TOTAL: 104 MG/DL (ref 160–199)
CO2: 25 MMOL/L (ref 22–29)
CREAT SERPL-MCNC: 0.5 MG/DL (ref 0.5–1.2)
D DIMER: 0.48 UG/ML FEU (ref 0–0.48)
FERRITIN: 69.5 NG/ML (ref 30–400)
FOLATE: 12.4 NG/ML (ref 4.5–32.2)
GFR AFRICAN AMERICAN: >59
GFR NON-AFRICAN AMERICAN: >60
GLUCOSE BLD-MCNC: 103 MG/DL (ref 74–109)
HBA1C MFR BLD: 5.5 % (ref 4–6)
HCT VFR BLD CALC: 40.1 % (ref 42–52)
HCT VFR BLD CALC: 43.2 % (ref 42–52)
HDLC SERPL-MCNC: 63 MG/DL (ref 55–121)
HEMOGLOBIN: 13 G/DL (ref 14–18)
HEMOGLOBIN: 13.4 G/DL (ref 14–18)
IRON SATURATION: 55 % (ref 14–50)
IRON: 153 UG/DL (ref 59–158)
LDL CHOLESTEROL CALCULATED: 24 MG/DL
MAGNESIUM: 2 MG/DL (ref 1.6–2.4)
MCH RBC QN AUTO: 29.1 PG (ref 27–31)
MCH RBC QN AUTO: 29.3 PG (ref 27–31)
MCHC RBC AUTO-ENTMCNC: 31 G/DL (ref 33–37)
MCHC RBC AUTO-ENTMCNC: 32.4 G/DL (ref 33–37)
MCV RBC AUTO: 90.3 FL (ref 80–94)
MCV RBC AUTO: 93.9 FL (ref 80–94)
PDW BLD-RTO: 12.9 % (ref 11.5–14.5)
PDW BLD-RTO: 12.9 % (ref 11.5–14.5)
PHOSPHORUS: 3.2 MG/DL (ref 2.5–4.5)
PLATELET # BLD: 171 K/UL (ref 130–400)
PLATELET # BLD: 186 K/UL (ref 130–400)
PMV BLD AUTO: 8.6 FL (ref 9.4–12.4)
PMV BLD AUTO: 9.2 FL (ref 9.4–12.4)
POTASSIUM REFLEX MAGNESIUM: 3.8 MMOL/L (ref 3.5–5)
RBC # BLD: 4.44 M/UL (ref 4.7–6.1)
RBC # BLD: 4.6 M/UL (ref 4.7–6.1)
SODIUM BLD-SCNC: 140 MMOL/L (ref 136–145)
TOTAL IRON BINDING CAPACITY: 277 UG/DL (ref 250–400)
TRIGL SERPL-MCNC: 87 MG/DL (ref 0–149)
TROPONIN: <0.01 NG/ML (ref 0–0.03)
VITAMIN B-12: 425 PG/ML (ref 211–946)
VITAMIN D 25-HYDROXY: 41 NG/ML
WBC # BLD: 5.1 K/UL (ref 4.8–10.8)
WBC # BLD: 5.1 K/UL (ref 4.8–10.8)

## 2022-06-29 PROCEDURE — 96376 TX/PRO/DX INJ SAME DRUG ADON: CPT

## 2022-06-29 PROCEDURE — 96366 THER/PROPH/DIAG IV INF ADDON: CPT

## 2022-06-29 PROCEDURE — 83550 IRON BINDING TEST: CPT

## 2022-06-29 PROCEDURE — 96375 TX/PRO/DX INJ NEW DRUG ADDON: CPT

## 2022-06-29 PROCEDURE — 96368 THER/DIAG CONCURRENT INF: CPT

## 2022-06-29 PROCEDURE — 82607 VITAMIN B-12: CPT

## 2022-06-29 PROCEDURE — 82306 VITAMIN D 25 HYDROXY: CPT

## 2022-06-29 PROCEDURE — 82746 ASSAY OF FOLIC ACID SERUM: CPT

## 2022-06-29 PROCEDURE — 80061 LIPID PANEL: CPT

## 2022-06-29 PROCEDURE — 6370000000 HC RX 637 (ALT 250 FOR IP): Performed by: NURSE PRACTITIONER

## 2022-06-29 PROCEDURE — 83540 ASSAY OF IRON: CPT

## 2022-06-29 PROCEDURE — 6360000002 HC RX W HCPCS: Performed by: INTERNAL MEDICINE

## 2022-06-29 PROCEDURE — 83735 ASSAY OF MAGNESIUM: CPT

## 2022-06-29 PROCEDURE — 82728 ASSAY OF FERRITIN: CPT

## 2022-06-29 PROCEDURE — 6370000000 HC RX 637 (ALT 250 FOR IP): Performed by: INTERNAL MEDICINE

## 2022-06-29 PROCEDURE — 80048 BASIC METABOLIC PNL TOTAL CA: CPT

## 2022-06-29 PROCEDURE — 84100 ASSAY OF PHOSPHORUS: CPT

## 2022-06-29 PROCEDURE — 85730 THROMBOPLASTIN TIME PARTIAL: CPT

## 2022-06-29 PROCEDURE — 85027 COMPLETE CBC AUTOMATED: CPT

## 2022-06-29 PROCEDURE — 85379 FIBRIN DEGRADATION QUANT: CPT

## 2022-06-29 PROCEDURE — 2580000003 HC RX 258: Performed by: NURSE PRACTITIONER

## 2022-06-29 PROCEDURE — 93005 ELECTROCARDIOGRAM TRACING: CPT | Performed by: NURSE PRACTITIONER

## 2022-06-29 PROCEDURE — 99223 1ST HOSP IP/OBS HIGH 75: CPT | Performed by: INTERNAL MEDICINE

## 2022-06-29 PROCEDURE — 84484 ASSAY OF TROPONIN QUANT: CPT

## 2022-06-29 PROCEDURE — 36415 COLL VENOUS BLD VENIPUNCTURE: CPT

## 2022-06-29 RX ORDER — METOPROLOL TARTRATE 50 MG/1
50 TABLET, FILM COATED ORAL 2 TIMES DAILY
Status: DISCONTINUED | OUTPATIENT
Start: 2022-06-29 | End: 2022-06-29 | Stop reason: HOSPADM

## 2022-06-29 RX ORDER — HEPARIN SODIUM 10000 [USP'U]/100ML
5-30 INJECTION, SOLUTION INTRAVENOUS CONTINUOUS
Status: DISCONTINUED | OUTPATIENT
Start: 2022-06-29 | End: 2022-06-29

## 2022-06-29 RX ORDER — QUETIAPINE FUMARATE 25 MG/1
25 TABLET, FILM COATED ORAL NIGHTLY
Status: DISCONTINUED | OUTPATIENT
Start: 2022-06-29 | End: 2022-06-29 | Stop reason: HOSPADM

## 2022-06-29 RX ORDER — HEPARIN SODIUM 1000 [USP'U]/ML
30 INJECTION, SOLUTION INTRAVENOUS; SUBCUTANEOUS PRN
Status: DISCONTINUED | OUTPATIENT
Start: 2022-06-29 | End: 2022-06-29 | Stop reason: ALTCHOICE

## 2022-06-29 RX ORDER — METOPROLOL TARTRATE 50 MG/1
50 TABLET, FILM COATED ORAL 2 TIMES DAILY
Status: DISCONTINUED | OUTPATIENT
Start: 2022-06-29 | End: 2022-06-29

## 2022-06-29 RX ORDER — HEPARIN SODIUM 1000 [USP'U]/ML
60 INJECTION, SOLUTION INTRAVENOUS; SUBCUTANEOUS PRN
Status: DISCONTINUED | OUTPATIENT
Start: 2022-06-29 | End: 2022-06-29 | Stop reason: ALTCHOICE

## 2022-06-29 RX ORDER — MORPHINE SULFATE 2 MG/ML
2 INJECTION, SOLUTION INTRAMUSCULAR; INTRAVENOUS EVERY 4 HOURS PRN
Status: DISCONTINUED | OUTPATIENT
Start: 2022-06-29 | End: 2022-06-29 | Stop reason: HOSPADM

## 2022-06-29 RX ORDER — HEPARIN SODIUM 1000 [USP'U]/ML
60 INJECTION, SOLUTION INTRAVENOUS; SUBCUTANEOUS ONCE
Status: COMPLETED | OUTPATIENT
Start: 2022-06-29 | End: 2022-06-29

## 2022-06-29 RX ADMIN — METOPROLOL TARTRATE 50 MG: 50 TABLET, FILM COATED ORAL at 10:41

## 2022-06-29 RX ADMIN — CLOPIDOGREL BISULFATE 75 MG: 75 TABLET ORAL at 08:51

## 2022-06-29 RX ADMIN — MORPHINE SULFATE 2 MG: 2 INJECTION, SOLUTION INTRAMUSCULAR; INTRAVENOUS at 08:54

## 2022-06-29 RX ADMIN — OXYCODONE 15 MG: 5 TABLET ORAL at 07:15

## 2022-06-29 RX ADMIN — ASPIRIN 81 MG 81 MG: 81 TABLET ORAL at 08:51

## 2022-06-29 RX ADMIN — SODIUM CHLORIDE, PRESERVATIVE FREE 10 ML: 5 INJECTION INTRAVENOUS at 00:30

## 2022-06-29 RX ADMIN — HEPARIN SODIUM 12 UNITS/KG/HR: 10000 INJECTION, SOLUTION INTRAVENOUS at 02:30

## 2022-06-29 RX ADMIN — TAMSULOSIN HYDROCHLORIDE 0.4 MG: 0.4 CAPSULE ORAL at 08:51

## 2022-06-29 RX ADMIN — SODIUM CHLORIDE: 9 INJECTION, SOLUTION INTRAVENOUS at 01:16

## 2022-06-29 RX ADMIN — MORPHINE SULFATE 2 MG: 2 INJECTION, SOLUTION INTRAMUSCULAR; INTRAVENOUS at 15:57

## 2022-06-29 RX ADMIN — SODIUM CHLORIDE, PRESERVATIVE FREE 10 ML: 5 INJECTION INTRAVENOUS at 08:52

## 2022-06-29 RX ADMIN — HEPARIN SODIUM 3310 UNITS: 1000 INJECTION INTRAVENOUS; SUBCUTANEOUS at 02:26

## 2022-06-29 RX ADMIN — AMLODIPINE BESYLATE 10 MG: 5 TABLET ORAL at 08:51

## 2022-06-29 RX ADMIN — MORPHINE SULFATE 2 MG: 2 INJECTION, SOLUTION INTRAMUSCULAR; INTRAVENOUS at 01:33

## 2022-06-29 RX ADMIN — OXYCODONE 15 MG: 5 TABLET ORAL at 13:08

## 2022-06-29 ASSESSMENT — PAIN DESCRIPTION - ORIENTATION
ORIENTATION: MID
ORIENTATION: MID

## 2022-06-29 ASSESSMENT — PAIN SCALES - GENERAL
PAINLEVEL_OUTOF10: 8
PAINLEVEL_OUTOF10: 6
PAINLEVEL_OUTOF10: 8
PAINLEVEL_OUTOF10: 5
PAINLEVEL_OUTOF10: 8
PAINLEVEL_OUTOF10: 5
PAINLEVEL_OUTOF10: 3

## 2022-06-29 ASSESSMENT — LIFESTYLE VARIABLES: HOW OFTEN DO YOU HAVE A DRINK CONTAINING ALCOHOL: NEVER

## 2022-06-29 ASSESSMENT — PAIN DESCRIPTION - DESCRIPTORS
DESCRIPTORS: ACHING
DESCRIPTORS: ACHING;DULL
DESCRIPTORS: ACHING;SHARP;TIGHTNESS
DESCRIPTORS: ACHING
DESCRIPTORS: ACHING

## 2022-06-29 ASSESSMENT — PAIN - FUNCTIONAL ASSESSMENT
PAIN_FUNCTIONAL_ASSESSMENT: PREVENTS OR INTERFERES SOME ACTIVE ACTIVITIES AND ADLS
PAIN_FUNCTIONAL_ASSESSMENT: ACTIVITIES ARE NOT PREVENTED

## 2022-06-29 ASSESSMENT — PAIN DESCRIPTION - LOCATION
LOCATION: CHEST;BACK;LEG
LOCATION: LEG;ARM;BACK
LOCATION: CHEST
LOCATION: LEG;ARM;BACK
LOCATION: CHEST

## 2022-06-29 ASSESSMENT — ENCOUNTER SYMPTOMS
GASTROINTESTINAL NEGATIVE: 1
DIARRHEA: 0
EYES NEGATIVE: 1
SHORTNESS OF BREATH: 0
ABDOMINAL PAIN: 0
NAUSEA: 0
RESPIRATORY NEGATIVE: 1
VOMITING: 0

## 2022-06-29 NOTE — ED NOTES
In room with patient. States \"pain is a little better but not much. \"     Kirk Ko RN  06/28/22 [id number]

## 2022-06-29 NOTE — ED NOTES
patient has already taken 6x nitro 0.4ml SL at home today, and same amount yesterday. Also 325mg ASA at home today. To no effect. MD notified. Both meds [facility].        Luis Santizo RN  06/28/22 1738

## 2022-06-29 NOTE — ED NOTES
Patient given Oxycodone. Becomes angry and states \"I take this at home! It takes an hour to work! If I'm not upstairs in one hour I am calling my wife to take me home! I need my evening meds! I need my sleeping medication! The pain kept me up all night and it will tonight too! \"     Elke Taveras RN  06/28/22 [id number]

## 2022-06-29 NOTE — ED NOTES
Spoke to hospitalist Dr Elke Bennett. Will increase nitro drip. Will administer night time Seroquel dose.      Hafsa Jasso RN  06/28/22 [id number]       Hafsa Jasso RN  06/29/22 0748

## 2022-06-29 NOTE — H&P
[facility]      Hospitalist - History & Physical      PCP: Marisol Grewal MD    Date of Admission: 6/28/2022    Date of Service: 6/28/2022    Chief Complaint:  Chest pain    History Of Present Illness: The patient is a 79 y.o. male who presented to [address] [place] [address] ED complaining of chest pain. Pt has history of CAD with prior cardiac stent placement, HTN, hyperlipidemia, pacemaker placement, gerd and DJD. Pt with midsternal chest pain over past 3 days initially improved with nitroglycerin use at home. Today change in chest pain in that he took nitroglycerin without much improvement in symptoms of chest pressure and shortness of breath. Pt denies fevers, abdominal pain as well as vomiting. In ED, initial troponin and ekg negative for ischemia. Wbc 6k, hgb 14, platelets 081D, sodium 143, potassium 3.9, glucose 113, creatinine 0.6/bun 11, LFTs normal, CXR-Bilateral lung hyperinflation with no radiographic evidence of acute pulmonary or pleural pathologic process. covid test negative. Pt is admitted to hospitalist service for further evaluation and treatment. Past Medical History:        Diagnosis Date    CAD (coronary artery disease) 2010    [place]essel. S/p stenting wtih negative cardiac cath on January 18, 2010 and negative nuclear stress test on June 29, 2010.  Chest pain 1/28/2016    DJD (degenerative joint disease)     GERD (gastroesophageal reflux disease) 2010    HTN (hypertension)     Hyperlipidemia     Left knee pain 1/28/2016    Pacemaker 2010    [employer]    Polyarticular arthritis     Renal calculi 2010    S/p lithrotripsy    Right arm pain 1/28/2016    Right ureteral stone 7/8/2016    Stones in the urinary tract 02/02/2022    Tobacco abuse 2010    Prior. Past Surgical History:        Procedure Laterality Date    CARDIAC CATHETERIZATION  01/08/2010    EF is estimated to be 60%. See scanned document.     CARDIAC CATHETERIZATION  6/20/08, 10/2/08    EF is estimated to Continue meds as per psych be 60%. See scanned document.  CARDIAC CATHETERIZATION  2/13/07, 6/6/07    EF 50%. See scanned document.  CARDIAC CATHETERIZATION  1/8/07, 11/26/07    EF is estimated to be in excess of 50%. See scanned doucment.  CARDIAC CATHETERIZATION  08/17/2006    EF is estimated to be in excess of 50%. See scanned doucment.  CARDIAC CATHETERIZATION  5/14/14  [facility]DL    normal LV function. EF 60    CHOLECYSTECTOMY      CLAVICLE SURGERY      COLONOSCOPY      Endoscopy    CYSTOSCOPY Right 7/8/2016    CYSTOSCOPY; RIGHT RETROGRADE PYELOGRAM; RIGHT URETERAL DILATATION; RIGHT URETEROSCOPY; RIGHT URETERAL LASER LITHOTRIPSY AND STONE EXTRACTION; INSERTION RIGHT URETERAL DOUBLE J STENT performed by Vanessa Homans, MD at [address] [place] [address] Left 1/14/2021    CYSTOSCOPY URETEROSCOPY LASER LITHO WITH STONE EXTRACTION AND STENT REMOVAL> performed by Megan Hernandez MD at [address] [place] [address] Left 12/29/2021    CYSTOSCOPY; LEFT URTERAL CATHERIZATION; LEFT RETORGRADE PYLEOGRAM; REMOVAL BLADDER CALCULI performed by Carline Jaramillo MD at [address] [place] [address] Left     HAND SURGERY      HUMERUS SURGERY      KNEE SURGERY      Right    KNEE SURGERY Left     LITHOTRIPSY      MANDIBLE FRACTURE SURGERY      NECK SURGERY      cervical spine.  OTHER SURGICAL HISTORY      Brachytherapy of the core stents.  PACEMAKER PLACEMENT      [employer]    PTCA      TIBIA FRACTURE SURGERY      WRIST SURGERY         Home Medications:  Prior to Admission medications    Medication Sig Start Date End Date Taking?  Authorizing Provider   ondansetron (ZOFRAN ODT) 4 MG disintegrating tablet Take 1 tablet by mouth every 8 hours as needed for Nausea or Vomiting 12/29/21   Awa Mcdaniels, APRN - CNP   QUEtiapine (SEROQUEL) 25 MG tablet  12/12/21   Historical Provider, MD   scopolamine (TRANSDERM-SCOP) transdermal patch  11/16/21   Historical Provider, MD   tamsulosin (FLOMAX) 0.4 MG capsule Take 1 capsule by mouth daily 12/2/21 BHUMIKA Luciano   oxyCODONE (OXY-IR) 15 MG immediate release tablet TAKE 1 TABLET BY MOUTH EVERY 6 HOURS 1/15/21   Historical Provider, MD   prochlorperazine (COMPAZINE) 5 MG tablet TAKE 1 TABLET BY MOUTH EVERY 6 HOURS AS NEEDED FOR NAUSEA 1/4/21   Historical Provider, MD   aspirin 325 MG tablet Take 325 mg by mouth daily    Historical Provider, MD   metoprolol tartrate (LOPRESSOR) 25 MG tablet Take 25 mg by mouth 2 times daily Indications: Pt unaware of dosage    Historical Provider, MD   isosorbide mononitrate (IMDUR) 120 MG CR tablet Take 120 mg by mouth 2 times daily     Historical Provider, MD   hydrochlorothiazide (HYDRODIURIL) 12.5 MG tablet Take 12.5 mg by mouth daily Indications: as needed    Historical Provider, MD   Coenzyme Q10 (COQ-10 PO) Take by mouth daily     Historical Provider, MD   Nutritional Supplements (BOOST PO) Take by mouth    Historical Provider, MD   NITROSTAT 0.4 MG SL tablet Place 1 tablet under the tongue 3 times daily as needed 10/23/15   Historical Provider, MD   LORazepam (ATIVAN) 1 MG tablet Take 1 mg by mouth 3 times daily as needed 1/18/16   Historical Provider, MD   amlodipine (NORVASC) 10 MG tablet Take 10 mg by mouth daily. Historical Provider, MD   clopidogrel (PLAVIX) 75 MG tablet Take 75 mg by mouth daily. Historical Provider, MD       Allergies:    Ezetimibe-simvastatin, Bee venom, Darvocet [propoxyphene n-acetaminophen], Other, Penicillins, Phenergan [promethazine hcl], Promethazine, Promethazine hcl, and Propoxyphene    Social History:    The patient currently lives with wife  Tobacco:   reports that he has quit smoking. His smoking use included cigarettes. He uses smokeless tobacco.  Alcohol:   reports no history of alcohol use. Illicit Drugs: none    Family History:  No family history on file. Review of Systems:   Review of Systems   Constitutional: Positive for fatigue. Cardiovascular: Positive for chest pain.    Gastrointestinal: Negative for abdominal pain and vomiting. All other systems reviewed and are negative. 14 point review of systems is negative except as specifically addressed above. Physical Examination:  /72   Pulse 60   Temp 98.1 °F (36.7 °C) (Oral)   Resp 14   Wt 130 lb (59 kg)   SpO2 96%   BMI 20.36 kg/m²   Physical Exam  Vitals reviewed. Constitutional:       Comments: 79 yr old male appears in no respiratory distress breathing room air oxygen   HENT:      Head: Normocephalic. Right Ear: External ear normal.      Left Ear: External ear normal.   Eyes:      Conjunctiva/sclera: Conjunctivae normal.   Cardiovascular:      Rate and Rhythm: Normal rate and regular rhythm. Heart sounds: Normal heart sounds. Pulmonary:      Effort: Pulmonary effort is normal.      Breath sounds: Normal breath sounds. Abdominal:      Palpations: Abdomen is soft. Tenderness: There is no abdominal tenderness. Musculoskeletal:         General: No tenderness. Cervical back: Normal range of motion. Right lower leg: No edema. Left lower leg: No edema. Skin:     General: Skin is warm and dry. Neurological:      Mental Status: He is alert and oriented to person, place, and time. Diagnostic Data:  CBC:  Recent Labs     06/28/22 1812   WBC 5.8   HGB 13.8*   HCT 42.6        BMP:  Recent Labs     06/28/22 1812      K 3.9      CO2 27   BUN 11   CREATININE 0.6   CALCIUM 9.0     Recent Labs     06/28/22 1812   AST 18   ALT 14   BILITOT 0.5   ALKPHOS 94     Coag Panel:   Recent Labs     06/28/22 1812   INR 1.03   PROTIME 13.4   APTT 29.7     Cardiac Enzymes:   Recent Labs     06/28/22 1812   TROPONINI <0.01     XR CHEST PORTABLE    Result Date: 6/28/2022  NO PRIOR REPORT AVAILABLE [facility] data:Chest pain. Technique:Single view of the chest. Prior studies: Radiograph of chest dated 02/27/2022. Findings: The lungs are hyperinflated; noevidence of acute infiltrate or pleural effusion. Cardiac silhouette is within normal limits. The bony thorax is stable    Bilateral lung hyperinflation with no radiographic evidence of acute pulmonary or pleural pathologic process. No interval change. Recommendation: Follow up as clinically indicated. Electronically Signed by Nikhil Turner MD at 28-Jun-2022 07:50:40 PM               Echocardiogram 2/28/2022  Indications:Syncope. Conclusions      Summary   Normal left ventricular chamber size and systolic function. Left ventricular ejection fraction is visually estimated at 60%. Severe right atrial enlargement. Increased IVC diameter and decreased inspiratory collapse suggesting RA   pressure of 15   Aortic valve leaflets are mildly thickened. There is mildly decreased   excursion. Mild aortic stenosis is present. The peak gradient across the aortic valve is 38 mmHg. The mean gradient across the aortic valve is 17 mmHg. Moderate-to-severe tricuspid regurgitation . There is moderate pulmonary hypertension. Signature      ----------------------------------------------------------------   Electronically signed by Lydia Gunter DO(Interpreting   physician) on 02/28/2022 11:47 AM   ----------------------------------------------------------------      Assessment/Plan:  Principal Problem:    ACS (acute coronary syndrome) ([address]  Active Problems:    CAD (coronary artery disease)    Hyperlipidemia    HTN (hypertension)    Benign prostatic hyperplasia with urinary frequency  Resolved Problems:    * No resolved hospital problems.  *     Principal Problem:    ACS/CAD with pror cardiac stents/Pacemaker   -therapeutic lmwh    -nitroglycerin infusion   -cards consult   -trend serial troponin   -follow ekg   -npo after midnight   -ns at 75cc/hr   -cardiac meds-asa    statin not ordered due to reported allergy   -telemetry    -tsh   -magnesium   -phosphorus   -HgA1c   -vitamin 25hydroxy   -continue plavix  Active Problems: Hyperlipidemia   -fasting lipid panel    HTN (hypertension)   -monitor blood pressure   -continue antihypertensive meds   -avoid hypotension    Benign prostatic hyperplasia with urinary frequency   -bladder scan   -urinalysis with reflex to culture   -continue flomax   -daily weight   -I's and O's  Resolved Problems:    * No resolved hospital problems. *  Signed:  BHUMIKA Pimentel - CNP, 6/28/2022 9:30 PM      Medicine attending     Patient evaluated     Called by ER staff about recurrent chest pain     He has known underlying cardiac disease and this is recurrent chest pain   He has some level of constant chest pain   On pain meds     ekg unchanged.      No tachycardia     No elevation in troponin     Asked Dinorah Varela RN charge nurse PCU to call cardiology and seek their advice as to how to proceed     They recommended that patient   Should be put on heparin drip and also morphine for pain relief in addiition to nitro drip

## 2022-06-29 NOTE — PROGRESS NOTES
Nas Ng arrived to room # 35 [id number]. Presented with: Chest Pain  Mental Status: Patient is oriented, alert, coherent, logical, thought processes intact and able to concentrate and follow conversation. Vitals:    06/29/22 0133   BP:    Pulse:    Resp: 18   Temp:    SpO2:      Patient safety contract and falls prevention contract reviewed with patient Yes. Oriented Patient to room. Call light within reach. Yes.   Needs, issues or concerns expressed at this time: no.      Electronically signed by River Villar RN on 6/29/2022 at 4:00 AM

## 2022-06-29 NOTE — TELEPHONE ENCOUNTER
Komal Sullivan called needing to schedule 3 wk hospital flwup with Dr. Anant Kc. [facility] unable to schedule within time frame. Please contact pt to discuss/schedule.     Thank you

## 2022-06-29 NOTE — CONSULTS
[facility]  Cardiology Consult      Requesting MD:  Deniz Nicole 42, *   Admit Status:         History obtained from:   [] Patient  [] Other (specify):     Patient:  Willian Erickson  [id number]     Chief Complaint:   Chief Complaint   Patient presents with    Chest Pain     Chest pain and weakness X3 days. Pt staes taking 6 nitro today       HPI: Mr. Dayanna Rivera is a 79 y.o. male with plaints of chest pain intermittently over several days. History of coronary artery disease previous stent placement hyperlipidemia hypertension pacemaker placement gastroesophageal reflux disease and degenerative joint disease. Nitroglycerin initially relieved the pain at home but last day or 2 has not been so helpful. All troponins are negative. Placed on heparin drip nitroglycerin infusion and given morphine for pain relief during the evening. Feels better today. Review of Systems:  Review of Systems   Constitutional: Negative. Negative for chills, fever and unexpected weight change. HENT: Negative. Eyes: Negative. Respiratory: Negative. Negative for shortness of breath. Cardiovascular: Negative. Negative for chest pain. Gastrointestinal: Negative. Negative for diarrhea, nausea and vomiting. Endocrine: Negative. Genitourinary: Negative. Musculoskeletal: Negative. Skin: Negative. Neurological: Negative. All other systems reviewed and are negative. Cardiac Specific Data:  Specialty Problems        Cardiology Problems    CAD (coronary artery disease)        HTN (hypertension)        Hyperlipidemia        Chest pain        * (Principal) ACS (acute coronary syndrome) (HCC)        Angina pectoris, crescendo ([facility] [address]              Past Medical History:  Past Medical History:   Diagnosis Date    CAD (coronary artery disease) 2010    [place] Vessel. S/p stenting wtih negative cardiac cath on January 18, 2010 and negative nuclear stress test on June 29, 2010.     Chest pain 1/28/2016    DJD (degenerative joint disease)     GERD (gastroesophageal reflux disease) 2010    HTN (hypertension)     Hyperlipidemia     Left knee pain 1/28/2016    Pacemaker 2010    [employer]    Polyarticular arthritis     Renal calculi 2010    S/p lithrotripsy    Right arm pain 1/28/2016    Right ureteral stone 7/8/2016    Stones in the urinary tract 02/02/2022    Tobacco abuse 2010    Prior. Past Surgical History:  Past Surgical History:   Procedure Laterality Date    CARDIAC CATHETERIZATION  01/08/2010    EF is estimated to be 60%. See scanned document.  CARDIAC CATHETERIZATION  6/20/08, 10/2/08    EF is estimated to be 60%. See scanned document.  CARDIAC CATHETERIZATION  2/13/07, 6/6/07    EF 50%. See scanned document.  CARDIAC CATHETERIZATION  1/8/07, 11/26/07    EF is estimated to be in excess of 50%. See scanned doucment.  CARDIAC CATHETERIZATION  08/17/2006    EF is estimated to be in excess of 50%. See scanned doucment.  CARDIAC CATHETERIZATION  5/14/14  MDL    normal LV function. EF 60    CHOLECYSTECTOMY      CLAVICLE SURGERY      COLONOSCOPY      Endoscopy    CYSTOSCOPY Right 7/8/2016    CYSTOSCOPY; RIGHT RETROGRADE PYELOGRAM; RIGHT URETERAL DILATATION; RIGHT URETEROSCOPY; RIGHT URETERAL LASER LITHOTRIPSY AND STONE EXTRACTION; INSERTION RIGHT URETERAL DOUBLE J STENT performed by Brooklyn Tejada MD at [facility] 1/14/2021    CYSTOSCOPY URETEROSCOPY LASER LITHO WITH STONE EXTRACTION AND STENT REMOVAL> performed by Leslie Waite MD at [facility] 12/29/2021    CYSTOSCOPY; LEFT URTERAL CATHERIZATION; LEFT RETORGRADE PYLEOGRAM; REMOVAL BLADDER CALCULI performed by Catina Abraham MD at [address] [place] [address] Left     HAND SURGERY      HUMERUS SURGERY      KNEE SURGERY      Right    KNEE SURGERY Left     LITHOTRIPSY      MANDIBLE FRACTURE SURGERY      NECK SURGERY      cervical spine.     OTHER SURGICAL HISTORY Brachytherapy of the core stents.  PACEMAKER PLACEMENT      [employer]    PTCA      TIBIA FRACTURE SURGERY      WRIST SURGERY         Past Family History:  No family history on file. Past Social History:  Social History     Socioeconomic History    Marital status:      Spouse name: Not on file    Number of children: Not on file    Years of education: Not on file    Highest education level: Not on file   Occupational History     Employer: DISABLED   Tobacco Use    Smoking status: Former Smoker     Types: Cigarettes    Smokeless tobacco: Current User   Vaping Use    Vaping Use: Never used   Substance and Sexual Activity    Alcohol use: No    Drug use: No    Sexual activity: Not on file   Other Topics Concern    Not on file   Social History Narrative    Not on file     Social Determinants of Health     Financial Resource Strain:     Difficulty of Paying Living Expenses: Not on file   Food Insecurity:     Worried About [address] in the Last Year: Not on file    Javy of Food in the Last Year: Not on file   Transportation Needs:     Lack of Transportation (Medical): Not on file    Lack of Transportation (Non-Medical):  Not on file   Physical Activity:     Days of Exercise per Week: Not on file    Minutes of Exercise per Session: Not on file   Stress:     Feeling of Stress : Not on file   Social Connections:     Frequency of Communication with Friends and Family: Not on file    Frequency of Social Gatherings with Friends and Family: Not on file    Attends Restorationist Services: Not on file    Active Member of Clubs or Organizations: Not on file    Attends Club or Organization Meetings: Not on file    Marital Status: Not on file   Intimate Partner Violence:     Fear of Current or Ex-Partner: Not on file    Emotionally Abused: Not on file    Physically Abused: Not on file    Sexually Abused: Not on file   Housing Stability:     Unable to Pay for Housing in the Last Year: Not on file    Number of Places Lived in the Last Year: Not on file    Unstable Housing in the Last Year: Not on file       Allergies: Allergies   Allergen Reactions    Ezetimibe-Simvastatin Other (See Comments)     Other reaction(s): myositis/elevated CPK  Other reaction(s): Other (See Comments)  Myositis/ elevated CPK    Bee Venom     Darvocet [Propoxyphene N-Acetaminophen] Rash    Other Rash    Penicillins Other (See Comments)     childhood allergy    Phenergan [Promethazine Hcl] Anxiety and Other (See Comments)     Restless legs    Promethazine Other (See Comments)     Other reaction(s): jitters    Promethazine Hcl Other (See Comments)     Restless legs    Propoxyphene Rash       Home Meds:  Prior to Admission medications    Medication Sig Start Date End Date Taking?  Authorizing Provider   ondansetron (ZOFRAN ODT) 4 MG disintegrating tablet Take 1 tablet by mouth every 8 hours as needed for Nausea or Vomiting 12/29/21   BHUMIKA Ferrer - CNP   QUEtiapine (SEROQUEL) 25 MG tablet  12/12/21   Historical Provider, MD   scopolamine (TRANSDERM-SCOP) transdermal patch  11/16/21   Historical Provider, MD   tamsulosin (FLOMAX) 0.4 MG capsule Take 1 capsule by mouth daily 12/2/21   Ronny Monday APRTHOMAS   oxyCODONE (OXY-IR) 15 MG immediate release tablet TAKE 1 TABLET BY MOUTH EVERY 6 HOURS 1/15/21   Historical Provider, MD   prochlorperazine (COMPAZINE) 5 MG tablet TAKE 1 TABLET BY MOUTH EVERY 6 HOURS AS NEEDED FOR NAUSEA 1/4/21   Historical Provider, MD   aspirin 325 MG tablet Take 325 mg by mouth daily    Historical Provider, MD   metoprolol tartrate (LOPRESSOR) 25 MG tablet Take 25 mg by mouth 2 times daily Indications: Pt unaware of dosage    Historical Provider, MD   isosorbide mononitrate (IMDUR) 120 MG CR tablet Take 120 mg by mouth 2 times daily     Historical Provider, MD   hydrochlorothiazide (HYDRODIURIL) 12.5 MG tablet Take 12.5 mg by mouth daily Indications: as needed    Historical Provider, MD   Coenzyme Q10 (COQ-10 PO) Take by mouth daily     Historical Provider, MD   Nutritional Supplements (BOOST PO) Take by mouth    Historical Provider, MD   NITROSTAT 0.4 MG SL tablet Place 1 tablet under the tongue 3 times daily as needed 10/23/15   Historical Provider, MD   LORazepam (ATIVAN) 1 MG tablet Take 1 mg by mouth 3 times daily as needed 1/18/16   Historical Provider, MD   amlodipine (NORVASC) 10 MG tablet Take 10 mg by mouth daily. Historical Provider, MD   clopidogrel (PLAVIX) 75 MG tablet Take 75 mg by mouth daily. Historical Provider, MD       Current Meds:   QUEtiapine  25 mg Oral Nightly    metoprolol tartrate  50 mg Oral BID    aspirin  325 mg Oral Once    sodium chloride flush  5-40 mL IntraVENous 2 times per day    aspirin  81 mg Oral Daily    amLODIPine  10 mg Oral Daily    clopidogrel  75 mg Oral Daily    hydroCHLOROthiazide  12.5 mg Oral Daily    isosorbide mononitrate  120 mg Oral BID    tamsulosin  0.4 mg Oral Daily       Current Infused Meds:   nitroGLYCERIN 15 mcg/min (06/29/22 0119)    sodium chloride      sodium chloride 75 mL/hr at 06/29/22 0116       Physical Exam:  Vitals:    06/29/22 1308   BP:    Pulse:    Resp: 18   Temp:    SpO2:        Intake/Output Summary (Last 24 hours) at 6/29/2022 1356  Last data filed at 6/29/2022 1053  Gross per 24 hour   Intake 10 ml   Output 225 ml   Net -215 ml     Estimated body mass index is 19.08 kg/m² as calculated from the following:    Height as of this encounter: 5' 7\" (1.702 m). Weight as of this encounter: 121 lb 12.8 oz (55.2 kg). Physical Exam  Vitals reviewed. Constitutional:       General: He is not in acute distress. Appearance: He is well-developed and normal weight. He is not ill-appearing, toxic-appearing or diaphoretic. HENT:      Head: Normocephalic and atraumatic. Nose: Nose normal.      Mouth/Throat:      Mouth: Mucous membranes are moist.      Pharynx: Oropharynx is clear. Eyes:      General: No scleral icterus. Extraocular Movements: Extraocular movements intact. Pupils: Pupils are equal, round, and reactive to light. Neck:      Vascular: No carotid bruit or JVD. Cardiovascular:      Rate and Rhythm: Normal rate and regular rhythm. Heart sounds: Normal heart sounds. No murmur heard. No friction rub. No gallop. Pulmonary:      Effort: Pulmonary effort is normal. No respiratory distress. Breath sounds: Normal breath sounds. No stridor. No wheezing, rhonchi or rales. Chest:      Chest wall: No tenderness. Abdominal:      General: Abdomen is flat. Bowel sounds are normal. There is no distension. Palpations: Abdomen is soft. There is no mass. Tenderness: There is no abdominal tenderness. There is no right CVA tenderness, left CVA tenderness, guarding or rebound. Hernia: No hernia is present. Musculoskeletal:         General: No swelling, tenderness, deformity or signs of injury. Cervical back: Normal range of motion and neck supple. No rigidity or tenderness. Right lower leg: No edema. Left lower leg: No edema. Lymphadenopathy:      Cervical: No cervical adenopathy. Skin:     General: Skin is warm and dry. Neurological:      General: No focal deficit present. Mental Status: He is alert and oriented to person, place, and time. Mental status is at baseline. Cranial Nerves: No cranial nerve deficit. Sensory: No sensory deficit. Motor: No weakness. Coordination: Coordination normal.   Psychiatric:         Mood and Affect: Mood normal.         Behavior: Behavior normal.         Thought Content:  Thought content normal.         Judgment: Judgment normal.         Labs:  Recent Labs     06/28/22  1812 06/29/22  0032 06/29/22  0125   WBC 5.8 5.1 5.1   HGB 13.8* 13.4* 13.0*    171 186       Recent Labs     06/28/22  1812 06/29/22  0032    140   K 3.9 3.8    105   CO2 27 25   BUN 11 11 CREATININE 0.6 0.5   LABGLOM >60 >60   MG  --  2.0   CALCIUM 9.0 8.8   PHOS  --  3.2       CK, CKMB, Troponin: @LABRCNT (CKTOTAL:3, CKMB:3, TROPONINI:3)@    Last 3 BNP:  No results for input(s): BNP in the last 72 hours. IMAGING:  XR CHEST PORTABLE    Result Date: 6/28/2022  NO PRIOR REPORT AVAILABLE Exam: X-RAY OF [facility] Clinical data:Chest pain. Technique:Single view of the chest. Prior studies: Radiograph of chest dated 02/27/2022. Findings: The lungs are hyperinflated; noevidence of acute infiltrate or pleural effusion. Cardiac silhouette is within normal limits. The bony thorax is stable    Bilateral lung hyperinflation with no radiographic evidence of acute pulmonary or pleural pathologic process. No interval change. Recommendation: Follow up as clinically indicated. Electronically Signed by Amee Stokes MD at 28-Jun-2022 07:50:40 PM               Assessment:  1. Admission 6/28/2022 complaints of chest pain intermittently over the past 3 days all troponins are negative. proBNP 157.  2. Coronary artery disease  3. Hypertension  4. Hyperlipidemia  5. Benign prostatic hyperplasia with urinary frequency  6. Tobacco abuse  7. History of kidney stones  8. Pacemaker  9. Gastroesophageal reflux disease  10. Degenerative osteoarthritis  11. CT of the head today no acute findings  12. CT abdomen pelvis 12/23/2021 decompression left collecting system since the previous study small calculus left UV junction is passed into the urinary bladder several small calculi urinary bladder not previously seen small nonobstructing calculus upper pole the left kidney  13. CTA head and neck 7/12/2021 negative CT angiogram level [place] widely patent bilateral extracranial carotid and vertebral arteries no stenosis aneurysm or dissection. Moderate degenerative changes cervical spine chronic appearing malalignment temporomandibular joints. Recommendations:  1.  Crease metoprolol 50 mg p.o. twice daily  2. Ambulate  3. ,[email or web address]

## 2022-06-29 NOTE — ED NOTES
Patient complains of chest 8/10. Contacted pharmacy for Roxicodone PRN order fulfillment.      Jacki Meek RN  06/28/22 0945

## 2022-06-29 NOTE — DISCHARGE SUMMARY
Physician Discharge Summary     Patient ID:  Malinda Contreras  [id number]  17 y.o.  1955    Admit date: 6/28/2022    Discharge date and time:  6/29/2022    Discharge Physician: Leigh Hayward MD    Discharge Diagnoses:    · Chest pain   · CAD  · HL  · HTN  · Tobacco abuse  · Benign prostatic hyperplasia with urinary frequency  · Ureterolithiasis  · Bladder calculus    Discharged Condition: good    Indication for Admission: chest pain     Hospital Course:     78 yo male presented with chest pain intermittent over several days. History of coronary artery disease previous stent placement hyperlipidemia hypertension pacemaker placement gastroesophageal reflux disease and degenerative joint disease. Nitroglycerin initially relieved the pain at home but last day or 2 has not been so helpful. All troponins are negative. Placed on heparin drip nitroglycerin infusion and given morphine for pain relief during the hospital stay. Feels better today. Evaluated by cardiology. Who increased metoprolol to 50 bid. Consults: cardiology    Significant Diagnostic Studies:    XR CHEST PORTABLE [[id number]] Resulted: 06/28/22 1850      Order Status: Completed Updated: 06/28/22 1852     Narrative:       NO PRIOR REPORT AVAILABLE   [facility]   Clinical data:Chest pain. Technique:Single view of the chest.   Prior studies: Radiograph of chest dated 02/27/2022. Findings: The lungs are hyperinflated; noevidence of acute infiltrate or pleural effusion. Cardiac silhouette is within normal limits. The bony thorax is stable     Impression:       Bilateral lung hyperinflation with no radiographic evidence of acute pulmonary or pleural pathologic process.  No interval change. Recommendation: Follow up as clinically indicated.    Electronically Signed by Nikhil Turner MD at 28-Jun-2022 07:50:40 PM                      Discharge Exam:  /77   Pulse 59   Temp 97 °F (36.1 °C) (Temporal)   Resp 18   Ht 5' 7\" (1.702 m)   Wt 121 lb 12.8 oz (55.2 kg)   SpO2 96%   BMI 19.08 kg/m²     General Appearance:    Alert, cooperative, no distress, appears stated age   Head:    Normocephalic, without obvious abnormality, atraumatic           Nose:   Nares normal, septum midline, mucosa normal, no drainage    or sinus tenderness       Neck:   Supple, symmetrical, trachea midline, no adenopathy;        thyroid:  No enlargement/tenderness/nodules; no carotid    bruit or JVD       Lungs:     Clear to auscultation bilaterally, respirations unlabored       Heart:    Regular rate and rhythm, S1 and S2 normal, no murmur, rub   or gallop   Abdomen:     Soft, non-tender, bowel sounds active all four quadrants,     no masses, no organomegaly           Extremities:   Extremities normal, atraumatic, no cyanosis or edema       Skin:   Skin color, texture, turgor normal, no rashes or lesions       Neurologic:   Normal strength, sensation and reflexes       throughout       Discharge Medications:         Medication List      CHANGE how you take these medications    metoprolol tartrate 25 MG tablet  Commonly known as: LOPRESSOR  Take 2 tablets by mouth 2 times daily Indications: Pt unaware of dosage  What changed: how much to take        CONTINUE taking these medications    aspirin 325 MG tablet     BOOST PO     COQ-10 PO     hydroCHLOROthiazide 12.5 MG tablet  Commonly known as: HYDRODIURIL     isosorbide mononitrate 120 MG extended release tablet  Commonly known as: IMDUR     LORazepam 1 MG tablet  Commonly known as: ATIVAN     Nitrostat 0.4 MG SL tablet  Generic drug: nitroGLYCERIN     Norvasc 10 MG tablet  Generic drug: amLODIPine     ondansetron 4 MG disintegrating tablet  Commonly known as: Zofran ODT  Take 1 tablet by mouth every 8 hours as needed for Nausea or Vomiting     oxyCODONE 15 MG immediate release tablet  Commonly known as: OXY-IR     Plavix 75 MG tablet  Generic drug: clopidogrel     prochlorperazine 5 MG tablet  Commonly known as: COMPAZINE     QUEtiapine 25 MG tablet  Commonly known as: SEROQUEL     scopolamine transdermal patch  Commonly known as: TRANSDERM-SCOP     tamsulosin 0.4 MG capsule  Commonly known as: FLOMAX  Take 1 capsule by mouth daily           Where to Get Your Medications      Information about where to get these medications is not yet available    Ask your nurse or doctor about these medications  · metoprolol tartrate 25 MG tablet           Discharge Instructions: Follow up with Davion Krause MD in 2 days with Dr Jerrica Fraser in 3 weeks. Take medications as directed. Resume activity as tolerated. Diet: ADULT DIET; Regular; Low Fat/Low Chol/High Fiber/2 gm Na     Disposition: Patient is medically stable and will be discharged home.      DC time 33 min

## 2022-06-29 NOTE — DISCHARGE SUMMARY
Physician Discharge Summary     Patient ID:  Loida Carroll  [id number]  89 y.o.  1955    Admit date: 6/28/2022    Discharge date and time:  6/29/2022    Discharge Physician: Joseph Vega MD    Discharge Diagnoses:    · Chest pain   · CAD  · HL  · HTN  · Tobacco abuse  · Benign prostatic hyperplasia with urinary frequency  · Ureterolithiasis  · Bladder calculus    Discharged Condition: good    Indication for Admission: chest pain     Hospital Course:     80 yo male presented with chest pain intermittent over several days. History of coronary artery disease previous stent placement hyperlipidemia hypertension pacemaker placement gastroesophageal reflux disease and degenerative joint disease. Nitroglycerin initially relieved the pain at home but last day or 2 has not been so helpful. All troponins are negative. Placed on heparin drip nitroglycerin infusion and given morphine for pain relief during the hospital stay. Feels better today. Evaluated by cardiology. Who increased metoprolol to 50 bid. Consults: cardiology    Significant Diagnostic Studies:    XR CHEST PORTABLE [[id number]] Resulted: 06/28/22 1850      Order Status: Completed Updated: 06/28/22 1852     Narrative:       NO PRIOR REPORT AVAILABLE   Exam: X-RAY OF [facility]   Clinical data:Chest pain. Technique:Single view of the chest.   Prior studies: Radiograph of chest dated 02/27/2022. Findings: The lungs are hyperinflated; noevidence of acute infiltrate or pleural effusion. Cardiac silhouette is within normal limits. The bony thorax is stable     Impression:       Bilateral lung hyperinflation with no radiographic evidence of acute pulmonary or pleural pathologic process.  No interval change. Recommendation: Follow up as clinically indicated.    Electronically Signed by Nenita Zamarripa MD at 28-Jun-2022 07:50:40 PM                      Discharge Exam:  /77   Pulse 59   Temp 97 °F (36.1 °C) (Temporal)   Resp 18   Ht 5' 7\" (1.702 m)   Wt 121 lb 12.8 oz (55.2 kg)   SpO2 96%   BMI 19.08 kg/m²     General Appearance:    Alert, cooperative, no distress, appears stated age   Head:    Normocephalic, without obvious abnormality, atraumatic           Nose:   Nares normal, septum midline, mucosa normal, no drainage    or sinus tenderness       Neck:   Supple, symmetrical, trachea midline, no adenopathy;        thyroid:  No enlargement/tenderness/nodules; no carotid    bruit or JVD       Lungs:     Clear to auscultation bilaterally, respirations unlabored       Heart:    Regular rate and rhythm, S1 and S2 normal, no murmur, rub   or gallop   Abdomen:     Soft, non-tender, bowel sounds active all four quadrants,     no masses, no organomegaly           Extremities:   Extremities normal, atraumatic, no cyanosis or edema       Skin:   Skin color, texture, turgor normal, no rashes or lesions       Neurologic:   Normal strength, sensation and reflexes       throughout       Discharge Medications:         Medication List      CHANGE how you take these medications    metoprolol tartrate 25 MG tablet  Commonly known as: LOPRESSOR  Take 2 tablets by mouth 2 times daily Indications: Pt unaware of dosage  What changed: how much to take        CONTINUE taking these medications    aspirin 325 MG tablet     BOOST PO     COQ-10 PO     hydroCHLOROthiazide 12.5 MG tablet  Commonly known as: HYDRODIURIL     isosorbide mononitrate 120 MG extended release tablet  Commonly known as: IMDUR     LORazepam 1 MG tablet  Commonly known as: ATIVAN     Nitrostat 0.4 MG SL tablet  Generic drug: nitroGLYCERIN     Norvasc 10 MG tablet  Generic drug: amLODIPine     ondansetron 4 MG disintegrating tablet  Commonly known as: Zofran ODT  Take 1 tablet by mouth every 8 hours as needed for Nausea or Vomiting     oxyCODONE 15 MG immediate release tablet  Commonly known as: OXY-IR     Plavix 75 MG tablet  Generic drug: clopidogrel     prochlorperazine 5 MG tablet  Commonly known

## 2022-06-29 NOTE — PLAN OF CARE
Problem: Discharge Planning  Goal: Discharge to home or other facility with appropriate resources  Outcome: Progressing     Problem: Pain  Goal: Verbalizes/displays adequate comfort level or baseline comfort level  Outcome: Progressing     Problem: Safety - Adult  Goal: Free from fall injury  Outcome: Progressing     Problem: ABCDS Injury Assessment  Goal: Absence of physical injury  Outcome: Progressing   Electronically signed by Gayatri Luz RN on 6/29/2022 at 12:45 AM

## 2022-06-29 NOTE — SIGNIFICANT EVENT
Called from ER and floor about having continued pain     ekg done and compared to baseline no change    Increased nitro drip     And he still does not feel he got relief from pain     He got fentanyl in ER for pain relief and wanted more of this     Fentanyl is not indicated for cv related pain     Asked cardiology to be called and go over pain issue and decide what next to pursue     Troponin negative

## 2022-06-29 NOTE — PROGRESS NOTES
HOSPITAL MEDICINE  - PROGRESS NOTE    Admit Date: 6/28/2022         CC: chest pain     Subjective: no chest pain, no palpations, no dyspnea, no cough, no wheezing, no abd pain, no N/V/D/C    Objective:  Mild distress    79 y.o. male presented complaining of chest pain. Pt has history of CAD with prior cardiac stent placement, HTN, hyperlipidemia, pacemaker placement, gerd and DJD.       In ED, initial troponin and ekg negative for ischemia. Wbc 6k, hgb 14, platelets 344O, sodium 143, potassium 3.9, glucose 113, creatinine 0.6/bun 11, LFTs normal, CXR-Bilateral lung hyperinflation with no radiographic evidence of acute pulmonary or pleural pathologic process. covid test negative. Diet: ADULT DIET;  Regular; Low Fat/Low Chol/High Fiber/2 gm Na  Pain is:None  Nausea:None  Bowel Movement/Flatus yes    Data:   Scheduled Meds: Reviewed  Current Facility-Administered Medications   Medication Dose Route Frequency Provider Last Rate Last Admin    morphine (PF) injection 2 mg  2 mg IntraVENous Q4H PRN Lindsay Thompson MD   2 mg at 06/29/22 0854    QUEtiapine (SEROQUEL) tablet 25 mg  25 mg Oral Nightly Jose L Araujo MD        metoprolol tartrate (LOPRESSOR) tablet 50 mg  50 mg Oral BID Lindsay Thompson MD   50 mg at 06/29/22 1041    aspirin tablet 325 mg  325 mg Oral Once Asher Mujica MD        nitroGLYCERIN (NITROSTAT) SL tablet 0.4 mg  0.4 mg SubLINGual Q5 Min PRN Asher Mujica MD        nitroGLYCERIN 50 mg in dextrose 5% 250 mL infusion  5-200 mcg/min IntraVENous Continuous Yeni Marrow, APRN - CNP 4.5 mL/hr at 06/29/22 0119 15 mcg/min at 06/29/22 0119    sodium chloride flush 0.9 % injection 5-40 mL  5-40 mL IntraVENous 2 times per day Yeni Marrow, APRN - CNP   10 mL at 06/29/22 0852    sodium chloride flush 0.9 % injection 5-40 mL  5-40 mL IntraVENous PRN Yeni Marrow, APRN - CNP        0.9 % sodium chloride infusion   IntraVENous PRN Kimberley Barrios, APRN - CNP        ondansetron (ZOFRAN-ODT) disintegrating tablet 4 mg  4 mg Oral Q8H PRN Kimberley Barrios, APRN - CNP        Or    ondansetron [facility]) injection 4 mg  4 mg IntraVENous Q6H PRN Kimberley Barrios, APRN - CNP        polyethylene glycol (GLYCOLAX) packet 17 g  17 g Oral Daily PRN Kimberley Barrios, APRN - CNP        aspirin chewable tablet 81 mg  81 mg Oral Daily Kimberley Barrios, APRN - CNP   81 mg at 06/29/22 0851    amLODIPine (NORVASC) tablet 10 mg  10 mg Oral Daily Kimberley Barrios, APRN - CNP   10 mg at 06/29/22 0851    clopidogrel (PLAVIX) tablet 75 mg  75 mg Oral Daily Kimberley Barrios, APRN - CNP   75 mg at 06/29/22 8403    hydroCHLOROthiazide (HYDRODIURIL) tablet 12.5 mg  12.5 mg Oral Daily Kimberley Barrios, APRN - CNP        isosorbide mononitrate (IMDUR) extended release tablet 120 mg  120 mg Oral BID Kimberley Barrios, APRN - CNP        LORazepam (ATIVAN) tablet 1 mg  1 mg Oral TID PRN Kimberley Barrios, APRN - CNP        oxyCODONE (ROXICODONE) immediate release tablet 15 mg  15 mg Oral Q6H PRN Kimberley Barrios, APRN - CNP   15 mg at 06/29/22 1308    tamsulosin (FLOMAX) capsule 0.4 mg  0.4 mg Oral Daily Kimberley Barrios, APRN - CNP   0.4 mg at 06/29/22 0851    0.9 % sodium chloride infusion   IntraVENous Continuous Kimberley Barrios, APRN - CNP 75 mL/hr at 06/29/22 0116 New Bag at 06/29/22 0116     DVT Prophylaxis: heparin gtt    Continuous Infusions:   nitroGLYCERIN 15 mcg/min (06/29/22 0119)    sodium chloride      sodium chloride 75 mL/hr at 06/29/22 0116       Intake/Output Summary (Last 24 hours) at 6/29/2022 1322  Last data filed at 6/29/2022 1053  Gross per 24 hour   Intake 10 ml   Output 225 ml   Net -215 ml     CBC:   Recent Labs     06/28/22  1812 06/29/22  0032 06/29/22  0125   WBC 5.8 5.1 5.1   HGB 13.8* 13.4* 13.0*    171 186     BMP:  Recent Labs     06/28/22  1812 06/29/22  0032    140   K 3.9 3.8    105   CO2 27 25   BUN 11 11   CREATININE 0.6 0.5   GLUCOSE 113* 103     ABGs: No results found for: PHART, PO2ART, ALJ5LMB  INR:   Recent Labs     06/28/22  1812   INR 1.03         Objective:   Vitals: /77   Pulse 59   Temp 97 °F (36.1 °C) (Temporal)   Resp 18   Ht 5' 7\" (1.702 m)   Wt 121 lb 12.8 oz (55.2 kg)   SpO2 96%   BMI 19.08 kg/m²   General appearance: alert, appears stated age and cooperative  Skin: Skin color, texture, turgor normal.   HEENT: Head: Normocephalic, no lesions, without obvious abnormality.   Neck: no adenopathy, no carotid bruit, no JVD and supple, symmetrical, trachea midline  Lungs: clear to auscultation bilaterally  Heart: regular rate and rhythm, S1, S2 normal, no murmur, click, rub or gallop  Abdomen: soft, non-tender; bowel sounds normal; no masses,  no organomegaly  Extremities: extremities normal, atraumatic, no cyanosis or edema  Lymphatic: No significant lymph node enlargement papable  Neurologic: Mental status: Alert, oriented, thought content appropriate        Assessment & Plan:    · Chest pain - per cardiology - on nitro gtt and heparin gtt  · CAD  · HL  · HTN    Patient Active Problem List:     Tobacco abuse     Benign prostatic hyperplasia with urinary frequency     Ureterolithiasis     Bladder calculus      See orders   Disposition: TBD    Natalio Conti MD

## 2022-06-30 LAB
EKG P AXIS: 56 DEGREES
EKG P AXIS: NORMAL DEGREES
EKG P AXIS: NORMAL DEGREES
EKG P-R INTERVAL: 192 MS
EKG P-R INTERVAL: NORMAL MS
EKG P-R INTERVAL: NORMAL MS
EKG Q-T INTERVAL: 434 MS
EKG Q-T INTERVAL: 434 MS
EKG Q-T INTERVAL: 442 MS
EKG QRS DURATION: 82 MS
EKG QRS DURATION: 82 MS
EKG QRS DURATION: 84 MS
EKG QTC CALCULATION (BAZETT): 434 MS
EKG QTC CALCULATION (BAZETT): 434 MS
EKG QTC CALCULATION (BAZETT): 442 MS
EKG T AXIS: 64 DEGREES
EKG T AXIS: 67 DEGREES
EKG T AXIS: 68 DEGREES

## 2022-07-06 ENCOUNTER — OFFICE VISIT (OUTPATIENT)
Dept: FAMILY MEDICINE CLINIC | Facility: CLINIC | Age: 67
End: 2022-07-06

## 2022-07-06 VITALS
WEIGHT: 121 LBS | HEART RATE: 80 BPM | SYSTOLIC BLOOD PRESSURE: 109 MMHG | BODY MASS INDEX: 18.99 KG/M2 | HEIGHT: 67 IN | DIASTOLIC BLOOD PRESSURE: 75 MMHG | OXYGEN SATURATION: 97 % | RESPIRATION RATE: 14 BRPM

## 2022-07-06 DIAGNOSIS — R07.9 CHEST PAIN, UNSPECIFIED TYPE: ICD-10-CM

## 2022-07-06 DIAGNOSIS — I25.119 ATHEROSCLEROSIS OF NATIVE CORONARY ARTERY OF NATIVE HEART WITH ANGINA PECTORIS: Primary | ICD-10-CM

## 2022-07-06 PROCEDURE — 99213 OFFICE O/P EST LOW 20 MIN: CPT | Performed by: FAMILY MEDICINE

## 2022-07-06 PROCEDURE — G0439 PPPS, SUBSEQ VISIT: HCPCS | Performed by: FAMILY MEDICINE

## 2022-07-06 PROCEDURE — 1159F MED LIST DOCD IN RCRD: CPT | Performed by: FAMILY MEDICINE

## 2022-07-06 PROCEDURE — 1170F FXNL STATUS ASSESSED: CPT | Performed by: FAMILY MEDICINE

## 2022-07-06 RX ORDER — FLUOXETINE HYDROCHLORIDE 20 MG/1
20 CAPSULE ORAL DAILY
Qty: 7 CAPSULE | Refills: 0 | Status: SHIPPED | OUTPATIENT
Start: 2022-07-06 | End: 2022-10-27

## 2022-07-06 RX ORDER — FLUOXETINE HYDROCHLORIDE 40 MG/1
40 CAPSULE ORAL DAILY
Qty: 30 CAPSULE | Refills: 2 | Status: SHIPPED | OUTPATIENT
Start: 2022-07-06 | End: 2022-09-27

## 2022-07-06 RX ORDER — POTASSIUM CHLORIDE 750 MG/1
10 TABLET, FILM COATED, EXTENDED RELEASE ORAL
COMMUNITY
Start: 2020-05-14 | End: 2031-04-01
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
## 2022-07-06 NOTE — PROGRESS NOTES
The ABCs of the Annual Wellness Visit  Subsequent Medicare Wellness Visit    Chief Complaint   Patient presents with   • Hospital Follow Up Visit     [facility] f/u   chest pain  Patient states that he continues to have chest pain.  He says that it is present all the time, but is more severe at times   • Medicare Wellness-subsequent      Subjective    History of Present Illness:  Junito Phelan is a 67 y.o. male who presents for a Subsequent Medicare Wellness Visit.    The following portions of the patient's history were reviewed and   updated as appropriate: allergies, current medications, past family history, past medical history, past social history, past surgical history and problem list.    Compared to one year ago, the patient feels his physical   health is the same.    Compared to one year ago, the patient feels his mental   health is the same.    Recent Hospitalizations:  He was admitted within the past 365 days at [facility].       Current Medical Providers:  Patient Care Team:  Jim Stover MD as PCP - General (Family Medicine)  Josiah Pedraza MD as Consulting Physician (Gastroenterology)    Outpatient Medications Prior to Visit   Medication Sig Dispense Refill   • potassium chloride 10 MEQ CR tablet Take 10 mEq by mouth.     • amLODIPine (NORVASC) 10 MG tablet Take 10 mg by mouth daily.       • aspirin 325 MG tablet Take 325 mg by mouth daily     • clopidogrel (PLAVIX) 75 MG tablet Take 75 mg by mouth daily.       • Coenzyme Q10 50 MG tablet dispersible Take by mouth daily      • EPINEPHrine (EPIPEN) 0.3 MG/0.3ML solution auto-injector injection Inject 0.3 mg under the skin into the appropriate area as directed.     • hydrochlorothiazide (HYDRODIURIL) 12.5 MG tablet Take 12.5 mg by mouth daily Indications: as needed     • hydrocortisone 2.5 % lotion APPLY TOPICALLY TO AFFECTED AREA ON FACE AND EARS ONCE DAILY     • isosorbide mononitrate (IMDUR) 120 MG 24 hr tablet Take 120 mg  by mouth Daily.     • LORazepam (ATIVAN) 0.5 MG tablet Take 1 tablet by mouth 3 (Three) Times a Day As Needed for Anxiety. 90 tablet 0   • metoprolol tartrate (LOPRESSOR) 25 MG tablet Take 25 mg by mouth 2 times daily Indications: Pt unaware of dosage     • montelukast (SINGULAIR) 10 MG tablet TAKE ONE TABLET AT BEDTIME 30 tablet 2   • nitroglycerin (NITROSTAT) 0.4 MG SL tablet ONE TABLET UNDER TONGUE AS NEEDED FOR CHEST PAIN. MAY REPEAT EVERY 5 MINUTES UP TO 3 TABLETS IN 15 MINUTES. 25 tablet 0   • ondansetron ODT (ZOFRAN-ODT) 4 MG disintegrating tablet Take 1 tablet by mouth Every 8 (Eight) Hours As Needed.     • oxyCODONE ER (oxyCONTIN) 15 MG tablet extended-release 12 hour Take 15 mg by mouth Every 6 (Six) Hours As Needed for Moderate Pain .     • Prolensa 0.07 % solution INSTILL 1 DROP INTO AFFECTED EYE ONCE A DAY AS DIRECTED BEGIN 1 DAY PRIOR TO SURGERY     • QUEtiapine (SEROquel) 25 MG tablet TAKE 1 TABLET BY MOUTH TWICE A DAY 60 tablet 2   • REPATHA PUSHTRONEX SYSTEM 420 MG/3.5ML solution cartridge Every 30 (Thirty) Days.     • Scopolamine (Transderm-Scop, 1.5 MG,) 1 MG/3DAYS patch Place 1 patch on the skin as directed by provider Every 72 (Seventy-Two) Hours. 10 each 0   • azithromycin (Zithromax Z-Simba) 250 MG tablet Take 2 tablets by mouth on day 1, then 1 tablet daily on days 2-5 6 tablet 0     No facility-administered medications prior to visit.       Opioid medication/s are on active medication list.  and I have evaluated his active treatment plan and pain score trends (see table).  There were no vitals filed for this visit.  I have reviewed the chart for potential of high risk medication and harmful drug interactions in the elderly.            Aspirin is on active medication list. Aspirin use is indicated based on review of current medical condition/s. Pros and cons of this therapy have been discussed today. Benefits of this medication outweigh potential harm.  Patient has been encouraged to continue  "taking this medication.  .      Patient Active Problem List   Diagnosis   • Atherosclerosis of native coronary artery of native heart with angina pectoris (HCC)   • Essential hypertension   • Mixed hyperlipidemia   • Chronic stable angina (HCC)   • Anxiety   • Neuropathy   • Diarrhea   • Chronic pain due to injury   • Rhinitis   • Flank pain   • Hematuria   • Acute seasonal allergic rhinitis due to pollen   • Dog bite of right arm   • Acute suppurative otitis media of right ear without spontaneous rupture of tympanic membrane   • Seizure (HCC)   • Sebaceous cyst     Advance Care Planning  Advance Directive is not on file.  ACP discussion was held with the patient during this visit. Patient does not have an advance directive, information provided.          Objective    Vitals:    07/06/22 1034   BP: 109/75   Pulse: 80   Resp: 14   SpO2: 97%   Weight: 54.9 kg (121 lb)   Height: 170.2 cm (67\")     Estimated body mass index is 18.95 kg/m² as calculated from the following:    Height as of this encounter: 170.2 cm (67\").    Weight as of this encounter: 54.9 kg (121 lb).    BMI is within normal parameters. No other follow-up for BMI required.      Does the patient have evidence of cognitive impairment? No    Physical Exam  Lab Results   Component Value Date    CHLPL 104 (L) 06/29/2022    TRIG 87 06/29/2022    HDL 63 06/29/2022    LDL 24 06/29/2022    HGBA1C 5.5 06/28/2022            HEALTH RISK ASSESSMENT    Smoking Status:  Social History     Tobacco Use   Smoking Status Former Smoker   Smokeless Tobacco Never Used     Alcohol Consumption:  Social History     Substance and Sexual Activity   Alcohol Use No     Fall Risk Screen:    STEADI Fall Risk Assessment was completed, and patient is at LOW risk for falls.Assessment completed on:7/6/2022    Depression Screening:  PHQ-2/PHQ-9 Depression Screening 7/6/2022   Retired Total Score -   Little Interest or Pleasure in Doing Things 0-->not at all   Feeling Down, Depressed or " Hopeless 1-->several days   PHQ-9: Brief Depression Severity Measure Score 1       Health Habits and Functional and Cognitive Screening:  Functional & Cognitive Status 7/6/2022   Do you have difficulty preparing food and eating? No   Do you have difficulty bathing yourself, getting dressed or grooming yourself? No   Do you have difficulty using the toilet? No   Do you have difficulty moving around from place to place? No   Do you have trouble with steps or getting out of a bed or a chair? No   Current Diet Well Balanced Diet   Dental Exam Up to date   Eye Exam Up to date   Exercise (times per week) 5 times per week   Current Exercises Include Walking   Current Exercise Activities Include -   Do you need help using the phone?  No   Are you deaf or do you have serious difficulty hearing?  No   Do you need help with transportation? No   Do you need help shopping? No   Do you need help preparing meals?  No   Do you need help with housework?  No   Do you need help with laundry? No   Do you need help taking your medications? No   Do you need help managing money? No   Do you ever drive or ride in a car without wearing a seat belt? No   Have you felt unusual stress, anger or loneliness in the last month? Yes   Who do you live with? Spouse   If you need help, do you have trouble finding someone available to you? No   Have you been bothered in the last four weeks by sexual problems? No   Do you have difficulty concentrating, remembering or making decisions? Yes       Age-appropriate Screening Schedule:  Refer to the list below for future screening recommendations based on patient's age, sex and/or medical conditions. Orders for these recommended tests are listed in the plan section. The patient has been provided with a written plan.    Health Maintenance   Topic Date Due   • ZOSTER VACCINE (2 of 2) 12/25/2019   • INFLUENZA VACCINE  10/01/2022   • LIPID PANEL  06/29/2023   • TDAP/TD VACCINES (3 - Td or Tdap) 12/17/2028               Assessment & Plan   CMS Preventative Services Quick Reference  Risk Factors Identified During Encounter  Cardiovascular Disease  The above risks/problems have been discussed with the patient.  Follow up actions/plans if indicated are seen below in the Assessment/Plan Section.  Pertinent information has been shared with the patient in the After Visit Summary.    Diagnoses and all orders for this visit:    1. Atherosclerosis of native coronary artery of native heart with angina pectoris (HCC) (Primary)    2. Chest pain, unspecified type        Follow Up:   No follow-ups on file.     An After Visit Summary and PPPS were made available to the patient.          I spent 5  minutes caring for Junito on this date of service. This time includes time spent by me in the following activities:reviewing tests, performing a medically appropriate examination and/or evaluation , ordering medications, tests, or procedures, referring and communicating with other health care professionals , independently interpreting results and communicating that information with the patient/family/caregiver and care coordination

## 2022-07-14 ENCOUNTER — OFFICE VISIT (OUTPATIENT)
Dept: FAMILY MEDICINE CLINIC | Facility: CLINIC | Age: 67
End: 2022-07-14

## 2022-07-14 VITALS
HEART RATE: 64 BPM | HEIGHT: 67 IN | DIASTOLIC BLOOD PRESSURE: 76 MMHG | BODY MASS INDEX: 19.46 KG/M2 | SYSTOLIC BLOOD PRESSURE: 118 MMHG | TEMPERATURE: 97.2 F | RESPIRATION RATE: 16 BRPM | WEIGHT: 124 LBS

## 2022-07-14 DIAGNOSIS — F51.01 PRIMARY INSOMNIA: ICD-10-CM

## 2022-07-14 DIAGNOSIS — I10 ESSENTIAL HYPERTENSION: Primary | ICD-10-CM

## 2022-07-14 DIAGNOSIS — E78.2 MIXED HYPERLIPIDEMIA: ICD-10-CM

## 2022-07-14 PROCEDURE — 99213 OFFICE O/P EST LOW 20 MIN: CPT | Performed by: FAMILY MEDICINE

## 2022-07-14 RX ORDER — HYDROXYZINE PAMOATE 25 MG/1
25 CAPSULE ORAL NIGHTLY PRN
Qty: 45 CAPSULE | Refills: 1 | Status: SHIPPED | OUTPATIENT
Start: 2022-07-14 | End: 2022-08-31
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
## 2022-07-20 ENCOUNTER — TELEPHONE (OUTPATIENT)
Dept: CARDIOLOGY CLINIC | Age: 67
End: 2022-07-20

## 2022-07-28 ENCOUNTER — TELEPHONE (OUTPATIENT)
Dept: UROLOGY | Age: 67
End: 2022-07-28

## 2022-07-28 DIAGNOSIS — N20.0 KIDNEY STONE: Primary | ICD-10-CM

## 2022-07-28 NOTE — TELEPHONE ENCOUNTER
Patient states he is passing blood and nauseous with a pain level of 8. Denies fever chills or vomitting. After to discussing with Arianna patient was told to come 8/5 w/ ct same day and to stay NPO.

## 2022-08-01 ENCOUNTER — HOSPITAL ENCOUNTER (EMERGENCY)
Age: 67
Discharge: HOME | End: 2022-08-01
Payer: MEDICARE

## 2022-08-01 ENCOUNTER — APPOINTMENT (OUTPATIENT)
Dept: CT IMAGING | Age: 67
End: 2022-08-01
Payer: MEDICARE

## 2022-08-01 VITALS
HEART RATE: 68 BPM | TEMPERATURE: 98.1 F | RESPIRATION RATE: 15 BRPM | SYSTOLIC BLOOD PRESSURE: 145 MMHG | DIASTOLIC BLOOD PRESSURE: 70 MMHG | OXYGEN SATURATION: 99 %

## 2022-08-01 DIAGNOSIS — R10.9 FLANK PAIN: ICD-10-CM

## 2022-08-01 DIAGNOSIS — N20.1 URETERIC STONE: Primary | ICD-10-CM

## 2022-08-01 LAB
ALBUMIN SERPL-MCNC: 5 G/DL (ref 3.5–5.2)
ALP BLD-CCNC: 114 U/L (ref 40–130)
ALT SERPL-CCNC: 15 U/L (ref 5–41)
ANION GAP SERPL CALCULATED.3IONS-SCNC: 11 MMOL/L (ref 7–19)
AST SERPL-CCNC: 18 U/L (ref 5–40)
BASOPHILS ABSOLUTE: 0 K/UL (ref 0–0.2)
BASOPHILS RELATIVE PERCENT: 0.3 % (ref 0–1)
BILIRUB SERPL-MCNC: 0.3 MG/DL (ref 0.2–1.2)
BILIRUBIN URINE: NEGATIVE
BLOOD, URINE: NEGATIVE
BUN BLDV-MCNC: 13 MG/DL (ref 8–23)
CALCIUM SERPL-MCNC: 9.4 MG/DL (ref 8.8–10.2)
CHLORIDE BLD-SCNC: 102 MMOL/L (ref 98–111)
CLARITY: ABNORMAL
CO2: 28 MMOL/L (ref 22–29)
COLOR: YELLOW
CREAT SERPL-MCNC: 0.8 MG/DL (ref 0.5–1.2)
EOSINOPHILS ABSOLUTE: 0 K/UL (ref 0–0.6)
EOSINOPHILS RELATIVE PERCENT: 0.5 % (ref 0–5)
GFR AFRICAN AMERICAN: >59
GFR NON-AFRICAN AMERICAN: >60
GLUCOSE BLD-MCNC: 102 MG/DL (ref 74–109)
GLUCOSE URINE: NEGATIVE MG/DL
HCT VFR BLD CALC: 44 % (ref 42–52)
HEMOGLOBIN: 14.8 G/DL (ref 14–18)
IMMATURE GRANULOCYTES #: 0.1 K/UL
KETONES, URINE: NEGATIVE MG/DL
LEUKOCYTE ESTERASE, URINE: NEGATIVE
LYMPHOCYTES ABSOLUTE: 1.2 K/UL (ref 1.1–4.5)
LYMPHOCYTES RELATIVE PERCENT: 18.8 % (ref 20–40)
MCH RBC QN AUTO: 30.3 PG (ref 27–31)
MCHC RBC AUTO-ENTMCNC: 33.6 G/DL (ref 33–37)
MCV RBC AUTO: 90.2 FL (ref 80–94)
MONOCYTES ABSOLUTE: 0.5 K/UL (ref 0–0.9)
MONOCYTES RELATIVE PERCENT: 7 % (ref 0–10)
NEUTROPHILS ABSOLUTE: 4.7 K/UL (ref 1.5–7.5)
NEUTROPHILS RELATIVE PERCENT: 72.6 % (ref 50–65)
NITRITE, URINE: NEGATIVE
PDW BLD-RTO: 13.2 % (ref 11.5–14.5)
PH UA: 8 (ref 5–8)
PLATELET # BLD: 187 K/UL (ref 130–400)
PMV BLD AUTO: 8.5 FL (ref 9.4–12.4)
POTASSIUM REFLEX MAGNESIUM: 4.3 MMOL/L (ref 3.5–5)
PROTEIN UA: NEGATIVE MG/DL
RBC # BLD: 4.88 M/UL (ref 4.7–6.1)
SODIUM BLD-SCNC: 141 MMOL/L (ref 136–145)
SPECIFIC GRAVITY UA: 1.01 (ref 1–1.03)
TOTAL PROTEIN: 7.3 G/DL (ref 6.6–8.7)
UROBILINOGEN, URINE: 0.2 E.U./DL
WBC # BLD: 6.5 K/UL (ref 4.8–10.8)

## 2022-08-01 PROCEDURE — 99284 EMERGENCY DEPT VISIT MOD MDM: CPT

## 2022-08-01 PROCEDURE — 80053 COMPREHEN METABOLIC PANEL: CPT

## 2022-08-01 PROCEDURE — 81003 URINALYSIS AUTO W/O SCOPE: CPT

## 2022-08-01 PROCEDURE — 74150 CT ABDOMEN W/O CONTRAST: CPT

## 2022-08-01 PROCEDURE — 2580000003 HC RX 258: Performed by: NURSE PRACTITIONER

## 2022-08-01 PROCEDURE — 85025 COMPLETE CBC W/AUTO DIFF WBC: CPT

## 2022-08-01 PROCEDURE — 96375 TX/PRO/DX INJ NEW DRUG ADDON: CPT

## 2022-08-01 PROCEDURE — 96374 THER/PROPH/DIAG INJ IV PUSH: CPT

## 2022-08-01 PROCEDURE — 36415 COLL VENOUS BLD VENIPUNCTURE: CPT

## 2022-08-01 PROCEDURE — 6360000002 HC RX W HCPCS: Performed by: NURSE PRACTITIONER

## 2022-08-01 PROCEDURE — 96376 TX/PRO/DX INJ SAME DRUG ADON: CPT

## 2022-08-01 RX ORDER — HYDROMORPHONE HYDROCHLORIDE 1 MG/ML
0.5 INJECTION, SOLUTION INTRAMUSCULAR; INTRAVENOUS; SUBCUTANEOUS ONCE
Status: COMPLETED | OUTPATIENT
Start: 2022-08-01 | End: 2022-08-01

## 2022-08-01 RX ORDER — ONDANSETRON 4 MG/1
4 TABLET, ORALLY DISINTEGRATING ORAL EVERY 8 HOURS PRN
Qty: 15 TABLET | Refills: 0 | Status: SHIPPED | OUTPATIENT
Start: 2022-08-01 | End: 2022-08-08

## 2022-08-01 RX ORDER — TAMSULOSIN HYDROCHLORIDE 0.4 MG/1
0.4 CAPSULE ORAL DAILY
Qty: 15 CAPSULE | Refills: 0 | Status: ON HOLD | OUTPATIENT
Start: 2022-08-01 | End: 2022-08-24 | Stop reason: SDUPTHER

## 2022-08-01 RX ORDER — ONDANSETRON 2 MG/ML
4 INJECTION INTRAMUSCULAR; INTRAVENOUS ONCE
Status: COMPLETED | OUTPATIENT
Start: 2022-08-01 | End: 2022-08-01

## 2022-08-01 RX ORDER — QUETIAPINE FUMARATE 25 MG/1
TABLET, FILM COATED ORAL
Qty: 60 TABLET | Refills: 2 | OUTPATIENT
Start: 2022-08-01

## 2022-08-01 RX ORDER — 0.9 % SODIUM CHLORIDE 0.9 %
1000 INTRAVENOUS SOLUTION INTRAVENOUS ONCE
Status: COMPLETED | OUTPATIENT
Start: 2022-08-01 | End: 2022-08-01

## 2022-08-01 RX ORDER — KETOROLAC TROMETHAMINE 30 MG/ML
15 INJECTION, SOLUTION INTRAMUSCULAR; INTRAVENOUS ONCE
Status: COMPLETED | OUTPATIENT
Start: 2022-08-01 | End: 2022-08-01

## 2022-08-01 RX ADMIN — KETOROLAC TROMETHAMINE 15 MG: 30 INJECTION, SOLUTION INTRAMUSCULAR; INTRAVENOUS at 19:17

## 2022-08-01 RX ADMIN — ONDANSETRON 4 MG: 2 INJECTION INTRAMUSCULAR; INTRAVENOUS at 17:09

## 2022-08-01 RX ADMIN — HYDROMORPHONE HYDROCHLORIDE 0.5 MG: 1 INJECTION, SOLUTION INTRAMUSCULAR; INTRAVENOUS; SUBCUTANEOUS at 19:17

## 2022-08-01 RX ADMIN — SODIUM CHLORIDE 1000 ML: 9 INJECTION, SOLUTION INTRAVENOUS at 17:14

## 2022-08-01 RX ADMIN — HYDROMORPHONE HYDROCHLORIDE 0.5 MG: 1 INJECTION, SOLUTION INTRAMUSCULAR; INTRAVENOUS; SUBCUTANEOUS at 17:11

## 2022-08-01 RX ADMIN — HYDROMORPHONE HYDROCHLORIDE 0.5 MG: 1 INJECTION, SOLUTION INTRAMUSCULAR; INTRAVENOUS; SUBCUTANEOUS at 18:20

## 2022-08-01 ASSESSMENT — PAIN DESCRIPTION - ORIENTATION
ORIENTATION: LEFT

## 2022-08-01 ASSESSMENT — PAIN DESCRIPTION - LOCATION
LOCATION: FLANK

## 2022-08-01 ASSESSMENT — PAIN DESCRIPTION - DESCRIPTORS
DESCRIPTORS: ACHING;CRAMPING;DULL
DESCRIPTORS: ACHING;DULL;CRAMPING
DESCRIPTORS: ACHING;CRAMPING

## 2022-08-01 ASSESSMENT — PAIN SCALES - GENERAL
PAINLEVEL_OUTOF10: 8
PAINLEVEL_OUTOF10: 8
PAINLEVEL_OUTOF10: 7
PAINLEVEL_OUTOF10: 7

## 2022-08-01 ASSESSMENT — PAIN - FUNCTIONAL ASSESSMENT: PAIN_FUNCTIONAL_ASSESSMENT: 0-10

## 2022-08-01 NOTE — ED PROVIDER NOTES
[facility] EMERGENCY DEPT  eMERGENCY dEPARTMENT eNCOUnter      Pt Name: Janee Anthony  MRN: [id number]  [place] 1955  Date of evaluation: 8/1/2022  Provider: Nathalia Mojica [address]       Chief Complaint   Patient presents with    Flank Pain     Left hx of kidney stones started yesterday          HISTORY OF PRESENT ILLNESS   (Location/Symptom, Timing/Onset,Context/Setting, Quality, Duration, Modifying Factors, Severity)  Note limiting factors. Janee Anthony is a 79 y.o. male who presents to the emergency department with left flank pain that started yesterday. He tells me he has had more ceyu276 kidney stones. He called Dr. Nydia Patricio office and does have an appointment next week. He is having too much pain right now to wait that long. Patient takes chronic pain medication and took his medication this morning. he states that that did not help. No vomiting or fever. Pt is able to urinate    The history is provided by the patient. Flank Pain  This is a new problem. NursingNotes were reviewed. REVIEW OF SYSTEMS    (2-9 systems for level 4, 10 or more for level 5)     Review of Systems   Genitourinary:  Positive for flank pain. Except as noted above the remainder of the review of systems was reviewed and negative. PAST MEDICAL HISTORY     Past Medical History:   Diagnosis Date    CAD (coronary artery disease) 2010    Native Vessel. S/p stenting wtih negative cardiac cath on January 18, 2010 and negative nuclear stress test on June 29, 2010. Chest pain 1/28/2016    DJD (degenerative joint disease)     GERD (gastroesophageal reflux disease) 2010    HTN (hypertension)     Hyperlipidemia     Left knee pain 1/28/2016    Pacemaker 2010    MEDTRONIC    Polyarticular arthritis     Renal calculi 2010    S/p lithrotripsy    Right arm pain 1/28/2016    Right ureteral stone 7/8/2016    Stones in the urinary tract 02/02/2022    Tobacco abuse 2010    Prior.          SURGICALHISTORY Past Surgical History:   Procedure Laterality Date    CARDIAC CATHETERIZATION  01/08/2010    EF is estimated to be 60%. See scanned document. CARDIAC CATHETERIZATION  6/20/08, 10/2/08    EF is estimated to be 60%. See scanned document. CARDIAC CATHETERIZATION  2/13/07, 6/6/07    EF 50%. See scanned document. CARDIAC CATHETERIZATION  1/8/07, 11/26/07    EF is estimated to be in excess of 50%. See scanned doucment. CARDIAC CATHETERIZATION  08/17/2006    EF is estimated to be in excess of 50%. See scanned doucment. CARDIAC CATHETERIZATION  5/14/14  MDL    normal LV function. EF 60    CHOLECYSTECTOMY      CLAVICLE SURGERY      COLONOSCOPY      Endoscopy    CYSTOSCOPY Right 7/8/2016    CYSTOSCOPY; RIGHT RETROGRADE PYELOGRAM; RIGHT URETERAL DILATATION; RIGHT URETEROSCOPY; RIGHT URETERAL LASER LITHOTRIPSY AND STONE EXTRACTION; INSERTION RIGHT URETERAL DOUBLE J STENT performed by Manjeet Keller MD at [address] [place] [address],[address] Left 1/14/2021    CYSTOSCOPY URETEROSCOPY LASER LITHO WITH STONE EXTRACTION AND STENT REMOVAL> performed by Adonis Gill MD at [address] [place] [address],[address] Left 12/29/2021    CYSTOSCOPY; LEFT URTERAL CATHERIZATION; LEFT RETORGRADE PYLEOGRAM; REMOVAL BLADDER CALCULI performed by Krissy Cruz MD at [address] [place] [address] Left     HAND SURGERY      HUMERUS SURGERY      KNEE SURGERY      Right    KNEE SURGERY Left     LITHOTRIPSY      MANDIBLE FRACTURE SURGERY      NECK SURGERY      cervical spine. OTHER SURGICAL HISTORY      Brachytherapy of the core stents.     PACEMAKER PLACEMENT      MEDTRONIC    PTCA      TIBIA FRACTURE SURGERY      WRIST SURGERY           CURRENT MEDICATIONS       Discharge Medication List as of 8/1/2022  7:26 PM        CONTINUE these medications which have NOT CHANGED    Details   metoprolol tartrate (LOPRESSOR) 25 MG tablet Take 2 tablets by mouth 2 times daily Indications: Pt unaware of dosage, Disp-60 tablet, R-3NO PRINT      QUEtiapine (SEROQUEL) 25 MG tablet Historical Med      scopolamine (TRANSDERM-SCOP) transdermal patch Historical Med      oxyCODONE (OXY-IR) 15 MG immediate release tablet TAKE 1 TABLET BY MOUTH EVERY 6 HOURSHistorical Med      prochlorperazine (COMPAZINE) 5 MG tablet TAKE 1 TABLET BY MOUTH EVERY 6 HOURS AS NEEDED FOR NAUSEAHistorical Med      aspirin 325 MG tablet Take 325 mg by mouth daily      isosorbide mononitrate (IMDUR) 120 MG CR tablet Take 120 mg by mouth 2 times daily Historical Med      hydrochlorothiazide (HYDRODIURIL) 12.5 MG tablet Take 12.5 mg by mouth daily Indications: as needed      Coenzyme Q10 (COQ-10 PO) Take by mouth daily       Nutritional Supplements (BOOST PO) Take by mouth      NITROSTAT 0.4 MG SL tablet Place 1 tablet under the tongue 3 times daily as needed, R-0, RUBEN      LORazepam (ATIVAN) 1 MG tablet Take 1 mg by mouth 3 times daily as needed, R-5      amlodipine (NORVASC) 10 MG tablet Take 10 mg by mouth daily. clopidogrel (PLAVIX) 75 MG tablet Take 75 mg by mouth daily. ALLERGIES     Ezetimibe-simvastatin, Bee venom, Darvocet [propoxyphene n-acetaminophen], Other, Penicillins, Phenergan [promethazine hcl], Promethazine, Promethazine hcl, and Propoxyphene    FAMILY HISTORY     No family history on file.        SOCIAL HISTORY       Social History     Socioeconomic History    Marital status:      Spouse name: None    Number of children: None    Years of education: None    Highest education level: None   Occupational History     Employer: DISABLED   Tobacco Use    Smoking status: Former     Types: Cigarettes    Smokeless tobacco: Current   Vaping Use    Vaping Use: Never used   Substance and Sexual Activity    Alcohol use: No    Drug use: No       SCREENINGS    Adell Coma Scale  Eye Opening: Spontaneous  Best Verbal Response: Oriented  Best Motor Response: Obeys commands  Ely Coma Scale Score: 15 @FLOW([id number])@      PHYSICAL EXAM    (up to 7 for level 4, 8 or more for level 5) ED Triage Vitals [08/01/22 1444]   BP Temp Temp src Heart Rate Resp SpO2 Height Weight   135/66 98.1 °F (36.7 °C) -- 59 18 97 % -- --       Physical Exam  Vitals and nursing note reviewed. Constitutional:       Appearance: Normal appearance. He is well-developed. HENT:      Head: Normocephalic and atraumatic. Eyes:      General: No scleral icterus. Right eye: No discharge. Left eye: No discharge. Cardiovascular:      Rate and Rhythm: Normal rate. Pulmonary:      Effort: No respiratory distress. Abdominal:      General: Abdomen is flat. Bowel sounds are normal.      Palpations: Abdomen is soft. Tenderness: There is abdominal tenderness in the left lower quadrant. There is guarding. Hernia: No hernia is present. Musculoskeletal:      Cervical back: Normal range of motion and neck supple. Neurological:      Mental Status: He is alert and oriented to person, place, and time. Psychiatric:         Behavior: Behavior normal.       DIAGNOSTIC RESULTS     EKG: All EKG's are interpreted by the Emergency Department Physician who either signs or Co-signsthis chart in the absence of a cardiologist.        RADIOLOGY:   Fernando  such as CT, Ultrasound and MRI are read by the radiologist. Plain radiographic images are visualized and preliminarily interpreted by the emergency physician with the below findings:      Interpretation per the Radiologist below, if available at the time of this note:    Jared Porras   Final Result   1. Moderate left hydronephrosis and hydroureter due to a 2.3 mm proximal   ureteral obstructing calculus. 2.Colonic diverticulosis without diverticulitis. 3.Vascular calcific atherosclerosis. 4.Degenerative spondylosis. Impression moderate left hydronephrosis and hydroureter due to a 2.3 mm proximal ureter obstructing calculus. Colonic diverticulosis without diverticulitis. 3 vascular callus for specific atherosclerosis.   For degenerative spondylosis. ED BEDSIDEULTRASOUND:   Performed by ED Physician -none    LABS:  Labs Reviewed   URINALYSIS WITH REFLEX TO CULTURE - Abnormal; Notable for the following components:       Result Value    Clarity, UA CLOUDY (*)     All other components within normal limits   CBC WITH AUTO DIFFERENTIAL - Abnormal; Notable for the following components:    MPV 8.5 (*)     Neutrophils % 72.6 (*)     Lymphocytes % 18.8 (*)     All other components within normal limits   COMPREHENSIVE METABOLIC PANEL W/ REFLEX TO MG FOR LOW K       All other labs were within normal range or not returned as of this dictation. EMERGENCY DEPARTMENT COURSE and DIFFERENTIALDIAGNOSIS/MDM:   Vitals:    Vitals:    08/01/22 1702 08/01/22 1744 08/01/22 1849 08/01/22 1933   BP: (!) 150/81 (!) 160/82 (!) 145/70    Pulse: 60 60 65 68   Resp: 15 16 16 15   Temp:       SpO2: 100% 99% 97% 99%           MDM  Patient is pain controlled at this time. We will give him Flomax nausea medicine to have at home. He takes stronger pain medication than I would probably provide him with. He knows he may return to the ER at any time if he is unable to keep medicine down or has uncontrolled pain. If this does not happen he will follow-up with Dr. Nohemy Larry next week. To increase his fluids. This should pass      CONSULTS:  None    PROCEDURES:  Unless otherwise noted below, none     Procedures    FINAL IMPRESSION      1. Ureteric stone    2.  Flank pain        DISPOSITION/PLAN   DISPOSITION Decision To Discharge 08/01/2022 07:08:02 PM      PATIENT REFERRED TO:  Josef Brower MD  [address] [place] [address] [phone]      follow up as scheduled    DISCHARGE MEDICATIONS:  Discharge Medication List as of 8/1/2022  7:26 PM             (Please note that portions of this note were completed with a voice recognitionprogram.  Efforts were made to edit the dictations but occasionally words are mis-transcribed.)    Em Maza, APRN (electronically signed)           Armand Matute, BHUMIKA  08/02/22 6958

## 2022-08-03 ENCOUNTER — TELEPHONE (OUTPATIENT)
Dept: UROLOGY | Age: 67
End: 2022-08-03

## 2022-08-03 NOTE — TELEPHONE ENCOUNTER
[id number] PT WAS NOTIFIED

## 2022-08-03 NOTE — TELEPHONE ENCOUNTER
Annika Quevedo is a 65 year old female referred by Laura Stover DO    Chief Complaint   Patient presents with   • Consultation     Impaired Gait    • Office Visit     multiple falls          HPI: falls and walking difficulty since past 3-4 years    Gradual onset    Worsening over time.     When she is walking falls to ground and starts shuffling and walking short steps and falls forward. No LOC when this happens    No tremors    Feet are numb and tingling. Has diabetes    NO neck pain.     Has a POA as she has difficulty with memory issues for 5-6 years    Does report leaking of bladder.    No known h/o MS. However has abnormal MRI brain done in 2020 which was suspicious for demyelinating disease.     NO h/o visual loss    NO h/o any symptoms suggestive of MS exacerbations    Does drink occasionally but no h/o heavy alcohol use/abuse. NO h/o drug abuse.       No aggravating/relieving factors reported. However with PT and using cane she has improved,     Has received COVID vaccine in April 2021, Pfizer.       Family history: Falls in brother. No Fh of PD        Current Outpatient Medications   Medication Sig   • metFORMIN (GLUCOPHAGE-XR) 500 MG 24 hr tablet Take one tablet daily in a.m. for 7 days, then on 8th day increase to 2 tablets in a.m., by mouth.   • Cholecalciferol (vitamin D) 50 mcg (2,000 units) capsule Take 1 capsule by mouth daily.   • lisinopril (ZESTRIL) 40 MG tablet Take 40 mg by mouth nightly.     No current facility-administered medications for this visit.       No past medical history on file.     Past Surgical History:   Procedure Laterality Date   • Bronchoscopy,diagnostic w lavage  10/13/2010   • Esophagogastroduodenoscopy transoral flex w/bx single or mult  01/19/2011            Social History     Socioeconomic History   • Marital status: Single     Spouse name: Not on file   • Number of children: Not on file   • Years of education: Not on file   • Highest education level: Not on file  Vidhya Rogers was seen in ED on Monday and testing was done for kidney stones, would like to know if he should still have testing ordered by office on Thursday or if ED testing would suffice?  Please return his call to discuss [phone]    Thank you   Occupational History   • Not on file   Tobacco Use   • Smoking status: Never Smoker   • Smokeless tobacco: Never Used   Vaping Use   • Vaping Use: never used   Substance and Sexual Activity   • Alcohol use: Not Currently     Comment: very seldom, occasional    • Drug use: Never   • Sexual activity: Not on file   Other Topics Concern   • Not on file   Social History Narrative   • Not on file     Social Determinants of Health     Financial Resource Strain:    • Social Determinants: Financial Resource Strain: Not on file   Food Insecurity:    • Social Determinants: Food Insecurity: Not on file   Transportation Needs:    • Lack of Transportation (Medical): Not on file   • Lack of Transportation (Non-Medical): Not on file   Physical Activity:    • Days of Exercise per Week: Not on file   • Minutes of Exercise per Session: Not on file   Stress:    • Social Determinants: Stress: Not on file   Social Connections:    • Social Determinants: Social Connections: Not on file   Intimate Partner Violence:    • Social Determinants: Intimate Partner Violence Past Fear: Not on file   • Social Determinants: Intimate Partner Violence Current Fear: Not on file       Visit Vitals  BP (!) 150/80   Pulse 73   Wt 87.1 kg (192 lb 0.3 oz)   SpO2 98%   BMI 32.45 kg/m²       GENERAL APPEARANCE: Pleasant and cooperative, normal in appearance.     CARDIOVASCULAR: Normal cardiac pulsations     NEUROLOGIC:     Higher cortical function: Alert and oriented to time, place, person. Memory , attention span, concentration mildly reduced; language and fund of knowledge were appropriate.     Cranial nerves: Pupils were 4 mm, reacting briskly to 2 mm without afferent pupillary defect. Visual fields were intact to confrontation testing. Funduscopic examination revealed sharp disk margins with normal vasculature. No papilledema. Extraocular movements were full and smooth with normal pursuits and saccades. No nystagmus noted. Normal facial sensation. No  facial droop. Normal shoulder shrug. Hearing was normal. Symmetric palatal elevation and tongue protrusion.     Motor exam: Tone and bulk were within normal limits in all 4 extremities. Strength was 5/5 in all 4 extremities. No involuntary movements noted.     Deep tendon reflexes were symmetrical and decreased    Coordination revealed normal finger-to-nose testing.     Sensation: Normal to light touch bilaterally.     Gait: slightly broad based.    Medical Decision Making:     MRI Brain (9/2020) : white matter disease. Contrast enhancement of corpus. Cerebral and cerebellar atrophy    (provide images)     Labs: A1c 8.8.       Imp: Annika was seen today for consultation and office visit.    Diagnoses and all orders for this visit:    Abnormal gait: evaluate for demyelinating disease/neuropathy.  -     MRI BRAIN W WO CONTRAST; Future  -     MRI CERVICAL SPINE W WO  CONTRAST; Future  -     MRI THORACIC SPINE W WO CONTRAST; Future    Frequent falls  -     MRI BRAIN W WO CONTRAST; Future  -     MRI CERVICAL SPINE W WO  CONTRAST; Future  -     MRI THORACIC SPINE W WO CONTRAST; Future    Abnormal brain MRI  -     MRI BRAIN W WO CONTRAST; Future  -     MRI CERVICAL SPINE W WO  CONTRAST; Future  -     MRI THORACIC SPINE W WO CONTRAST; Future    Demyelinating disease of central nervous system (CMS/HCC): MRI report suggestive of MS. However no clear history. Will need to evaluate.  -     MRI BRAIN W WO CONTRAST; Future  -     MRI CERVICAL SPINE W WO  CONTRAST; Future  -     MRI THORACIC SPINE W WO CONTRAST; Future  -     SERVICE TO OPHTHALMOLOGY    Neuropathy: possibly neuropathy, secondary to DM. Recommend good control of BS  -     EMG - ROUTINE      Return in about 4 months (around 3/3/2022). Seek medical attention sooner in case of any worsening/new symptoms developing.    Con Rodriguez MD    Cc: Laura Stover DO

## 2022-08-04 ENCOUNTER — HOSPITAL ENCOUNTER (OUTPATIENT)
Dept: CT IMAGING | Age: 67
Discharge: HOME | End: 2022-08-04
Payer: MEDICARE

## 2022-08-04 DIAGNOSIS — N20.0 KIDNEY STONE: ICD-10-CM

## 2022-08-04 PROCEDURE — 6360000004 HC RX CONTRAST MEDICATION: Performed by: NURSE PRACTITIONER

## 2022-08-04 PROCEDURE — 74178 CT ABD&PLV WO CNTR FLWD CNTR: CPT

## 2022-08-04 RX ADMIN — IOPAMIDOL 75 ML: 755 INJECTION, SOLUTION INTRAVENOUS at 15:23

## 2022-08-05 ENCOUNTER — APPOINTMENT (OUTPATIENT)
Dept: GENERAL RADIOLOGY | Age: 67
End: 2022-08-05
Attending: UROLOGY
Payer: MEDICARE

## 2022-08-05 ENCOUNTER — OFFICE VISIT (OUTPATIENT)
Dept: UROLOGY | Age: 67
End: 2022-08-05
Payer: MEDICARE

## 2022-08-05 ENCOUNTER — ANESTHESIA EVENT (OUTPATIENT)
Dept: OPERATING ROOM | Age: 67
End: 2022-08-05
Payer: MEDICARE

## 2022-08-05 ENCOUNTER — HOSPITAL ENCOUNTER (OUTPATIENT)
Age: 67
Setting detail: OUTPATIENT SURGERY
Discharge: HOME | End: 2022-08-05
Attending: UROLOGY | Admitting: UROLOGY
Payer: MEDICARE

## 2022-08-05 ENCOUNTER — ANESTHESIA (OUTPATIENT)
Dept: OPERATING ROOM | Age: 67
End: 2022-08-05
Payer: MEDICARE

## 2022-08-05 VITALS
OXYGEN SATURATION: 97 % | TEMPERATURE: 97.4 F | DIASTOLIC BLOOD PRESSURE: 52 MMHG | HEIGHT: 67 IN | RESPIRATION RATE: 16 BRPM | WEIGHT: 126 LBS | SYSTOLIC BLOOD PRESSURE: 122 MMHG | HEART RATE: 60 BPM | BODY MASS INDEX: 19.78 KG/M2

## 2022-08-05 VITALS — WEIGHT: 126.8 LBS | TEMPERATURE: 98.8 F | BODY MASS INDEX: 19.9 KG/M2 | HEIGHT: 67 IN

## 2022-08-05 DIAGNOSIS — N20.1 CALCULUS OF PROXIMAL LEFT URETER: Primary | ICD-10-CM

## 2022-08-05 DIAGNOSIS — N20.1 URETERAL STONE: ICD-10-CM

## 2022-08-05 PROCEDURE — 99214 OFFICE O/P EST MOD 30 MIN: CPT | Performed by: NURSE PRACTITIONER

## 2022-08-05 PROCEDURE — 82360 CALCULUS ASSAY QUANT: CPT

## 2022-08-05 PROCEDURE — 7100000011 HC PHASE II RECOVERY - ADDTL 15 MIN: Performed by: UROLOGY

## 2022-08-05 PROCEDURE — C1769 GUIDE WIRE: HCPCS | Performed by: UROLOGY

## 2022-08-05 PROCEDURE — 6360000002 HC RX W HCPCS: Performed by: NURSE ANESTHETIST, CERTIFIED REGISTERED

## 2022-08-05 PROCEDURE — C1758 CATHETER, URETERAL: HCPCS | Performed by: UROLOGY

## 2022-08-05 PROCEDURE — 52332 CYSTOSCOPY AND TREATMENT: CPT | Performed by: UROLOGY

## 2022-08-05 PROCEDURE — 6360000002 HC RX W HCPCS: Performed by: ANESTHESIOLOGY

## 2022-08-05 PROCEDURE — 2720000010 HC SURG SUPPLY STERILE: Performed by: UROLOGY

## 2022-08-05 PROCEDURE — 7100000001 HC PACU RECOVERY - ADDTL 15 MIN: Performed by: UROLOGY

## 2022-08-05 PROCEDURE — 3700000000 HC ANESTHESIA ATTENDED CARE: Performed by: UROLOGY

## 2022-08-05 PROCEDURE — 3700000001 HC ADD 15 MINUTES (ANESTHESIA): Performed by: UROLOGY

## 2022-08-05 PROCEDURE — 2709999900 HC NON-CHARGEABLE SUPPLY: Performed by: UROLOGY

## 2022-08-05 PROCEDURE — 74420 UROGRAPHY RTRGR +-KUB: CPT | Performed by: UROLOGY

## 2022-08-05 PROCEDURE — 1123F ACP DISCUSS/DSCN MKR DOCD: CPT | Performed by: NURSE PRACTITIONER

## 2022-08-05 PROCEDURE — 7100000000 HC PACU RECOVERY - FIRST 15 MIN: Performed by: UROLOGY

## 2022-08-05 PROCEDURE — 6370000000 HC RX 637 (ALT 250 FOR IP): Performed by: ANESTHESIOLOGY

## 2022-08-05 PROCEDURE — 2500000003 HC RX 250 WO HCPCS: Performed by: NURSE ANESTHETIST, CERTIFIED REGISTERED

## 2022-08-05 PROCEDURE — 7100000010 HC PHASE II RECOVERY - FIRST 15 MIN: Performed by: UROLOGY

## 2022-08-05 PROCEDURE — 88300 SURGICAL PATH GROSS: CPT

## 2022-08-05 PROCEDURE — 2580000003 HC RX 258: Performed by: ANESTHESIOLOGY

## 2022-08-05 PROCEDURE — 3600000004 HC SURGERY LEVEL 4 BASE: Performed by: UROLOGY

## 2022-08-05 PROCEDURE — 52352 CYSTOURETERO W/STONE REMOVE: CPT | Performed by: UROLOGY

## 2022-08-05 PROCEDURE — 3600000014 HC SURGERY LEVEL 4 ADDTL 15MIN: Performed by: UROLOGY

## 2022-08-05 PROCEDURE — C2617 STENT, NON-COR, TEM W/O DEL: HCPCS | Performed by: UROLOGY

## 2022-08-05 PROCEDURE — 3209999900 FLUORO FOR SURGICAL PROCEDURES

## 2022-08-05 DEVICE — STENT URET 6FR L24CM PERCFLX HYDR+ DBL PGTL THRD 2: Type: IMPLANTABLE DEVICE | Status: FUNCTIONAL

## 2022-08-05 RX ORDER — MIDAZOLAM HYDROCHLORIDE 2 MG/2ML
2 INJECTION, SOLUTION INTRAMUSCULAR; INTRAVENOUS
Status: DISCONTINUED | OUTPATIENT
Start: 2022-08-05 | End: 2022-08-05 | Stop reason: HOSPADM

## 2022-08-05 RX ORDER — ONDANSETRON 2 MG/ML
INJECTION INTRAMUSCULAR; INTRAVENOUS PRN
Status: DISCONTINUED | OUTPATIENT
Start: 2022-08-05 | End: 2022-08-05 | Stop reason: SDUPTHER

## 2022-08-05 RX ORDER — LIDOCAINE HYDROCHLORIDE 10 MG/ML
INJECTION, SOLUTION EPIDURAL; INFILTRATION; INTRACAUDAL; PERINEURAL PRN
Status: DISCONTINUED | OUTPATIENT
Start: 2022-08-05 | End: 2022-08-05 | Stop reason: SDUPTHER

## 2022-08-05 RX ORDER — SODIUM CHLORIDE, SODIUM LACTATE, POTASSIUM CHLORIDE, CALCIUM CHLORIDE 600; 310; 30; 20 MG/100ML; MG/100ML; MG/100ML; MG/100ML
INJECTION, SOLUTION INTRAVENOUS CONTINUOUS
Status: DISCONTINUED | OUTPATIENT
Start: 2022-08-05 | End: 2022-08-05 | Stop reason: HOSPADM

## 2022-08-05 RX ORDER — EPHEDRINE SULFATE 50 MG/ML
INJECTION, SOLUTION INTRAVENOUS PRN
Status: DISCONTINUED | OUTPATIENT
Start: 2022-08-05 | End: 2022-08-05 | Stop reason: SDUPTHER

## 2022-08-05 RX ORDER — PROPOFOL 10 MG/ML
INJECTION, EMULSION INTRAVENOUS PRN
Status: DISCONTINUED | OUTPATIENT
Start: 2022-08-05 | End: 2022-08-05 | Stop reason: SDUPTHER

## 2022-08-05 RX ORDER — SODIUM CHLORIDE 0.9 % (FLUSH) 0.9 %
5-40 SYRINGE (ML) INJECTION EVERY 12 HOURS SCHEDULED
Status: DISCONTINUED | OUTPATIENT
Start: 2022-08-05 | End: 2022-08-05 | Stop reason: HOSPADM

## 2022-08-05 RX ORDER — FENTANYL CITRATE 50 UG/ML
INJECTION, SOLUTION INTRAMUSCULAR; INTRAVENOUS PRN
Status: DISCONTINUED | OUTPATIENT
Start: 2022-08-05 | End: 2022-08-05 | Stop reason: SDUPTHER

## 2022-08-05 RX ORDER — SODIUM CHLORIDE 9 MG/ML
INJECTION, SOLUTION INTRAVENOUS CONTINUOUS
Status: DISCONTINUED | OUTPATIENT
Start: 2022-08-05 | End: 2022-08-05 | Stop reason: HOSPADM

## 2022-08-05 RX ORDER — MEPERIDINE HYDROCHLORIDE 25 MG/ML
12.5 INJECTION INTRAMUSCULAR; INTRAVENOUS; SUBCUTANEOUS EVERY 5 MIN PRN
Status: DISCONTINUED | OUTPATIENT
Start: 2022-08-05 | End: 2022-08-05 | Stop reason: HOSPADM

## 2022-08-05 RX ORDER — DEXAMETHASONE SODIUM PHOSPHATE 10 MG/ML
INJECTION, SOLUTION INTRAMUSCULAR; INTRAVENOUS PRN
Status: DISCONTINUED | OUTPATIENT
Start: 2022-08-05 | End: 2022-08-05 | Stop reason: SDUPTHER

## 2022-08-05 RX ORDER — DEXAMETHASONE 4 MG/1
4 TABLET ORAL ONCE
Status: COMPLETED | OUTPATIENT
Start: 2022-08-05 | End: 2022-08-05

## 2022-08-05 RX ORDER — CIPROFLOXACIN 2 MG/ML
INJECTION, SOLUTION INTRAVENOUS PRN
Status: DISCONTINUED | OUTPATIENT
Start: 2022-08-05 | End: 2022-08-05 | Stop reason: SDUPTHER

## 2022-08-05 RX ORDER — LIDOCAINE HYDROCHLORIDE 10 MG/ML
1 INJECTION, SOLUTION EPIDURAL; INFILTRATION; INTRACAUDAL; PERINEURAL
Status: DISCONTINUED | OUTPATIENT
Start: 2022-08-05 | End: 2022-08-05 | Stop reason: HOSPADM

## 2022-08-05 RX ORDER — FAMOTIDINE 20 MG/1
20 TABLET, FILM COATED ORAL ONCE
Status: COMPLETED | OUTPATIENT
Start: 2022-08-05 | End: 2022-08-05

## 2022-08-05 RX ORDER — SODIUM CHLORIDE 0.9 % (FLUSH) 0.9 %
5-40 SYRINGE (ML) INJECTION PRN
Status: DISCONTINUED | OUTPATIENT
Start: 2022-08-05 | End: 2022-08-05 | Stop reason: HOSPADM

## 2022-08-05 RX ORDER — FENTANYL CITRATE 50 UG/ML
25 INJECTION, SOLUTION INTRAMUSCULAR; INTRAVENOUS EVERY 5 MIN PRN
Status: DISCONTINUED | OUTPATIENT
Start: 2022-08-05 | End: 2022-08-05 | Stop reason: HOSPADM

## 2022-08-05 RX ORDER — SODIUM CHLORIDE 9 MG/ML
INJECTION, SOLUTION INTRAVENOUS PRN
Status: DISCONTINUED | OUTPATIENT
Start: 2022-08-05 | End: 2022-08-05 | Stop reason: HOSPADM

## 2022-08-05 RX ORDER — DIPHENHYDRAMINE HYDROCHLORIDE 50 MG/ML
12.5 INJECTION INTRAMUSCULAR; INTRAVENOUS
Status: DISCONTINUED | OUTPATIENT
Start: 2022-08-05 | End: 2022-08-05 | Stop reason: HOSPADM

## 2022-08-05 RX ORDER — OXYCODONE HYDROCHLORIDE 5 MG/1
15 TABLET ORAL ONCE
Status: COMPLETED | OUTPATIENT
Start: 2022-08-05 | End: 2022-08-05

## 2022-08-05 RX ORDER — ONDANSETRON 2 MG/ML
4 INJECTION INTRAMUSCULAR; INTRAVENOUS
Status: DISCONTINUED | OUTPATIENT
Start: 2022-08-05 | End: 2022-08-05 | Stop reason: HOSPADM

## 2022-08-05 RX ORDER — FENTANYL CITRATE 50 UG/ML
50 INJECTION, SOLUTION INTRAMUSCULAR; INTRAVENOUS EVERY 5 MIN PRN
Status: DISCONTINUED | OUTPATIENT
Start: 2022-08-05 | End: 2022-08-05 | Stop reason: HOSPADM

## 2022-08-05 RX ADMIN — OXYCODONE 15 MG: 5 TABLET ORAL at 17:40

## 2022-08-05 RX ADMIN — ONDANSETRON 4 MG: 2 INJECTION INTRAMUSCULAR; INTRAVENOUS at 16:05

## 2022-08-05 RX ADMIN — FENTANYL CITRATE 50 MCG: 50 INJECTION, SOLUTION INTRAMUSCULAR; INTRAVENOUS at 16:55

## 2022-08-05 RX ADMIN — LIDOCAINE HYDROCHLORIDE 50 MG: 10 INJECTION, SOLUTION EPIDURAL; INFILTRATION; INTRACAUDAL; PERINEURAL at 15:30

## 2022-08-05 RX ADMIN — CIPROFLOXACIN 400 MG: 2 INJECTION INTRAVENOUS at 15:40

## 2022-08-05 RX ADMIN — PROPOFOL 150 MG: 10 INJECTION, EMULSION INTRAVENOUS at 15:30

## 2022-08-05 RX ADMIN — FENTANYL CITRATE 50 MCG: 50 INJECTION, SOLUTION INTRAMUSCULAR; INTRAVENOUS at 15:23

## 2022-08-05 RX ADMIN — SODIUM CHLORIDE, SODIUM LACTATE, POTASSIUM CHLORIDE, AND CALCIUM CHLORIDE: 600; 310; 30; 20 INJECTION, SOLUTION INTRAVENOUS at 15:21

## 2022-08-05 RX ADMIN — DEXAMETHASONE 4 MG: 4 TABLET ORAL at 14:06

## 2022-08-05 RX ADMIN — EPHEDRINE SULFATE 20 MG: 50 INJECTION INTRAMUSCULAR; INTRAVENOUS; SUBCUTANEOUS at 15:39

## 2022-08-05 RX ADMIN — FENTANYL CITRATE 50 MCG: 50 INJECTION, SOLUTION INTRAMUSCULAR; INTRAVENOUS at 17:11

## 2022-08-05 RX ADMIN — FENTANYL CITRATE 50 MCG: 50 INJECTION, SOLUTION INTRAMUSCULAR; INTRAVENOUS at 15:30

## 2022-08-05 RX ADMIN — DEXAMETHASONE SODIUM PHOSPHATE 10 MG: 10 INJECTION, SOLUTION INTRAMUSCULAR; INTRAVENOUS at 15:42

## 2022-08-05 RX ADMIN — FAMOTIDINE 20 MG: 20 TABLET ORAL at 14:06

## 2022-08-05 ASSESSMENT — PAIN DESCRIPTION - LOCATION
LOCATION: SCROTUM

## 2022-08-05 ASSESSMENT — ENCOUNTER SYMPTOMS
SHORTNESS OF BREATH: 1
DYSPNEA ACTIVITY LEVEL: AFTER AMBULATING 1 FLIGHT OF STAIRS
BACK PAIN: 0
ABDOMINAL DISTENTION: 0
ABDOMINAL PAIN: 0
NAUSEA: 1
VOMITING: 0

## 2022-08-05 ASSESSMENT — PAIN DESCRIPTION - PAIN TYPE: TYPE: SURGICAL PAIN

## 2022-08-05 ASSESSMENT — PAIN DESCRIPTION - DESCRIPTORS
DESCRIPTORS: ACHING
DESCRIPTORS: DISCOMFORT

## 2022-08-05 ASSESSMENT — PAIN SCALES - GENERAL
PAINLEVEL_OUTOF10: 8
PAINLEVEL_OUTOF10: 7

## 2022-08-05 ASSESSMENT — PAIN DESCRIPTION - ORIENTATION
ORIENTATION: LEFT

## 2022-08-05 ASSESSMENT — PAIN - FUNCTIONAL ASSESSMENT
PAIN_FUNCTIONAL_ASSESSMENT: ACTIVITIES ARE NOT PREVENTED
PAIN_FUNCTIONAL_ASSESSMENT: 0-10

## 2022-08-05 NOTE — ANESTHESIA POSTPROCEDURE EVALUATION
Department of Anesthesiology  Postprocedure Note    Patient: Barry Wright  MRN: [id number]  YOB: 1955  Date of evaluation: 8/5/2022      Procedure Summary     Date: 08/05/22 Room / Location: [facility]    Anesthesia Start: 7390 Anesthesia Stop:     Procedure: CYSTOSCOPY LEFT URETERAL STENT INSERTION (Left) Diagnosis:       Ureteral stone      (Ureteral stone [N20.1])    Surgeons: Steph Xavier MD Responsible Provider: BHUMIKA Jain CRNA    Anesthesia Type: general ASA Status: 3          Anesthesia Type: No value filed.     Brenna Phase I: Brenna Score: 6    Brenna Phase II:        Anesthesia Post Evaluation    Patient location during evaluation: PACU  Patient participation: waiting for patient participation  Airway patency: patent  Nausea & Vomiting: no nausea and no vomiting  Complications: no  Cardiovascular status: hemodynamically stable  Respiratory status: acceptable, oral airway and spontaneous ventilation  Hydration status: euvolemic

## 2022-08-05 NOTE — PROGRESS NOTES
Suzi Walsh is a 79 y.o., male, Established patient who presents today   Chief Complaint   Patient presents with    Follow-up     I am here today for kidney stones       HPI   Patient presents for follow-up after evaluation in the emergency room on 8/1/2022 for left-sided flank pain that radiates down to his testicle. Associated symptoms include nausea without vomiting and subjective fever without chills. He has a long history of nephrolithiasis and states he is passed over her 20 stones since 1980. His most recent intervention was removal of bladder stones on 12/29/2021. He has undergone metabolic work-up in the past at [facility] twice sometime in the early 90s and once after that. Past Medical History:   Diagnosis Date    CAD (coronary artery disease) 2010    Native Vessel. S/p stenting wtih negative cardiac cath on January 18, 2010 and negative nuclear stress test on June 29, 2010. Chest pain 1/28/2016    DJD (degenerative joint disease)     GERD (gastroesophageal reflux disease) 2010    HTN (hypertension)     Hyperlipidemia     Left knee pain 1/28/2016    Pacemaker 2010    MEDTRONIC    Polyarticular arthritis     Renal calculi 2010    S/p lithrotripsy    Right arm pain 1/28/2016    Right ureteral stone 7/8/2016    Stones in the urinary tract 02/02/2022    Tobacco abuse 2010    Prior. Past Surgical History:   Procedure Laterality Date    CARDIAC CATHETERIZATION  01/08/2010    EF is estimated to be 60%. See scanned document. CARDIAC CATHETERIZATION  6/20/08, 10/2/08    EF is estimated to be 60%. See scanned document. CARDIAC CATHETERIZATION  2/13/07, 6/6/07    EF 50%. See scanned document. CARDIAC CATHETERIZATION  1/8/07, 11/26/07    EF is estimated to be in excess of 50%. See scanned doucment. CARDIAC CATHETERIZATION  08/17/2006    EF is estimated to be in excess of 50%. See scanned doucment. CARDIAC CATHETERIZATION  5/14/14  MDL    normal LV function.  EF 60 CHOLECYSTECTOMY      CLAVICLE SURGERY      COLONOSCOPY      Endoscopy    CYSTOSCOPY Right 7/8/2016    CYSTOSCOPY; RIGHT RETROGRADE PYELOGRAM; RIGHT URETERAL DILATATION; RIGHT URETEROSCOPY; RIGHT URETERAL LASER LITHOTRIPSY AND STONE EXTRACTION; INSERTION RIGHT URETERAL DOUBLE J STENT performed by Gideon Courtney MD at [facility] Left 1/14/2021    CYSTOSCOPY URETEROSCOPY LASER LITHO WITH STONE EXTRACTION AND STENT REMOVAL> performed by Geo Morocho MD at [facility] Left 12/29/2021    CYSTOSCOPY; LEFT URTERAL CATHERIZATION; LEFT RETORGRADE PYLEOGRAM; REMOVAL BLADDER CALCULI performed by Juliocesar Howard MD at [address] [place] [address] Left     HAND SURGERY      HUMERUS SURGERY      KNEE SURGERY      Right    KNEE SURGERY Left     LITHOTRIPSY      MANDIBLE FRACTURE SURGERY      NECK SURGERY      cervical spine. OTHER SURGICAL HISTORY      Brachytherapy of the core stents. PACEMAKER PLACEMENT      MEDTRONIC    PTCA      TIBIA FRACTURE SURGERY      WRIST SURGERY         No current outpatient medications on file. No current facility-administered medications for this visit. Allergies   Allergen Reactions    Ezetimibe-Simvastatin Other (See Comments)     Other reaction(s): myositis/elevated CPK  Other reaction(s):  Other (See Comments)  Myositis/ elevated CPK    Bee Venom     Darvocet [Propoxyphene N-Acetaminophen] Rash    Other Rash    Penicillins Other (See Comments)     childhood allergy    Phenergan [Promethazine Hcl] Anxiety and Other (See Comments)     Restless legs    Promethazine Other (See Comments)     Other reaction(s): jitters    Promethazine Hcl Other (See Comments)     Restless legs    Propoxyphene Rash       Social History     Socioeconomic History    Marital status:      Spouse name: None    Number of children: None    Years of education: None    Highest education level: None   Occupational History     Employer: DISABLED   Tobacco Use    Smoking status: Former Types: Cigarettes    Smokeless tobacco: Current   Vaping Use    Vaping Use: Never used   Substance and Sexual Activity    Alcohol use: No    Drug use: No       No family history on file. REVIEW OF SYSTEMS:  Review of Systems   Constitutional:  Positive for fever (subjective). Negative for chills. Gastrointestinal:  Positive for nausea. Negative for abdominal distention, abdominal pain and vomiting. Genitourinary:  Positive for flank pain and testicular pain. Negative for difficulty urinating, dysuria, frequency, hematuria and urgency. Musculoskeletal:  Negative for back pain and gait problem. Psychiatric/Behavioral:  Negative for agitation and confusion. PHYSICAL EXAM:  Temp 98.8 °F (37.1 °C) (Temporal)   Ht 5' 7\" (1.702 m)   Wt 126 lb 12.8 oz (57.5 kg)   BMI 19.86 kg/m²   Physical Exam  Vitals and nursing note reviewed. Constitutional:       General: He is not in acute distress. Appearance: Normal appearance. He is ill-appearing. Pulmonary:      Effort: Pulmonary effort is normal. No respiratory distress. Abdominal:      General: There is no distension. Tenderness: There is no abdominal tenderness. There is left CVA tenderness. There is no right CVA tenderness. Neurological:      Mental Status: He is alert and oriented to person, place, and time. Mental status is at baseline. Psychiatric:         Mood and Affect: Mood normal.         Behavior: Behavior normal.     IMAGING:  Impression   1. Moderate left hydronephrosis due to a 3 mm proximal obstructing ureteral   calculus. New compared to the prior exam.   2.Status post cholecystectomy. 3.Calcific atherosclerosis of the aorta. 4.Multilevel degenerative spondylosis. I have reviewed CT imaging and there is a nearly 3 mm stone in the proximal left ureter causing moderate left hydronephrosis. There may also be an adjacent 1 mm stone in the proximal ureter as well.   He was seen in the emergency room a few days ago and this has not changed significantly in terms of movement. ASSESSMENT/PLAN  1. Calculus of proximal left ureter  Patient presents with a left proximal ureter stone. Based on the stone location and severity of symptoms the patient has elected to proceed with ureteral stent placement. and possible ureteroscopic management of the stone. If ureteroscopic management cannot be accomplished today, we will plan for staged procedure with Dr. Llewellyn Cowden. We have discussed other modalities of treatment, including but not limited to a trial of medical expulsion therapy, ESWL, and monitoring the stone with subsequent imaging. Risks of the procedure were discussed which include but are not limited to bleeding, infection, postprocedural pain, damage to the kidney, ureter, bladder, or urethra, stricturing or perforation of the ureter which may require additional procedures, pain arising from the postoperative stent, failure to remove the stone, etc.    The patient has been provided with preoperative arrival times/lab work and has been instructed to strain urine prior to the procedure. No orders of the defined types were placed in this encounter. No follow-ups on file. An electronic signature was used to authenticate this note. BHUMIKA JOSEPH CNP    All information inputted into the note by the MA to include chief complaint, past medical history, past surgical history, medications, allergies, social and family history and review of systems has been reviewed and updated as needed by me. EMR Dragon/transcription disclaimer: Much of this document is electronic transcription/translation of spoken language to printed text. The electronic translation of spoken language may be erroneous or, at times, nonsensical words or phrases may be inadvertently transcribed.  Although I have reviewed the document for such errors, some may still exist.

## 2022-08-05 NOTE — DISCHARGE INSTRUCTIONS
Cystoscopy / Ureteroscopy / Bladder Endoscopy Procedures Discharge Instructions    You may experience : Burning sensation when you void     A feeling of a need to go to the bathroom frequently     You may have urgent urination     Your urine may be blood tinged    These symptoms should be relieved within a few hours and days  as you increase the amounts of fluids you drink and the number of times that you empty your bladder. They may persist for 1-2 weeks if you have a stent or had a biopsy or resection. We recommended that you drink plenty of fluid a few days after surgery. If you have a ureteral stent he may have pain in your side or back related to the stent. Stents also cause bladder irritation symptoms of frequency and urgency. No strenuous activities for 1 week or  until you talk to your doctor. You may feel light headed up to 24 hours after anesthesia. You should not do the following for the next 24 hours. Drive a car, operate machinery or power tools     Drink any alcoholic drinks (not even beer or wine)     Make any important decisions, i.e., signing important papers. It is recommended that you begin with clear liquids and/or light foods. If you  Are not nauseated, progress to your normal diet. I you are unable to urinate you should call your surgeon.

## 2022-08-05 NOTE — ANESTHESIA PRE PROCEDURE
Department of Anesthesiology  Preprocedure Note       Name:  Gadiel Mena   Age:  79 y.o.  :  1955                                          MRN:  [id number]         Date:  2022      Surgeon: Luda Roland):  Geo Morocho MD    Procedure: Procedure(s):  CYSTOSCOPY LEFT URETERAL STENT INSERTION  POSSIBLE LEFT URETEROSCOPY LASER LITHOTRIPSY    Medications prior to admission:   Prior to Admission medications    Medication Sig Start Date End Date Taking? Authorizing Provider   tamsulosin (FLOMAX) 0.4 MG capsule Take 1 capsule by mouth in the morning.  22   BHUMIKA Cox   ondansetron (ZOFRAN ODT) 4 MG disintegrating tablet Take 1 tablet by mouth every 8 hours as needed for Nausea or Vomiting 22   BHUMIKA Cox   metoprolol tartrate (LOPRESSOR) 25 MG tablet Take 2 tablets by mouth 2 times daily Indications: Pt unaware of dosage 22   Dayanara Roa MD   QUEtiapine (SEROQUEL) 25 MG tablet  21   Historical Provider, MD   scopolamine (TRANSDERM-SCOP) transdermal patch  21   Historical Provider, MD   oxyCODONE (OXY-IR) 15 MG immediate release tablet TAKE 1 TABLET BY MOUTH EVERY 6 HOURS 1/15/21   Historical Provider, MD   prochlorperazine (COMPAZINE) 5 MG tablet TAKE 1 TABLET BY MOUTH EVERY 6 HOURS AS NEEDED FOR NAUSEA  Patient not taking: Reported on 2022   Historical Provider, MD   aspirin 325 MG tablet Take 325 mg by mouth daily    Historical Provider, MD   isosorbide mononitrate (IMDUR) 120 MG CR tablet Take 120 mg by mouth 2 times daily     Historical Provider, MD   hydrochlorothiazide (HYDRODIURIL) 12.5 MG tablet Take 12.5 mg by mouth daily Indications: as needed    Historical Provider, MD   Coenzyme Q10 (COQ-10 PO) Take by mouth daily     Historical Provider, MD   Nutritional Supplements (BOOST PO) Take by mouth    Historical Provider, MD   NITROSTAT 0.4 MG SL tablet Place 1 tablet under the tongue 3 times daily as needed 10/23/15   Historical Provider, MD   LORazepam (ATIVAN) 1 MG tablet Take 1 mg by mouth 3 times daily as needed 1/18/16   Historical Provider, MD   amLODIPine (NORVASC) 10 MG tablet Take 10 mg by mouth daily. Historical Provider, MD   clopidogrel (PLAVIX) 75 MG tablet Take 75 mg by mouth daily. Historical Provider, MD       Current medications:    No current facility-administered medications for this visit. No current outpatient medications on file. Facility-Administered Medications Ordered in Other Visits   Medication Dose Route Frequency Provider Last Rate Last Admin    ceFAZolin (ANCEF) 1,000 mg in sterile water 10 mL IV syringe  1,000 mg IntraVENous Q8H Geo Morocho MD        lactated ringers infusion   IntraVENous Continuous Garcia Leather, DO           Allergies: Allergies   Allergen Reactions    Ezetimibe-Simvastatin Other (See Comments)     Other reaction(s): myositis/elevated CPK  Other reaction(s):  Other (See Comments)  Myositis/ elevated CPK    Bee Venom     Darvocet [Propoxyphene N-Acetaminophen] Rash    Other Rash    Penicillins Other (See Comments)     childhood allergy    Phenergan [Promethazine Hcl] Anxiety and Other (See Comments)     Restless legs    Promethazine Other (See Comments)     Other reaction(s): jitters    Promethazine Hcl Other (See Comments)     Restless legs    Propoxyphene Rash       Problem List:    Patient Active Problem List   Diagnosis Code    CAD (coronary artery disease) I25.10    Tobacco abuse Z72.0    Hyperlipidemia E78.5    HTN (hypertension) I10    Chest pain R07.9    Right arm pain M79.601    Left knee pain M25.562    History of kidney stones Z87.442    Benign prostatic hyperplasia with urinary frequency N40.1, R35.0    Angina pectoris, crescendo ([facility] [address] I20.0    ACS (acute coronary syndrome) ([facility] [address] I24.9    Nocturia R35.1    Left ureteral calculus I57.1    Renal colic on left side Z97    Ureterolithiasis N20.1    Imbalance R26.89    Slurred speech R47.81  Confusion R41.0    Dyspnea on exertion R06.00    Bladder calculus N21.0       Past Medical History:        Diagnosis Date    CAD (coronary artery disease) 2010    Native Vessel. S/p stenting wtih negative cardiac cath on January 18, 2010 and negative nuclear stress test on June 29, 2010.  Chest pain 1/28/2016    DJD (degenerative joint disease)     GERD (gastroesophageal reflux disease) 2010    HTN (hypertension)     Hyperlipidemia     Left knee pain 1/28/2016    Pacemaker 2010    [employer]    Polyarticular arthritis     Renal calculi 2010    S/p lithrotripsy    Right arm pain 1/28/2016    Right ureteral stone 7/8/2016    Stones in the urinary tract 02/02/2022    Tobacco abuse 2010    Prior. Past Surgical History:        Procedure Laterality Date    CARDIAC CATHETERIZATION  01/08/2010    EF is estimated to be 60%. See scanned document.  CARDIAC CATHETERIZATION  6/20/08, 10/2/08    EF is estimated to be 60%. See scanned document.  CARDIAC CATHETERIZATION  2/13/07, 6/6/07    EF 50%. See scanned document.  CARDIAC CATHETERIZATION  1/8/07, 11/26/07    EF is estimated to be in excess of 50%. See scanned doucment.  CARDIAC CATHETERIZATION  08/17/2006    EF is estimated to be in excess of 50%. See scanned doucment.  CARDIAC CATHETERIZATION  5/14/14  MDL    normal LV function.  EF 60    CHOLECYSTECTOMY      CLAVICLE SURGERY      COLONOSCOPY      Endoscopy    CYSTOSCOPY Right 7/8/2016    CYSTOSCOPY; RIGHT RETROGRADE PYELOGRAM; RIGHT URETERAL DILATATION; RIGHT URETEROSCOPY; RIGHT URETERAL LASER LITHOTRIPSY AND STONE EXTRACTION; INSERTION RIGHT URETERAL DOUBLE J STENT performed by Vanessa Homans, MD at [facility] 1/14/2021    CYSTOSCOPY URETEROSCOPY LASER LITHO WITH STONE EXTRACTION AND STENT REMOVAL> performed by Megan Hernandez MD at [facility] 12/29/2021    CYSTOSCOPY; LEFT URTERAL CATHERIZATION; LEFT RETORGRADE PYLEOGRAM; REMOVAL BLADDER CALCULI performed by Lauren Morales MD at [facility] Left     HAND SURGERY      HUMERUS SURGERY      KNEE SURGERY      Right    KNEE SURGERY Left     LITHOTRIPSY      MANDIBLE FRACTURE SURGERY      NECK SURGERY      cervical spine.  OTHER SURGICAL HISTORY      Brachytherapy of the core stents.  PACEMAKER PLACEMENT      [employer]    PTCA      TIBIA FRACTURE SURGERY      WRIST SURGERY         Social History:    Social History     Tobacco Use    Smoking status: Former     Types: Cigarettes    Smokeless tobacco: Current   Substance Use Topics    Alcohol use: No                                Ready to quit: Not Answered  Counseling given: Not Answered      Vital Signs (Current): There were no vitals filed for this visit.                                            BP Readings from Last 3 Encounters:   08/05/22 105/72   08/01/22 (!) 145/70   06/29/22 113/77       NPO Status:                                                                                 BMI:   Wt Readings from Last 3 Encounters:   08/05/22 126 lb (57.2 kg)   08/05/22 126 lb 12.8 oz (57.5 kg)   06/29/22 121 lb 12.8 oz (55.2 kg)     There is no height or weight on file to calculate BMI.    CBC:   Lab Results   Component Value Date/Time    WBC 6.5 08/01/2022 05:00 PM    RBC 4.88 08/01/2022 05:00 PM    RBC 4.11 12/18/2011 11:55 AM    HGB 14.8 08/01/2022 05:00 PM    HCT 44.0 08/01/2022 05:00 PM    HCT 36.7 12/18/2011 11:55 AM    MCV 90.2 08/01/2022 05:00 PM    RDW 13.2 08/01/2022 05:00 PM     08/01/2022 05:00 PM       CMP:   Lab Results   Component Value Date/Time     08/01/2022 05:00 PM     12/21/2011 02:16 AM    K 4.3 08/01/2022 05:00 PM    K 3.7 12/21/2011 02:16 AM     08/01/2022 05:00 PM     12/21/2011 02:16 AM    CO2 28 08/01/2022 05:00 PM    BUN 13 08/01/2022 05:00 PM    CREATININE 0.8 08/01/2022 05:00 PM    CREATININE 0.9 12/21/2011 02:16 AM    GFRAA >59 08/01/2022 05:00 PM LABGLOM >60 08/01/2022 05:00 PM    GLUCOSE 102 08/01/2022 05:00 PM    PROT 7.3 08/01/2022 05:00 PM    CALCIUM 9.4 08/01/2022 05:00 PM    BILITOT 0.3 08/01/2022 05:00 PM    ALKPHOS 114 08/01/2022 05:00 PM    AST 18 08/01/2022 05:00 PM    ALT 15 08/01/2022 05:00 PM       POC Tests: No results for input(s): POCGLU, POCNA, POCK, POCCL, POCBUN, POCHEMO, POCHCT in the last 72 hours. Coags:   Lab Results   Component Value Date/Time    PROTIME 13.4 06/28/2022 06:12 PM    PROTIME 12.76 12/18/2011 11:55 AM    INR 1.03 06/28/2022 06:12 PM    APTT 65.4 06/29/2022 07:14 AM       HCG (If Applicable): No results found for: PREGTESTUR, PREGSERUM, HCG, HCGQUANT     ABGs: No results found for: PHART, PO2ART, IXL5AFY, OCK1HVW, BEART, D8DDRPPR     Type & Screen (If Applicable):  No results found for: LABABO, LABRH    Drug/Infectious Status (If Applicable):  No results found for: HIV, HEPCAB    COVID-19 Screening (If Applicable):   Lab Results   Component Value Date/Time    COVID19 Not Detected 06/28/2022 06:19 PM           Anesthesia Evaluation  Patient summary reviewed and Nursing notes reviewed no history of anesthetic complications:   Airway: Mallampati: II  TM distance: >3 FB   Neck ROM: full  Mouth opening: > = 3 FB   Dental:    (+) partials      Pulmonary:   (+) shortness of breath: no interval change,                            ROS comment: CXR:  Bilateral lung hyperinflation with no radiographic evidence of acute pulmonary or pleural pathologic process.  No interval change. Recommendation: Follow up as clinically indicated.         Cardiovascular:  Exercise tolerance: good (>4 METS),   (+) hypertension:, valvular problems/murmurs (moderate to severe TR):, angina:, pacemaker: pacemaker and dependent, CAD: no interval change, CABG/stent (stenting x 6 last placed ~ 5 years ago):, ACEVEDO: after ambulating 1 flight of stairs, pulmonary hypertension: moderate, hyperlipidemia    (-) past MI       Beta Blocker:  Dose within 24

## 2022-08-06 NOTE — OP NOTE
[facility] [place] OF [facility] PATIENCE [address] [facility]                                OPERATIVE REPORT    PATIENT NAME: [facility]                    :        1955  MED REC NO:   [id number]                              ROOM:  ACCOUNT NO:   [de-identified]                           ADMIT DATE: 2022  PROVIDER:     Margo Gooden MD    DATE OF PROCEDURE:  2022    PREOPERATIVE DIAGNOSES:  1. Left renal colic. 2.  Left proximal ureteral calculus (3 mm). 3.  Left hydroureteronephrosis. POSTOPERATIVE DIAGNOSES:  1. Left renal colic. 2.  Left proximal ureteral calculus (3 mm). 3.  Left hydroureteronephrosis. PROCEDURES:  1. Cystoscopy. 2.  Left retrograde pyelogram.  3.  Left ureteroscopy. 4.  Removal of left proximal ureteral calculus. 5.  Insertion of a left ureteral stent. SURGEON:  Kush Gooden MD    TYPE OF ANESTHESIA:  General.    ANESTHESIOLOGIST:  Tatiana Melgoza. SPECIMEN FORWARDED:  Fragments of left proximal ureteral stone. COMPLICATIONS:  None. ESTIMATED BLOOD LOSS:  Zero. STENT LEFT IN POSITION:  A 24 cm x 6 Slovak double-J stent. CATHETERS LEFT IN POSITION:  None. CONDITION TO RECOVERY ROOM:  Stable. CLINICAL HISTORY:  A 30-year-old white male with a history of recurrent  nephro/ureterolithiasis who was brought here today for cystoscopy, left  retrograde pyelogram, left ureteroscopy with stone extraction (left  proximal ureter), and stent insertion. The patient has been having pain associated with the stone and was seen  in clinic on several occasions and wanted to have the stone removed. Based on this, the patient was brought here for stone extraction. OPERATIVE FINDINGS:  Cystoscopy revealed a normal pendulous penile  urethra. Prostatic urethra was approximately 3 cm long and open. The  bladder mucosa had undergone grade 2 trabecular changes.   There appeared  to be no evidence of stones or tumors in the bladder. There was  diminished efflux of urine from the left ureteral orifice in comparison  to that on the right. The left retrograde pyelogram documented a filling defect in the left  proximal ureter. Left ureteroscopy confirmed that the stone was in the left proximal  ureter just below the ureteropelvic junction. The stone was then  removed in its entirety. Following removal of the stone, a double-J stent was positioned in such  a manner that the proximal end of the stent was left in the renal pelvis  and the distal end was left in the bladder. The stone fragments removed  were submitted for chemical analysis. OPERATIVE PROCEDURE:  The patient was brought to the operating room and  was positioned in the supine position. Following the administration of  general anesthesia, the patient was repositioned in the dorsal lithotomy  position. Groin area was then prepped and draped in the usual sterile  manner to expose the penis. A #22 Hungarian Storz cystoscope was passed transurethrally into the  bladder under direct vision. First, the urethra was examined with a  30-degrees lens and the scope was passed into the bladder and the  bladder was examined with 30 and 70-degree lens respectively. Those  findings are as described above. A #5 Hungarian cone tip catheter was passed through the working channel of  the cystoscope, and bilateral retrograde pyelogram was performed on the  left side. Having confirmed that the stone was in the left proximal  ureter, attention was directed towards left ureteroscopic stone  extraction. A Flexi-Tip Glidewire was passed through the working channel of the  cystoscope and was manipulated in such a manner that this passed up the  left ureter. The bladder was then emptied and the cystoscope was then  removed leaving the Glidewire in position. A 7.5-Hungarian Storz ureteroscope was then passed transurethrally into the  bladder and up the ureter.   The stone was visualized in the proximal  ureter just below the ureteropelvic junction. A stone basket was passed through the working channel of the cystoscope  and was guided up the ureter. The basket was then moved proximal to the  stone, opened and then in the process of removing, the stone was  fragmented and the fragments were retrieved and submitted for chemical  analysis. Once ascertaining that the stone was removed, a double-J stent was  placed. The cystoscope was backthreaded over the guidewire and was  passed transurethrally into the bladder. Over the guidewire was then  passed a 24 cm x 6 Upper sorbian double-J stent. The stent was positioned in  such a manner that the proximal end was left in the renal pelvis and the  distal end was left in the bladder. The guidewire was then removed  leaving the stent in position. Fluoroscopy confirmed that the stent was in good position. The patient  was then repositioned in supine position, reversed from his anesthesia,  extubated and transferred to recovery room in stable condition. The  patient tolerated these procedures well. CECILIO Alvarado MD    D: 08/05/2022 22:11:27      T: 08/05/2022 22:15:19     [id number]  Job#: [id number]     Doc#: [id number]    CC:  Noel Dixon MD

## 2022-08-06 NOTE — BRIEF OP NOTE
Brief Postoperative Note      Patient: Raphael Marks  YOB: 1955  MRN: [id number]    Date of Procedure: 8/5/2022    Pre-Op Diagnosis: 1. Left proximal ureteral stone. 2.  Left hydroureteronephrosis 3. Left renal colic    Post-Op Diagnosis: Same as preoperative diagnosis       Procedure: 1 cystoscopy 2. Left retrograde pyelogram 3. Left ureteroscopy 4. Removal of left proximal ureteral calculus 5. Insertion of left ureteral stent. Surgeon(s):  Carla Jaramillo MD    Assistant:  None    Anesthesia: General    Estimated Blood Loss (mL): 0 cc    Complications: None    Specimens:   ID Type Source Tests Collected by Time Destination   1 : left ureteral stone Stone (Calculus) Aaron Pop MD 8/5/2022 1613        Implants:  Implant Name Type Inv.  Item Serial No.  Lot No. LRB No. Used Action   STENT URET 6FR L24CM PERCFLX HYDR+ DBL PGTL THRD 2 - [id number]  STENT URET 6FR L24CM PERCFLX HYDR+ DBL PGTL THRD 2  [facility]- [id number] Left 1 Implanted         Drains: None    Findings: Left proximal ureteral calculus (3 mm)    Electronically signed by Carla Jaramillo MD on 8/5/2022 at 8:45 PM

## 2022-08-08 ENCOUNTER — OFFICE VISIT (OUTPATIENT)
Dept: UROLOGY | Age: 67
End: 2022-08-08
Payer: MEDICARE

## 2022-08-08 DIAGNOSIS — N20.1 CALCULUS OF PROXIMAL LEFT URETER: ICD-10-CM

## 2022-08-08 DIAGNOSIS — R11.0 NAUSEA: Primary | ICD-10-CM

## 2022-08-08 DIAGNOSIS — R31.0 GROSS HEMATURIA: ICD-10-CM

## 2022-08-08 LAB
ANION GAP SERPL CALCULATED.3IONS-SCNC: 7 MMOL/L (ref 7–19)
BUN BLDV-MCNC: 20 MG/DL (ref 8–23)
CALCIUM SERPL-MCNC: 9.1 MG/DL (ref 8.8–10.2)
CHLORIDE BLD-SCNC: 102 MMOL/L (ref 98–111)
CO2: 31 MMOL/L (ref 22–29)
CREAT SERPL-MCNC: 1.3 MG/DL (ref 0.5–1.2)
GFR AFRICAN AMERICAN: >59
GFR NON-AFRICAN AMERICAN: 55
GLUCOSE BLD-MCNC: 88 MG/DL (ref 74–109)
HCT VFR BLD CALC: 41.9 % (ref 42–52)
HEMOGLOBIN: 13.8 G/DL (ref 14–18)
MCH RBC QN AUTO: 30.4 PG (ref 27–31)
MCHC RBC AUTO-ENTMCNC: 32.9 G/DL (ref 33–37)
MCV RBC AUTO: 92.3 FL (ref 80–94)
PDW BLD-RTO: 13.2 % (ref 11.5–14.5)
PLATELET # BLD: 184 K/UL (ref 130–400)
PMV BLD AUTO: 9 FL (ref 9.4–12.4)
POTASSIUM SERPL-SCNC: 5.1 MMOL/L (ref 3.5–5)
RBC # BLD: 4.54 M/UL (ref 4.7–6.1)
SODIUM BLD-SCNC: 140 MMOL/L (ref 136–145)
WBC # BLD: 6.7 K/UL (ref 4.8–10.8)

## 2022-08-08 PROCEDURE — 1123F ACP DISCUSS/DSCN MKR DOCD: CPT | Performed by: NURSE PRACTITIONER

## 2022-08-08 PROCEDURE — 99214 OFFICE O/P EST MOD 30 MIN: CPT | Performed by: NURSE PRACTITIONER

## 2022-08-08 PROCEDURE — 4004F PT TOBACCO SCREEN RCVD TLK: CPT | Performed by: NURSE PRACTITIONER

## 2022-08-08 PROCEDURE — 3017F COLORECTAL CA SCREEN DOC REV: CPT | Performed by: NURSE PRACTITIONER

## 2022-08-08 PROCEDURE — G8420 CALC BMI NORM PARAMETERS: HCPCS | Performed by: NURSE PRACTITIONER

## 2022-08-08 PROCEDURE — G8427 DOCREV CUR MEDS BY ELIG CLIN: HCPCS | Performed by: NURSE PRACTITIONER

## 2022-08-08 RX ORDER — SULFAMETHOXAZOLE AND TRIMETHOPRIM 800; 160 MG/1; MG/1
TABLET ORAL
Status: ON HOLD | COMMUNITY
Start: 2022-08-05 | End: 2022-08-24 | Stop reason: HOSPADM

## 2022-08-08 RX ORDER — ONDANSETRON 4 MG/1
8 TABLET, ORALLY DISINTEGRATING ORAL EVERY 8 HOURS PRN
Qty: 30 TABLET | Refills: 1 | Status: SHIPPED | OUTPATIENT
Start: 2022-08-08 | End: 2022-09-09

## 2022-08-08 RX ORDER — PHENAZOPYRIDINE HYDROCHLORIDE 200 MG/1
TABLET, FILM COATED ORAL
Status: ON HOLD | COMMUNITY
Start: 2022-08-05 | End: 2022-08-24 | Stop reason: SDUPTHER

## 2022-08-08 NOTE — PROGRESS NOTES
Davy Friday is a 79 y.o., male, Established patient who presents today   Chief Complaint   Patient presents with    Follow-up     I am here today for blood in my urine and I have a stent in placed on 8/5       HPI   Patient presents for follow-up after ureteroscopic intervention for a proximal left ureteral stone on 8/5/2022 with Dr. Perry Goetz. Patient does have a long history of nephrolithiasis and has passed over 120 stones since 1980. He recently had removal of bladder stones in December 2021 as well. He has undergone metabolic work-up in the past at [facility] twice. He states that he typically does not tolerate stents well and is having some issues with hematuria as well as stent pain with associated nausea. REVIEW OF SYSTEMS:  Review of Systems   Constitutional:  Negative for chills and fever. Gastrointestinal:  Positive for nausea. Negative for abdominal distention, abdominal pain and vomiting. Genitourinary:  Positive for flank pain and hematuria. Negative for difficulty urinating, dysuria, frequency and urgency. Musculoskeletal:  Negative for back pain and gait problem. Psychiatric/Behavioral:  Negative for agitation and confusion. PHYSICAL EXAM:  There were no vitals taken for this visit. Physical Exam  Vitals and nursing note reviewed. Constitutional:       General: He is not in acute distress. Appearance: Normal appearance. He is not ill-appearing. Pulmonary:      Effort: Pulmonary effort is normal. No respiratory distress. Abdominal:      General: There is no distension. Tenderness: There is no abdominal tenderness. There is no right CVA tenderness or left CVA tenderness. Neurological:      Mental Status: He is alert and oriented to person, place, and time. Mental status is at baseline. Psychiatric:         Mood and Affect: Mood normal.         Behavior: Behavior normal.     ASSESSMENT/PLAN  1.  Nausea  Patient with nausea after left ureteral stent placement and ureteroscopy for a proximal ureteral stone. He does report he is able to stay hydrated, but would like an increase of his nausea medicine.  - ondansetron (ZOFRAN ODT) 4 MG disintegrating tablet; Take 2 tablets by mouth every 8 hours as needed for Nausea or Vomiting  Dispense: 30 tablet; Refill: 1    2. Calculus of proximal left ureter  Proximal left ureteral stone treated with ureteroscopy. Stent currently in place. Will return for removal of stent on Thursday. Did give the patient the option to discontinue the stent today given he is having difficulty with excessive stent symptoms. Patient reports he would rather wait until Thursday to have the stent removed by cystoscopy. 3. Gross hematuria  Patient experiencing gross hematuria. I evaluated his urine today which is dark and maroon-colored. It does not appear thick and there are no clots visible. Patient reports he is having no issues urinating. We will go ahead and check some labs on him this afternoon.  - Basic Metabolic Panel; Future  - CBC; Future      Orders Placed This Encounter   Procedures    Basic Metabolic Panel     Standing Status:   Future     Number of Occurrences:   1     Standing Expiration Date:   8/8/2023    CBC     Standing Status:   Future     Number of Occurrences:   1     Standing Expiration Date:   8/8/2023        No follow-ups on file. An electronic signature was used to authenticate this note. BHUMIKA JOSEPH - CNP    All information inputted into the note by the MA to include chief complaint, past medical history, past surgical history, medications, allergies, social and family history and review of systems has been reviewed and updated as needed by me. EMR Dragon/transcription disclaimer: Much of this document is electronic transcription/translation of spoken language to printed text.  The electronic translation of spoken language may be erroneous or, at times, nonsensical words or phrases may be inadvertently transcribed.  Although I have reviewed the document for such errors, some may still exist.

## 2022-08-09 DIAGNOSIS — N20.1 CALCULUS OF PROXIMAL LEFT URETER: Primary | ICD-10-CM

## 2022-08-09 RX ORDER — MELOXICAM 7.5 MG/1
7.5 TABLET ORAL DAILY
Qty: 10 TABLET | Refills: 0 | Status: SHIPPED | OUTPATIENT
Start: 2022-08-09

## 2022-08-10 ASSESSMENT — ENCOUNTER SYMPTOMS
BACK PAIN: 0
ABDOMINAL PAIN: 0
NAUSEA: 1
ABDOMINAL DISTENTION: 0
VOMITING: 0

## 2022-08-11 ENCOUNTER — PROCEDURE VISIT (OUTPATIENT)
Dept: UROLOGY | Age: 67
End: 2022-08-11
Payer: MEDICARE

## 2022-08-11 VITALS — TEMPERATURE: 98 F

## 2022-08-11 DIAGNOSIS — N20.1 CALCULUS OF PROXIMAL LEFT URETER: Primary | ICD-10-CM

## 2022-08-11 LAB
BACTERIA URINE, POC: 0
BILIRUBIN URINE: 1 MG/DL
BLOOD, URINE: POSITIVE
CALCULI COMPOSITION: NORMAL
CASTS URINE, POC: 0
CLARITY: ABNORMAL
COLOR: ABNORMAL
CRYSTALS URINE, POC: 0
EPI CELLS URINE, POC: 0
GLUCOSE URINE: 100
KETONES, URINE: POSITIVE
LEUKOCYTE EST, POC: ABNORMAL
MASS: 3 MG
NITRITE, URINE: POSITIVE
PH UA: 5 (ref 4.5–8)
PROTEIN UA: POSITIVE
RBC URINE, POC: ABNORMAL
SPECIFIC GRAVITY UA: 1.02 (ref 1–1.03)
STONE DESCRIPTION: NORMAL
UROBILINOGEN, URINE: ABNORMAL
WBC URINE, POC: 0
YEAST URINE, POC: 0

## 2022-08-11 PROCEDURE — 52000 CYSTOURETHROSCOPY: CPT | Performed by: UROLOGY

## 2022-08-11 PROCEDURE — 81001 URINALYSIS AUTO W/SCOPE: CPT | Performed by: UROLOGY

## 2022-08-11 NOTE — PROGRESS NOTES
Humphrey Holt is a 79 y.o. male who presents today   Chief Complaint   Patient presents with    Cystoscopy     I am here today for a cysto stent removal.        HPI:   Patient presents for follow-up after ureteroscopic intervention for a proximal left ureteral stone on 8/5/2022 with Dr. Kevin Bennett. Patient does have a long history of nephrolithiasis and has passed over 120 stones since 1980. He recently had removal of bladder stones in December 2021 as well. He has undergone metabolic work-up in the past at [facility] twice. He states that he typically does not tolerate stents well and is having some issues with hematuria as well as stent pain with associated nausea    Patient comes in today for stent removal    Past Medical History:   Diagnosis Date    CAD (coronary artery disease) 2010    Native Vessel. S/p stenting wtih negative cardiac cath on January 18, 2010 and negative nuclear stress test on June 29, 2010. Chest pain 1/28/2016    DJD (degenerative joint disease)     GERD (gastroesophageal reflux disease) 2010    HTN (hypertension)     Hyperlipidemia     Left knee pain 1/28/2016    Pacemaker 2010    MEDTRONIC    Polyarticular arthritis     Renal calculi 2010    S/p lithrotripsy    Right arm pain 1/28/2016    Right ureteral stone 7/8/2016    Stones in the urinary tract 02/02/2022    Tobacco abuse 2010    Prior. Past Surgical History:   Procedure Laterality Date    CARDIAC CATHETERIZATION  01/08/2010    EF is estimated to be 60%. See scanned document. CARDIAC CATHETERIZATION  6/20/08, 10/2/08    EF is estimated to be 60%. See scanned document. CARDIAC CATHETERIZATION  2/13/07, 6/6/07    EF 50%. See scanned document. CARDIAC CATHETERIZATION  1/8/07, 11/26/07    EF is estimated to be in excess of 50%. See scanned doucment. CARDIAC CATHETERIZATION  08/17/2006    EF is estimated to be in excess of 50%. See scanned doucment.     CARDIAC CATHETERIZATION  5/14/14  MDL    normal LV function. EF 60    CHOLECYSTECTOMY      CLAVICLE SURGERY      COLONOSCOPY      Endoscopy    CYSTOSCOPY Right 7/8/2016    CYSTOSCOPY; RIGHT RETROGRADE PYELOGRAM; RIGHT URETERAL DILATATION; RIGHT URETEROSCOPY; RIGHT URETERAL LASER LITHOTRIPSY AND STONE EXTRACTION; INSERTION RIGHT URETERAL DOUBLE J STENT performed by Tara Grant MD at [address] Left 1/14/2021    CYSTOSCOPY URETEROSCOPY LASER LITHO WITH STONE EXTRACTION AND STENT REMOVAL> performed by Chelsea Newell MD at [address] Left 12/29/2021    CYSTOSCOPY; LEFT URTERAL CATHERIZATION; LEFT RETORGRADE PYLEOGRAM; REMOVAL BLADDER CALCULI performed by Montrell Storey MD at [address] Left 8/5/2022    CYSTOSCOPY LEFT URETERAL STENT INSERTION performed by Chelsea Newell MD at [facility] Left     HAND SURGERY      HUMERUS SURGERY      KNEE SURGERY      Right    KNEE SURGERY Left     LITHOTRIPSY      MANDIBLE FRACTURE SURGERY      NECK SURGERY      cervical spine. OTHER SURGICAL HISTORY      Brachytherapy of the core stents. PACEMAKER PLACEMENT      MEDTRONIC    PTCA      TIBIA FRACTURE SURGERY      WRIST SURGERY         Current Outpatient Medications   Medication Sig Dispense Refill    meloxicam (MOBIC) 7.5 MG tablet Take 1 tablet by mouth in the morning. 10 tablet 0    phenazopyridine (PYRIDIUM) 200 MG tablet TAKE 1 TABLET BY MOUTH 3 TIMES A DAY      sulfamethoxazole-trimethoprim (BACTRIM DS;SEPTRA DS) 800-160 MG per tablet TAKE 1 TABLET BY MOUTH TWICE A DAY      ondansetron (ZOFRAN ODT) 4 MG disintegrating tablet Take 2 tablets by mouth every 8 hours as needed for Nausea or Vomiting 30 tablet 1    tamsulosin (FLOMAX) 0.4 MG capsule Take 1 capsule by mouth in the morning.  15 capsule 0    metoprolol tartrate (LOPRESSOR) 25 MG tablet Take 2 tablets by mouth 2 times daily Indications: Pt unaware of dosage 60 tablet 3    QUEtiapine (SEROQUEL) 25 MG tablet       oxyCODONE (OXY-IR) 15 MG immediate release tablet TAKE 1 TABLET BY MOUTH EVERY 6 HOURS      prochlorperazine (COMPAZINE) 5 MG tablet       aspirin 325 MG tablet Take 325 mg by mouth daily      isosorbide mononitrate (IMDUR) 120 MG CR tablet Take 120 mg by mouth 2 times daily       hydrochlorothiazide (HYDRODIURIL) 12.5 MG tablet Take 12.5 mg by mouth daily Indications: as needed      Coenzyme Q10 (COQ-10 PO) Take by mouth daily       Nutritional Supplements (BOOST PO) Take by mouth      NITROSTAT 0.4 MG SL tablet Place 1 tablet under the tongue 3 times daily as needed  0    LORazepam (ATIVAN) 1 MG tablet Take 1 mg by mouth 3 times daily as needed  5    amLODIPine (NORVASC) 10 MG tablet Take 10 mg by mouth daily. clopidogrel (PLAVIX) 75 MG tablet Take 75 mg by mouth daily. No current facility-administered medications for this visit. Allergies   Allergen Reactions    Ezetimibe-Simvastatin Other (See Comments)     Other reaction(s): myositis/elevated CPK  Other reaction(s): Other (See Comments)  Myositis/ elevated CPK    Promethazine Other (See Comments) and Anxiety     Other reaction(s): jitters   jitters    Bee Venom     Darvocet [Propoxyphene N-Acetaminophen] Rash    Other Rash    Penicillins Other (See Comments)     childhood allergy    Phenergan [Promethazine Hcl] Anxiety and Other (See Comments)     Restless legs    Promethazine Hcl Other (See Comments)     Restless legs    Propoxyphene Rash         REVIEW OF SYSTEMS:  Review of Systems    PHYSICAL EXAM:  Physical Exam      Cystoscopy Procedure Note    Indications: Diagnosis,  Indwelling Left Ureteral stent     Pre-operative Diagnosis: Status post left ureteroscopy stent placement    Post-operative Diagnosis: Same    Surgeon: Montrell Storey MD     Assistants: staff    Anesthesia: Local anesthesia topical 2% lidocaine gel    Procedure Details   The risks, benefits, complications, treatment options, and expected outcomes were discussedwith the patient.  The patient concurred with the proposed plan, giving informed consent. Cystoscopy was performed today under local anesthesia, using sterile technique. The patient was placed in the supine position, prepped with Hibiclens, and draped in the usual sterile fashion. A 17 Frisian sheath flexible cystoscope was used to inspect both the urethra and bladder. A stent was seenprotruding from the Left ureteral orifice. A string was protruding into the urethra. It was grasped with a grasper and the stent was moved removed in its entirety, without difficulty. Findings:  Anterior urethra: normal without strictures and without scarring. String was seen protruding into the bulbar urethra. This was grasped with a grasper and the stent was then extracted. I did not advance the scope past the membranous urethra  The stent was removed without difficulty                            Complications:  None;patient tolerated the procedure well. Disposition: To home after observation. Condition: stable          DATA:  CMP:    Lab Results   Component Value Date/Time     08/08/2022 02:32 PM     12/21/2011 02:16 AM    K 5.1 08/08/2022 02:32 PM    K 4.3 08/01/2022 05:00 PM    K 3.7 12/21/2011 02:16 AM     08/08/2022 02:32 PM     12/21/2011 02:16 AM    CO2 31 08/08/2022 02:32 PM    BUN 20 08/08/2022 02:32 PM    CREATININE 1.3 08/08/2022 02:32 PM    CREATININE 0.9 12/21/2011 02:16 AM    GFRAA >59 08/08/2022 02:32 PM    LABGLOM 55 08/08/2022 02:32 PM    GLUCOSE 88 08/08/2022 02:32 PM    PROT 7.3 08/01/2022 05:00 PM    LABALBU 5.0 08/01/2022 05:00 PM    CALCIUM 9.1 08/08/2022 02:32 PM    BILITOT 0.3 08/01/2022 05:00 PM    ALKPHOS 114 08/01/2022 05:00 PM    AST 18 08/01/2022 05:00 PM    ALT 15 08/01/2022 05:00 PM     1. Calculus of proximal left ureter  Status post ureteroscopy for proximal stone on the left side stent was removed today.   He will follow-up in 6 weeks with renal ultrasound  - Cystoscopy  - US RENAL COMPLETE; Future      Orders Placed This Encounter   Procedures    US RENAL COMPLETE     In 6 weeks prior to next visit s/p ureteroscopy for ureteral calculus     Standing Status:   Future     Standing Expiration Date:   8/11/2023     Scheduling Instructions: In 6 week prior to next visit     Order Specific Question:   Reason for exam:     Answer:   s/p  ureterroscopy r/o hydro left proximal ureter    Cystoscopy        Return in about 6 weeks (around 9/22/2022) for office visit after xray study, Ok to FU with any provider MD or MARY.

## 2022-08-19 ENCOUNTER — TELEPHONE (OUTPATIENT)
Dept: UROLOGY | Age: 67
End: 2022-08-19

## 2022-08-19 NOTE — TELEPHONE ENCOUNTER
PT CALLED AND STATED HES HAVING PAIN IN KIDNEYS AFTER HAVING STENT REMOVED A WEEK AGO. PT ALSO STATES HES GOT BLOOD IN URINE AND NOT ABLE TO FULLY EMPTY BLADDER AND NAUSEA.  PT WOULD LIKE TO SPEAK TO A NURSE ON WHAT HE NEEDS TO DO AS FAR AS FOLLOWING UP WITH OFFICE

## 2022-08-24 ENCOUNTER — HOSPITAL ENCOUNTER (EMERGENCY)
Age: 67
Discharge: HOME | End: 2022-08-24
Attending: EMERGENCY MEDICINE
Payer: MEDICARE

## 2022-08-24 ENCOUNTER — APPOINTMENT (OUTPATIENT)
Dept: CT IMAGING | Age: 67
End: 2022-08-24
Payer: MEDICARE

## 2022-08-24 ENCOUNTER — APPOINTMENT (OUTPATIENT)
Dept: GENERAL RADIOLOGY | Age: 67
End: 2022-08-24
Payer: MEDICARE

## 2022-08-24 ENCOUNTER — ANESTHESIA EVENT (OUTPATIENT)
Dept: OPERATING ROOM | Age: 67
End: 2022-08-24
Payer: MEDICARE

## 2022-08-24 ENCOUNTER — ANESTHESIA (OUTPATIENT)
Dept: OPERATING ROOM | Age: 67
End: 2022-08-24
Payer: MEDICARE

## 2022-08-24 ENCOUNTER — OFFICE VISIT (OUTPATIENT)
Dept: FAMILY MEDICINE CLINIC | Facility: CLINIC | Age: 67
End: 2022-08-24

## 2022-08-24 VITALS
RESPIRATION RATE: 16 BRPM | HEIGHT: 67 IN | SYSTOLIC BLOOD PRESSURE: 102 MMHG | WEIGHT: 126 LBS | HEART RATE: 70 BPM | BODY MASS INDEX: 19.78 KG/M2 | DIASTOLIC BLOOD PRESSURE: 64 MMHG | TEMPERATURE: 98.7 F

## 2022-08-24 VITALS
BODY MASS INDEX: 20.4 KG/M2 | WEIGHT: 130 LBS | SYSTOLIC BLOOD PRESSURE: 121 MMHG | OXYGEN SATURATION: 97 % | HEIGHT: 67 IN | RESPIRATION RATE: 16 BRPM | DIASTOLIC BLOOD PRESSURE: 81 MMHG | HEART RATE: 60 BPM | TEMPERATURE: 97.4 F

## 2022-08-24 DIAGNOSIS — R10.9 FLANK PAIN: Primary | ICD-10-CM

## 2022-08-24 DIAGNOSIS — N20.1 LEFT URETERAL CALCULUS: ICD-10-CM

## 2022-08-24 DIAGNOSIS — N20.1 URETEROLITHIASIS: Primary | ICD-10-CM

## 2022-08-24 DIAGNOSIS — N23 RENAL COLIC ON LEFT SIDE: ICD-10-CM

## 2022-08-24 DIAGNOSIS — R10.9 LEFT FLANK PAIN: ICD-10-CM

## 2022-08-24 DIAGNOSIS — N20.1 URETERAL STONE: ICD-10-CM

## 2022-08-24 LAB
ALBUMIN SERPL-MCNC: 4.3 G/DL (ref 3.5–5.2)
ALP BLD-CCNC: 103 U/L (ref 40–130)
ALT SERPL-CCNC: 24 U/L (ref 5–41)
ANION GAP SERPL CALCULATED.3IONS-SCNC: 8 MMOL/L (ref 7–19)
AST SERPL-CCNC: 33 U/L (ref 5–40)
BACTERIA: NEGATIVE /HPF
BASOPHILS ABSOLUTE: 0 K/UL (ref 0–0.2)
BASOPHILS RELATIVE PERCENT: 0.8 % (ref 0–1)
BILIRUB SERPL-MCNC: 0.3 MG/DL (ref 0.2–1.2)
BILIRUBIN URINE: NEGATIVE
BLOOD, URINE: NEGATIVE
BUN BLDV-MCNC: 33 MG/DL (ref 8–23)
CALCIUM SERPL-MCNC: 9 MG/DL (ref 8.8–10.2)
CHLORIDE BLD-SCNC: 100 MMOL/L (ref 98–111)
CLARITY: CLEAR
CO2: 30 MMOL/L (ref 22–29)
COLOR: YELLOW
CREAT SERPL-MCNC: 1 MG/DL (ref 0.5–1.2)
CRYSTALS, UA: ABNORMAL /HPF
EOSINOPHILS ABSOLUTE: 0.1 K/UL (ref 0–0.6)
EOSINOPHILS RELATIVE PERCENT: 2 % (ref 0–5)
EPITHELIAL CELLS, UA: 1 /HPF (ref 0–5)
GFR AFRICAN AMERICAN: >59
GFR NON-AFRICAN AMERICAN: >60
GLUCOSE BLD-MCNC: 89 MG/DL (ref 74–109)
GLUCOSE URINE: NEGATIVE MG/DL
HCT VFR BLD CALC: 39.9 % (ref 42–52)
HEMOGLOBIN: 13.1 G/DL (ref 14–18)
HYALINE CASTS: 3 /HPF (ref 0–8)
IMMATURE GRANULOCYTES #: 0 K/UL
KETONES, URINE: ABNORMAL MG/DL
LEUKOCYTE ESTERASE, URINE: ABNORMAL
LYMPHOCYTES ABSOLUTE: 1.1 K/UL (ref 1.1–4.5)
LYMPHOCYTES RELATIVE PERCENT: 22.5 % (ref 20–40)
MCH RBC QN AUTO: 29.8 PG (ref 27–31)
MCHC RBC AUTO-ENTMCNC: 32.8 G/DL (ref 33–37)
MCV RBC AUTO: 90.7 FL (ref 80–94)
MONOCYTES ABSOLUTE: 0.5 K/UL (ref 0–0.9)
MONOCYTES RELATIVE PERCENT: 8.9 % (ref 0–10)
NEUTROPHILS ABSOLUTE: 3.3 K/UL (ref 1.5–7.5)
NEUTROPHILS RELATIVE PERCENT: 65.6 % (ref 50–65)
NITRITE, URINE: NEGATIVE
PDW BLD-RTO: 13 % (ref 11.5–14.5)
PH UA: 5 (ref 5–8)
PLATELET # BLD: 231 K/UL (ref 130–400)
PMV BLD AUTO: 8.7 FL (ref 9.4–12.4)
POTASSIUM REFLEX MAGNESIUM: 4.9 MMOL/L (ref 3.5–5)
PROTEIN UA: NEGATIVE MG/DL
RBC # BLD: 4.4 M/UL (ref 4.7–6.1)
RBC UA: 13 /HPF (ref 0–4)
SARS-COV-2, NAAT: NOT DETECTED
SODIUM BLD-SCNC: 138 MMOL/L (ref 136–145)
SPECIFIC GRAVITY UA: 1.03 (ref 1–1.03)
TOTAL PROTEIN: 6.7 G/DL (ref 6.6–8.7)
UROBILINOGEN, URINE: 1 E.U./DL
WBC # BLD: 5 K/UL (ref 4.8–10.8)
WBC UA: 2 /HPF (ref 0–5)

## 2022-08-24 PROCEDURE — 96375 TX/PRO/DX INJ NEW DRUG ADDON: CPT

## 2022-08-24 PROCEDURE — 74150 CT ABDOMEN W/O CONTRAST: CPT

## 2022-08-24 PROCEDURE — 6360000002 HC RX W HCPCS: Performed by: UROLOGY

## 2022-08-24 PROCEDURE — 99213 OFFICE O/P EST LOW 20 MIN: CPT | Performed by: FAMILY MEDICINE

## 2022-08-24 PROCEDURE — 6360000002 HC RX W HCPCS: Performed by: ANESTHESIOLOGY

## 2022-08-24 PROCEDURE — 2720000010 HC SURG SUPPLY STERILE: Performed by: UROLOGY

## 2022-08-24 PROCEDURE — 3600000004 HC SURGERY LEVEL 4 BASE: Performed by: UROLOGY

## 2022-08-24 PROCEDURE — 6370000000 HC RX 637 (ALT 250 FOR IP): Performed by: UROLOGY

## 2022-08-24 PROCEDURE — 2580000003 HC RX 258: Performed by: ANESTHESIOLOGY

## 2022-08-24 PROCEDURE — 6360000002 HC RX W HCPCS

## 2022-08-24 PROCEDURE — 3209999900 FLUORO FOR SURGICAL PROCEDURES

## 2022-08-24 PROCEDURE — 74176 CT ABD & PELVIS W/O CONTRAST: CPT | Performed by: RADIOLOGY

## 2022-08-24 PROCEDURE — 7100000000 HC PACU RECOVERY - FIRST 15 MIN: Performed by: UROLOGY

## 2022-08-24 PROCEDURE — C2617 STENT, NON-COR, TEM W/O DEL: HCPCS | Performed by: UROLOGY

## 2022-08-24 PROCEDURE — 7100000011 HC PHASE II RECOVERY - ADDTL 15 MIN: Performed by: UROLOGY

## 2022-08-24 PROCEDURE — 82360 CALCULUS ASSAY QUANT: CPT

## 2022-08-24 PROCEDURE — 36415 COLL VENOUS BLD VENIPUNCTURE: CPT

## 2022-08-24 PROCEDURE — 3700000000 HC ANESTHESIA ATTENDED CARE: Performed by: UROLOGY

## 2022-08-24 PROCEDURE — 3700000001 HC ADD 15 MINUTES (ANESTHESIA): Performed by: UROLOGY

## 2022-08-24 PROCEDURE — 6360000002 HC RX W HCPCS: Performed by: EMERGENCY MEDICINE

## 2022-08-24 PROCEDURE — 96365 THER/PROPH/DIAG IV INF INIT: CPT

## 2022-08-24 PROCEDURE — 96376 TX/PRO/DX INJ SAME DRUG ADON: CPT

## 2022-08-24 PROCEDURE — 88300 SURGICAL PATH GROSS: CPT

## 2022-08-24 PROCEDURE — 80053 COMPREHEN METABOLIC PANEL: CPT

## 2022-08-24 PROCEDURE — C1769 GUIDE WIRE: HCPCS | Performed by: UROLOGY

## 2022-08-24 PROCEDURE — 85025 COMPLETE CBC W/AUTO DIFF WBC: CPT

## 2022-08-24 PROCEDURE — 7100000001 HC PACU RECOVERY - ADDTL 15 MIN: Performed by: UROLOGY

## 2022-08-24 PROCEDURE — 7100000010 HC PHASE II RECOVERY - FIRST 15 MIN: Performed by: UROLOGY

## 2022-08-24 PROCEDURE — 2580000003 HC RX 258

## 2022-08-24 PROCEDURE — 2709999900 HC NON-CHARGEABLE SUPPLY: Performed by: UROLOGY

## 2022-08-24 PROCEDURE — C1758 CATHETER, URETERAL: HCPCS | Performed by: UROLOGY

## 2022-08-24 PROCEDURE — 52332 CYSTOSCOPY AND TREATMENT: CPT | Performed by: UROLOGY

## 2022-08-24 PROCEDURE — 87635 SARS-COV-2 COVID-19 AMP PRB: CPT

## 2022-08-24 PROCEDURE — 52352 CYSTOURETERO W/STONE REMOVE: CPT | Performed by: UROLOGY

## 2022-08-24 PROCEDURE — 81001 URINALYSIS AUTO W/SCOPE: CPT

## 2022-08-24 PROCEDURE — 3600000014 HC SURGERY LEVEL 4 ADDTL 15MIN: Performed by: UROLOGY

## 2022-08-24 PROCEDURE — 2500000003 HC RX 250 WO HCPCS

## 2022-08-24 PROCEDURE — 99284 EMERGENCY DEPT VISIT MOD MDM: CPT

## 2022-08-24 DEVICE — URETERAL STENT
Type: IMPLANTABLE DEVICE | Site: URETER | Status: FUNCTIONAL
Brand: POLARIS™ ULTRA

## 2022-08-24 RX ORDER — FENTANYL CITRATE 50 UG/ML
50 INJECTION, SOLUTION INTRAMUSCULAR; INTRAVENOUS EVERY 5 MIN PRN
Status: DISCONTINUED | OUTPATIENT
Start: 2022-08-24 | End: 2022-08-24 | Stop reason: HOSPADM

## 2022-08-24 RX ORDER — ONDANSETRON 2 MG/ML
4 INJECTION INTRAMUSCULAR; INTRAVENOUS EVERY 4 HOURS PRN
Status: DISCONTINUED | OUTPATIENT
Start: 2022-08-24 | End: 2022-08-24 | Stop reason: HOSPADM

## 2022-08-24 RX ORDER — OXYCODONE HYDROCHLORIDE AND ACETAMINOPHEN 5; 325 MG/1; MG/1
1 TABLET ORAL EVERY 6 HOURS PRN
Qty: 12 TABLET | Refills: 0 | Status: SHIPPED | OUTPATIENT
Start: 2022-08-24 | End: 2022-08-27

## 2022-08-24 RX ORDER — HYDROMORPHONE HYDROCHLORIDE 1 MG/ML
0.5 INJECTION, SOLUTION INTRAMUSCULAR; INTRAVENOUS; SUBCUTANEOUS
Status: DISCONTINUED | OUTPATIENT
Start: 2022-08-24 | End: 2022-08-24 | Stop reason: HOSPADM

## 2022-08-24 RX ORDER — OXYCODONE HYDROCHLORIDE AND ACETAMINOPHEN 5; 325 MG/1; MG/1
2 TABLET ORAL EVERY 4 HOURS PRN
Status: DISCONTINUED | OUTPATIENT
Start: 2022-08-24 | End: 2022-08-24 | Stop reason: HOSPADM

## 2022-08-24 RX ORDER — ONDANSETRON 2 MG/ML
4 INJECTION INTRAMUSCULAR; INTRAVENOUS
Status: DISCONTINUED | OUTPATIENT
Start: 2022-08-24 | End: 2022-08-24 | Stop reason: HOSPADM

## 2022-08-24 RX ORDER — SODIUM CHLORIDE 9 MG/ML
INJECTION, SOLUTION INTRAVENOUS CONTINUOUS
Status: DISCONTINUED | OUTPATIENT
Start: 2022-08-24 | End: 2022-08-24 | Stop reason: HOSPADM

## 2022-08-24 RX ORDER — LEVOFLOXACIN 5 MG/ML
500 INJECTION, SOLUTION INTRAVENOUS
Status: COMPLETED | OUTPATIENT
Start: 2022-08-24 | End: 2022-08-24

## 2022-08-24 RX ORDER — ATROPA BELLADONNA AND OPIUM 16.2; 6 MG/1; MG/1
SUPPOSITORY RECTAL PRN
Status: DISCONTINUED | OUTPATIENT
Start: 2022-08-24 | End: 2022-08-24 | Stop reason: ALTCHOICE

## 2022-08-24 RX ORDER — SODIUM CHLORIDE, SODIUM LACTATE, POTASSIUM CHLORIDE, CALCIUM CHLORIDE 600; 310; 30; 20 MG/100ML; MG/100ML; MG/100ML; MG/100ML
INJECTION, SOLUTION INTRAVENOUS CONTINUOUS
Status: DISCONTINUED | OUTPATIENT
Start: 2022-08-24 | End: 2022-08-24 | Stop reason: HOSPADM

## 2022-08-24 RX ORDER — DIPHENHYDRAMINE HYDROCHLORIDE 50 MG/ML
12.5 INJECTION INTRAMUSCULAR; INTRAVENOUS
Status: DISCONTINUED | OUTPATIENT
Start: 2022-08-24 | End: 2022-08-24 | Stop reason: HOSPADM

## 2022-08-24 RX ORDER — PHENAZOPYRIDINE HYDROCHLORIDE 100 MG/1
100 TABLET, FILM COATED ORAL 3 TIMES DAILY PRN
Qty: 21 TABLET | Refills: 0 | Status: SHIPPED | OUTPATIENT
Start: 2022-08-24 | End: 2022-08-31

## 2022-08-24 RX ORDER — FENTANYL CITRATE 50 UG/ML
25 INJECTION, SOLUTION INTRAMUSCULAR; INTRAVENOUS EVERY 5 MIN PRN
Status: DISCONTINUED | OUTPATIENT
Start: 2022-08-24 | End: 2022-08-24 | Stop reason: HOSPADM

## 2022-08-24 RX ORDER — SODIUM CHLORIDE 9 MG/ML
INJECTION, SOLUTION INTRAVENOUS PRN
Status: DISCONTINUED | OUTPATIENT
Start: 2022-08-24 | End: 2022-08-24 | Stop reason: HOSPADM

## 2022-08-24 RX ORDER — SODIUM CHLORIDE 0.9 % (FLUSH) 0.9 %
5-40 SYRINGE (ML) INJECTION EVERY 12 HOURS SCHEDULED
Status: DISCONTINUED | OUTPATIENT
Start: 2022-08-24 | End: 2022-08-24 | Stop reason: HOSPADM

## 2022-08-24 RX ORDER — LIDOCAINE HYDROCHLORIDE 10 MG/ML
INJECTION, SOLUTION EPIDURAL; INFILTRATION; INTRACAUDAL; PERINEURAL PRN
Status: DISCONTINUED | OUTPATIENT
Start: 2022-08-24 | End: 2022-08-24 | Stop reason: SDUPTHER

## 2022-08-24 RX ORDER — HYDROMORPHONE HYDROCHLORIDE 1 MG/ML
1 INJECTION, SOLUTION INTRAMUSCULAR; INTRAVENOUS; SUBCUTANEOUS
Status: COMPLETED | OUTPATIENT
Start: 2022-08-24 | End: 2022-08-24

## 2022-08-24 RX ORDER — ROCURONIUM BROMIDE 10 MG/ML
INJECTION, SOLUTION INTRAVENOUS PRN
Status: DISCONTINUED | OUTPATIENT
Start: 2022-08-24 | End: 2022-08-24 | Stop reason: SDUPTHER

## 2022-08-24 RX ORDER — ONDANSETRON 2 MG/ML
4 INJECTION INTRAMUSCULAR; INTRAVENOUS EVERY 30 MIN PRN
Status: COMPLETED | OUTPATIENT
Start: 2022-08-24 | End: 2022-08-24

## 2022-08-24 RX ORDER — PROPOFOL 10 MG/ML
INJECTION, EMULSION INTRAVENOUS PRN
Status: DISCONTINUED | OUTPATIENT
Start: 2022-08-24 | End: 2022-08-24 | Stop reason: SDUPTHER

## 2022-08-24 RX ORDER — TAMSULOSIN HYDROCHLORIDE 0.4 MG/1
0.4 CAPSULE ORAL DAILY
Qty: 7 CAPSULE | Refills: 0 | Status: SHIPPED | OUTPATIENT
Start: 2022-08-24 | End: 2022-09-23

## 2022-08-24 RX ORDER — FENTANYL CITRATE 50 UG/ML
INJECTION, SOLUTION INTRAMUSCULAR; INTRAVENOUS PRN
Status: DISCONTINUED | OUTPATIENT
Start: 2022-08-24 | End: 2022-08-24 | Stop reason: SDUPTHER

## 2022-08-24 RX ORDER — SODIUM CHLORIDE, SODIUM LACTATE, POTASSIUM CHLORIDE, AND CALCIUM CHLORIDE .6; .31; .03; .02 G/100ML; G/100ML; G/100ML; G/100ML
1000 INJECTION, SOLUTION INTRAVENOUS ONCE
Status: DISCONTINUED | OUTPATIENT
Start: 2022-08-24 | End: 2022-08-24 | Stop reason: HOSPADM

## 2022-08-24 RX ORDER — CIPROFLOXACIN 500 MG/1
500 TABLET, FILM COATED ORAL DAILY
Qty: 10 TABLET | Refills: 0 | Status: SHIPPED | OUTPATIENT
Start: 2022-08-24 | End: 2022-09-03

## 2022-08-24 RX ORDER — SODIUM CHLORIDE, SODIUM LACTATE, POTASSIUM CHLORIDE, CALCIUM CHLORIDE 600; 310; 30; 20 MG/100ML; MG/100ML; MG/100ML; MG/100ML
INJECTION, SOLUTION INTRAVENOUS CONTINUOUS PRN
Status: DISCONTINUED | OUTPATIENT
Start: 2022-08-24 | End: 2022-08-24 | Stop reason: SDUPTHER

## 2022-08-24 RX ORDER — HYDROMORPHONE HYDROCHLORIDE 1 MG/ML
0.5 INJECTION, SOLUTION INTRAMUSCULAR; INTRAVENOUS; SUBCUTANEOUS
Status: COMPLETED | OUTPATIENT
Start: 2022-08-24 | End: 2022-08-24

## 2022-08-24 RX ORDER — TAMSULOSIN HYDROCHLORIDE 0.4 MG/1
0.4 CAPSULE ORAL ONCE
Status: COMPLETED | OUTPATIENT
Start: 2022-08-24 | End: 2022-08-24

## 2022-08-24 RX ORDER — MEPERIDINE HYDROCHLORIDE 25 MG/ML
12.5 INJECTION INTRAMUSCULAR; INTRAVENOUS; SUBCUTANEOUS EVERY 5 MIN PRN
Status: DISCONTINUED | OUTPATIENT
Start: 2022-08-24 | End: 2022-08-24 | Stop reason: HOSPADM

## 2022-08-24 RX ORDER — SODIUM CHLORIDE 0.9 % (FLUSH) 0.9 %
5-40 SYRINGE (ML) INJECTION PRN
Status: DISCONTINUED | OUTPATIENT
Start: 2022-08-24 | End: 2022-08-24 | Stop reason: HOSPADM

## 2022-08-24 RX ORDER — DEXAMETHASONE SODIUM PHOSPHATE 10 MG/ML
INJECTION, SOLUTION INTRAMUSCULAR; INTRAVENOUS PRN
Status: DISCONTINUED | OUTPATIENT
Start: 2022-08-24 | End: 2022-08-24 | Stop reason: SDUPTHER

## 2022-08-24 RX ORDER — PHENAZOPYRIDINE HYDROCHLORIDE 100 MG/1
100 TABLET, FILM COATED ORAL ONCE
Status: COMPLETED | OUTPATIENT
Start: 2022-08-24 | End: 2022-08-24

## 2022-08-24 RX ADMIN — HYDROMORPHONE HYDROCHLORIDE 0.5 MG: 1 INJECTION, SOLUTION INTRAMUSCULAR; INTRAVENOUS; SUBCUTANEOUS at 12:34

## 2022-08-24 RX ADMIN — SODIUM CHLORIDE, SODIUM LACTATE, POTASSIUM CHLORIDE, AND CALCIUM CHLORIDE: 600; 310; 30; 20 INJECTION, SOLUTION INTRAVENOUS at 17:00

## 2022-08-24 RX ADMIN — FENTANYL CITRATE 25 MCG: 50 INJECTION, SOLUTION INTRAMUSCULAR; INTRAVENOUS at 18:26

## 2022-08-24 RX ADMIN — TAMSULOSIN HYDROCHLORIDE 0.4 MG: 0.4 CAPSULE ORAL at 19:11

## 2022-08-24 RX ADMIN — OXYCODONE HYDROCHLORIDE AND ACETAMINOPHEN 2 TABLET: 5; 325 TABLET ORAL at 20:14

## 2022-08-24 RX ADMIN — ONDANSETRON 4 MG: 2 INJECTION INTRAMUSCULAR; INTRAVENOUS at 12:33

## 2022-08-24 RX ADMIN — SODIUM CHLORIDE, SODIUM LACTATE, POTASSIUM CHLORIDE, AND CALCIUM CHLORIDE 1000 ML: .6; .31; .03; .02 INJECTION, SOLUTION INTRAVENOUS at 12:30

## 2022-08-24 RX ADMIN — PROPOFOL 120 MG: 10 INJECTION, EMULSION INTRAVENOUS at 17:21

## 2022-08-24 RX ADMIN — PHENAZOPYRIDINE HYDROCHLORIDE 100 MG: 100 TABLET ORAL at 19:11

## 2022-08-24 RX ADMIN — FENTANYL CITRATE 50 MCG: 50 INJECTION, SOLUTION INTRAMUSCULAR; INTRAVENOUS at 18:49

## 2022-08-24 RX ADMIN — MEPERIDINE HYDROCHLORIDE 12.5 MG: 25 INJECTION, SOLUTION INTRAMUSCULAR; INTRAVENOUS; SUBCUTANEOUS at 19:35

## 2022-08-24 RX ADMIN — ONDANSETRON 4 MG: 2 INJECTION INTRAMUSCULAR; INTRAVENOUS at 17:32

## 2022-08-24 RX ADMIN — LIDOCAINE HYDROCHLORIDE 50 MG: 10 INJECTION, SOLUTION EPIDURAL; INFILTRATION; INTRACAUDAL; PERINEURAL at 17:21

## 2022-08-24 RX ADMIN — FENTANYL CITRATE 25 MCG: 50 INJECTION, SOLUTION INTRAMUSCULAR; INTRAVENOUS at 17:20

## 2022-08-24 RX ADMIN — ROCURONIUM BROMIDE 50 MG: 10 INJECTION, SOLUTION INTRAVENOUS at 17:21

## 2022-08-24 RX ADMIN — MEPERIDINE HYDROCHLORIDE 12.5 MG: 25 INJECTION, SOLUTION INTRAMUSCULAR; INTRAVENOUS; SUBCUTANEOUS at 19:24

## 2022-08-24 RX ADMIN — SODIUM CHLORIDE, SODIUM LACTATE, POTASSIUM CHLORIDE, AND CALCIUM CHLORIDE: 600; 310; 30; 20 INJECTION, SOLUTION INTRAVENOUS at 17:15

## 2022-08-24 RX ADMIN — FENTANYL CITRATE 50 MCG: 50 INJECTION, SOLUTION INTRAMUSCULAR; INTRAVENOUS at 19:12

## 2022-08-24 RX ADMIN — HYDROMORPHONE HYDROCHLORIDE 0.5 MG: 1 INJECTION, SOLUTION INTRAMUSCULAR; INTRAVENOUS; SUBCUTANEOUS at 14:02

## 2022-08-24 RX ADMIN — SUGAMMADEX 400 MG: 100 INJECTION, SOLUTION INTRAVENOUS at 18:22

## 2022-08-24 RX ADMIN — DEXAMETHASONE SODIUM PHOSPHATE 10 MG: 10 INJECTION, SOLUTION INTRAMUSCULAR; INTRAVENOUS at 17:32

## 2022-08-24 RX ADMIN — LEVOFLOXACIN 500 MG: 5 INJECTION, SOLUTION INTRAVENOUS at 15:31

## 2022-08-24 RX ADMIN — HYDROMORPHONE HYDROCHLORIDE 1 MG: 1 INJECTION, SOLUTION INTRAMUSCULAR; INTRAVENOUS; SUBCUTANEOUS at 15:23

## 2022-08-24 RX ADMIN — FENTANYL CITRATE 50 MCG: 50 INJECTION, SOLUTION INTRAMUSCULAR; INTRAVENOUS at 17:39

## 2022-08-24 ASSESSMENT — ENCOUNTER SYMPTOMS
APNEA: 0
VOICE CHANGE: 0
NAUSEA: 1
SINUS PRESSURE: 0
CONSTIPATION: 0
SHORTNESS OF BREATH: 1
ABDOMINAL PAIN: 0
COUGH: 0
EYES NEGATIVE: 1
FACIAL SWELLING: 0
RESPIRATORY NEGATIVE: 1
DYSPNEA ACTIVITY LEVEL: AFTER AMBULATING 1 FLIGHT OF STAIRS
BLOOD IN STOOL: 0
DIARRHEA: 0
SORE THROAT: 0
EYE DISCHARGE: 0
CHOKING: 0
NAUSEA: 0
SHORTNESS OF BREATH: 0

## 2022-08-24 ASSESSMENT — PAIN - FUNCTIONAL ASSESSMENT
PAIN_FUNCTIONAL_ASSESSMENT: ACTIVITIES ARE NOT PREVENTED
PAIN_FUNCTIONAL_ASSESSMENT: ACTIVITIES ARE NOT PREVENTED
PAIN_FUNCTIONAL_ASSESSMENT: 0-10

## 2022-08-24 ASSESSMENT — PAIN SCALES - GENERAL
PAINLEVEL_OUTOF10: 7
PAINLEVEL_OUTOF10: 8
PAINLEVEL_OUTOF10: 5
PAINLEVEL_OUTOF10: 9
PAINLEVEL_OUTOF10: 7
PAINLEVEL_OUTOF10: 6
PAINLEVEL_OUTOF10: 7

## 2022-08-24 ASSESSMENT — PAIN DESCRIPTION - DESCRIPTORS
DESCRIPTORS: ACHING
DESCRIPTORS: DISCOMFORT

## 2022-08-24 ASSESSMENT — PAIN DESCRIPTION - ORIENTATION
ORIENTATION: MID
ORIENTATION: LEFT
ORIENTATION: LEFT
ORIENTATION: MID

## 2022-08-24 ASSESSMENT — PAIN DESCRIPTION - LOCATION
LOCATION: FLANK
LOCATION: BACK
LOCATION: FLANK

## 2022-08-24 ASSESSMENT — PAIN DESCRIPTION - PAIN TYPE: TYPE: SURGICAL PAIN

## 2022-08-24 ASSESSMENT — PAIN DESCRIPTION - ONSET: ONSET: ON-GOING

## 2022-08-24 ASSESSMENT — PAIN DESCRIPTION - FREQUENCY: FREQUENCY: CONTINUOUS
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
## 2022-08-24 NOTE — DISCHARGE INSTRUCTIONS
[facility] Urology, Dr Llewellyn Cowden    Cystoscopy / Ureteroscopy / Bladder Endoscopy Procedures Discharge Instructions      You may experience : Burning sensation when you void     A feeling of a need to go to the bathroom frequently     You may have urgent urination     Your urine may be blood tinged    These symptoms should be relieved within a few hours and days  as you increase the amounts of fluids you drink and the number of times that you empty your bladder. They may persist for 1-2 weeks if you have a stent or had a biopsy or resection. We recommended that you drink plenty of fluid a few days after surgery. If you have a ureteral stent he may have pain in your side or back related to the stent. Stents also cause bladder irritation symptoms of frequency and urgency. No strenuous activities for 1 week or  until you talk to your doctor. You may feel light headed up to 24 hours after anesthesia. You should not do the following for the next 24 hours. Drive a car, operate machinery or power tools     Drink any alcoholic drinks (not even beer or wine)     Make any important decisions, i.e., signing important papers. It is recommended that you begin with clear liquids and/or light foods. If you  Are not nauseated, progress to your normal diet. I you are unable to urinate you should call your surgeon.     Dr. Oumar David
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
## 2022-08-24 NOTE — OP NOTE
Brief operative Note      Patient: Aamir Mcintyre  YOB: 1955  MRN: [id number]    Date of Procedure: 8/24/2022    Pre-Op Diagnosis: Left ureteral stone [N20.1]    Post-Op Diagnosis: Same       Procedure(s):  LEFT URETEROSCOPY STONE BASKET EXTRACTION  LEFT URETERAL STENT INSERTION    Surgeon(s):  Ernestina Davila MD    Assistant:   First Assistant: eDnton Amaya    Anesthesia: General    Estimated Blood Loss (mL): 0    Complications: None    Specimens:   ID Type Source Tests Collected by Time Destination   1 : LEFT URETERAL STONE Stone (Calculus) Ureter STONE ANALYSIS Ernestina Davila MD 8/24/2022 1744        Implants:  Implant Name Type Inv. Item Serial No.  Lot No. LRB No. Used Action   STENT URET 5FR L20CM PERCFLX HYDR+ DBL PGTL THRD 2 - [id number]  STENT URET 5FR L20CM PERCFLX HYDR+ DBL PGTL THRD 2  [facility]- [id number] Left 1 Implanted         Drains: * No LDAs found *    Findings: Mid left ureteral calculus extracted with stone basket. 5 [employer] [place] by 20 cm left double-J stent placed with string attached.     Detailed Description of Procedure:   See dictated report:  [id number]    Disposition to PACU then to op care outpatient follow-up in 1 week for stent removal patient has a string    Electronically signed by Lizbeth Rogers MD on 8/24/2022 at 6:26 PM

## 2022-08-24 NOTE — ANESTHESIA PRE PROCEDURE
Historical Provider, MD   Nutritional Supplements (BOOST PO) Take by mouth    Historical Provider, MD   NITROSTAT 0.4 MG SL tablet Place 1 tablet under the tongue 3 times daily as needed 10/23/15   Historical Provider, MD   LORazepam (ATIVAN) 1 MG tablet Take 1 mg by mouth 3 times daily as needed 1/18/16   Historical Provider, MD   amLODIPine (NORVASC) 10 MG tablet Take 10 mg by mouth daily. Historical Provider, MD   clopidogrel (PLAVIX) 75 MG tablet Take 75 mg by mouth daily. Historical Provider, MD       Current medications:    No current facility-administered medications for this visit. Current Outpatient Medications   Medication Sig Dispense Refill    meloxicam (MOBIC) 7.5 MG tablet Take 1 tablet by mouth in the morning. 10 tablet 0    phenazopyridine (PYRIDIUM) 200 MG tablet TAKE 1 TABLET BY MOUTH 3 TIMES A DAY      sulfamethoxazole-trimethoprim (BACTRIM DS;SEPTRA DS) 800-160 MG per tablet TAKE 1 TABLET BY MOUTH TWICE A DAY      ondansetron (ZOFRAN ODT) 4 MG disintegrating tablet Take 2 tablets by mouth every 8 hours as needed for Nausea or Vomiting 30 tablet 1    tamsulosin (FLOMAX) 0.4 MG capsule Take 1 capsule by mouth in the morning.  15 capsule 0    metoprolol tartrate (LOPRESSOR) 25 MG tablet Take 2 tablets by mouth 2 times daily Indications: Pt unaware of dosage 60 tablet 3    QUEtiapine (SEROQUEL) 25 MG tablet       oxyCODONE (OXY-IR) 15 MG immediate release tablet TAKE 1 TABLET BY MOUTH EVERY 6 HOURS      prochlorperazine (COMPAZINE) 5 MG tablet       aspirin 325 MG tablet Take 325 mg by mouth daily      isosorbide mononitrate (IMDUR) 120 MG CR tablet Take 120 mg by mouth 2 times daily       hydrochlorothiazide (HYDRODIURIL) 12.5 MG tablet Take 12.5 mg by mouth daily Indications: as needed      Coenzyme Q10 (COQ-10 PO) Take by mouth daily       Nutritional Supplements (BOOST PO) Take by mouth      NITROSTAT 0.4 MG SL tablet Place 1 tablet under the tongue 3 times daily as needed  0    LORazepam (ATIVAN) 1 MG tablet Take 1 mg by mouth 3 times daily as needed  5    amLODIPine (NORVASC) 10 MG tablet Take 10 mg by mouth daily.  clopidogrel (PLAVIX) 75 MG tablet Take 75 mg by mouth daily. Facility-Administered Medications Ordered in Other Visits   Medication Dose Route Frequency Provider Last Rate Last Admin    ondansetron (ZOFRAN) injection 4 mg  4 mg IntraVENous Q30 Min PRN Carlito Littlejohn MD   4 mg at 08/24/22 1233    lactated ringers bolus  1,000 mL IntraVENous Once Carlito Littlejohn MD        lactated ringers infusion   IntraVENous Continuous [facility],  100 mL/hr at 08/24/22 1700 New Bag at 08/24/22 1700       Allergies: Allergies   Allergen Reactions    Ezetimibe-Simvastatin Other (See Comments)     Other reaction(s): myositis/elevated CPK  Other reaction(s):  Other (See Comments)  Myositis/ elevated CPK    Promethazine Other (See Comments) and Anxiety     Other reaction(s): jitters   jitters    Bee Venom     Darvocet [Propoxyphene N-Acetaminophen] Rash    Other Rash    Penicillins Other (See Comments)     childhood allergy    Phenergan [Promethazine Hcl] Anxiety and Other (See Comments)     Restless legs    Promethazine Hcl Other (See Comments)     Restless legs    Propoxyphene Rash       Problem List:    Patient Active Problem List   Diagnosis Code    CAD (coronary artery disease) I25.10    Tobacco abuse Z72.0    Hyperlipidemia E78.5    HTN (hypertension) I10    Chest pain R07.9    Right arm pain M79.601    Left knee pain M25.562    History of kidney stones Z87.442    Benign prostatic hyperplasia with urinary frequency N40.1, R35.0    Angina pectoris, crescendo ([facility] [address] I20.0    ACS (acute coronary syndrome) ([facility] [address] I24.9    Nocturia R35.1    Left ureteral calculus L32.6    Renal colic on left side D34    Ureterolithiasis N20.1    Imbalance R26.89    Slurred speech R47.81    Confusion R41.0    Dyspnea on exertion R06.00    Bladder calculus N21.0       Past Medical History:        Diagnosis Date    CAD (coronary artery disease) 2010    Native Vessel. S/p stenting wtih negative cardiac cath on January 18, 2010 and negative nuclear stress test on June 29, 2010.  Chest pain 1/28/2016    DJD (degenerative joint disease)     GERD (gastroesophageal reflux disease) 2010    HTN (hypertension)     Hyperlipidemia     Left knee pain 1/28/2016    Pacemaker 2010    MEDTRONIC    Polyarticular arthritis     Renal calculi 2010    S/p lithrotripsy    Right arm pain 1/28/2016    Right ureteral stone 7/8/2016    Stones in the urinary tract 02/02/2022    Tobacco abuse 2010    Prior. Past Surgical History:        Procedure Laterality Date    CARDIAC CATHETERIZATION  01/08/2010    EF is estimated to be 60%. See scanned document.  CARDIAC CATHETERIZATION  6/20/08, 10/2/08    EF is estimated to be 60%. See scanned document.  CARDIAC CATHETERIZATION  2/13/07, 6/6/07    EF 50%. See scanned document.  CARDIAC CATHETERIZATION  1/8/07, 11/26/07    EF is estimated to be in excess of 50%. See scanned doucment.  CARDIAC CATHETERIZATION  08/17/2006    EF is estimated to be in excess of 50%. See scanned doucment.  CARDIAC CATHETERIZATION  5/14/14  MDL    normal LV function.  EF 60    CHOLECYSTECTOMY      CLAVICLE SURGERY      COLONOSCOPY      Endoscopy    CYSTOSCOPY Right 7/8/2016    CYSTOSCOPY; RIGHT RETROGRADE PYELOGRAM; RIGHT URETERAL DILATATION; RIGHT URETEROSCOPY; RIGHT URETERAL LASER LITHOTRIPSY AND STONE EXTRACTION; INSERTION RIGHT URETERAL DOUBLE J STENT performed by Anais Can MD at [address] [place] [address] Left 1/14/2021    CYSTOSCOPY URETEROSCOPY LASER LITHO WITH STONE EXTRACTION AND STENT REMOVAL> performed by Trae Miller MD at [facility] Left 12/29/2021    CYSTOSCOPY; LEFT URTERAL CATHERIZATION; LEFT RETORGRADE PYLEOGRAM; REMOVAL BLADDER CALCULI performed by Jeremy Jules MD at [facility]  CYSTOSCOPY Left 8/5/2022    CYSTOSCOPY LEFT URETERAL STENT INSERTION performed by David Garcia MD at [facility] Left     HAND SURGERY      HUMERUS SURGERY      KNEE SURGERY      Right    KNEE SURGERY Left     LITHOTRIPSY      MANDIBLE FRACTURE SURGERY      NECK SURGERY      cervical spine.  OTHER SURGICAL HISTORY      Brachytherapy of the core stents.  PACEMAKER PLACEMENT      [employer]    PTCA      TIBIA FRACTURE SURGERY      WRIST SURGERY         Social History:    Social History     Tobacco Use    Smoking status: Former     Types: Cigarettes    Smokeless tobacco: Current   Substance Use Topics    Alcohol use: No                                Ready to quit: Not Answered  Counseling given: Not Answered      Vital Signs (Current): There were no vitals filed for this visit.                                            BP Readings from Last 3 Encounters:   08/24/22 117/74   08/05/22 (!) 122/52   08/01/22 (!) 145/70       NPO Status:                                                                                 BMI:   Wt Readings from Last 3 Encounters:   08/24/22 130 lb (59 kg)   08/05/22 126 lb (57.2 kg)   08/05/22 126 lb 12.8 oz (57.5 kg)     There is no height or weight on file to calculate BMI.    CBC:   Lab Results   Component Value Date/Time    WBC 5.0 08/24/2022 11:15 AM    RBC 4.40 08/24/2022 11:15 AM    RBC 4.11 12/18/2011 11:55 AM    HGB 13.1 08/24/2022 11:15 AM    HCT 39.9 08/24/2022 11:15 AM    HCT 36.7 12/18/2011 11:55 AM    MCV 90.7 08/24/2022 11:15 AM    RDW 13.0 08/24/2022 11:15 AM     08/24/2022 11:15 AM       CMP:   Lab Results   Component Value Date/Time     08/24/2022 11:15 AM     12/21/2011 02:16 AM    K 4.9 08/24/2022 11:15 AM    K 3.7 12/21/2011 02:16 AM     08/24/2022 11:15 AM     12/21/2011 02:16 AM    CO2 30 08/24/2022 11:15 AM    BUN 33 08/24/2022 11:15 AM    CREATININE 1.0 08/24/2022 11:15 AM    CREATININE 0.9 12/21/2011 02:16 AM    GFRAA >59 08/24/2022 11:15 AM    LABGLOM >60 08/24/2022 11:15 AM    GLUCOSE 89 08/24/2022 11:15 AM    PROT 6.7 08/24/2022 11:15 AM    CALCIUM 9.0 08/24/2022 11:15 AM    BILITOT 0.3 08/24/2022 11:15 AM    ALKPHOS 103 08/24/2022 11:15 AM    AST 33 08/24/2022 11:15 AM    ALT 24 08/24/2022 11:15 AM       POC Tests: No results for input(s): POCGLU, POCNA, POCK, POCCL, POCBUN, POCHEMO, POCHCT in the last 72 hours. Coags:   Lab Results   Component Value Date/Time    PROTIME 13.4 06/28/2022 06:12 PM    PROTIME 12.76 12/18/2011 11:55 AM    INR 1.03 06/28/2022 06:12 PM    APTT 65.4 06/29/2022 07:14 AM       HCG (If Applicable): No results found for: PREGTESTUR, PREGSERUM, HCG, HCGQUANT     ABGs: No results found for: PHART, PO2ART, BUI0NFV, MKO4AJY, BEART, S1BPRGXL     Type & Screen (If Applicable):  No results found for: LABABO, LABRH    Drug/Infectious Status (If Applicable):  No results found for: HIV, HEPCAB    COVID-19 Screening (If Applicable):   Lab Results   Component Value Date/Time    COVID19 Not Detected 08/24/2022 03:19 PM           Anesthesia Evaluation  Patient summary reviewed and Nursing notes reviewed no history of anesthetic complications:   Airway: Mallampati: II  TM distance: >3 FB   Neck ROM: full  Mouth opening: > = 3 FB   Dental:    (+) partials      Pulmonary:   (+) shortness of breath: no interval change,                            ROS comment: CXR:  Bilateral lung hyperinflation with no radiographic evidence of acute pulmonary or pleural pathologic process.  No interval change. Recommendation: Follow up as clinically indicated.         Cardiovascular:  Exercise tolerance: good (>4 METS),   (+) hypertension:, valvular problems/murmurs (moderate to severe TR):, angina:, pacemaker: pacemaker and dependent, CAD: no interval change, CABG/stent (stenting x 6 last placed ~ 5 years ago):, ACEVEDO: after ambulating 1 flight of stairs, pulmonary hypertension: moderate, hyperlipidemia    (-) past MI       Beta Blocker:  Dose within 24 Hrs      ROS comment: Echo :  Summary   Normal left ventricular chamber size and systolic function. Left ventricular ejection fraction is visually estimated at 60%. Severe right atrial enlargement. Increased IVC diameter and decreased inspiratory collapse suggesting RA   pressure of 15   Aortic valve leaflets are mildly thickened. There is mildly decreased   excursion. Mild aortic stenosis is present. The peak gradient across the aortic valve is 38 mmHg. The mean gradient across the aortic valve is 17 mmHg. Moderate-to-severe tricuspid regurgitation . There is moderate pulmonary hypertension. EK BPM  Technical difficulties  Rhythm is now sinus  ECG now within normal limits  Comparison Summary: Significant rhythm change  Summary: Normal ECG     Neuro/Psych:   (+) psychiatric history:   (-) seizures and CVA           GI/Hepatic/Renal:   (+) GERD:, renal disease: kidney stones,      (-) liver disease       Endo/Other:    (+) no malignancy/cancer. (-) diabetes mellitus, blood dyscrasia (off plavix since end ), no malignancy/cancer               Abdominal:             Vascular:     - DVT and PE. Other Findings: Pacer in left upper chest wall            Anesthesia Plan      general     ASA 3     ( Pt c/o of nausea today.  )  Induction: intravenous. MIPS: Postoperative opioids intended and Prophylactic antiemetics administered. Anesthetic plan and risks discussed with patient.                         Ezell Merlin, DO   2022

## 2022-08-24 NOTE — ED PROVIDER NOTES
[facility] EMERGENCY DEPT  eMERGENCY dEPARTMENT eNCOUnter      Pt Name: Jodi Barragan  MRN: [id number]  [place] 1955  Date of evaluation: 8/24/2022  Provider: Waylon Roth MD    [address] [place] [address]       Chief Complaint   Patient presents with    Flank Pain     Left flank pain after having a stent placed for kidney stone removal on 8/5. Pt states pain is worse and having pain that goes to left testicle. HISTORY OF PRESENT ILLNESS   (Location/Symptom, Timing/Onset,Context/Setting, Quality, Duration, Modifying Factors, Severity)  Note limiting factors. Jodi Barragan is a 79 y.o. male who presents to the emergency department left flank pain history of kidney stones    80-year-old male presents with persistent left flank pain. History of kidney stones Berna 120 on toad. Last stone had to have intervention by Dr. Kimani Valderrama. No stent was left. The stent was removed on the 11th. The patient complains that he has been in pain ever since. He has been on Flomax and pain medication at home. Denies fever chills or infection. The history is provided by the patient and medical records. NursingNotes were reviewed. REVIEW OF SYSTEMS    (2-9 systems for level 4, 10 or more for level 5)     Review of Systems   Constitutional:  Negative for chills and fever. HENT:  Negative for congestion, drooling, facial swelling, nosebleeds, sinus pressure, sore throat and voice change. Eyes:  Negative for discharge. Respiratory:  Negative for apnea, cough, choking and shortness of breath. Cardiovascular:  Negative for chest pain and leg swelling. Gastrointestinal:  Negative for abdominal pain, blood in stool, constipation, diarrhea and nausea. Genitourinary:  Positive for flank pain and testicular pain. Negative for dysuria and enuresis. Musculoskeletal:  Negative for joint swelling. Skin:  Negative for rash and wound. Neurological:  Negative for seizures and syncope.    Psychiatric/Behavioral: Negative for behavioral problems, hallucinations and suicidal ideas. All other systems reviewed and are negative. A complete review of systems was performed and is negative except as noted above in the HPI. PAST MEDICAL HISTORY     Past Medical History:   Diagnosis Date    CAD (coronary artery disease) 2010    Native Vessel. S/p stenting wtih negative cardiac cath on January 18, 2010 and negative nuclear stress test on June 29, 2010. Chest pain 1/28/2016    DJD (degenerative joint disease)     GERD (gastroesophageal reflux disease) 2010    HTN (hypertension)     Hyperlipidemia     Left knee pain 1/28/2016    Pacemaker 2010    [employer]TRONIC    Polyarticular arthritis     Renal calculi 2010    S/p lithrotripsy    Right arm pain 1/28/2016    Right ureteral stone 7/8/2016    Stones in the urinary tract 02/02/2022    Tobacco abuse 2010    Prior. SURGICAL HISTORY       Past Surgical History:   Procedure Laterality Date    CARDIAC CATHETERIZATION  01/08/2010    EF is estimated to be 60%. See scanned document. CARDIAC CATHETERIZATION  6/20/08, 10/2/08    EF is estimated to be 60%. See scanned document. CARDIAC CATHETERIZATION  2/13/07, 6/6/07    EF 50%. See scanned document. CARDIAC CATHETERIZATION  1/8/07, 11/26/07    EF is estimated to be in excess of 50%. See scanned doucment. CARDIAC CATHETERIZATION  08/17/2006    EF is estimated to be in excess of 50%. See scanned doucment. CARDIAC CATHETERIZATION  5/14/14  MDL    normal LV function.  EF 60    CHOLECYSTECTOMY      CLAVICLE SURGERY      COLONOSCOPY      Endoscopy    CYSTOSCOPY Right 7/8/2016    CYSTOSCOPY; RIGHT RETROGRADE PYELOGRAM; RIGHT URETERAL DILATATION; RIGHT URETEROSCOPY; RIGHT URETERAL LASER LITHOTRIPSY AND STONE EXTRACTION; INSERTION RIGHT URETERAL DOUBLE J STENT [facility] 1/14/2021    CYSTOSCOPY URETEROSCOPY LASER LITHO WITH STONE EXTRACTION AND STENT REMOVAL> performed by Leslie Waite MD at [address] Left 12/29/2021    CYSTOSCOPY; LEFT URTERAL CATHERIZATION; LEFT RETORGRADE PYLEOGRAM; REMOVAL BLADDER CALCULI performed by Catina Abraham MD at [address] Left 8/5/2022    CYSTOSCOPY LEFT URETERAL STENT INSERTION performed by Leslie Waite MD at [email or web address] Left     HAND SURGERY      HUMERUS SURGERY      KNEE SURGERY      Right    KNEE SURGERY Left     LITHOTRIPSY      MANDIBLE FRACTURE SURGERY      NECK SURGERY      cervical spine. OTHER SURGICAL HISTORY      Brachytherapy of the core stents. PACEMAKER PLACEMENT      [employer]TRONIC    PTCA      TIBIA FRACTURE SURGERY      WRIST SURGERY           CURRENT MEDICATIONS       Previous Medications    AMLODIPINE (NORVASC) 10 MG TABLET    Take 10 mg by mouth daily. ASPIRIN 325 MG TABLET    Take 325 mg by mouth daily    CLOPIDOGREL (PLAVIX) 75 MG TABLET    Take 75 mg by mouth daily. COENZYME Q10 (COQ-10 PO)    Take by mouth daily     HYDROCHLOROTHIAZIDE (HYDRODIURIL) 12.5 MG TABLET    Take 12.5 mg by mouth daily Indications: as needed    ISOSORBIDE MONONITRATE (IMDUR) 120 MG CR TABLET    Take 120 mg by mouth 2 times daily     LORAZEPAM (ATIVAN) 1 MG TABLET    Take 1 mg by mouth 3 times daily as needed    MELOXICAM (MOBIC) 7.5 MG TABLET    Take 1 tablet by mouth in the morning.     METOPROLOL TARTRATE (LOPRESSOR) 25 MG TABLET    Take 2 tablets by mouth 2 times daily Indications: Pt unaware of dosage    NITROSTAT 0.4 MG SL TABLET    Place 1 tablet under the tongue 3 times daily as needed    NUTRITIONAL SUPPLEMENTS (BOOST PO)    Take by mouth    ONDANSETRON (ZOFRAN ODT) 4 MG DISINTEGRATING TABLET    Take 2 tablets by mouth every 8 hours as needed for Nausea or Vomiting    OXYCODONE (OXY-IR) 15 MG IMMEDIATE RELEASE TABLET    TAKE 1 TABLET BY MOUTH EVERY 6 HOURS    PHENAZOPYRIDINE (PYRIDIUM) 200 MG TABLET    TAKE 1 TABLET BY MOUTH 3 TIMES A DAY    PROCHLORPERAZINE (COMPAZINE) 5 MG TABLET QUETIAPINE (SEROQUEL) 25 MG TABLET        SULFAMETHOXAZOLE-TRIMETHOPRIM (BACTRIM DS;SEPTRA DS) 800-160 MG PER TABLET    TAKE 1 TABLET BY MOUTH TWICE A DAY    TAMSULOSIN (FLOMAX) 0.4 MG CAPSULE    Take 1 capsule by mouth in the morning. ALLERGIES     Ezetimibe-simvastatin, Promethazine, Bee venom, Darvocet [propoxyphene n-acetaminophen], Other, Penicillins, Phenergan [promethazine hcl], Promethazine hcl, and Propoxyphene    FAMILY HISTORY     History reviewed. No pertinent family history. SOCIAL HISTORY       Social History     Socioeconomic History    Marital status:      Spouse name: None    Number of children: None    Years of education: None    Highest education level: None   Occupational History     Employer: DISABLED   Tobacco Use    Smoking status: Former     Types: Cigarettes    Smokeless tobacco: Current   Vaping Use    Vaping Use: Never used   Substance and Sexual Activity    Alcohol use: No    Drug use: No       SCREENINGS    [place] Coma Scale  Eye Opening: Spontaneous  Best Verbal Response: Oriented  Best Motor Response: Obeys commands  [place] Coma Scale Score: 15        PHYSICAL EXAM    (up to 7 for level 4, 8 or more for level 5)     ED Triage Vitals [08/24/22 1109]   BP Temp Temp Source Heart Rate Resp SpO2 Height Weight   99/68 98.2 °F (36.8 °C) Oral 59 16 98 % 5' 7\" (1.702 m) 130 lb (59 kg)       Physical Exam  Vitals and nursing note reviewed. Constitutional:       Appearance: He is well-developed. HENT:      Head: Normocephalic and atraumatic. Right Ear: External ear normal.      Left Ear: External ear normal.   Eyes:      General: No scleral icterus. Conjunctiva/sclera: Conjunctivae normal.      Pupils: Pupils are equal, round, and reactive to light. Cardiovascular:      Rate and Rhythm: Normal rate and regular rhythm. Pulses: Normal pulses. Heart sounds: Normal heart sounds. No murmur heard.   Pulmonary:      Effort: Pulmonary effort is normal. Breath sounds: Normal breath sounds. Abdominal:      General: Bowel sounds are normal.      Palpations: Abdomen is soft. Tenderness: There is left CVA tenderness. Genitourinary:     Testes: Normal.   Musculoskeletal:         General: Normal range of motion. Cervical back: Normal range of motion and neck supple. Skin:     General: Skin is warm and dry. Coloration: Skin is not jaundiced or pale. Neurological:      General: No focal deficit present. Mental Status: He is alert and oriented to person, place, and time. Psychiatric:         Mood and Affect: Mood normal.         Behavior: Behavior normal.       DIAGNOSTIC RESULTS     EKG: All EKG's are interpreted by the Emergency Department Physician who either signs or Co-signs this chart in the absence of a cardiologist.        RADIOLOGY:   Non-plain film images such as CT, Ultrasound and MRI are read by the radiologist. Plainradiographic images are visualized and preliminarily interpreted by the emergency physician with the below findings:    I have reviewed the results in the images. Interpretation per the Radiologist below, if available at the time of this note:    CT KIDNEY WO CONTRAST   Final Result   1. Tiny left proximal and slightly larger left mid ureteral calculi associated with slight fullness of the left renal pelvis and proximal ureter. 2.  Moderate bladder wall thickening which may be related to cystitis and/or outlet obstruction. Clustered prostatic calcifications. 3.  Other nonacute findings above. Recommendation: Follow up as clinically indicated. All CT scans at this facility utilize dose modulation, iterative reconstruction, and/or weight based dosing when appropriate to reduce radiation dose to as low as reasonably achievable.             Electronically Signed by Amanda Ely MD at 24-Aug-2022 01:05:58 PM                     ED BEDSIDE ULTRASOUND:   Performed by ED Physician - none    LABS:  Labs Reviewed CBC WITH AUTO DIFFERENTIAL - Abnormal; Notable for the following components:       Result Value    RBC 4.40 (*)     Hemoglobin 13.1 (*)     Hematocrit 39.9 (*)     MCHC 32.8 (*)     MPV 8.7 (*)     Neutrophils % 65.6 (*)     All other components within normal limits   COMPREHENSIVE METABOLIC PANEL W/ REFLEX TO MG FOR LOW K - Abnormal; Notable for the following components:    CO2 30 (*)     BUN 33 (*)     All other components within normal limits   URINALYSIS - Abnormal; Notable for the following components:    Ketones, Urine TRACE (*)     Leukocyte Esterase, Urine TRACE (*)     All other components within normal limits   MICROSCOPIC URINALYSIS - Abnormal; Notable for the following components:    Bacteria, UA NEGATIVE (*)     Crystals, UA NEG (*)     RBC, UA 13 (*)     All other components within normal limits   COVID-19, RAPID   COVID-19, RAPID       All other labs were within normal range or not returned as of this dictation. EMERGENCY DEPARTMENT COURSE and DIFFERENTIALDIAGNOSIS/MDM:   Vitals:    Vitals:    08/24/22 1109   BP: 99/68   Pulse: 59   Resp: 16   Temp: 98.2 °F (36.8 °C)   TempSrc: Oral   SpO2: 98%   Weight: 130 lb (59 kg)   Height: 5' 7\" (1.702 m)       MDM  Number of Diagnoses or Management Options  Left flank pain  Ureterolithiasis  Diagnosis management comments: CT shows 2 areas of stone in the left ureter. Whether they are fragments from the old stone or new stones. I spoke with Dr. Micki Kitchen. Patient wants something done has been dealing with this for some time. Dr. Micki Kitchen will admit and is planning procedure for the stones. Patient otherwise stable at this time. CONSULTS:  None    PROCEDURES:  Unless otherwise notedbelow, none     Procedures    FINAL IMPRESSION     1. Ureterolithiasis    2.  Left flank pain          DISPOSITION/PLAN   DISPOSITION Decision To Admit 08/24/2022 02:43:34 PM      PATIENT REFERRED TO:  @FUP@    DISCHARGE MEDICATIONS:  New Prescriptions    No medications on file          (Please note that portions of this note were completed with a voice recognition program.  Efforts were made to edit the dictations butoccasionally words are mis-transcribed.)    Jm Beverly MD (electronically signed)  AttendingEmergency Physician          Whitney Davis MD  08/24/22 [phone]

## 2022-08-24 NOTE — ANESTHESIA POSTPROCEDURE EVALUATION
Department of Anesthesiology  Postprocedure Note    Patient: Vamsi Magana  MRN: [id number]  YOB: 1955  Date of evaluation: 8/24/2022      Procedure Summary     Date: 08/24/22 Room / Location: [facility]    Anesthesia Start: 4081 Anesthesia Stop: 1831    Procedures:       LEFT URETEROSCOPY STONE BASKET EXTRACTION (Left)      LEFT URETERAL STENT INSERTION (Left) Diagnosis:       Ureteral stone      (Ureteral stone [N20.1])    Surgeons: Montrell Storey MD Responsible Provider: BHUMIKA Abraham CRNA    Anesthesia Type: general ASA Status: 3          Anesthesia Type: No value filed.     Brenna Phase I: Brenna Score: 10    Brenna Phase II:        Anesthesia Post Evaluation    Patient location during evaluation: PACU  Patient participation: complete - patient participated  Level of consciousness: awake  Pain score: 0  Airway patency: patent  Nausea & Vomiting: no vomiting and no nausea  Complications: no  Cardiovascular status: hemodynamically stable  Respiratory status: acceptable, spontaneous ventilation and room air  Hydration status: stable

## 2022-08-24 NOTE — H&P
HISTORY AND PHYSICAL             Date: 8/24/2022        Patient Name: Nas Ng     YOB: 1955      Age:  79 y.o. Chief Complaint     Chief Complaint   Patient presents with    Flank Pain     Left flank pain after having a stent placed for kidney stone removal on 8/5. Pt states pain is worse and having pain that goes to left testicle. History Obtained From   patient, electronic medical record    History of Present Illness   Patient 71-year-old gentleman with long history of nephrolithiasis. He had intervention by Dr. Zayda Barahona for a left proximal ureteral calculus underwent left ureteroscopy and stone extraction on 8/5/2022. Per review of Dr. Dwight Gibson note he had remove the stone entirely. Therefore he came to see me on 8/11/2022 and we removed his left renal stent. However after stent removal he has had some flank pain and nausea because of continued pain he presented to the emergency department today. Emergency department CT scan revealed a left mid ureteral calculus and maybe a small tiny stone just proximal to this with some mild left-sided hydronephrosis. His pain was reasonably controlled however the patient was to proceed with definitive intervention as undergo outpatient surgery for removal of this left mid ureteral calculus. It should be mentioned he has been taking tamsulosin. Past Medical History     Past Medical History:   Diagnosis Date    CAD (coronary artery disease) 2010    Native Vessel. S/p stenting wtih negative cardiac cath on January 18, 2010 and negative nuclear stress test on June 29, 2010.     Chest pain 1/28/2016    DJD (degenerative joint disease)     GERD (gastroesophageal reflux disease) 2010    HTN (hypertension)     Hyperlipidemia     Left knee pain 1/28/2016    Pacemaker 2010    MEDTRONIC    Polyarticular arthritis     Renal calculi 2010    S/p lithrotripsy    Right arm pain 1/28/2016    Right ureteral stone 7/8/2016    Stones in the urinary tract 02/02/2022    Tobacco abuse 2010    Prior. Past Surgical History     Past Surgical History:   Procedure Laterality Date    CARDIAC CATHETERIZATION  01/08/2010    EF is estimated to be 60%. See scanned document. CARDIAC CATHETERIZATION  6/20/08, 10/2/08    EF is estimated to be 60%. See scanned document. CARDIAC CATHETERIZATION  2/13/07, 6/6/07    EF 50%. See scanned document. CARDIAC CATHETERIZATION  1/8/07, 11/26/07    EF is estimated to be in excess of 50%. See scanned doucment. CARDIAC CATHETERIZATION  08/17/2006    EF is estimated to be in excess of 50%. See scanned doucment. CARDIAC CATHETERIZATION  5/14/14  MDL    normal LV function. EF 60    CHOLECYSTECTOMY      CLAVICLE SURGERY      COLONOSCOPY      Endoscopy    CYSTOSCOPY Right 7/8/2016    CYSTOSCOPY; RIGHT RETROGRADE PYELOGRAM; RIGHT URETERAL DILATATION; RIGHT URETEROSCOPY; RIGHT URETERAL LASER LITHOTRIPSY AND STONE EXTRACTION; INSERTION RIGHT URETERAL DOUBLE J STENT performed by Brooklyn Tejada MD at [facility] Left 1/14/2021    CYSTOSCOPY URETEROSCOPY LASER LITHO WITH STONE EXTRACTION AND STENT REMOVAL> performed by Leslie Waite MD at [facility] Left 12/29/2021    CYSTOSCOPY; LEFT URTERAL CATHERIZATION; LEFT RETORGRADE PYLEOGRAM; REMOVAL BLADDER CALCULI performed by Catina Abraham MD at [address] Left 8/5/2022    CYSTOSCOPY LEFT URETERAL STENT INSERTION performed by Leslie Waite MD at [address] [place] [address] Left     HAND SURGERY      HUMERUS SURGERY      KNEE SURGERY      Right    KNEE SURGERY Left     LITHOTRIPSY      MANDIBLE FRACTURE SURGERY      NECK SURGERY      cervical spine. OTHER SURGICAL HISTORY      Brachytherapy of the core stents. PACEMAKER PLACEMENT      MEDTRONIC    PTCA      TIBIA FRACTURE SURGERY      WRIST SURGERY          Medications Prior to Admission     Prior to Admission medications    Medication Sig Start Date End Date Taking?  Authorizing Provider   meloxicam (MOBIC) 7.5 MG tablet Take 1 tablet by mouth in the morning. 8/9/22   BHUMIKA Zuluaga CNP   phenazopyridine (PYRIDIUM) 200 MG tablet TAKE 1 TABLET BY MOUTH 3 TIMES A DAY 8/5/22   Historical Provider, MD   sulfamethoxazole-trimethoprim (BACTRIM DS;SEPTRA DS) 800-160 MG per tablet TAKE 1 TABLET BY MOUTH TWICE A DAY 8/5/22   Historical Provider, MD   ondansetron (ZOFRAN ODT) 4 MG disintegrating tablet Take 2 tablets by mouth every 8 hours as needed for Nausea or Vomiting 8/8/22   BHUMIKA Zuluaga CNP   tamsulosin (FLOMAX) 0.4 MG capsule Take 1 capsule by mouth in the morning. 8/1/22   BHUMIKA Bennett   metoprolol tartrate (LOPRESSOR) 25 MG tablet Take 2 tablets by mouth 2 times daily Indications: Pt unaware of dosage 6/29/22   Sukh Polk MD   QUEtiapine (SEROQUEL) 25 MG tablet  12/12/21   Historical Provider, MD   oxyCODONE (OXY-IR) 15 MG immediate release tablet TAKE 1 TABLET BY MOUTH EVERY 6 HOURS 1/15/21   Historical Provider, MD   prochlorperazine (COMPAZINE) 5 MG tablet  1/4/21   Historical Provider, MD   aspirin 325 MG tablet Take 325 mg by mouth daily    Historical Provider, MD   isosorbide mononitrate (IMDUR) 120 MG CR tablet Take 120 mg by mouth 2 times daily     Historical Provider, MD   hydrochlorothiazide (HYDRODIURIL) 12.5 MG tablet Take 12.5 mg by mouth daily Indications: as needed    Historical Provider, MD   Coenzyme Q10 (COQ-10 PO) Take by mouth daily     Historical Provider, MD   Nutritional Supplements (BOOST PO) Take by mouth    Historical Provider, MD   NITROSTAT 0.4 MG SL tablet Place 1 tablet under the tongue 3 times daily as needed 10/23/15   Historical Provider, MD   LORazepam (ATIVAN) 1 MG tablet Take 1 mg by mouth 3 times daily as needed 1/18/16   Historical Provider, MD   amLODIPine (NORVASC) 10 MG tablet Take 10 mg by mouth daily. Historical Provider, MD   clopidogrel (PLAVIX) 75 MG tablet Take 75 mg by mouth daily.       Historical Provider, MD        Allergies   Ezetimibe-simvastatin, Promethazine, Bee venom, Darvocet [propoxyphene n-acetaminophen], Other, Penicillins, Phenergan [promethazine hcl], Promethazine hcl, and Propoxyphene    Social History     Social History       Tobacco History       Smoking Status  Former Smoking Tobacco Type  Cigarettes      Smokeless Tobacco Use  Current              Alcohol History       Alcohol Use Status  No              Drug Use       Drug Use Status  No              Sexual Activity       Sexually Active  Not Asked                    Family History   History reviewed. No pertinent family history. Review of Systems   Review of Systems   Constitutional:  Negative for chills and fever. HENT: Negative. Eyes: Negative. Respiratory: Negative. Cardiovascular: Negative. Gastrointestinal:  Positive for nausea. Genitourinary:  Positive for flank pain. Skin: Negative. All other systems reviewed and are negative. Physical Exam   /74   Pulse 60   Temp 98.2 °F (36.8 °C) (Oral)   Resp 12   Ht 5' 7\" (1.702 m)   Wt 130 lb (59 kg)   SpO2 94%   BMI 20.36 kg/m²     Physical Exam  Constitutional:       General: He is not in acute distress. Appearance: Normal appearance. He is well-developed. HENT:      Head: Normocephalic and atraumatic. Nose: Nose normal.   Eyes:      General: No scleral icterus. Conjunctiva/sclera: Conjunctivae normal.      Pupils: Pupils are equal, round, and reactive to light. Neck:      Trachea: No tracheal deviation. Cardiovascular:      Rate and Rhythm: Normal rate and regular rhythm. Pulses: Normal pulses. Pulmonary:      Effort: Pulmonary effort is normal. No respiratory distress. Breath sounds: No stridor. Abdominal:      General: There is no distension. Palpations: Abdomen is soft. There is no mass. Tenderness: There is no abdominal tenderness. There is left CVA tenderness.    Musculoskeletal:         General: No tenderness or deformity. Normal range of motion. Cervical back: Normal range of motion and neck supple. Lymphadenopathy:      Cervical: No cervical adenopathy. Skin:     General: Skin is warm and dry. Findings: No erythema. Neurological:      General: No focal deficit present. Mental Status: He is alert and oriented to person, place, and time.    Psychiatric:         Behavior: Behavior normal.         Judgment: Judgment normal.       Labs      Recent Results (from the past 24 hour(s))   CBC with Auto Differential    Collection Time: 08/24/22 11:15 AM   Result Value Ref Range    WBC 5.0 4.8 - 10.8 K/uL    RBC 4.40 (L) 4.70 - 6.10 M/uL    Hemoglobin 13.1 (L) 14.0 - 18.0 g/dL    Hematocrit 39.9 (L) 42.0 - 52.0 %    MCV 90.7 80.0 - 94.0 fL    MCH 29.8 27.0 - 31.0 pg    MCHC 32.8 (L) 33.0 - 37.0 g/dL    RDW 13.0 11.5 - 14.5 %    Platelets 152 111 - 662 K/uL    MPV 8.7 (L) 9.4 - 12.4 fL    Neutrophils % 65.6 (H) 50.0 - 65.0 %    Lymphocytes % 22.5 20.0 - 40.0 %    Monocytes % 8.9 0.0 - 10.0 %    Eosinophils % 2.0 0.0 - 5.0 %    Basophils % 0.8 0.0 - 1.0 %    Neutrophils Absolute 3.3 1.5 - 7.5 K/uL    Immature Granulocytes # 0.0 K/uL    Lymphocytes Absolute 1.1 1.1 - 4.5 K/uL    Monocytes Absolute 0.50 0.00 - 0.90 K/uL    Eosinophils Absolute 0.10 0.00 - 0.60 K/uL    Basophils Absolute 0.00 0.00 - 0.20 K/uL   Comprehensive Metabolic Panel w/ Reflex to MG    Collection Time: 08/24/22 11:15 AM   Result Value Ref Range    Sodium 138 136 - 145 mmol/L    Potassium reflex Magnesium 4.9 3.5 - 5.0 mmol/L    Chloride 100 98 - 111 mmol/L    CO2 30 (H) 22 - 29 mmol/L    Anion Gap 8 7 - 19 mmol/L    Glucose 89 74 - 109 mg/dL    BUN 33 (H) 8 - 23 mg/dL    Creatinine 1.0 0.5 - 1.2 mg/dL    GFR Non-African American >60 >60    GFR African American >59 >59    Calcium 9.0 8.8 - 10.2 mg/dL    Total Protein 6.7 6.6 - 8.7 g/dL    Albumin 4.3 3.5 - 5.2 g/dL    Total Bilirubin 0.3 0.2 - 1.2 mg/dL    Alkaline Phosphatase 103 40 - 130 U/L    ALT 24 5 - 41 U/L    AST 33 5 - 40 U/L   Urinalysis    Collection Time: 08/24/22 11:20 AM   Result Value Ref Range    Color, UA YELLOW Straw/Yellow    Clarity, UA Clear Clear    Glucose, Ur Negative Negative mg/dL    Bilirubin Urine Negative Negative    Ketones, Urine TRACE (A) Negative mg/dL    Specific Gravity, UA 1.026 1.005 - 1.030    Blood, Urine Negative Negative    pH, UA 5.0 5.0 - 8.0    Protein, UA Negative Negative mg/dL    Urobilinogen, Urine 1.0 <2.0 E.U./dL    Nitrite, Urine Negative Negative    Leukocyte Esterase, Urine TRACE (A) Negative   Microscopic Urinalysis    Collection Time: 08/24/22 11:20 AM   Result Value Ref Range    Bacteria, UA NEGATIVE (A) None Seen /HPF    Crystals, UA NEG (A) None Seen /HPF    Hyaline Casts, UA 3 0 - 8 /HPF    WBC, UA 2 0 - 5 /HPF    RBC, UA 13 (H) 0 - 4 /HPF    Epithelial Cells, UA 1 0 - 5 /HPF   SARS-CoV-2 NAAT (Rapid)    Collection Time: 08/24/22  3:19 PM    Specimen: Nasopharyngeal Swab   Result Value Ref Range    SARS-CoV-2, NAAT Not Detected Not Detected        Imaging/Diagnostics Last 24 Hours   CT KIDNEY WO CONTRAST    Result Date: 8/24/2022  NO PRIOR REPORT AVAILABLE  Exam: CT OF THE ABDOMEN/PELVIS WITHOUT CONTRAST Clinical data: Left flank pain. History of recent kidney stone. Recent left stent removal on 08/11/22. Technique: Direct contiguous axial CT images were acquired through the abdomen and pelvis without contrast using soft tissue and bone algorithms. Reformatted/MPR images were performed. Radiation dose: CTDIvol =15.37 mGy, DLP = 720mGy x cm. Limitations: Lack of intravenous contrast limits evaluation of solid viscera. Lack of oral contrast limits evaluation of the bowel loops. Prior Studies: CT scan of the abdomen and pelvis dated 08/04/22 images. Radiographs of the abdomen dated 02/14/22. Findings: Lung bases: Grossly clear. Liver:Unremarkable size, contour, and density. No evidence of mass. No evidence of dilated ducts.   Punctate calcified hepatic granulomas. Gallbladder Fossa:Prior cholecystectomy. Spleen:Calcified granulomas. Pancreas:  Grossly unremarkable. Adrenal glands: Grossly unremarkable size, contour and density. Kidneys: In anatomic position. Grossly unremarkablerenal size, contour and density. 2 mm calculus in the proximal left ureter. Additional 3 mm calculus in the mid left ureter. Slight fullness of the left renal pelvis and proximal ureter. Surgical clip adjacent to the left renal pelvis. Few peripheral small right renal calcifications. Perinephric space is unremarkable. Retroperitoneum: No retroperitoneal lymphadenopathy. Unremarkable abdominal aorta. The IVC is grossly unremarkable. Atheromatous vascular calcification. Peritoneal cavity: No evidence of free air or ascites. Gastrointestinal tract: Nondistended small bowel and colon. No evidence of obstruction. Appendix: Unremarkable. Pelvis:Clustered prostatic calcifications. Bladder demonstrates moderate wall thickening. Osseous structures: No acute or destructive bony process identified. Moderate multilevel spondylosis with convex left scoliosis. 1.  Tiny left proximal and slightly larger left mid ureteral calculi associated with slight fullness of the left renal pelvis and proximal ureter. 2.  Moderate bladder wall thickening which may be related to cystitis and/or outlet obstruction. Clustered prostatic calcifications. 3.  Other nonacute findings above. Recommendation: Follow up as clinically indicated. All CT scans at this facility utilize dose modulation, iterative reconstruction, and/or weight based dosing when appropriate to reduce radiation dose to as low as reasonably achievable.     Electronically Signed by Michael Staples MD at 24-Aug-2022 01:05:58 PM               Personal review of the CT scan as above there does appear to be a stone in the mid left ureteral calculus maybe a centimeter stone just proximal to this    Assessment      Hospital Problems Last Modified POA    Left ureteral calculus 4/82/5523 Yes    Renal colic on left side 9/13/5843 Yes       Plan   Patient has residual stone in the left mid ureter after stent removal 1 week status post ureteroscopy stone extraction and now 1 week after stent removal into the ER today for symptoms. CT confirms residual stone fragment mid left ureter patient wishes to proceed with urologic intervention we will plan for left ureteroscopy laser lithotripsy stone extraction and stent placement. Risks and complications have been discussed with the patient he understands agrees to proceed.     Consultations Ordered:  None    Electronically signed by Ari Lundberg MD on 8/24/22 at 4:12 PM CDT

## 2022-08-25 NOTE — OP NOTE
[facility] [employer] OF [facility] PATIENCE [address], [address] [facility]                                OPERATIVE REPORT    PATIENT NAME: [facility]                    :        1955  MED REC NO:   [id number]                              ROOM:  ACCOUNT NO:   [de-identified]                           ADMIT DATE: 2022  PROVIDER:     Claudene Dance, MD    DATE OF PROCEDURE:  2022    TITLE OF OPERATION:  Left ureteroscopy, stone basket extraction and  placement of a 5-Bermudian x 28 cm left double-J ureteral stent. PREOPERATIVE DIAGNOSIS:  Left ureteral calculus. POSTOPERATIVE DIAGNOSIS:  Left ureteral calculus. ANESTHETIC:  General anesthetic. ATTENDING SURGEON:  Claudene Dance, MD    HISTORY:  The patient recently underwent a left ureteroscopy, stone  extraction by Dr. Jah Gooden on 2022. Per Dr. Valentino Abts operative  note, he had removed the stone entirely but it did fragment into pieces. He came to see me in the office for stent removal on 2022 and the  stent was removed. Since that time, he has been having left flank pain  and nausea. He presented to the emergency room today which showed a 3  mm stone in the mid left ureter with some left hydronephrosis. Therefore, he was offered definitive intervention with left  ureteroscopy, laser lithotripsy, stone extraction and stent placement,  and he was agreeable to proceed. The risks and complications of the  procedure were discussed with him. Consent was obtained. DESCRIPTION OF PROCEDURE:  The patient was brought to the operating  room, underwent general anesthetic. He was placed in the lithotomy  position. His genitalia was prepped and draped per routine sterile  fashion. He received a preoperative antibiotic and time-out was  performed. A 22-Bermudian cystoscope was inserted into the meatus and this was  advanced under direct vision.   Penile and bulbar urethra appeared to be  normal. Prostatic urethra also appeared to be normal.  I entered into  the patient's bladder and the bladder was inspected. The mucosa  appeared to be normal.  There was mild trabeculation. Right and left  ureteral orifices were identified and they appeared normal.  There was  some small tiny gravely stone fragments in the bladder which easily  washed out. I identified the left ureteral orifice and intubated with a  5-Sudanese catheter, injected contrast and this showed a very faint  filling defect in the mid ureter overlying the sacrum at about L5  region. A guidewire was placed. I felt the ureter based on the  retrograde study was large enough _____ he just had a recent stent that  we could not have to dilate him, so I looked in with a mini semirigid  ureteroscope under direct vision alongside the guidewire and I actually  was able to get up to the mid ureter. I never really saw the stone  though the stone was confirmed on CT today, so it must have retropulsed  more proximal.  So, I switched to the flexible ureteroscope and indeed  when I switched to flexible ureteroscope, the stone was seen just above  the mid ureter. I was able to entrap it in a [place] basket and the  stone was completely extracted. I then looked back in with a flexible  scope all the way up into the patient's kidney. At this time, I  examined the left kidney, upper, middle and lower poles, and I saw no  new stones or other large stone fragments. I then had a little bit of  narrowing at the UPJ, then I had to pullback into the ureter and  examined the ureter in its entirety and saw no stones within the ureter. Therefore, I felt he was now stone free. After removing the ureteroscope, the guidewire was backloaded through  the cystoscope which was advanced over the wire into the patient's  bladder.   I had already opacified the renal pelvis but I went ahead and  passed a 5-Sudanese open-ended ureteral catheter over the wire up to  measure for the stent.  After doing so, the catheter was removed leaving  the guidewire in place and then a 5-American x 28 cm left double-J  ureteral stent was advanced using cystoscopic and fluoroscopic guidance  with the proximal end coiled in the renal pelvis and the distal end  coiled in the bladder. I left the string attached to the end of the  stent out the meatus. The bladder was emptied and the procedure was  terminated. Estimated blood loss was 0 mL. The patient was awakened  from his anesthetic and taken to the recovery room in stable condition. He will follow up in 1 week for stent removal.  He does have a string. There were no complications.         Emily Sotelo MD    D: 08/24/2022 19:32:42      T: 08/25/2022 3:43:01     [email or web address]  Job#: [id number]     Doc#: [id number]    CC:

## 2022-08-26 ENCOUNTER — TELEPHONE (OUTPATIENT)
Dept: UROLOGY | Age: 67
End: 2022-08-26

## 2022-08-26 NOTE — TELEPHONE ENCOUNTER
Urbano Manning called in to schedule 1 week stent removal  Please contact back to schedule  A good call back time is anytime  Thank you

## 2022-08-29 NOTE — TELEPHONE ENCOUNTER
The 9/1/22 is stent removal with string Dr. Aleksandr Paredes said that to be done on nurse visit they will only see nurse not Shakila Bette. The 9/23/22 was for a 6 week FU with imaging prior. Okay to reschedule to see Dr. Aleksandr Paredes.

## 2022-08-29 NOTE — TELEPHONE ENCOUNTER
Patients wife Mara Nguyen is requesting a return call from the office in regards to patients nurse visit on 9/1. Patients wife states they do not wish to see BHUMIKA Aguilera . They want to see Dr. Jessie Springer instead. Please return her call. Thank you!

## 2022-08-31 LAB
CALCULI COMPOSITION: NORMAL
MASS: 14 MG
STONE DESCRIPTION: NORMAL

## 2022-08-31 RX ORDER — HYDROXYZINE PAMOATE 25 MG/1
25 CAPSULE ORAL NIGHTLY PRN
Qty: 45 CAPSULE | Refills: 1 | Status: SHIPPED | OUTPATIENT
Start: 2022-08-31 | End: 2022-11-26

## 2022-09-01 ENCOUNTER — NURSE ONLY (OUTPATIENT)
Dept: UROLOGY | Age: 67
End: 2022-09-01
Payer: MEDICARE

## 2022-09-01 DIAGNOSIS — N20.0 KIDNEY STONE: Primary | ICD-10-CM

## 2022-09-01 PROCEDURE — 99211 OFF/OP EST MAY X REQ PHY/QHP: CPT | Performed by: NURSE PRACTITIONER

## 2022-09-01 NOTE — PROGRESS NOTES
Patient of Dr. Curt Seo states they are here today to have stent removed post Left ureteroscopy, stone basket extraction and  placement of a 5-Tamazight x 28 cm left double-J ureteral stent. Patient denies any fever, chills, nausea or vomiting. The string was located and stent was pulled with no complications. The patient was advised to continue any medications prescribed by [facility] and to push fluids to help with any discomfort. Patient to call if they have any questions or concerns. Patient verbalized understanding. Patient will follow up as scheduled. Nic Dodd was in office at time of procedure. Patient to keep scheduled follow up     9/23/2022  9:00 AM [address], APRN - CNP   Appointment Notes:    Return in about 6 weeks (around 9/22/2022) for office visit after xray study, [id number] [address] with any provider MD or MARY.

## 2022-09-06 ENCOUNTER — APPOINTMENT (OUTPATIENT)
Dept: CT IMAGING | Age: 67
End: 2022-09-06
Payer: MEDICARE

## 2022-09-06 ENCOUNTER — HOSPITAL ENCOUNTER (EMERGENCY)
Age: 67
Discharge: HOME | End: 2022-09-07
Attending: EMERGENCY MEDICINE
Payer: MEDICARE

## 2022-09-06 DIAGNOSIS — N30.00 ACUTE CYSTITIS WITHOUT HEMATURIA: Primary | ICD-10-CM

## 2022-09-06 LAB
ALBUMIN SERPL-MCNC: 4 G/DL (ref 3.5–5.2)
ALP BLD-CCNC: 112 U/L (ref 40–130)
ALT SERPL-CCNC: 14 U/L (ref 5–41)
ANION GAP SERPL CALCULATED.3IONS-SCNC: 12 MMOL/L (ref 7–19)
AST SERPL-CCNC: 17 U/L (ref 5–40)
BASOPHILS ABSOLUTE: 0 K/UL (ref 0–0.2)
BASOPHILS RELATIVE PERCENT: 0.3 % (ref 0–1)
BILIRUB SERPL-MCNC: 0.5 MG/DL (ref 0.2–1.2)
BUN BLDV-MCNC: 20 MG/DL (ref 8–23)
CALCIUM SERPL-MCNC: 9 MG/DL (ref 8.8–10.2)
CHLORIDE BLD-SCNC: 99 MMOL/L (ref 98–111)
CO2: 26 MMOL/L (ref 22–29)
CREAT SERPL-MCNC: 0.8 MG/DL (ref 0.5–1.2)
EOSINOPHILS ABSOLUTE: 0 K/UL (ref 0–0.6)
EOSINOPHILS RELATIVE PERCENT: 0 % (ref 0–5)
GFR AFRICAN AMERICAN: >59
GFR NON-AFRICAN AMERICAN: >60
GLUCOSE BLD-MCNC: 122 MG/DL (ref 74–109)
HCT VFR BLD CALC: 39.7 % (ref 42–52)
HEMOGLOBIN: 12.9 G/DL (ref 14–18)
IMMATURE GRANULOCYTES #: 0 K/UL
LACTIC ACID: 1.3 MMOL/L (ref 0.5–1.9)
LYMPHOCYTES ABSOLUTE: 0.4 K/UL (ref 1.1–4.5)
LYMPHOCYTES RELATIVE PERCENT: 3.4 % (ref 20–40)
MCH RBC QN AUTO: 29.3 PG (ref 27–31)
MCHC RBC AUTO-ENTMCNC: 32.5 G/DL (ref 33–37)
MCV RBC AUTO: 90 FL (ref 80–94)
MONOCYTES ABSOLUTE: 0.6 K/UL (ref 0–0.9)
MONOCYTES RELATIVE PERCENT: 5.5 % (ref 0–10)
NEUTROPHILS ABSOLUTE: 9.6 K/UL (ref 1.5–7.5)
NEUTROPHILS RELATIVE PERCENT: 90.4 % (ref 50–65)
PDW BLD-RTO: 12.4 % (ref 11.5–14.5)
PLATELET # BLD: 160 K/UL (ref 130–400)
PMV BLD AUTO: 8.3 FL (ref 9.4–12.4)
POTASSIUM SERPL-SCNC: 4.3 MMOL/L (ref 3.5–5)
RBC # BLD: 4.41 M/UL (ref 4.7–6.1)
SODIUM BLD-SCNC: 137 MMOL/L (ref 136–145)
TOTAL PROTEIN: 7.1 G/DL (ref 6.6–8.7)
WBC # BLD: 10.6 K/UL (ref 4.8–10.8)

## 2022-09-06 PROCEDURE — 87040 BLOOD CULTURE FOR BACTERIA: CPT

## 2022-09-06 PROCEDURE — 36415 COLL VENOUS BLD VENIPUNCTURE: CPT

## 2022-09-06 PROCEDURE — 74150 CT ABDOMEN W/O CONTRAST: CPT

## 2022-09-06 PROCEDURE — 2580000003 HC RX 258: Performed by: EMERGENCY MEDICINE

## 2022-09-06 PROCEDURE — 83605 ASSAY OF LACTIC ACID: CPT

## 2022-09-06 PROCEDURE — 96376 TX/PRO/DX INJ SAME DRUG ADON: CPT

## 2022-09-06 PROCEDURE — 99284 EMERGENCY DEPT VISIT MOD MDM: CPT

## 2022-09-06 PROCEDURE — 85025 COMPLETE CBC W/AUTO DIFF WBC: CPT

## 2022-09-06 PROCEDURE — 80053 COMPREHEN METABOLIC PANEL: CPT

## 2022-09-06 PROCEDURE — 6360000002 HC RX W HCPCS: Performed by: EMERGENCY MEDICINE

## 2022-09-06 PROCEDURE — 6370000000 HC RX 637 (ALT 250 FOR IP): Performed by: EMERGENCY MEDICINE

## 2022-09-06 PROCEDURE — 96375 TX/PRO/DX INJ NEW DRUG ADDON: CPT

## 2022-09-06 PROCEDURE — 96374 THER/PROPH/DIAG INJ IV PUSH: CPT

## 2022-09-06 RX ORDER — ONDANSETRON 2 MG/ML
4 INJECTION INTRAMUSCULAR; INTRAVENOUS ONCE
Status: COMPLETED | OUTPATIENT
Start: 2022-09-06 | End: 2022-09-06

## 2022-09-06 RX ORDER — SODIUM CHLORIDE, SODIUM LACTATE, POTASSIUM CHLORIDE, AND CALCIUM CHLORIDE .6; .31; .03; .02 G/100ML; G/100ML; G/100ML; G/100ML
1000 INJECTION, SOLUTION INTRAVENOUS ONCE
Status: COMPLETED | OUTPATIENT
Start: 2022-09-06 | End: 2022-09-06

## 2022-09-06 RX ORDER — ACETAMINOPHEN 500 MG
1000 TABLET ORAL ONCE
Status: COMPLETED | OUTPATIENT
Start: 2022-09-06 | End: 2022-09-06

## 2022-09-06 RX ORDER — HYDROMORPHONE HYDROCHLORIDE 1 MG/ML
1 INJECTION, SOLUTION INTRAMUSCULAR; INTRAVENOUS; SUBCUTANEOUS ONCE
Status: COMPLETED | OUTPATIENT
Start: 2022-09-06 | End: 2022-09-06

## 2022-09-06 RX ADMIN — ONDANSETRON 4 MG: 2 INJECTION INTRAMUSCULAR; INTRAVENOUS at 23:40

## 2022-09-06 RX ADMIN — HYDROMORPHONE HYDROCHLORIDE 1 MG: 1 INJECTION, SOLUTION INTRAMUSCULAR; INTRAVENOUS; SUBCUTANEOUS at 23:41

## 2022-09-06 RX ADMIN — SODIUM CHLORIDE, POTASSIUM CHLORIDE, SODIUM LACTATE AND CALCIUM CHLORIDE 1000 ML: 600; 310; 30; 20 INJECTION, SOLUTION INTRAVENOUS at 21:52

## 2022-09-06 RX ADMIN — ACETAMINOPHEN 1000 MG: 500 TABLET ORAL at 22:08

## 2022-09-06 RX ADMIN — HYDROMORPHONE HYDROCHLORIDE 1 MG: 1 INJECTION, SOLUTION INTRAMUSCULAR; INTRAVENOUS; SUBCUTANEOUS at 21:52

## 2022-09-06 RX ADMIN — ONDANSETRON 4 MG: 2 INJECTION INTRAMUSCULAR; INTRAVENOUS at 21:52

## 2022-09-06 ASSESSMENT — PAIN SCALES - GENERAL
PAINLEVEL_OUTOF10: 9
PAINLEVEL_OUTOF10: 7
PAINLEVEL_OUTOF10: 7

## 2022-09-06 ASSESSMENT — PAIN DESCRIPTION - DESCRIPTORS
DESCRIPTORS: STABBING
DESCRIPTORS: SHARP
DESCRIPTORS: SHARP
DESCRIPTORS: CRAMPING

## 2022-09-06 ASSESSMENT — PAIN DESCRIPTION - LOCATION
LOCATION: FLANK

## 2022-09-06 ASSESSMENT — PAIN DESCRIPTION - ORIENTATION
ORIENTATION: RIGHT
ORIENTATION: LEFT
ORIENTATION: LEFT

## 2022-09-06 ASSESSMENT — PAIN - FUNCTIONAL ASSESSMENT: PAIN_FUNCTIONAL_ASSESSMENT: 0-10

## 2022-09-07 VITALS
BODY MASS INDEX: 20.4 KG/M2 | HEART RATE: 71 BPM | OXYGEN SATURATION: 92 % | WEIGHT: 130 LBS | TEMPERATURE: 101 F | RESPIRATION RATE: 23 BRPM | DIASTOLIC BLOOD PRESSURE: 68 MMHG | SYSTOLIC BLOOD PRESSURE: 106 MMHG | HEIGHT: 67 IN

## 2022-09-07 LAB
BACTERIA: ABNORMAL /HPF
BILIRUBIN URINE: NEGATIVE
BLOOD, URINE: NEGATIVE
CLARITY: ABNORMAL
COLOR: YELLOW
CRYSTALS, UA: ABNORMAL /HPF
EPITHELIAL CELLS, UA: 0 /HPF (ref 0–5)
GLUCOSE URINE: NEGATIVE MG/DL
HYALINE CASTS: 1 /HPF (ref 0–8)
KETONES, URINE: 40 MG/DL
LEUKOCYTE ESTERASE, URINE: ABNORMAL
NITRITE, URINE: POSITIVE
PH UA: 8 (ref 5–8)
PROTEIN UA: NEGATIVE MG/DL
RBC UA: 3 /HPF (ref 0–4)
SPECIFIC GRAVITY UA: 1.01 (ref 1–1.03)
UROBILINOGEN, URINE: 1 E.U./DL
WBC UA: 86 /HPF (ref 0–5)

## 2022-09-07 PROCEDURE — 96375 TX/PRO/DX INJ NEW DRUG ADDON: CPT

## 2022-09-07 PROCEDURE — 87186 SC STD MICRODIL/AGAR DIL: CPT

## 2022-09-07 PROCEDURE — 87086 URINE CULTURE/COLONY COUNT: CPT

## 2022-09-07 PROCEDURE — 96376 TX/PRO/DX INJ SAME DRUG ADON: CPT

## 2022-09-07 PROCEDURE — 81001 URINALYSIS AUTO W/SCOPE: CPT

## 2022-09-07 PROCEDURE — 6360000002 HC RX W HCPCS: Performed by: EMERGENCY MEDICINE

## 2022-09-07 PROCEDURE — 2580000003 HC RX 258: Performed by: EMERGENCY MEDICINE

## 2022-09-07 RX ORDER — CEFDINIR 300 MG/1
300 CAPSULE ORAL 2 TIMES DAILY
Qty: 20 CAPSULE | Refills: 0 | Status: SHIPPED | OUTPATIENT
Start: 2022-09-07 | End: 2022-09-17

## 2022-09-07 RX ORDER — HYDROMORPHONE HYDROCHLORIDE 1 MG/ML
1 INJECTION, SOLUTION INTRAMUSCULAR; INTRAVENOUS; SUBCUTANEOUS ONCE
Status: COMPLETED | OUTPATIENT
Start: 2022-09-07 | End: 2022-09-07

## 2022-09-07 RX ADMIN — HYDROMORPHONE HYDROCHLORIDE 1 MG: 1 INJECTION, SOLUTION INTRAMUSCULAR; INTRAVENOUS; SUBCUTANEOUS at 02:03

## 2022-09-07 RX ADMIN — WATER 1000 MG: 1 INJECTION INTRAMUSCULAR; INTRAVENOUS; SUBCUTANEOUS at 00:38

## 2022-09-07 ASSESSMENT — PAIN SCALES - GENERAL: PAINLEVEL_OUTOF10: 7

## 2022-09-07 ASSESSMENT — PAIN DESCRIPTION - LOCATION: LOCATION: FLANK

## 2022-09-07 NOTE — ED NOTES
Pt allergic to Phenergan, order cancelled and notified Dr. Darrell Day, RN  09/06/22 133 Darryl Neil RN  09/06/22 1528

## 2022-09-07 NOTE — ED PROVIDER NOTES
[facility] EMERGENCY DEPT  eMERGENCY dEPARTMENT eNCOUnter      Pt Name: Odalys Dubose  MRN: [id number]  [place] 1955  Date of evaluation: 9/6/2022  Provider: Alyssa Landry MD    [address] [place] [address]       Chief Complaint   Patient presents with    Flank Pain     Pt arrives to ED with c/o left side flank pain that started this morning, couldn't give specific time. Pt states they had a stent removed from left ureter on Thursday. Pt has hx of kidney stones. Pt states they have had N/V today. Fever     Pt states fever started this evening. Highest temp at home as 103.4 on the way here. Temp now is 99.8. Pt states they took tylenol and oxycodone around 1300 today. HISTORY OF PRESENT ILLNESS   (Location/Symptom, Timing/Onset,Context/Setting, Quality, Duration, Modifying Factors, Severity)  Note limiting factors. Odalys Dubose is a 79 y.o. male who presents to the emergency department for evaluation regarding left-sided flank and abdominal pain. Patient had recent stent removed from his left ureter about 5 days previously. States that earlier tonight at home he had a temp as high as 103. He had taken some Tylenol around 1 PM today. He has been following with Dr. Curt Seo as an outpatient. Patient does have a prior history of kidney stones. States that he has been able to urinate since the pain began. Has had some associated nausea with several episodes of vomiting. Denies any diarrhea episodes. Has not noticed any change in the color of his urine. There have been no relieving factors for the symptoms. HPI    NursingNotes were reviewed. REVIEW OF SYSTEMS    (2-9 systems for level 4, 10 or more for level 5)     Review of Systems   Constitutional:  Negative for chills and fever. Respiratory:  Negative for shortness of breath. Cardiovascular:  Negative for chest pain. Gastrointestinal:  Positive for abdominal pain. Genitourinary:  Positive for flank pain and testicular pain.  Negative for dysuria and hematuria. All other systems reviewed and are negative. PAST MEDICALHISTORY     Past Medical History:   Diagnosis Date    CAD (coronary artery disease) 2010    Native Vessel. S/p stenting wtih negative cardiac cath on January 18, 2010 and negative nuclear stress test on June 29, 2010. Chest pain 1/28/2016    DJD (degenerative joint disease)     GERD (gastroesophageal reflux disease) 2010    HTN (hypertension)     Hyperlipidemia     Left knee pain 1/28/2016    Pacemaker 2010    MEDTRONIC    Polyarticular arthritis     Renal calculi 2010    S/p lithrotripsy    Right arm pain 1/28/2016    Right ureteral stone 7/8/2016    Stones in the urinary tract 02/02/2022    Tobacco abuse 2010    Prior. SURGICAL HISTORY       Past Surgical History:   Procedure Laterality Date    CARDIAC CATHETERIZATION  01/08/2010    EF is estimated to be 60%. See scanned document. CARDIAC CATHETERIZATION  6/20/08, 10/2/08    EF is estimated to be 60%. See scanned document. CARDIAC CATHETERIZATION  2/13/07, 6/6/07    EF 50%. See scanned document. CARDIAC CATHETERIZATION  1/8/07, 11/26/07    EF is estimated to be in excess of 50%. See scanned doucment. CARDIAC CATHETERIZATION  08/17/2006    EF is estimated to be in excess of 50%. See scanned doucment. CARDIAC CATHETERIZATION  5/14/14  MDL    normal LV function.  EF 60    CHOLECYSTECTOMY      CLAVICLE SURGERY      COLONOSCOPY      Endoscopy    CYSTOSCOPY Right 7/8/2016    CYSTOSCOPY; RIGHT RETROGRADE PYELOGRAM; RIGHT URETERAL DILATATION; RIGHT URETEROSCOPY; RIGHT URETERAL LASER LITHOTRIPSY AND STONE EXTRACTION; INSERTION RIGHT URETERAL DOUBLE J STENT performed by Eddy Cardenas MD at [address] Left 1/14/2021    CYSTOSCOPY URETEROSCOPY LASER LITHO WITH STONE EXTRACTION AND STENT REMOVAL> performed by Shraddha Medrano MD at [address] Left 12/29/2021    CYSTOSCOPY; LEFT URTERAL CATHERIZATION; LEFT RETORGRADE PYLEOGRAM; REMOVAL BLADDER CALCULI performed by Kianna Mora MD at [address] Left 8/5/2022    CYSTOSCOPY LEFT URETERAL STENT INSERTION performed by Farhat Kelly MD at [address] Left 8/24/2022    LEFT URETEROSCOPY STONE BASKET EXTRACTION performed by Kianna Mora MD at [address] Left 8/24/2022    LEFT URETERAL STENT INSERTION performed by Kianna Mora MD at [facility] Left     HAND SURGERY      HUMERUS SURGERY      KNEE SURGERY      Right    KNEE SURGERY Left     LITHOTRIPSY      MANDIBLE FRACTURE SURGERY      NECK SURGERY      cervical spine. OTHER SURGICAL HISTORY      Brachytherapy of the core stents.     PACEMAKER PLACEMENT      MEDTRONIC    PTCA      TIBIA FRACTURE SURGERY      WRIST SURGERY           CURRENT MEDICATIONS     Discharge Medication List as of 9/7/2022  2:09 AM        CONTINUE these medications which have NOT CHANGED    Details   tamsulosin (FLOMAX) 0.4 MG capsule Take 1 capsule by mouth daily for 7 days, Disp-7 capsule, R-0Normal      meloxicam (MOBIC) 7.5 MG tablet Take 1 tablet by mouth in the morning., Disp-10 tablet, R-0Normal      ondansetron (ZOFRAN ODT) 4 MG disintegrating tablet Take 2 tablets by mouth every 8 hours as needed for Nausea or Vomiting, Disp-30 tablet, R-1Normal      metoprolol tartrate (LOPRESSOR) 25 MG tablet Take 2 tablets by mouth 2 times daily Indications: Pt unaware of dosage, Disp-60 tablet, R-3NO PRINT      QUEtiapine (SEROQUEL) 25 MG tablet Historical Med      oxyCODONE (OXY-IR) 15 MG immediate release tablet TAKE 1 TABLET BY MOUTH EVERY 6 HOURSHistorical Med      prochlorperazine (COMPAZINE) 5 MG tablet Historical Med      aspirin 325 MG tablet Take 325 mg by mouth dailyHistorical Med      isosorbide mononitrate (IMDUR) 120 MG CR tablet Take 120 mg by mouth 2 times daily Historical Med      hydrochlorothiazide (HYDRODIURIL) 12.5 MG tablet Take 12.5 mg by mouth daily Indications: as needed      Coenzyme Q10 (COQ-10 PO) Take by mouth daily Historical Med      Nutritional Supplements (BOOST PO) Take by mouth      NITROSTAT 0.4 MG SL tablet Place 1 tablet under the tongue 3 times daily as needed, R-0, DAWHistorical Med      LORazepam (ATIVAN) 1 MG tablet Take 1 mg by mouth 3 times daily as needed, R-5Historical Med      amLODIPine (NORVASC) 10 MG tablet Take 10 mg by mouth daily. Historical Med      clopidogrel (PLAVIX) 75 MG tablet Take 75 mg by mouth daily. Historical Med             ALLERGIES     Ezetimibe-simvastatin, Promethazine, Bee venom, Darvocet [propoxyphene n-acetaminophen], Other, Penicillins, Phenergan [promethazine hcl], Promethazine hcl, and Propoxyphene    FAMILY HISTORY     History reviewed. No pertinent family history. SOCIAL HISTORY       Social History     Socioeconomic History    Marital status:      Spouse name: None    Number of children: None    Years of education: None    Highest education level: None   Occupational History     Employer: [employer]   Tobacco Use    Smoking status: Former     Types: Cigarettes    Smokeless tobacco: Former   Vaping Use    Vaping Use: Never used   Substance and Sexual Activity    Alcohol use: No    Drug use: No       SCREENINGS    Berry Coma Scale  Eye Opening: Spontaneous  Best Verbal Response: Oriented  Best Motor Response: Obeys commands  Ely Coma Scale Score: 15        PHYSICAL EXAM    (up to 7 for level 4, 8 or more for level 5)     ED Triage Vitals [09/06/22 2024]   BP Temp Temp Source Heart Rate Resp SpO2 Height Weight   121/67 99.8 °F (37.7 °C) Oral 74 17 94 % 5' 7\" (1.702 m) 130 lb (59 kg)       Physical Exam  Vitals and nursing note reviewed. Constitutional:       General: He is in acute distress. HENT:      Head: Atraumatic. Mouth/Throat:      Mouth: Mucous membranes are moist. Mucous membranes are not dry. Eyes:      General: No scleral icterus. Pupils: Pupils are equal, round, and reactive to light.    Neck:      Trachea: No tracheal deviation. Cardiovascular:      Rate and Rhythm: Normal rate and regular rhythm. Pulses: Normal pulses. Heart sounds: Normal heart sounds. No murmur heard. Pulmonary:      Effort: Pulmonary effort is normal. No respiratory distress. Breath sounds: Normal breath sounds. No stridor. Abdominal:      General: There is no distension. Palpations: Abdomen is soft. Tenderness: There is abdominal tenderness (left). There is no guarding. Skin:     Capillary Refill: Capillary refill takes less than 2 seconds. Coloration: Skin is not pale. Findings: No rash. Neurological:      Mental Status: He is alert and oriented to person, place, and time. Psychiatric:         Behavior: Behavior is cooperative.        DIAGNOSTIC RESULTS       RADIOLOGY:  Non-plain film images such as CT, Ultrasound and MRI are read by the radiologist. Plain radiographic images are visualized and preliminarily interpreted bythe emergency physician with the below findings:      CT KIDNEY WO CONTRAST   Final Result        No acute process      LABS:  Labs Reviewed   CULTURE, URINE - Abnormal; Notable for the following components:       Result Value    Urine Culture, Routine >100,000 CFU/ml (*)     Organism Escherichia coli (*)     All other components within normal limits    Narrative:     ORDER#: [id number]                          ORDERED BY: Tami Mays  SOURCE: Urine Clean Catch                  COLLECTED:  09/06/22 23:45  ANTIBIOTICS AT [facility]SELL.:                      RECEIVED :  09/06/22 23:51   CBC WITH AUTO DIFFERENTIAL - Abnormal; Notable for the following components:    RBC 4.41 (*)     Hemoglobin 12.9 (*)     Hematocrit 39.7 (*)     MCHC 32.5 (*)     MPV 8.3 (*)     Neutrophils % 90.4 (*)     Lymphocytes % 3.4 (*)     Neutrophils Absolute 9.6 (*)     Lymphocytes Absolute 0.4 (*)     All other components within normal limits   COMPREHENSIVE METABOLIC PANEL - Abnormal; Notable for the following components:    Glucose 122 (*)     All other components within normal limits   URINALYSIS - Abnormal; Notable for the following components:    Clarity, UA CLOUDY (*)     Ketones, Urine 40 (*)     Nitrite, Urine POSITIVE (*)     Leukocyte Esterase, Urine LARGE (*)     All other components within normal limits   MICROSCOPIC URINALYSIS - Abnormal; Notable for the following components:    Bacteria, UA 4+ (*)     Crystals, UA NEG (*)     WBC, UA 86 (*)     All other components within normal limits   CULTURE, BLOOD 1    Narrative:     ORDER#: [id number]                          ORDERED BY: Dayo Styles  SOURCE: Blood lac                          COLLECTED:  09/06/22 22:14  ANTIBIOTICS AT [facility]SELL.:                      RECEIVED :  09/06/22 22:18   CULTURE, BLOOD 2    Narrative:     ORDER#: [id number]                          ORDERED BY: Dayo Styles  SOURCE: Blood Antecubital-Lef              COLLECTED:  09/06/22 22:49  ANTIBIOTICS AT [facility]LL.:                      RECEIVED :  09/06/22 22:52   LACTIC ACID       All other labs were within normal range or not returned as of this dictation. EMERGENCY DEPARTMENT COURSE and DIFFERENTIAL DIAGNOSIS/MDM:   Vitals:    Vitals:    09/06/22 2130 09/06/22 2152 09/06/22 2345 09/07/22 0200   BP: 123/75  110/69 106/68   Pulse: 67  71 71   Resp: 10  11 23   Temp:  (!) 101 °F (38.3 °C)     TempSrc:  Oral     SpO2: 93%  90% 92%   Weight:       Height:           MDM        We will plan to discharge patient out on an outpatient course of oral antibiotics. His urinalysis appears consistent with a urinary tract infection. Return precautions were reviewed and discussed and all questions were answered. PROCEDURES:  Unless otherwise noted below, none     Procedures    FINAL IMPRESSION      1.  Acute cystitis without hematuria          DISPOSITION/PLAN   DISPOSITION Decision To Discharge 09/07/2022 01:56:11 AM      PATIENT REFERRED TO:  Farzana Ba MD  [address] [facility], [address] [facility] [address]   [phone]          DISCHARGE MEDICATIONS:  Discharge Medication List as of 9/7/2022  2:09 AM        START taking these medications    Details   cefdinir (OMNICEF) 300 MG capsule Take 1 capsule by mouth 2 times daily for 10 days, Disp-20 capsule, R-0Normal                (Please note that portions of this note were completed with a voice recognition program.  Efforts were made to edit thedictations but occasionally words are mis-transcribed.)    Silviano Lyman MD (electronically signed)  Attending Emergency Physician         Silviano Lyman MD  09/16/22 [id number]

## 2022-09-07 NOTE — DISCHARGE INSTRUCTIONS
Drink plenty of fluids. Follow up with your urologist, Dr. Simi Caro. Return to the ED for any worsening of symptoms.

## 2022-09-07 NOTE — ED NOTES
Patient placed on cardiac monitor, continuous pulse oximeter, and NIBP monitor.  Monitor alarms on.       Jaelyn Pate RN  09/06/22 0209

## 2022-09-09 ENCOUNTER — TELEPHONE (OUTPATIENT)
Dept: UROLOGY | Age: 67
End: 2022-09-09

## 2022-09-09 ENCOUNTER — HOSPITAL ENCOUNTER (EMERGENCY)
Age: 67
Discharge: HOME | End: 2022-09-10
Attending: EMERGENCY MEDICINE
Payer: MEDICARE

## 2022-09-09 ENCOUNTER — APPOINTMENT (OUTPATIENT)
Dept: CT IMAGING | Age: 67
End: 2022-09-09
Payer: MEDICARE

## 2022-09-09 DIAGNOSIS — Z87.442 HISTORY OF KIDNEY STONES: ICD-10-CM

## 2022-09-09 DIAGNOSIS — R10.9 LEFT FLANK PAIN: Primary | ICD-10-CM

## 2022-09-09 DIAGNOSIS — N12 PYELONEPHRITIS: ICD-10-CM

## 2022-09-09 LAB
ALBUMIN SERPL-MCNC: 3.8 G/DL (ref 3.5–5.2)
ALP BLD-CCNC: 97 U/L (ref 40–130)
ALT SERPL-CCNC: 12 U/L (ref 5–41)
ANION GAP SERPL CALCULATED.3IONS-SCNC: 9 MMOL/L (ref 7–19)
AST SERPL-CCNC: 13 U/L (ref 5–40)
BACTERIA: ABNORMAL /HPF
BASOPHILS ABSOLUTE: 0 K/UL (ref 0–0.2)
BASOPHILS RELATIVE PERCENT: 0.4 % (ref 0–1)
BILIRUB SERPL-MCNC: <0.2 MG/DL (ref 0.2–1.2)
BILIRUBIN URINE: NEGATIVE
BLOOD, URINE: NEGATIVE
BUN BLDV-MCNC: 12 MG/DL (ref 8–23)
CALCIUM SERPL-MCNC: 8.9 MG/DL (ref 8.8–10.2)
CHLORIDE BLD-SCNC: 103 MMOL/L (ref 98–111)
CLARITY: CLEAR
CO2: 28 MMOL/L (ref 22–29)
COLOR: YELLOW
CREAT SERPL-MCNC: 0.6 MG/DL (ref 0.5–1.2)
CRYSTALS, UA: ABNORMAL /HPF
EOSINOPHILS ABSOLUTE: 0.1 K/UL (ref 0–0.6)
EOSINOPHILS RELATIVE PERCENT: 1.1 % (ref 0–5)
EPITHELIAL CELLS, UA: ABNORMAL /HPF
GFR AFRICAN AMERICAN: >59
GFR NON-AFRICAN AMERICAN: >60
GLUCOSE BLD-MCNC: 100 MG/DL (ref 74–109)
GLUCOSE URINE: NEGATIVE MG/DL
HCT VFR BLD CALC: 37.9 % (ref 42–52)
HEMOGLOBIN: 12.6 G/DL (ref 14–18)
IMMATURE GRANULOCYTES #: 0 K/UL
KETONES, URINE: NEGATIVE MG/DL
LEUKOCYTE ESTERASE, URINE: ABNORMAL
LIPASE: 19 U/L (ref 13–60)
LYMPHOCYTES ABSOLUTE: 1 K/UL (ref 1.1–4.5)
LYMPHOCYTES RELATIVE PERCENT: 19.4 % (ref 20–40)
MCH RBC QN AUTO: 29.8 PG (ref 27–31)
MCHC RBC AUTO-ENTMCNC: 33.2 G/DL (ref 33–37)
MCV RBC AUTO: 89.6 FL (ref 80–94)
MONOCYTES ABSOLUTE: 0.6 K/UL (ref 0–0.9)
MONOCYTES RELATIVE PERCENT: 11 % (ref 0–10)
NEUTROPHILS ABSOLUTE: 3.6 K/UL (ref 1.5–7.5)
NEUTROPHILS RELATIVE PERCENT: 67.4 % (ref 50–65)
NITRITE, URINE: NEGATIVE
PDW BLD-RTO: 12.4 % (ref 11.5–14.5)
PH UA: 8 (ref 5–8)
PLATELET # BLD: 166 K/UL (ref 130–400)
PMV BLD AUTO: 8 FL (ref 9.4–12.4)
POTASSIUM SERPL-SCNC: 4.3 MMOL/L (ref 3.5–5)
PROTEIN UA: NEGATIVE MG/DL
RBC # BLD: 4.23 M/UL (ref 4.7–6.1)
SODIUM BLD-SCNC: 140 MMOL/L (ref 136–145)
SPECIFIC GRAVITY UA: 1.01 (ref 1–1.03)
TOTAL PROTEIN: 6.5 G/DL (ref 6.6–8.7)
UROBILINOGEN, URINE: 0.2 E.U./DL
WBC # BLD: 5.4 K/UL (ref 4.8–10.8)
WBC UA: ABNORMAL /HPF (ref 0–5)

## 2022-09-09 PROCEDURE — 87086 URINE CULTURE/COLONY COUNT: CPT

## 2022-09-09 PROCEDURE — 2580000003 HC RX 258: Performed by: EMERGENCY MEDICINE

## 2022-09-09 PROCEDURE — 6360000002 HC RX W HCPCS: Performed by: EMERGENCY MEDICINE

## 2022-09-09 PROCEDURE — 74176 CT ABD & PELVIS W/O CONTRAST: CPT | Performed by: RADIOLOGY

## 2022-09-09 PROCEDURE — 96376 TX/PRO/DX INJ SAME DRUG ADON: CPT

## 2022-09-09 PROCEDURE — 99285 EMERGENCY DEPT VISIT HI MDM: CPT

## 2022-09-09 PROCEDURE — 81001 URINALYSIS AUTO W/SCOPE: CPT

## 2022-09-09 PROCEDURE — 74177 CT ABD & PELVIS W/CONTRAST: CPT

## 2022-09-09 PROCEDURE — 6360000004 HC RX CONTRAST MEDICATION: Performed by: EMERGENCY MEDICINE

## 2022-09-09 PROCEDURE — 80053 COMPREHEN METABOLIC PANEL: CPT

## 2022-09-09 PROCEDURE — 96375 TX/PRO/DX INJ NEW DRUG ADDON: CPT

## 2022-09-09 PROCEDURE — 96374 THER/PROPH/DIAG INJ IV PUSH: CPT

## 2022-09-09 PROCEDURE — 87186 SC STD MICRODIL/AGAR DIL: CPT

## 2022-09-09 PROCEDURE — 51798 US URINE CAPACITY MEASURE: CPT

## 2022-09-09 PROCEDURE — 85025 COMPLETE CBC W/AUTO DIFF WBC: CPT

## 2022-09-09 PROCEDURE — 36415 COLL VENOUS BLD VENIPUNCTURE: CPT

## 2022-09-09 PROCEDURE — 83690 ASSAY OF LIPASE: CPT

## 2022-09-09 RX ORDER — SODIUM CHLORIDE, SODIUM LACTATE, POTASSIUM CHLORIDE, AND CALCIUM CHLORIDE .6; .31; .03; .02 G/100ML; G/100ML; G/100ML; G/100ML
1000 INJECTION, SOLUTION INTRAVENOUS ONCE
Status: COMPLETED | OUTPATIENT
Start: 2022-09-09 | End: 2022-09-10

## 2022-09-09 RX ORDER — KETOROLAC TROMETHAMINE 30 MG/ML
15 INJECTION, SOLUTION INTRAMUSCULAR; INTRAVENOUS ONCE
Status: DISCONTINUED | OUTPATIENT
Start: 2022-09-09 | End: 2022-09-10 | Stop reason: HOSPADM

## 2022-09-09 RX ORDER — ONDANSETRON 2 MG/ML
4 INJECTION INTRAMUSCULAR; INTRAVENOUS ONCE
Status: COMPLETED | OUTPATIENT
Start: 2022-09-09 | End: 2022-09-09

## 2022-09-09 RX ORDER — MORPHINE SULFATE 4 MG/ML
4 INJECTION, SOLUTION INTRAMUSCULAR; INTRAVENOUS ONCE
Status: COMPLETED | OUTPATIENT
Start: 2022-09-09 | End: 2022-09-09

## 2022-09-09 RX ORDER — ONDANSETRON 4 MG/1
4 TABLET, ORALLY DISINTEGRATING ORAL EVERY 8 HOURS PRN
Qty: 20 TABLET | Refills: 0 | Status: SHIPPED | OUTPATIENT
Start: 2022-09-09

## 2022-09-09 RX ORDER — HYOSCYAMINE SULFATE 0.12 MG/1
0.12 TABLET SUBLINGUAL
Qty: 30 EACH | Refills: 0 | Status: SHIPPED | OUTPATIENT
Start: 2022-09-09

## 2022-09-09 RX ORDER — METOCLOPRAMIDE HYDROCHLORIDE 5 MG/ML
10 INJECTION INTRAMUSCULAR; INTRAVENOUS ONCE
Status: COMPLETED | OUTPATIENT
Start: 2022-09-09 | End: 2022-09-09

## 2022-09-09 RX ORDER — ONDANSETRON 2 MG/ML
4 INJECTION INTRAMUSCULAR; INTRAVENOUS ONCE
Status: COMPLETED | OUTPATIENT
Start: 2022-09-09 | End: 2022-09-10

## 2022-09-09 RX ADMIN — IOPAMIDOL 90 ML: 755 INJECTION, SOLUTION INTRAVENOUS at 21:38

## 2022-09-09 RX ADMIN — ONDANSETRON 4 MG: 2 INJECTION INTRAMUSCULAR; INTRAVENOUS at 21:05

## 2022-09-09 RX ADMIN — METOCLOPRAMIDE HYDROCHLORIDE 10 MG: 5 INJECTION INTRAMUSCULAR; INTRAVENOUS at 21:05

## 2022-09-09 RX ADMIN — SODIUM CHLORIDE, POTASSIUM CHLORIDE, SODIUM LACTATE AND CALCIUM CHLORIDE 1000 ML: 600; 310; 30; 20 INJECTION, SOLUTION INTRAVENOUS at 21:16

## 2022-09-09 RX ADMIN — WATER 1000 MG: 1 INJECTION INTRAMUSCULAR; INTRAVENOUS; SUBCUTANEOUS at 22:52

## 2022-09-09 RX ADMIN — MORPHINE SULFATE 4 MG: 4 INJECTION, SOLUTION INTRAMUSCULAR; INTRAVENOUS at 21:05

## 2022-09-09 ASSESSMENT — ENCOUNTER SYMPTOMS
ABDOMINAL PAIN: 0
COUGH: 0
NAUSEA: 1
EYE PAIN: 0
DIARRHEA: 0
RHINORRHEA: 0
VOICE CHANGE: 0
VOMITING: 1
SHORTNESS OF BREATH: 0
EYE REDNESS: 0

## 2022-09-09 ASSESSMENT — PAIN SCALES - GENERAL: PAINLEVEL_OUTOF10: 8

## 2022-09-09 ASSESSMENT — PAIN DESCRIPTION - LOCATION: LOCATION: FLANK

## 2022-09-09 NOTE — TELEPHONE ENCOUNTER
Spoke to patient wife who states patient has been vomiting, having left flank darren and dysuria but denies fever since ED visit. Patient is currently taking cefdinir prescribed by ED for uti but sensitivity is not back. After discussion with Loulou Walsh patient was advised to go to the ED.

## 2022-09-10 VITALS
OXYGEN SATURATION: 90 % | SYSTOLIC BLOOD PRESSURE: 127 MMHG | TEMPERATURE: 97.9 F | HEART RATE: 60 BPM | DIASTOLIC BLOOD PRESSURE: 68 MMHG | RESPIRATION RATE: 11 BRPM

## 2022-09-10 LAB
ORGANISM: ABNORMAL
URINE CULTURE, ROUTINE: ABNORMAL
URINE CULTURE, ROUTINE: ABNORMAL

## 2022-09-10 PROCEDURE — 6360000002 HC RX W HCPCS: Performed by: EMERGENCY MEDICINE

## 2022-09-10 RX ADMIN — ONDANSETRON 4 MG: 2 INJECTION INTRAMUSCULAR; INTRAVENOUS at 00:03

## 2022-09-10 NOTE — ED NOTES
Pt refused to sign discharge papers. Pt stated he wasn't waiting for the \"worthless Dr.\" and stated \"take this thing out and I dont want none of those papers\" Pt A/O and ambulating without problem.        Jeovanny Paula RN  09/10/22 2635

## 2022-09-10 NOTE — ED PROVIDER NOTES
[facility] EMERGENCY DEPT  EMERGENCY DEPARTMENT ENCOUNTER      Pt Name: Cayden Boyd  MRN: [id number]  [place] 1955  Date of evaluation: 9/9/2022  Provider: Eriberto Hernandez MD    [address] [place] [address]       Chief Complaint   Patient presents with    Flank Pain     Left side flank pain          HISTORY OF PRESENT ILLNESS   (Location/Symptom, Timing/Onset,Context/Setting, Quality, Duration, Modifying Factors, Severity)  Note limiting factors. Cayden Boyd is a 79 y.o. male who presents to the emergency department with complaint of left flank pain, difficulty urinating, sensation of urgency and inability to fully empty his bladder, nausea and vomiting. Symptoms have been ongoing for several days. Was seen here 3 days ago with similar symptoms and placed on antibiotics for a urinary tract infection. Has a history of multiple kidney stones and ureteral stents in the past.  Had some hematuria several days ago but says that has resolved. Has had some chills with subjective fevers but no documented or measured fevers. Says symptoms have not really improved after starting antibiotic several days ago. No history of prostate enlargement or issues with urinary retention in the past aside from episodes when he has had stones and associated bladder spasms. Says the pain radiates into the left testicle and says the left testicle feels like it is going to explode. Denies any swelling, tenderness, or other abnormalities with the testicle itself. No pain or swelling in the inguinal region. HPI    NursingNotes were reviewed. REVIEW OF SYSTEMS    (2-9 systems for level 4, 10 or more for level 5)     Review of Systems   Constitutional:  Negative for fatigue and fever. HENT:  Negative for congestion, rhinorrhea and voice change. Eyes:  Negative for pain and redness. Respiratory:  Negative for cough and shortness of breath. Cardiovascular:  Negative for chest pain.    Gastrointestinal:  Positive for nausea and DILATATION; RIGHT URETEROSCOPY; RIGHT URETERAL LASER LITHOTRIPSY AND STONE EXTRACTION; INSERTION RIGHT URETERAL DOUBLE J STENT performed by Tara Grant MD at [facility],[address] Left 1/14/2021    CYSTOSCOPY URETEROSCOPY LASER LITHO WITH STONE EXTRACTION AND STENT REMOVAL> performed by Chelsea Newell MD at [address] [facility],[address] Left 12/29/2021    CYSTOSCOPY; LEFT URTERAL CATHERIZATION; LEFT RETORGRADE PYLEOGRAM; REMOVAL BLADDER CALCULI performed by Montrell Storey MD at [facility],[address] Left 8/5/2022    CYSTOSCOPY LEFT URETERAL STENT INSERTION performed by Chelsea Newell MD at [facility],[address] Left 8/24/2022    LEFT URETEROSCOPY STONE BASKET EXTRACTION performed by Montrell Storey MD at [facility],[address] Left 8/24/2022    LEFT URETERAL STENT INSERTION performed by Montrell Storey MD at [address] [place] [address] Left     HAND SURGERY      HUMERUS SURGERY      KNEE SURGERY      Right    KNEE SURGERY Left     LITHOTRIPSY      MANDIBLE FRACTURE SURGERY      NECK SURGERY      cervical spine. OTHER SURGICAL HISTORY      Brachytherapy of the core stents.     PACEMAKER PLACEMENT      MEDTRONIC    PTCA      TIBIA FRACTURE SURGERY      WRIST SURGERY           CURRENT MEDICATIONS       Discharge Medication List as of 9/10/2022 12:35 AM        CONTINUE these medications which have NOT CHANGED    Details   cefdinir (OMNICEF) 300 MG capsule Take 1 capsule by mouth 2 times daily for 10 days, Disp-20 capsule, R-0Normal      tamsulosin (FLOMAX) 0.4 MG capsule Take 1 capsule by mouth daily for 7 days, Disp-7 capsule, R-0Normal      meloxicam (MOBIC) 7.5 MG tablet Take 1 tablet by mouth in the morning., Disp-10 tablet, R-0Normal      metoprolol tartrate (LOPRESSOR) 25 MG tablet Take 2 tablets by mouth 2 times daily Indications: Pt unaware of dosage, Disp-60 tablet, R-3NO PRINT      QUEtiapine (SEROQUEL) 25 MG tablet Historical Med      oxyCODONE (OXY-IR) 15 MG immediate release tablet TAKE 1 TABLET BY MOUTH EVERY 6 HOURSHistorical Med      prochlorperazine (COMPAZINE) 5 MG tablet Historical Med      aspirin 325 MG tablet Take 325 mg by mouth dailyHistorical Med      isosorbide mononitrate (IMDUR) 120 MG CR tablet Take 120 mg by mouth 2 times daily Historical Med      hydrochlorothiazide (HYDRODIURIL) 12.5 MG tablet Take 12.5 mg by mouth daily Indications: as needed      Coenzyme Q10 (COQ-10 PO) Take by mouth daily Historical Med      Nutritional Supplements (BOOST PO) Take by mouth      NITROSTAT 0.4 MG SL tablet Place 1 tablet under the tongue 3 times daily as needed, R-0, DAWHistorical Med      LORazepam (ATIVAN) 1 MG tablet Take 1 mg by mouth 3 times daily as needed, R-5Historical Med      amLODIPine (NORVASC) 10 MG tablet Take 10 mg by mouth daily. Historical Med      clopidogrel (PLAVIX) 75 MG tablet Take 75 mg by mouth daily. Historical Med             ALLERGIES     Ezetimibe-simvastatin, Promethazine, Bee venom, Darvocet [propoxyphene n-acetaminophen], Other, Penicillins, Phenergan [promethazine hcl], Promethazine hcl, and Propoxyphene    FAMILY HISTORY     History reviewed. No pertinent family history.        SOCIAL HISTORY       Social History     Socioeconomic History    Marital status:      Spouse name: None    Number of children: None    Years of education: None    Highest education level: None   Occupational History     Employer: DISABLED   Tobacco Use    Smoking status: Former     Types: Cigarettes    Smokeless tobacco: Former   Vaping Use    Vaping Use: Never used   Substance and Sexual Activity    Alcohol use: No    Drug use: No       SCREENINGS    Ely Coma Scale  Eye Opening: Spontaneous  Best Verbal Response: Oriented  Best Motor Response: Obeys commands  Ely Coma Scale Score: 15        PHYSICAL EXAM    (up to 7 for level 4, 8 or more for level 5)     ED Triage Vitals [09/09/22 1643]   BP Temp Temp Source Heart Rate Resp SpO2 Height Weight   117/78 97.9 °F (36.6 °C) Oral 60 16 98 % -- --       Physical Exam  Vitals and nursing note reviewed. Constitutional:       General: He is not in acute distress. Appearance: Normal appearance. He is well-developed. He is not diaphoretic. HENT:      Head: Normocephalic and atraumatic. Mouth/Throat:      Pharynx: No oropharyngeal exudate. Eyes:      General: No scleral icterus. Pupils: Pupils are equal, round, and reactive to light. Neck:      Trachea: No tracheal deviation. Cardiovascular:      Rate and Rhythm: Normal rate. Pulses: Normal pulses. Heart sounds: Normal heart sounds. Pulmonary:      Effort: Pulmonary effort is normal.      Breath sounds: Normal breath sounds. No stridor. No wheezing or rhonchi. Abdominal:      General: There is no distension. Palpations: Abdomen is soft. Abdomen is not rigid. Tenderness: There is no abdominal tenderness. There is no guarding. Hernia: No hernia is present. Musculoskeletal:         General: No deformity. Cervical back: Normal range of motion. Skin:     General: Skin is warm and dry. Findings: No rash. Neurological:      Mental Status: He is alert and oriented to person, place, and time. Cranial Nerves: No cranial nerve deficit. Coordination: Coordination normal.   Psychiatric:         Behavior: Behavior normal.       DIAGNOSTIC RESULTS     EKG: All EKG's are interpreted by the Emergency Department Physician who either signs or Co-signs this chart in the absence of a cardiologist.        RADIOLOGY:   Non-plain film images such as CT, Ultrasound and MRI are read by the radiologist. Epifanio Cowden images are visualized and preliminarily interpreted by the emergency physician with the below findings:        Interpretation per the Radiologist below, if available at the time of this note:    CT ABDOMEN PELVIS W IV CONTRAST Additional Contrast? None   Final Result   1. No bowel or renal obstruction.   No fat stranding to suggest inflammatory process. 2.  Diffuse fatty infiltration of the liver, mild atherosclerotic vascular disease, degenerative changes of the spine, moderate amount of fecal residue in colon prior cholecystectomy again noted. 3. A tiny metallic clip adjacent to the anterior kidney, streaky artifacts from metallic clip adjacent to the left renal pelvis and mild fullness of the left renal collecting system appear unchanged. .   Recommendation: Follow up as clinically indicated. All CT scans at this facility utilize dose modulation, iterative reconstruction, and/or weight based dosing when appropriate to reduce radiation dose to as low as reasonably achievable. Electronically Signed by Dorian Dance MD at 09-Sep-2022 11:16:59 PM                     ED BEDSIDE ULTRASOUND:   Performed by ED Physician - none    LABS:  Labs Reviewed   COMPREHENSIVE METABOLIC PANEL - Abnormal; Notable for the following components:       Result Value    Total Protein 6.5 (*)     All other components within normal limits   CBC WITH AUTO DIFFERENTIAL - Abnormal; Notable for the following components:    RBC 4.23 (*)     Hemoglobin 12.6 (*)     Hematocrit 37.9 (*)     MPV 8.0 (*)     Neutrophils % 67.4 (*)     Lymphocytes % 19.4 (*)     Monocytes % 11.0 (*)     Lymphocytes Absolute 1.0 (*)     All other components within normal limits   URINALYSIS WITH REFLEX TO CULTURE - Abnormal; Notable for the following components:    Leukocyte Esterase, Urine LARGE (*)     All other components within normal limits   MICROSCOPIC URINALYSIS - Abnormal; Notable for the following components:    WBC, UA 16-20 (*)     Bacteria, UA 1+ (*)     Crystals, UA NEG (*)     All other components within normal limits   CULTURE, URINE   LIPASE       All other labs were within normal range or not returned as of this dictation.     Medications   metoclopramide (REGLAN) injection 10 mg (10 mg IntraVENous Given 9/9/22 2105)   ondansetron (ZOFRAN) injection 4 mg (4 mg IntraVENous Given 9/9/22 2105)   lactated ringers bolus (0 mLs IntraVENous Stopped 9/10/22 0004)   morphine sulfate (PF) injection 4 mg (4 mg IntraVENous Given 9/9/22 2105)   iopamidol (ISOVUE-370) 76 % injection 90 mL (90 mLs IntraVENous Given 9/9/22 2138)   cefTRIAXone (ROCEPHIN) 1,000 mg in sterile water 10 mL IV syringe (1,000 mg IntraVENous Given 9/9/22 2252)   ondansetron (ZOFRAN) injection 4 mg (4 mg IntraVENous Given 9/10/22 0003)       EMERGENCY DEPARTMENT COURSE and DIFFERENTIALDIAGNOSIS/MDM:   Vitals:    Vitals:    09/09/22 1643 09/10/22 0000   BP: 117/78 127/68   Pulse: 60 60   Resp: 16 11   Temp: 97.9 °F (36.6 °C)    TempSrc: Oral    SpO2: 98% 90%       [facility]      ED Course as of 09/10/22 0720   Fri Sep 09, 2022   2054 WBC: 5.4 [RONIT]   2054 GFR Non-: >60 [RONIT]   Sat Sep 10, 2022   0014 Bladder scan shows only about 60cc with no sign of urinary retention or bladder outlet obstruction. CT shows no signs of acute ureteral obstruction, infection, or other acute findings to explain symptoms. UA shows improvement in infection compared to recent visit after initiated abx. Additional IV abx given here. Pt given pain control with improvement in pain but says he would prefer dilaudid instead. Refused toradol because it doesn't work. [RONIT]      ED Course User Index  [RONIT] Tim Cruz MD     No clear etiology of pain identified. Objectively, infection seems to be improving. Scan shows no acute ureteral obstruction. No urinary retention or bladder outlet obstruction evaluation. Advised patient to follow-up with urology as an outpatient. Evaluation and work-up here revealed no signs of emergent or life-threatening pathology that would necessitate admission for further work-up or management at this time. Patient is felt to be stable for discharge home with return precautions for worsening of the condition or development of new concerning symptoms.   Patient was encouraged to follow-up with their primary care doctor in the appropriate timeframe. Necessary prescriptions and information have been provided for treatment at home. CONSULTS:  None    PROCEDURES:  Unless otherwise notedbelow, none     Procedures      FINAL IMPRESSION     1. Left flank pain    2. History of kidney stones    3. Pyelonephritis          DISPOSITION/PLAN   DISPOSITION Decision To Discharge 09/09/2022 11:54:08 PM      No notes of EC Admission Criteria type on file.     PATIENT REFERRED TO:  [address] [place] [address] EMERGENCY DEPT  [facility]  [phone]    If symptoms worsen    Clayton Allen MD  [facility] [place] [phone]      As needed    DISCHARGE MEDICATIONS:  Discharge Medication List as of 9/10/2022 12:35 AM        START taking these medications    Details   Hyoscyamine Sulfate SL (LEVSIN/SL) 0.125 MG SUBL Place 0.125 mg under the tongue every 4-6 hours as needed (abdominal cramping/pain), Disp-30 each, R-0Normal                (Please note that portions of this note were completed with a voice recognition program.  Efforts were made to edit the dictations butoccasionally words are mis-transcribed.)    Danilo Ray MD (electronically signed)  AttendingEmergency Physician          Danilo Phillips MD  09/10/22 9973

## 2022-09-12 LAB
BLOOD CULTURE, ROUTINE: NORMAL
CULTURE, BLOOD 2: NORMAL
ORGANISM: ABNORMAL
URINE CULTURE, ROUTINE: ABNORMAL
URINE CULTURE, ROUTINE: ABNORMAL

## 2022-09-14 ENCOUNTER — APPOINTMENT (OUTPATIENT)
Dept: CT IMAGING | Facility: HOSPITAL | Age: 67
End: 2022-09-14

## 2022-09-14 ENCOUNTER — OFFICE VISIT (OUTPATIENT)
Dept: FAMILY MEDICINE CLINIC | Facility: CLINIC | Age: 67
End: 2022-09-14

## 2022-09-14 ENCOUNTER — HOSPITAL ENCOUNTER (EMERGENCY)
Facility: HOSPITAL | Age: 67
Discharge: HOME | End: 2022-09-14
Attending: STUDENT IN AN ORGANIZED HEALTH CARE EDUCATION/TRAINING PROGRAM | Admitting: STUDENT IN AN ORGANIZED HEALTH CARE EDUCATION/TRAINING PROGRAM

## 2022-09-14 VITALS
OXYGEN SATURATION: 96 % | SYSTOLIC BLOOD PRESSURE: 88 MMHG | WEIGHT: 126 LBS | BODY MASS INDEX: 19.78 KG/M2 | HEIGHT: 67 IN | RESPIRATION RATE: 14 BRPM | DIASTOLIC BLOOD PRESSURE: 54 MMHG | HEART RATE: 58 BPM

## 2022-09-14 VITALS
SYSTOLIC BLOOD PRESSURE: 137 MMHG | RESPIRATION RATE: 18 BRPM | HEIGHT: 67 IN | OXYGEN SATURATION: 98 % | WEIGHT: 126 LBS | BODY MASS INDEX: 19.78 KG/M2 | HEART RATE: 71 BPM | TEMPERATURE: 98.1 F | DIASTOLIC BLOOD PRESSURE: 68 MMHG

## 2022-09-14 DIAGNOSIS — N12 PYELONEPHRITIS: Primary | ICD-10-CM

## 2022-09-14 DIAGNOSIS — R10.9 ACUTE LEFT FLANK PAIN: ICD-10-CM

## 2022-09-14 DIAGNOSIS — Z87.440 HISTORY OF URINARY TRACT INFECTION: ICD-10-CM

## 2022-09-14 DIAGNOSIS — Z98.890 HISTORY OF REMOVAL OF URETERAL STENT: ICD-10-CM

## 2022-09-14 DIAGNOSIS — Z87.442 HISTORY OF KIDNEY STONES: ICD-10-CM

## 2022-09-14 DIAGNOSIS — R30.0 DYSURIA: Primary | ICD-10-CM

## 2022-09-14 DIAGNOSIS — R35.0 URINARY FREQUENCY: ICD-10-CM

## 2022-09-14 DIAGNOSIS — N20.1 LEFT URETERAL STONE: ICD-10-CM

## 2022-09-14 LAB
ALBUMIN SERPL-MCNC: 3.9 G/DL (ref 3.5–5.2)
ALBUMIN/GLOB SERPL: 1.6 G/DL
ALP SERPL-CCNC: 97 U/L (ref 39–117)
ALT SERPL W P-5'-P-CCNC: 11 U/L (ref 1–41)
ANION GAP SERPL CALCULATED.3IONS-SCNC: 6 MMOL/L (ref 5–15)
AST SERPL-CCNC: 14 U/L (ref 1–40)
BACTERIA UR QL AUTO: ABNORMAL /HPF
BASOPHILS # BLD AUTO: 0.03 10*3/MM3 (ref 0–0.2)
BASOPHILS NFR BLD AUTO: 0.6 % (ref 0–1.5)
BILIRUB SERPL-MCNC: 0.2 MG/DL (ref 0–1.2)
BILIRUB UR QL STRIP: NEGATIVE
BUN SERPL-MCNC: 17 MG/DL (ref 8–23)
BUN/CREAT SERPL: 23 (ref 7–25)
CALCIUM SPEC-SCNC: 8.9 MG/DL (ref 8.6–10.5)
CHLORIDE SERPL-SCNC: 104 MMOL/L (ref 98–107)
CLARITY UR: CLEAR
CO2 SERPL-SCNC: 31 MMOL/L (ref 22–29)
COLOR UR: YELLOW
CREAT SERPL-MCNC: 0.74 MG/DL (ref 0.76–1.27)
DEPRECATED RDW RBC AUTO: 40.7 FL (ref 37–54)
EGFRCR SERPLBLD CKD-EPI 2021: 99.3 ML/MIN/1.73
EOSINOPHIL # BLD AUTO: 0.17 10*3/MM3 (ref 0–0.4)
EOSINOPHIL NFR BLD AUTO: 3.2 % (ref 0.3–6.2)
ERYTHROCYTE [DISTWIDTH] IN BLOOD BY AUTOMATED COUNT: 12.7 % (ref 12.3–15.4)
GLOBULIN UR ELPH-MCNC: 2.5 GM/DL
GLUCOSE SERPL-MCNC: 93 MG/DL (ref 65–99)
GLUCOSE UR STRIP-MCNC: NEGATIVE MG/DL
HCT VFR BLD AUTO: 35.7 % (ref 37.5–51)
HGB BLD-MCNC: 11.9 G/DL (ref 13–17.7)
HGB UR QL STRIP.AUTO: NEGATIVE
HYALINE CASTS UR QL AUTO: ABNORMAL /LPF
IMM GRANULOCYTES # BLD AUTO: 0.1 10*3/MM3 (ref 0–0.05)
IMM GRANULOCYTES NFR BLD AUTO: 1.9 % (ref 0–0.5)
KETONES UR QL STRIP: NEGATIVE
LEUKOCYTE ESTERASE UR QL STRIP.AUTO: ABNORMAL
LYMPHOCYTES # BLD AUTO: 1.34 10*3/MM3 (ref 0.7–3.1)
LYMPHOCYTES NFR BLD AUTO: 25.1 % (ref 19.6–45.3)
MCH RBC QN AUTO: 29.5 PG (ref 26.6–33)
MCHC RBC AUTO-ENTMCNC: 33.3 G/DL (ref 31.5–35.7)
MCV RBC AUTO: 88.4 FL (ref 79–97)
MONOCYTES # BLD AUTO: 0.48 10*3/MM3 (ref 0.1–0.9)
MONOCYTES NFR BLD AUTO: 9 % (ref 5–12)
NEUTROPHILS NFR BLD AUTO: 3.21 10*3/MM3 (ref 1.7–7)
NEUTROPHILS NFR BLD AUTO: 60.2 % (ref 42.7–76)
NITRITE UR QL STRIP: NEGATIVE
NRBC BLD AUTO-RTO: 0 /100 WBC (ref 0–0.2)
PH UR STRIP.AUTO: 7 [PH] (ref 5–8)
PLATELET # BLD AUTO: 206 10*3/MM3 (ref 140–450)
PMV BLD AUTO: 7.9 FL (ref 6–12)
POTASSIUM SERPL-SCNC: 4.5 MMOL/L (ref 3.5–5.2)
PROT SERPL-MCNC: 6.4 G/DL (ref 6–8.5)
PROT UR QL STRIP: NEGATIVE
RBC # BLD AUTO: 4.04 10*6/MM3 (ref 4.14–5.8)
RBC # UR STRIP: ABNORMAL /HPF
REF LAB TEST METHOD: ABNORMAL
SODIUM SERPL-SCNC: 141 MMOL/L (ref 136–145)
SP GR UR STRIP: 1.02 (ref 1–1.03)
SQUAMOUS #/AREA URNS HPF: ABNORMAL /HPF
UROBILINOGEN UR QL STRIP: ABNORMAL
WBC # UR STRIP: ABNORMAL /HPF
WBC NRBC COR # BLD: 5.33 10*3/MM3 (ref 3.4–10.8)

## 2022-09-14 PROCEDURE — 80053 COMPREHEN METABOLIC PANEL: CPT | Performed by: STUDENT IN AN ORGANIZED HEALTH CARE EDUCATION/TRAINING PROGRAM

## 2022-09-14 PROCEDURE — 81001 URINALYSIS AUTO W/SCOPE: CPT | Performed by: STUDENT IN AN ORGANIZED HEALTH CARE EDUCATION/TRAINING PROGRAM

## 2022-09-14 PROCEDURE — 25010000002 ONDANSETRON PER 1 MG: Performed by: STUDENT IN AN ORGANIZED HEALTH CARE EDUCATION/TRAINING PROGRAM

## 2022-09-14 PROCEDURE — 99283 EMERGENCY DEPT VISIT LOW MDM: CPT

## 2022-09-14 PROCEDURE — 96375 TX/PRO/DX INJ NEW DRUG ADDON: CPT

## 2022-09-14 PROCEDURE — 0 HYDROMORPHONE 1 MG/ML SOLUTION: Performed by: STUDENT IN AN ORGANIZED HEALTH CARE EDUCATION/TRAINING PROGRAM

## 2022-09-14 PROCEDURE — 85025 COMPLETE CBC W/AUTO DIFF WBC: CPT | Performed by: STUDENT IN AN ORGANIZED HEALTH CARE EDUCATION/TRAINING PROGRAM

## 2022-09-14 PROCEDURE — 99213 OFFICE O/P EST LOW 20 MIN: CPT | Performed by: FAMILY MEDICINE

## 2022-09-14 PROCEDURE — 96374 THER/PROPH/DIAG INJ IV PUSH: CPT

## 2022-09-14 PROCEDURE — 74176 CT ABD & PELVIS W/O CONTRAST: CPT

## 2022-09-14 RX ORDER — ONDANSETRON 2 MG/ML
4 INJECTION INTRAMUSCULAR; INTRAVENOUS ONCE
Status: COMPLETED | OUTPATIENT
Start: 2022-09-14 | End: 2022-09-14

## 2022-09-14 RX ORDER — SODIUM CHLORIDE 0.9 % (FLUSH) 0.9 %
10 SYRINGE (ML) INJECTION AS NEEDED
Status: DISCONTINUED | OUTPATIENT
Start: 2022-09-14 | End: 2022-09-14 | Stop reason: HOSPADM

## 2022-09-14 RX ORDER — CEFDINIR 300 MG/1
CAPSULE ORAL
COMMUNITY
Start: 2022-09-07 | End: 2022-11-06

## 2022-09-14 RX ADMIN — SODIUM CHLORIDE, POTASSIUM CHLORIDE, SODIUM LACTATE AND CALCIUM CHLORIDE 1000 ML: 600; 310; 30; 20 INJECTION, SOLUTION INTRAVENOUS at 15:11

## 2022-09-14 RX ADMIN — HYDROMORPHONE HYDROCHLORIDE 0.5 MG: 1 INJECTION, SOLUTION INTRAMUSCULAR; INTRAVENOUS; SUBCUTANEOUS at 15:16

## 2022-09-14 RX ADMIN — ONDANSETRON 4 MG: 2 INJECTION INTRAMUSCULAR; INTRAVENOUS at 15:09
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
## 2022-09-14 NOTE — ED NOTES
"Went to DC pt and pt requested \"more nausea and pain medicine before I go if I can.\" informed will notify MD. Dr. Jenkins informed. Denies new orders- states pt needs to f/u with pain management.   "

## 2022-09-14 NOTE — DISCHARGE INSTRUCTIONS
Today you are seen for your symptoms.  You have a 1 mm nonobstructing stone on the left but otherwise urine did not show any evidence of infection.  Please continue taking her previously prescribed cefdinir.  As we discussed I like you follow-up with your previous urologist but I have given you number for our urologist if you prefer to follow-up.

## 2022-09-16 ASSESSMENT — ENCOUNTER SYMPTOMS
SHORTNESS OF BREATH: 0
ABDOMINAL PAIN: 1

## 2022-09-22 ENCOUNTER — HOSPITAL ENCOUNTER (OUTPATIENT)
Dept: ULTRASOUND IMAGING | Age: 67
Discharge: HOME | End: 2022-09-22
Payer: MEDICARE

## 2022-09-22 DIAGNOSIS — N20.1 CALCULUS OF PROXIMAL LEFT URETER: ICD-10-CM

## 2022-09-22 PROCEDURE — 76770 US EXAM ABDO BACK WALL COMP: CPT

## 2022-09-23 ENCOUNTER — OFFICE VISIT (OUTPATIENT)
Dept: UROLOGY | Age: 67
End: 2022-09-23
Payer: MEDICARE

## 2022-09-23 VITALS
OXYGEN SATURATION: 95 % | SYSTOLIC BLOOD PRESSURE: 100 MMHG | HEART RATE: 83 BPM | WEIGHT: 127.6 LBS | HEIGHT: 67 IN | DIASTOLIC BLOOD PRESSURE: 62 MMHG | BODY MASS INDEX: 20.03 KG/M2 | TEMPERATURE: 99.1 F

## 2022-09-23 DIAGNOSIS — R10.9 LEFT FLANK PAIN: ICD-10-CM

## 2022-09-23 DIAGNOSIS — R11.0 NAUSEA: ICD-10-CM

## 2022-09-23 DIAGNOSIS — R35.0 FREQUENCY OF URINATION: ICD-10-CM

## 2022-09-23 DIAGNOSIS — N20.1 CALCULUS OF PROXIMAL LEFT URETER: Primary | ICD-10-CM

## 2022-09-23 DIAGNOSIS — N30.90 CYSTITIS: ICD-10-CM

## 2022-09-23 LAB
BACTERIA URINE, POC: ABNORMAL
BILIRUBIN URINE: 0 MG/DL
BLOOD, URINE: NEGATIVE
CASTS URINE, POC: ABNORMAL
CLARITY: CLEAR
COLOR: ABNORMAL
CRYSTALS URINE, POC: ABNORMAL
EPI CELLS URINE, POC: ABNORMAL
GLUCOSE URINE: ABNORMAL
KETONES, URINE: NEGATIVE
LEUKOCYTE EST, POC: ABNORMAL
NITRITE, URINE: POSITIVE
PH UA: 5.5 (ref 4.5–8)
PROTEIN UA: POSITIVE
RBC URINE, POC: 0
SPECIFIC GRAVITY UA: 1.02 (ref 1–1.03)
UROBILINOGEN, URINE: ABNORMAL
WBC URINE, POC: 4
YEAST URINE, POC: ABNORMAL

## 2022-09-23 PROCEDURE — 1036F TOBACCO NON-USER: CPT | Performed by: UROLOGY

## 2022-09-23 PROCEDURE — G8420 CALC BMI NORM PARAMETERS: HCPCS | Performed by: UROLOGY

## 2022-09-23 PROCEDURE — 3017F COLORECTAL CA SCREEN DOC REV: CPT | Performed by: UROLOGY

## 2022-09-23 PROCEDURE — 81000 URINALYSIS NONAUTO W/SCOPE: CPT | Performed by: UROLOGY

## 2022-09-23 PROCEDURE — 1123F ACP DISCUSS/DSCN MKR DOCD: CPT | Performed by: UROLOGY

## 2022-09-23 PROCEDURE — 51798 US URINE CAPACITY MEASURE: CPT | Performed by: UROLOGY

## 2022-09-23 PROCEDURE — 99214 OFFICE O/P EST MOD 30 MIN: CPT | Performed by: UROLOGY

## 2022-09-23 PROCEDURE — G8427 DOCREV CUR MEDS BY ELIG CLIN: HCPCS | Performed by: UROLOGY

## 2022-09-23 RX ORDER — PHENAZOPYRIDINE HYDROCHLORIDE 100 MG/1
100 TABLET, FILM COATED ORAL 3 TIMES DAILY PRN
Qty: 30 TABLET | Refills: 1 | Status: SHIPPED | OUTPATIENT
Start: 2022-09-23

## 2022-09-23 RX ORDER — NITROFURANTOIN 25; 75 MG/1; MG/1
100 CAPSULE ORAL 2 TIMES DAILY
Qty: 28 CAPSULE | Refills: 0 | Status: SHIPPED | OUTPATIENT
Start: 2022-09-23 | End: 2022-10-07

## 2022-09-23 RX ORDER — PROCHLORPERAZINE MALEATE 5 MG/1
5 TABLET ORAL EVERY 6 HOURS PRN
Qty: 40 TABLET | Refills: 0 | Status: SHIPPED | OUTPATIENT
Start: 2022-09-23 | End: 2022-10-03

## 2022-09-23 ASSESSMENT — ENCOUNTER SYMPTOMS
SORE THROAT: 0
FACIAL SWELLING: 0
EYE DISCHARGE: 0
NAUSEA: 1
BACK PAIN: 0
EYE REDNESS: 0
CHEST TIGHTNESS: 0
VOMITING: 0
WHEEZING: 0

## 2022-09-23 NOTE — PROGRESS NOTES
Malinda Contreras is a 79 y.o. male who presents today   Chief Complaint   Patient presents with    Follow-up     I am here today for a 6 week fu. I had a renal us done prior. Status post left ureteroscopy, left flank pain and nausea  Patient is here for follow-up after undergoing left ureteroscopy stone basket extraction and stent placement on 8/24/2022. He initially underwent a left ureteroscopy stone extraction by Dr. Mj Walker on 8/5/2022 however when I saw him for follow-up for stent removal on 8/11/2022 he developed left flank pain after stent removal and in the emergency department he was found to have a 3 mm mid left ureteral calculus with left hydronephrosis. He was taken back to the operating room on 8/24/2022 and underwent left ureteroscopy stone basket extraction and stent placement. The stent was removed in my office on 9/1/2022. Since that time he has been to the emergency room 3 different times once on 9/6/2020,   9/9/2022 and at [facility] on 9/14/2022. Each time he was complaining of left flank pain, frequency urgency dysuria and nausea and vomiting. He also claims he had fever. When he was initially seen after stent removal on 9/6/2022 there is documented low-grade temperature of 99.8, white blood cell count was normal at 10 but there was a left shift. Serum lactic acid was normal.  Blood and urine cultures were obtained blood cultures were negative urine culture was positive for E. coli that was resistant to Levaquin, Bactrim, and ampicillin. He was treated with a 10-day course of cefdinir which it was sensitive to. CT scan done on that visit on 9/6/2022 showed no stone no hydronephrosis. As mentioned he returned back to the ER on 9/9/2022 with similar complaints again CT was performed that showed some mild fullness of the renal pelvis but no obvious stone in the course of the ureter.   Repeat urine culture done on 9/9/2022 grew less than 50,000 CFU per mL E. coli with the same sensitivities. Again his white blood cell count was normal, creatinine was normal, lactic acid was normal.  Given a dose of IV Rocephin at that point it is recommended he continue completion of his antibiotic and follow-up as scheduled. Patient was then seen in the emergency department at [facility] on 9/14/2022 similar work-up. He was again complaining of left flank pain nausea subjective fevers but no documented fever in the ED. He had been taking some Compazine which seemed to help the nausea. Despite normal white blood cell count normal creatinine a CT scan was obtained and it was commented that he had a 1 mm nonobstructing stone on the left. Urinalysis was negative for infection. He was instructed to complete his cefdinir and follow-up with me as scheduled. He was to come in for his routine follow-up renal ultrasound after stent removal.  He is here today for that. He still is complaining of some mild flank pain. He has had anorexia with decreased appetite and says he is lost about 5 pounds. He still complains of some dysuria frequency feeling of incomplete emptying no gross hematuria. He is still complaining of nausea. Past Medical History:   Diagnosis Date    CAD (coronary artery disease) 2010    Native Vessel. S/p stenting wtih negative cardiac cath on January 18, 2010 and negative nuclear stress test on June 29, 2010. Chest pain 1/28/2016    DJD (degenerative joint disease)     GERD (gastroesophageal reflux disease) 2010    HTN (hypertension)     Hyperlipidemia     Left knee pain 1/28/2016    Pacemaker 2010    MEDTRONIC    Polyarticular arthritis     Renal calculi 2010    S/p lithrotripsy    Right arm pain 1/28/2016    Right ureteral stone 7/8/2016    Stones in the urinary tract 02/02/2022    Tobacco abuse 2010    Prior. Past Surgical History:   Procedure Laterality Date    CARDIAC CATHETERIZATION  01/08/2010    EF is estimated to be 60%. See scanned document. CARDIAC CATHETERIZATION  6/20/08, 10/2/08    EF is estimated to be 60%. See scanned document. CARDIAC CATHETERIZATION  2/13/07, 6/6/07    EF 50%. See scanned document. CARDIAC CATHETERIZATION  1/8/07, 11/26/07    EF is estimated to be in excess of 50%. See scanned doucment. CARDIAC CATHETERIZATION  08/17/2006    EF is estimated to be in excess of 50%. See scanned doucment. CARDIAC CATHETERIZATION  5/14/14  MDL    normal LV function. EF 60    CHOLECYSTECTOMY      CLAVICLE SURGERY      COLONOSCOPY      Endoscopy    CYSTOSCOPY Right 7/8/2016    CYSTOSCOPY; RIGHT RETROGRADE PYELOGRAM; RIGHT URETERAL DILATATION; RIGHT URETEROSCOPY; RIGHT URETERAL LASER LITHOTRIPSY AND STONE EXTRACTION; INSERTION RIGHT URETERAL DOUBLE J STENT performed by More Silveira MD at [address] Left 1/14/2021    CYSTOSCOPY URETEROSCOPY LASER LITHO WITH STONE EXTRACTION AND STENT REMOVAL> performed by Sea Bowling MD at [address] [facility] Left 12/29/2021    CYSTOSCOPY; LEFT URTERAL CATHERIZATION; LEFT RETORGRADE PYLEOGRAM; REMOVAL BLADDER CALCULI performed by Leigh Ann Webber MD at [address] Left 8/5/2022    CYSTOSCOPY LEFT URETERAL STENT INSERTION performed by Sea Bowling MD at [address] Left 8/24/2022    LEFT URETEROSCOPY STONE BASKET EXTRACTION performed by Leigh Ann Webber MD at [address] Left 8/24/2022    LEFT URETERAL STENT INSERTION performed by Leigh [facility] [address]     HAND SURGERY      HUMERUS SURGERY      KNEE SURGERY      Right    KNEE SURGERY Left     LITHOTRIPSY      MANDIBLE FRACTURE SURGERY      NECK SURGERY      cervical spine. OTHER SURGICAL HISTORY      Brachytherapy of the core stents.     PACEMAKER PLACEMENT      MEDTRONIC    PTCA      TIBIA FRACTURE SURGERY      WRIST SURGERY         Current Outpatient Medications   Medication Sig Dispense Refill    prochlorperazine (COMPAZINE) 5 MG tablet Take 1 tablet by mouth every 6 hours as needed for Nausea 40 tablet 0    nitrofurantoin, macrocrystal-monohydrate, (MACROBID) 100 MG capsule Take 1 capsule by mouth 2 times daily for 14 days 28 capsule 0    phenazopyridine (PYRIDIUM) 100 MG tablet Take 1 tablet by mouth 3 times daily as needed for Pain (for burning and spasms) 30 tablet 1    ondansetron (ZOFRAN ODT) 4 MG disintegrating tablet Take 1 tablet by mouth every 8 hours as needed for Nausea or Vomiting 20 tablet 0    Hyoscyamine Sulfate SL (LEVSIN/SL) 0.125 MG SUBL Place 0.125 mg under the tongue every 4-6 hours as needed (abdominal cramping/pain) 30 each 0    meloxicam (MOBIC) 7.5 MG tablet Take 1 tablet by mouth in the morning. 10 tablet 0    metoprolol tartrate (LOPRESSOR) 25 MG tablet Take 2 tablets by mouth 2 times daily Indications: Pt unaware of dosage 60 tablet 3    QUEtiapine (SEROQUEL) 25 MG tablet       oxyCODONE (OXY-IR) 15 MG immediate release tablet TAKE 1 TABLET BY MOUTH EVERY 6 HOURS      aspirin 325 MG tablet Take 325 mg by mouth daily      isosorbide mononitrate (IMDUR) 120 MG CR tablet Take 120 mg by mouth 2 times daily       hydrochlorothiazide (HYDRODIURIL) 12.5 MG tablet Take 12.5 mg by mouth daily Indications: as needed      Coenzyme Q10 (COQ-10 PO) Take by mouth daily       Nutritional Supplements (BOOST PO) Take by mouth      NITROSTAT 0.4 MG SL tablet Place 1 tablet under the tongue 3 times daily as needed  0    LORazepam (ATIVAN) 1 MG tablet Take 1 mg by mouth 3 times daily as needed  5    amLODIPine (NORVASC) 10 MG tablet Take 10 mg by mouth daily. clopidogrel (PLAVIX) 75 MG tablet Take 75 mg by mouth daily. No current facility-administered medications for this visit. Allergies   Allergen Reactions    Ezetimibe-Simvastatin Other (See Comments)     Other reaction(s): myositis/elevated CPK  Other reaction(s):  Other (See Comments)  Myositis/ elevated CPK    Promethazine Other (See Comments) and Anxiety     Other reaction(s): jitters jitters    Bee Venom     Darvocet [Propoxyphene N-Acetaminophen] Rash    Other Rash    Penicillins Other (See Comments)     childhood allergy    Phenergan [Promethazine Hcl] Anxiety and Other (See Comments)     Restless legs    Promethazine Hcl Other (See Comments)     Restless legs    Propoxyphene Rash       Social History     Socioeconomic History    Marital status:      Spouse name: None    Number of children: None    Years of education: None    Highest education level: None   Occupational History     Employer: DISABLED   Tobacco Use    Smoking status: Former     Types: Cigarettes    Smokeless tobacco: Former   Vaping Use    Vaping Use: Never used   Substance and Sexual Activity    Alcohol use: No    Drug use: No       History reviewed. No pertinent family history. REVIEW OF SYSTEMS:  Review of Systems   Constitutional:  Negative for chills and fever. HENT:  Negative for facial swelling and sore throat. Eyes:  Negative for discharge and redness. Respiratory:  Negative for chest tightness and wheezing. Cardiovascular:  Negative for chest pain and palpitations. Gastrointestinal:  Positive for nausea. Negative for vomiting. Endocrine: Negative for polyphagia and polyuria. Genitourinary:  Positive for flank pain (left side) and testicular pain (left side. ). Negative for decreased urine volume, difficulty urinating, dysuria, enuresis, frequency, genital sores, hematuria, penile discharge, penile pain, penile swelling, scrotal swelling and urgency. Musculoskeletal:  Negative for back pain and neck stiffness. Skin:  Negative for rash and wound. Neurological:  Negative for dizziness and headaches. Hematological:  Negative for adenopathy. Does not bruise/bleed easily. Psychiatric/Behavioral:  Negative for confusion and hallucinations.         PHYSICAL EXAM:  /62   Pulse 83   Temp 99.1 °F (37.3 °C)   Ht 5' 7\" (1.702 m)   Wt 127 lb 9.6 oz (57.9 kg)   SpO2 95%   BMI 19.98 kg/m² Physical Exam  Vitals reviewed. Constitutional:       General: He is not in acute distress. Appearance: Normal appearance. He is well-developed. HENT:      Head: Normocephalic and atraumatic. Nose: Nose normal.   Eyes:      General: No scleral icterus. Conjunctiva/sclera: Conjunctivae normal.      Pupils: Pupils are equal, round, and reactive to light. Neck:      Trachea: No tracheal deviation. Cardiovascular:      Rate and Rhythm: Normal rate and regular rhythm. Pulses: Normal pulses. Pulmonary:      Effort: Pulmonary effort is normal. No respiratory distress. Breath sounds: No stridor. Abdominal:      General: There is no distension. Palpations: Abdomen is soft. There is no mass. Tenderness: There is no abdominal tenderness. Musculoskeletal:         General: No tenderness or deformity. Normal range of motion. Cervical back: Normal range of motion and neck supple. Lymphadenopathy:      Cervical: No cervical adenopathy. Skin:     General: Skin is warm and dry. Findings: No erythema. Neurological:      General: No focal deficit present. Mental Status: He is alert and oriented to person, place, and time.    Psychiatric:         Behavior: Behavior normal.         Judgment: Judgment normal.           DATA:  CBC:   Lab Results   Component Value Date/Time    WBC 5.4 09/09/2022 07:45 PM    RBC 4.23 09/09/2022 07:45 PM    RBC 4.11 12/18/2011 11:55 AM    HGB 12.6 09/09/2022 07:45 PM    HCT 37.9 09/09/2022 07:45 PM    HCT 36.7 12/18/2011 11:55 AM    MCV 89.6 09/09/2022 07:45 PM    MCH 29.8 09/09/2022 07:45 PM    MCHC 33.2 09/09/2022 07:45 PM    RDW 12.4 09/09/2022 07:45 PM     09/09/2022 07:45 PM    MPV 8.0 09/09/2022 07:45 PM     CMP:    Lab Results   Component Value Date/Time     09/09/2022 07:45 PM     12/21/2011 02:16 AM    K 4.3 09/09/2022 07:45 PM    K 4.9 08/24/2022 11:15 AM    K 3.7 12/21/2011 02:16 AM     09/09/2022 07:45 PM  12/21/2011 02:16 AM    CO2 28 09/09/2022 07:45 PM    BUN 12 09/09/2022 07:45 PM    CREATININE 0.6 09/09/2022 07:45 PM    CREATININE 0.9 12/21/2011 02:16 AM    GFRAA >59 09/09/2022 07:45 PM    LABGLOM >60 09/09/2022 07:45 PM    GLUCOSE 100 09/09/2022 07:45 PM    PROT 6.5 09/09/2022 07:45 PM    LABALBU 3.8 09/09/2022 07:45 PM    CALCIUM 8.9 09/09/2022 07:45 PM    BILITOT <0.2 09/09/2022 07:45 PM    ALKPHOS 97 09/09/2022 07:45 PM    AST 13 09/09/2022 07:45 PM    ALT 12 09/09/2022 07:45 PM     Results for orders placed or performed in visit on 09/23/22   POC URINE with Microscopic   Result Value Ref Range    Color, UA Nahed     Clarity, UA Clear Clear    Glucose, Ur neg     Bilirubin Urine 0 mg/dL    Ketones, Urine Negative     Specific Gravity, UA 1.025 1.005 - 1.030    Blood, Urine Negative     pH, UA 5.5 4.5 - 8.0    Protein, UA Positive (A) Negative    Nitrite, Urine Positive     Leukocytes, UA small     Urobilinogen, Urine 0.2 mg/dL     RBC Urine, POC 0     WBC Urine, POC 4     Bacteria Urine, POC 2+     yeast urine, poc      Casts Urine, POC      Epi Cells Urine, POC      crystals urine, poc       Lab Results   Component Value Date    PSA 0.3 08/11/2016     Lab Results   Component Value Date    PSAFREEPCT 33 08/11/2016       Urine culture sent today    IMAGING:  Renal ultrasound was reviewed. This was done yesterday on 9/22/2022. This shows no hydronephrosis. Radiologist reads a nonobstructing calculus seen in the midpole of the left kidney measuring 0.84 cm however there is no posterior acoustic shadowing for this size of a stone and this was not seen on the prior CT imaging done on 9/6/2022 9/9/2022 and 9/14/2022. This is not a stone and I disagree with radiologist.  This is likely some hilar fat density    1. Calculus of proximal left ureter  Follow-up imaging with CT x3 shows resolution of left proximal ureteral calculus.   The images on CT from [facility] stating there was a 1 mm stone was not able for my review however 2 CT scans done within 8 days of that showed no stone. Therefore I have reassured the patient and I went over the CTs with him that the stone that we have removed and treated is no longer present. 2. Left flank pain  I saw nothing that could account for ongoing left flank pain. 3. Frequency of urination  This may be related to cystitis or continued inflammatory response from having stent in place. I did write a prescription for Pyridium today to see if this helps. - IN Measure, post-void residual, US, non-imaging  - Culture, Urine    4. Nausea  I told the patient not sure the ongoing nausea is related to his stone episode as I see nothing on the imaging studies to suggest why he would have continued nausea or anorexia. He request a prescription for Compazine today. Told him he needs a follow-up with Dr. Sarabjit Moncada if he has ongoing nausea as I do not believe this is related. - prochlorperazine (COMPAZINE) 5 MG tablet; Take 1 tablet by mouth every 6 hours as needed for Nausea  Dispense: 40 tablet; Refill: 0    5. Cystitis  He seems to have persistent bacteriuria. We will send another culture today we will change his antibiotics for 14-day course of Macrobid. Contact him with the culture results should this need to be changed  - nitrofurantoin, macrocrystal-monohydrate, (MACROBID) 100 MG capsule; Take 1 capsule by mouth 2 times daily for 14 days  Dispense: 28 capsule; Refill: 0  - POC URINE with Microscopic      Orders Placed This Encounter   Procedures    Culture, Urine     Order Specific Question:   Specify (ex-cath, midstream, cysto, etc)? Answer:   clean catch    POC URINE with Microscopic     With micro    IN Measure, post-void residual, US, non-imaging     31ml        Return in about 6 weeks (around 11/4/2022) for [facility] f/u.     All information inputted into the note by the MA to include chief complaint, past medical history, past surgical history, medications, allergies, social and family history and review of systems has been reviewed and updated as needed by me. EMR Dragon/transcription disclaimer: Much of this documentt is electronic  transcription/translation of spoken language to printed text. The  electronic translation of spoken language may be erroneous, or at times,  nonsensical words or phrases may be inadvertently transcribed.  Although I  have reviewed the document for such errors, some may still exist.

## 2022-09-25 LAB
ORGANISM: ABNORMAL
URINE CULTURE, ROUTINE: ABNORMAL
URINE CULTURE, ROUTINE: ABNORMAL

## 2022-09-26 ENCOUNTER — TELEPHONE (OUTPATIENT)
Dept: UROLOGY | Age: 67
End: 2022-09-26

## 2022-09-26 NOTE — TELEPHONE ENCOUNTER
Called and spoke with pt of positive urine culture results and to continue taking the prescribed Macrobid for it will treat infection. Verified understanding.

## 2022-09-26 NOTE — TELEPHONE ENCOUNTER
----- Message from BHUMIKA Eubanks CNP sent at 9/26/2022  8:46 AM CDT -----  Please let the patient know the culture did grow bacteria and their current antibiotic therapy should clear the infection.  macrobid

## 2022-09-27 RX ORDER — FLUOXETINE HYDROCHLORIDE 40 MG/1
CAPSULE ORAL
Qty: 30 CAPSULE | Refills: 2 | Status: SHIPPED | OUTPATIENT
Start: 2022-09-27 | End: 2022-12-21

## 2022-10-03 ENCOUNTER — APPOINTMENT (OUTPATIENT)
Dept: CT IMAGING | Facility: HOSPITAL | Age: 67
End: 2022-10-03

## 2022-10-03 ENCOUNTER — APPOINTMENT (OUTPATIENT)
Dept: ULTRASOUND IMAGING | Facility: HOSPITAL | Age: 67
End: 2022-10-03

## 2022-10-03 ENCOUNTER — HOSPITAL ENCOUNTER (EMERGENCY)
Facility: HOSPITAL | Age: 67
Discharge: HOME | End: 2022-10-03
Admitting: STUDENT IN AN ORGANIZED HEALTH CARE EDUCATION/TRAINING PROGRAM

## 2022-10-03 VITALS
RESPIRATION RATE: 18 BRPM | BODY MASS INDEX: 19.78 KG/M2 | DIASTOLIC BLOOD PRESSURE: 72 MMHG | SYSTOLIC BLOOD PRESSURE: 124 MMHG | OXYGEN SATURATION: 96 % | HEART RATE: 62 BPM | HEIGHT: 67 IN | TEMPERATURE: 99.1 F | WEIGHT: 126 LBS

## 2022-10-03 DIAGNOSIS — N30.00 ACUTE CYSTITIS WITHOUT HEMATURIA: ICD-10-CM

## 2022-10-03 DIAGNOSIS — N20.1 URETERAL STONE: Primary | ICD-10-CM

## 2022-10-03 LAB
ALBUMIN SERPL-MCNC: 4.1 G/DL (ref 3.5–5.2)
ALBUMIN/GLOB SERPL: 1.5 G/DL
ALP SERPL-CCNC: 103 U/L (ref 39–117)
ALT SERPL W P-5'-P-CCNC: 19 U/L (ref 1–41)
ANION GAP SERPL CALCULATED.3IONS-SCNC: 8 MMOL/L (ref 5–15)
AST SERPL-CCNC: 16 U/L (ref 1–40)
BACTERIA UR QL AUTO: ABNORMAL /HPF
BASOPHILS # BLD AUTO: 0.03 10*3/MM3 (ref 0–0.2)
BASOPHILS NFR BLD AUTO: 0.4 % (ref 0–1.5)
BILIRUB SERPL-MCNC: 0.5 MG/DL (ref 0–1.2)
BILIRUB UR QL STRIP: NEGATIVE
BUN SERPL-MCNC: 18 MG/DL (ref 8–23)
BUN/CREAT SERPL: 26.5 (ref 7–25)
CALCIUM SPEC-SCNC: 9 MG/DL (ref 8.6–10.5)
CHLORIDE SERPL-SCNC: 103 MMOL/L (ref 98–107)
CLARITY UR: CLEAR
CO2 SERPL-SCNC: 30 MMOL/L (ref 22–29)
COLOR UR: ABNORMAL
CREAT SERPL-MCNC: 0.68 MG/DL (ref 0.76–1.27)
D-LACTATE SERPL-SCNC: 1.2 MMOL/L (ref 0.5–2)
DEPRECATED RDW RBC AUTO: 41.2 FL (ref 37–54)
EGFRCR SERPLBLD CKD-EPI 2021: 101.9 ML/MIN/1.73
EOSINOPHIL # BLD AUTO: 0.23 10*3/MM3 (ref 0–0.4)
EOSINOPHIL NFR BLD AUTO: 2.8 % (ref 0.3–6.2)
ERYTHROCYTE [DISTWIDTH] IN BLOOD BY AUTOMATED COUNT: 12.6 % (ref 12.3–15.4)
GLOBULIN UR ELPH-MCNC: 2.7 GM/DL
GLUCOSE SERPL-MCNC: 101 MG/DL (ref 65–99)
GLUCOSE UR STRIP-MCNC: NEGATIVE MG/DL
HCT VFR BLD AUTO: 41.4 % (ref 37.5–51)
HGB BLD-MCNC: 13.5 G/DL (ref 13–17.7)
HGB UR QL STRIP.AUTO: NEGATIVE
HYALINE CASTS UR QL AUTO: ABNORMAL /LPF
IMM GRANULOCYTES # BLD AUTO: 0.02 10*3/MM3 (ref 0–0.05)
IMM GRANULOCYTES NFR BLD AUTO: 0.2 % (ref 0–0.5)
KETONES UR QL STRIP: ABNORMAL
LEUKOCYTE ESTERASE UR QL STRIP.AUTO: ABNORMAL
LIPASE SERPL-CCNC: 13 U/L (ref 13–60)
LYMPHOCYTES # BLD AUTO: 0.79 10*3/MM3 (ref 0.7–3.1)
LYMPHOCYTES NFR BLD AUTO: 9.6 % (ref 19.6–45.3)
MCH RBC QN AUTO: 29.2 PG (ref 26.6–33)
MCHC RBC AUTO-ENTMCNC: 32.6 G/DL (ref 31.5–35.7)
MCV RBC AUTO: 89.6 FL (ref 79–97)
MONOCYTES # BLD AUTO: 0.71 10*3/MM3 (ref 0.1–0.9)
MONOCYTES NFR BLD AUTO: 8.7 % (ref 5–12)
NEUTROPHILS NFR BLD AUTO: 6.42 10*3/MM3 (ref 1.7–7)
NEUTROPHILS NFR BLD AUTO: 78.3 % (ref 42.7–76)
NITRITE UR QL STRIP: POSITIVE
NRBC BLD AUTO-RTO: 0 /100 WBC (ref 0–0.2)
PH UR STRIP.AUTO: 6 [PH] (ref 5–8)
PLATELET # BLD AUTO: 132 10*3/MM3 (ref 140–450)
PMV BLD AUTO: 8.5 FL (ref 6–12)
POTASSIUM SERPL-SCNC: 4.4 MMOL/L (ref 3.5–5.2)
PROT SERPL-MCNC: 6.8 G/DL (ref 6–8.5)
PROT UR QL STRIP: NEGATIVE
RBC # BLD AUTO: 4.62 10*6/MM3 (ref 4.14–5.8)
RBC # UR STRIP: ABNORMAL /HPF
REF LAB TEST METHOD: ABNORMAL
SODIUM SERPL-SCNC: 141 MMOL/L (ref 136–145)
SP GR UR STRIP: 1.02 (ref 1–1.03)
SQUAMOUS #/AREA URNS HPF: ABNORMAL /HPF
UROBILINOGEN UR QL STRIP: ABNORMAL
WBC # UR STRIP: ABNORMAL /HPF
WBC NRBC COR # BLD: 8.2 10*3/MM3 (ref 3.4–10.8)

## 2022-10-03 PROCEDURE — 83690 ASSAY OF LIPASE: CPT | Performed by: NURSE PRACTITIONER

## 2022-10-03 PROCEDURE — 99283 EMERGENCY DEPT VISIT LOW MDM: CPT

## 2022-10-03 PROCEDURE — 85025 COMPLETE CBC W/AUTO DIFF WBC: CPT | Performed by: NURSE PRACTITIONER

## 2022-10-03 PROCEDURE — 25010000002 ONDANSETRON PER 1 MG: Performed by: NURSE PRACTITIONER

## 2022-10-03 PROCEDURE — 96376 TX/PRO/DX INJ SAME DRUG ADON: CPT

## 2022-10-03 PROCEDURE — 81001 URINALYSIS AUTO W/SCOPE: CPT | Performed by: NURSE PRACTITIONER

## 2022-10-03 PROCEDURE — 87186 SC STD MICRODIL/AGAR DIL: CPT | Performed by: NURSE PRACTITIONER

## 2022-10-03 PROCEDURE — 0 HYDROMORPHONE 1 MG/ML SOLUTION: Performed by: NURSE PRACTITIONER

## 2022-10-03 PROCEDURE — 87077 CULTURE AEROBIC IDENTIFY: CPT | Performed by: NURSE PRACTITIONER

## 2022-10-03 PROCEDURE — 76870 US EXAM SCROTUM: CPT

## 2022-10-03 PROCEDURE — 87086 URINE CULTURE/COLONY COUNT: CPT | Performed by: NURSE PRACTITIONER

## 2022-10-03 PROCEDURE — 74176 CT ABD & PELVIS W/O CONTRAST: CPT

## 2022-10-03 PROCEDURE — 80053 COMPREHEN METABOLIC PANEL: CPT | Performed by: NURSE PRACTITIONER

## 2022-10-03 PROCEDURE — 83605 ASSAY OF LACTIC ACID: CPT | Performed by: NURSE PRACTITIONER

## 2022-10-03 PROCEDURE — 96375 TX/PRO/DX INJ NEW DRUG ADDON: CPT

## 2022-10-03 PROCEDURE — 96374 THER/PROPH/DIAG INJ IV PUSH: CPT

## 2022-10-03 RX ORDER — ONDANSETRON 2 MG/ML
4 INJECTION INTRAMUSCULAR; INTRAVENOUS ONCE
Status: COMPLETED | OUTPATIENT
Start: 2022-10-03 | End: 2022-10-03

## 2022-10-03 RX ORDER — SODIUM CHLORIDE 0.9 % (FLUSH) 0.9 %
10 SYRINGE (ML) INJECTION AS NEEDED
Status: DISCONTINUED | OUTPATIENT
Start: 2022-10-03 | End: 2022-10-03 | Stop reason: HOSPADM

## 2022-10-03 RX ADMIN — ONDANSETRON 4 MG: 2 INJECTION INTRAMUSCULAR; INTRAVENOUS at 14:04

## 2022-10-03 RX ADMIN — HYDROMORPHONE HYDROCHLORIDE 0.5 MG: 1 INJECTION, SOLUTION INTRAMUSCULAR; INTRAVENOUS; SUBCUTANEOUS at 14:04

## 2022-10-03 RX ADMIN — HYDROMORPHONE HYDROCHLORIDE 0.5 MG: 1 INJECTION, SOLUTION INTRAMUSCULAR; INTRAVENOUS; SUBCUTANEOUS at 15:48

## 2022-10-03 RX ADMIN — SODIUM CHLORIDE, POTASSIUM CHLORIDE, SODIUM LACTATE AND CALCIUM CHLORIDE 500 ML: 600; 310; 30; 20 INJECTION, SOLUTION INTRAVENOUS at 14:04

## 2022-10-03 RX ADMIN — HYDROMORPHONE HYDROCHLORIDE 0.5 MG: 1 INJECTION, SOLUTION INTRAMUSCULAR; INTRAVENOUS; SUBCUTANEOUS at 17:46

## 2022-10-03 NOTE — DISCHARGE INSTRUCTIONS
Please call to schedule an appt with urology  Continue on the ABX that you were given by your doctor  Return to the ER if any new or worsening symptoms
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
## 2022-10-05 LAB — BACTERIA SPEC AEROBE CULT: ABNORMAL

## 2022-10-18 RX ORDER — QUETIAPINE FUMARATE 25 MG/1
TABLET, FILM COATED ORAL
Qty: 60 TABLET | Refills: 2 | Status: SHIPPED | OUTPATIENT
Start: 2022-10-18 | End: 2022-12-28

## 2022-10-27 ENCOUNTER — OFFICE VISIT (OUTPATIENT)
Dept: FAMILY MEDICINE CLINIC | Facility: CLINIC | Age: 67
End: 2022-10-27

## 2022-10-27 VITALS
HEART RATE: 68 BPM | BODY MASS INDEX: 20.62 KG/M2 | DIASTOLIC BLOOD PRESSURE: 76 MMHG | HEIGHT: 67 IN | WEIGHT: 131.4 LBS | SYSTOLIC BLOOD PRESSURE: 102 MMHG | OXYGEN SATURATION: 97 %

## 2022-10-27 DIAGNOSIS — Z12.11 SCREEN FOR COLON CANCER: ICD-10-CM

## 2022-10-27 DIAGNOSIS — R10.9 FLANK PAIN: Primary | ICD-10-CM

## 2022-10-27 PROCEDURE — 99213 OFFICE O/P EST LOW 20 MIN: CPT | Performed by: FAMILY MEDICINE
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
## 2022-11-01 LAB
APPEARANCE UR: ABNORMAL
BACTERIA #/AREA URNS HPF: ABNORMAL /[HPF]
BACTERIA UR CULT: ABNORMAL
BACTERIA UR CULT: ABNORMAL
BILIRUB UR QL STRIP: NEGATIVE
CASTS URNS QL MICRO: ABNORMAL /LPF
COLOR UR: YELLOW
CRYSTALS URNS MICRO: ABNORMAL
EPI CELLS #/AREA URNS HPF: ABNORMAL /HPF (ref 0–10)
GLUCOSE UR QL STRIP: NEGATIVE
HGB UR QL STRIP: NEGATIVE
KETONES UR QL STRIP: ABNORMAL
LEUKOCYTE ESTERASE UR QL STRIP: ABNORMAL
MICRO URNS: ABNORMAL
NITRITE UR QL STRIP: POSITIVE
OTHER ANTIBIOTIC SUSC ISLT: ABNORMAL
PH UR STRIP: 5.5 [PH] (ref 5–7.5)
PROT UR QL STRIP: ABNORMAL
RBC #/AREA URNS HPF: ABNORMAL /HPF (ref 0–2)
SP GR UR STRIP: 1.03 (ref 1–1.03)
UNIDENT CRYS URNS QL MICRO: PRESENT
UROBILINOGEN UR STRIP-MCNC: 0.2 MG/DL (ref 0.2–1)
WBC #/AREA URNS HPF: >30 /HPF (ref 0–5)

## 2022-11-03 ENCOUNTER — TELEPHONE (OUTPATIENT)
Dept: FAMILY MEDICINE CLINIC | Facility: CLINIC | Age: 67
End: 2022-11-03

## 2022-11-03 DIAGNOSIS — N12 PYELITIS: Primary | ICD-10-CM

## 2022-11-03 NOTE — TELEPHONE ENCOUNTER
I have put in the referral to urology--I understand her concern about kidney function--I would consider er at [facility] for consideration for admission.

## 2022-11-03 NOTE — TELEPHONE ENCOUNTER
Caller: Junito Phelan    Relationship: Self    Best call back number: [phone]    What is the best time to reach you: ANY    Who are you requesting to speak with (clinical staff, provider,  specific staff member): CLINICAL     What was the call regarding: PATIENT STATES THAT WIFE SPOKE WITH BELLA THIS MORNING. PATIENT STATES THAT BELLA TOLD WIFE THAT SHE WAS GOING TO TALK TO DR. ROCHA ABOUT AN ONGOING KIDNEY INFECTION.     Do you require a callback: YES     ATTEMPTED TO WARM TRANSFER

## 2022-11-05 LAB
HOLD SPECIMEN: NORMAL
WHOLE BLOOD HOLD COAG: NORMAL
WHOLE BLOOD HOLD SPECIMEN: NORMAL

## 2022-11-05 PROCEDURE — 81001 URINALYSIS AUTO W/SCOPE: CPT | Performed by: STUDENT IN AN ORGANIZED HEALTH CARE EDUCATION/TRAINING PROGRAM

## 2022-11-05 PROCEDURE — 99284 EMERGENCY DEPT VISIT MOD MDM: CPT

## 2022-11-05 PROCEDURE — 83690 ASSAY OF LIPASE: CPT | Performed by: STUDENT IN AN ORGANIZED HEALTH CARE EDUCATION/TRAINING PROGRAM

## 2022-11-05 PROCEDURE — 87086 URINE CULTURE/COLONY COUNT: CPT | Performed by: STUDENT IN AN ORGANIZED HEALTH CARE EDUCATION/TRAINING PROGRAM

## 2022-11-05 PROCEDURE — 36415 COLL VENOUS BLD VENIPUNCTURE: CPT

## 2022-11-05 PROCEDURE — 87077 CULTURE AEROBIC IDENTIFY: CPT | Performed by: STUDENT IN AN ORGANIZED HEALTH CARE EDUCATION/TRAINING PROGRAM

## 2022-11-05 PROCEDURE — 85025 COMPLETE CBC W/AUTO DIFF WBC: CPT | Performed by: STUDENT IN AN ORGANIZED HEALTH CARE EDUCATION/TRAINING PROGRAM

## 2022-11-05 PROCEDURE — 82550 ASSAY OF CK (CPK): CPT | Performed by: STUDENT IN AN ORGANIZED HEALTH CARE EDUCATION/TRAINING PROGRAM

## 2022-11-05 PROCEDURE — 80053 COMPREHEN METABOLIC PANEL: CPT | Performed by: STUDENT IN AN ORGANIZED HEALTH CARE EDUCATION/TRAINING PROGRAM

## 2022-11-05 PROCEDURE — 87186 SC STD MICRODIL/AGAR DIL: CPT | Performed by: STUDENT IN AN ORGANIZED HEALTH CARE EDUCATION/TRAINING PROGRAM

## 2022-11-06 ENCOUNTER — HOSPITAL ENCOUNTER (EMERGENCY)
Facility: HOSPITAL | Age: 67
Discharge: HOME | End: 2022-11-06
Attending: STUDENT IN AN ORGANIZED HEALTH CARE EDUCATION/TRAINING PROGRAM | Admitting: STUDENT IN AN ORGANIZED HEALTH CARE EDUCATION/TRAINING PROGRAM

## 2022-11-06 ENCOUNTER — APPOINTMENT (OUTPATIENT)
Dept: CT IMAGING | Facility: HOSPITAL | Age: 67
End: 2022-11-06

## 2022-11-06 VITALS
RESPIRATION RATE: 16 BRPM | HEART RATE: 62 BPM | DIASTOLIC BLOOD PRESSURE: 73 MMHG | WEIGHT: 132 LBS | BODY MASS INDEX: 20.72 KG/M2 | OXYGEN SATURATION: 97 % | HEIGHT: 67 IN | SYSTOLIC BLOOD PRESSURE: 107 MMHG | TEMPERATURE: 98.2 F

## 2022-11-06 DIAGNOSIS — N39.0 URINARY TRACT INFECTION WITHOUT HEMATURIA, SITE UNSPECIFIED: Primary | ICD-10-CM

## 2022-11-06 LAB
ALBUMIN SERPL-MCNC: 4.5 G/DL (ref 3.5–5.2)
ALBUMIN/GLOB SERPL: 1.7 G/DL
ALP SERPL-CCNC: 106 U/L (ref 39–117)
ALT SERPL W P-5'-P-CCNC: 18 U/L (ref 1–41)
ANION GAP SERPL CALCULATED.3IONS-SCNC: 7 MMOL/L (ref 5–15)
AST SERPL-CCNC: 21 U/L (ref 1–40)
BACTERIA UR QL AUTO: ABNORMAL /HPF
BASOPHILS # BLD AUTO: 0.03 10*3/MM3 (ref 0–0.2)
BASOPHILS NFR BLD AUTO: 0.6 % (ref 0–1.5)
BILIRUB SERPL-MCNC: <0.2 MG/DL (ref 0–1.2)
BILIRUB UR QL STRIP: NEGATIVE
BUN SERPL-MCNC: 22 MG/DL (ref 8–23)
BUN/CREAT SERPL: 26.2 (ref 7–25)
CALCIUM SPEC-SCNC: 8.9 MG/DL (ref 8.6–10.5)
CHLORIDE SERPL-SCNC: 101 MMOL/L (ref 98–107)
CK SERPL-CCNC: 95 U/L (ref 20–200)
CLARITY UR: CLEAR
CO2 SERPL-SCNC: 31 MMOL/L (ref 22–29)
COD CRY URNS QL: ABNORMAL /HPF
COLOR UR: YELLOW
CREAT SERPL-MCNC: 0.84 MG/DL (ref 0.76–1.27)
DEPRECATED RDW RBC AUTO: 41.1 FL (ref 37–54)
EGFRCR SERPLBLD CKD-EPI 2021: 95.6 ML/MIN/1.73
EOSINOPHIL # BLD AUTO: 0.21 10*3/MM3 (ref 0–0.4)
EOSINOPHIL NFR BLD AUTO: 3.9 % (ref 0.3–6.2)
ERYTHROCYTE [DISTWIDTH] IN BLOOD BY AUTOMATED COUNT: 12.6 % (ref 12.3–15.4)
GLOBULIN UR ELPH-MCNC: 2.6 GM/DL
GLUCOSE SERPL-MCNC: 87 MG/DL (ref 65–99)
GLUCOSE UR STRIP-MCNC: NEGATIVE MG/DL
HCT VFR BLD AUTO: 39.7 % (ref 37.5–51)
HGB BLD-MCNC: 13.2 G/DL (ref 13–17.7)
HGB UR QL STRIP.AUTO: NEGATIVE
HOLD SPECIMEN: NORMAL
HYALINE CASTS UR QL AUTO: ABNORMAL /LPF
IMM GRANULOCYTES # BLD AUTO: 0.02 10*3/MM3 (ref 0–0.05)
IMM GRANULOCYTES NFR BLD AUTO: 0.4 % (ref 0–0.5)
KETONES UR QL STRIP: NEGATIVE
LEUKOCYTE ESTERASE UR QL STRIP.AUTO: ABNORMAL
LIPASE SERPL-CCNC: 230 U/L (ref 13–60)
LYMPHOCYTES # BLD AUTO: 1.75 10*3/MM3 (ref 0.7–3.1)
LYMPHOCYTES NFR BLD AUTO: 32.2 % (ref 19.6–45.3)
MCH RBC QN AUTO: 29.5 PG (ref 26.6–33)
MCHC RBC AUTO-ENTMCNC: 33.2 G/DL (ref 31.5–35.7)
MCV RBC AUTO: 88.6 FL (ref 79–97)
MONOCYTES # BLD AUTO: 0.47 10*3/MM3 (ref 0.1–0.9)
MONOCYTES NFR BLD AUTO: 8.6 % (ref 5–12)
NEUTROPHILS NFR BLD AUTO: 2.96 10*3/MM3 (ref 1.7–7)
NEUTROPHILS NFR BLD AUTO: 54.3 % (ref 42.7–76)
NITRITE UR QL STRIP: POSITIVE
NRBC BLD AUTO-RTO: 0 /100 WBC (ref 0–0.2)
PH UR STRIP.AUTO: 6 [PH] (ref 5–8)
PLATELET # BLD AUTO: 198 10*3/MM3 (ref 140–450)
PMV BLD AUTO: 8.6 FL (ref 6–12)
POTASSIUM SERPL-SCNC: 4.1 MMOL/L (ref 3.5–5.2)
PROT SERPL-MCNC: 7.1 G/DL (ref 6–8.5)
PROT UR QL STRIP: NEGATIVE
RBC # BLD AUTO: 4.48 10*6/MM3 (ref 4.14–5.8)
RBC # UR STRIP: ABNORMAL /HPF
REF LAB TEST METHOD: ABNORMAL
SODIUM SERPL-SCNC: 139 MMOL/L (ref 136–145)
SP GR UR STRIP: 1.02 (ref 1–1.03)
SQUAMOUS #/AREA URNS HPF: ABNORMAL /HPF
UROBILINOGEN UR QL STRIP: ABNORMAL
WBC # UR STRIP: ABNORMAL /HPF
WBC NRBC COR # BLD: 5.44 10*3/MM3 (ref 3.4–10.8)

## 2022-11-06 PROCEDURE — 25010000002 ONDANSETRON PER 1 MG

## 2022-11-06 PROCEDURE — 25010000002 FENTANYL CITRATE (PF) 50 MCG/ML SOLUTION

## 2022-11-06 PROCEDURE — 96375 TX/PRO/DX INJ NEW DRUG ADDON: CPT

## 2022-11-06 PROCEDURE — 74176 CT ABD & PELVIS W/O CONTRAST: CPT

## 2022-11-06 PROCEDURE — 25010000002 CEFTRIAXONE PER 250 MG: Performed by: STUDENT IN AN ORGANIZED HEALTH CARE EDUCATION/TRAINING PROGRAM

## 2022-11-06 PROCEDURE — 96365 THER/PROPH/DIAG IV INF INIT: CPT

## 2022-11-06 RX ORDER — OXYCODONE AND ACETAMINOPHEN 7.5; 325 MG/1; MG/1
1 TABLET ORAL ONCE
Status: COMPLETED | OUTPATIENT
Start: 2022-11-06 | End: 2022-11-06

## 2022-11-06 RX ORDER — ONDANSETRON 2 MG/ML
INJECTION INTRAMUSCULAR; INTRAVENOUS
Status: COMPLETED
Start: 2022-11-06 | End: 2022-11-06

## 2022-11-06 RX ORDER — FENTANYL CITRATE 50 UG/ML
INJECTION, SOLUTION INTRAMUSCULAR; INTRAVENOUS
Status: COMPLETED
Start: 2022-11-06 | End: 2022-11-06

## 2022-11-06 RX ORDER — HYDROCODONE BITARTRATE AND ACETAMINOPHEN 5; 325 MG/1; MG/1
1 TABLET ORAL ONCE
Status: COMPLETED | OUTPATIENT
Start: 2022-11-06 | End: 2022-11-06

## 2022-11-06 RX ORDER — FENTANYL CITRATE 50 UG/ML
50 INJECTION, SOLUTION INTRAMUSCULAR; INTRAVENOUS ONCE
Status: COMPLETED | OUTPATIENT
Start: 2022-11-06 | End: 2022-11-06

## 2022-11-06 RX ORDER — CEFDINIR 300 MG/1
300 CAPSULE ORAL 2 TIMES DAILY
Qty: 10 CAPSULE | Refills: 0 | Status: SHIPPED | OUTPATIENT
Start: 2022-11-06 | End: 2022-11-11

## 2022-11-06 RX ORDER — ONDANSETRON 2 MG/ML
4 INJECTION INTRAMUSCULAR; INTRAVENOUS ONCE
Status: COMPLETED | OUTPATIENT
Start: 2022-11-06 | End: 2022-11-06

## 2022-11-06 RX ORDER — ONDANSETRON 4 MG/1
8 TABLET, ORALLY DISINTEGRATING ORAL EVERY 8 HOURS PRN
Qty: 15 TABLET | Refills: 0 | Status: SHIPPED | OUTPATIENT
Start: 2022-11-06 | End: 2022-11-11

## 2022-11-06 RX ADMIN — HYDROCODONE BITARTRATE AND ACETAMINOPHEN 1 TABLET: 5; 325 TABLET ORAL at 07:00

## 2022-11-06 RX ADMIN — ONDANSETRON 4 MG: 2 INJECTION INTRAMUSCULAR; INTRAVENOUS at 01:27

## 2022-11-06 RX ADMIN — SODIUM CHLORIDE 1 G: 9 INJECTION, SOLUTION INTRAVENOUS at 06:34

## 2022-11-06 RX ADMIN — FENTANYL CITRATE 50 MCG: 50 INJECTION, SOLUTION INTRAMUSCULAR; INTRAVENOUS at 01:27

## 2022-11-06 RX ADMIN — OXYCODONE HYDROCHLORIDE AND ACETAMINOPHEN 1 TABLET: 7.5; 325 TABLET ORAL at 05:14
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
## 2022-11-06 NOTE — DISCHARGE INSTRUCTIONS
Please return if you have severely worsening pain, fever, new concerning symptoms, or if you develop other emergent concerns. Otherwise please take the antibiotic as prescribed and follow-up with your primary care doctor regarding your ER visit today.     Your lipase was 230. Your CT did not show acute pancreatitis. If you develop vomiting and mid-abdominal pain, please come back for re-evaluation. Otherwise, your doctor will need to follow-up on this.

## 2022-11-08 LAB — BACTERIA SPEC AEROBE CULT: ABNORMAL

## 2022-11-23 ENCOUNTER — TELEPHONE (OUTPATIENT)
Dept: FAMILY MEDICINE CLINIC | Facility: CLINIC | Age: 67
End: 2022-11-23

## 2022-11-23 NOTE — TELEPHONE ENCOUNTER
"  Caller: Junito Phelan    Relationship to patient: Self    Best call back number: [phone]    Patient is needing: Pt called and tried to schedule an appt w/  for ongoing issues w/ his kidneys. He wants to come at the same time as his wife on 11/29 (around 930) and there was no availability w/ . I offered an appt w/ [facility] and pt declined, he also declined soonest available on 12/1.     I attempted to warm transfer since our workflow says if pt is not happy w/ our options to transfer. Margy said that she can't overbook  and theres nothing that she can do and refused to speak w/ the patient.     I offered to change wifes appt to get them at the same time, he declined and said \"forget it, I'll keep dealing with this myself\"     Could someone please reach out to the patient to assist?   "

## 2022-11-26 RX ORDER — HYDROXYZINE PAMOATE 25 MG/1
25 CAPSULE ORAL NIGHTLY PRN
Qty: 45 CAPSULE | Refills: 1 | Status: SHIPPED | OUTPATIENT
Start: 2022-11-26 | End: 2023-02-13

## 2022-11-29 ENCOUNTER — OFFICE VISIT (OUTPATIENT)
Dept: FAMILY MEDICINE CLINIC | Facility: CLINIC | Age: 67
End: 2022-11-29

## 2022-11-29 VITALS
SYSTOLIC BLOOD PRESSURE: 122 MMHG | HEIGHT: 67 IN | OXYGEN SATURATION: 100 % | WEIGHT: 136.2 LBS | TEMPERATURE: 97.3 F | HEART RATE: 99 BPM | RESPIRATION RATE: 16 BRPM | DIASTOLIC BLOOD PRESSURE: 70 MMHG | BODY MASS INDEX: 21.38 KG/M2

## 2022-11-29 DIAGNOSIS — R10.9 FLANK PAIN: Primary | ICD-10-CM

## 2022-11-29 DIAGNOSIS — Z23 NEED FOR VACCINATION: ICD-10-CM

## 2022-11-29 PROCEDURE — G0008 ADMIN INFLUENZA VIRUS VAC: HCPCS | Performed by: FAMILY MEDICINE

## 2022-11-29 PROCEDURE — 90662 IIV NO PRSV INCREASED AG IM: CPT | Performed by: FAMILY MEDICINE

## 2022-11-29 PROCEDURE — 99213 OFFICE O/P EST LOW 20 MIN: CPT | Performed by: FAMILY MEDICINE
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
## 2022-12-02 LAB
ALBUMIN SERPL-MCNC: 4.2 G/DL (ref 3.5–5.2)
ALBUMIN/GLOB SERPL: 2.1 G/DL
ALP SERPL-CCNC: 105 U/L (ref 39–117)
ALT SERPL-CCNC: 14 U/L (ref 1–41)
AMYLASE SERPL-CCNC: 79 U/L (ref 28–100)
APPEARANCE UR: ABNORMAL
AST SERPL-CCNC: 17 U/L (ref 1–40)
BACTERIA #/AREA URNS HPF: ABNORMAL /HPF
BACTERIA UR CULT: ABNORMAL
BACTERIA UR CULT: ABNORMAL
BASOPHILS # BLD AUTO: 0.04 10*3/MM3 (ref 0–0.2)
BASOPHILS NFR BLD AUTO: 0.8 % (ref 0–1.5)
BILIRUB SERPL-MCNC: <0.2 MG/DL (ref 0–1.2)
BILIRUB UR QL STRIP: NEGATIVE
BUN SERPL-MCNC: 17 MG/DL (ref 8–23)
BUN/CREAT SERPL: 18.7 (ref 7–25)
CALCIUM SERPL-MCNC: 8.8 MG/DL (ref 8.6–10.5)
CASTS URNS MICRO: ABNORMAL
CHLORIDE SERPL-SCNC: 103 MMOL/L (ref 98–107)
CO2 SERPL-SCNC: 29 MMOL/L (ref 22–29)
COLOR UR: YELLOW
CREAT SERPL-MCNC: 0.91 MG/DL (ref 0.76–1.27)
EGFRCR SERPLBLD CKD-EPI 2021: 92.4 ML/MIN/1.73
EOSINOPHIL # BLD AUTO: 0.17 10*3/MM3 (ref 0–0.4)
EOSINOPHIL NFR BLD AUTO: 3.2 % (ref 0.3–6.2)
EPI CELLS #/AREA URNS HPF: ABNORMAL /HPF
ERYTHROCYTE [DISTWIDTH] IN BLOOD BY AUTOMATED COUNT: 13.1 % (ref 12.3–15.4)
GLOBULIN SER CALC-MCNC: 2 GM/DL
GLUCOSE SERPL-MCNC: 101 MG/DL (ref 65–99)
GLUCOSE UR QL STRIP: NEGATIVE
HCT VFR BLD AUTO: 39.5 % (ref 37.5–51)
HGB BLD-MCNC: 12.9 G/DL (ref 13–17.7)
HGB UR QL STRIP: NEGATIVE
IMM GRANULOCYTES # BLD AUTO: 0.03 10*3/MM3 (ref 0–0.05)
IMM GRANULOCYTES NFR BLD AUTO: 0.6 % (ref 0–0.5)
KETONES UR QL STRIP: NEGATIVE
LEUKOCYTE ESTERASE UR QL STRIP: ABNORMAL
LIPASE SERPL-CCNC: 50 U/L (ref 13–60)
LYMPHOCYTES # BLD AUTO: 1.33 10*3/MM3 (ref 0.7–3.1)
LYMPHOCYTES NFR BLD AUTO: 25 % (ref 19.6–45.3)
MCH RBC QN AUTO: 29.6 PG (ref 26.6–33)
MCHC RBC AUTO-ENTMCNC: 32.7 G/DL (ref 31.5–35.7)
MCV RBC AUTO: 90.6 FL (ref 79–97)
MONOCYTES # BLD AUTO: 0.49 10*3/MM3 (ref 0.1–0.9)
MONOCYTES NFR BLD AUTO: 9.2 % (ref 5–12)
NEUTROPHILS # BLD AUTO: 3.27 10*3/MM3 (ref 1.7–7)
NEUTROPHILS NFR BLD AUTO: 61.2 % (ref 42.7–76)
NITRITE UR QL STRIP: POSITIVE
NRBC BLD AUTO-RTO: 0 /100 WBC (ref 0–0.2)
OTHER ANTIBIOTIC SUSC ISLT: ABNORMAL
PH UR STRIP: 5.5 [PH] (ref 5–8)
PLATELET # BLD AUTO: 183 10*3/MM3 (ref 140–450)
POTASSIUM SERPL-SCNC: 4.3 MMOL/L (ref 3.5–5.2)
PROT SERPL-MCNC: 6.2 G/DL (ref 6–8.5)
PROT UR QL STRIP: ABNORMAL
RBC # BLD AUTO: 4.36 10*6/MM3 (ref 4.14–5.8)
RBC #/AREA URNS HPF: ABNORMAL /HPF
SODIUM SERPL-SCNC: 141 MMOL/L (ref 136–145)
SP GR UR STRIP: 1.02 (ref 1–1.03)
UROBILINOGEN UR STRIP-MCNC: ABNORMAL MG/DL
WBC # BLD AUTO: 5.33 10*3/MM3 (ref 3.4–10.8)
WBC #/AREA URNS HPF: ABNORMAL /HPF

## 2022-12-02 RX ORDER — NITROFURANTOIN MACROCRYSTALS 50 MG/1
50 CAPSULE ORAL NIGHTLY
Qty: 10 CAPSULE | Refills: 0 | Status: SHIPPED | OUTPATIENT
Start: 2022-12-02 | End: 2023-01-12

## 2022-12-13 DIAGNOSIS — N20.0 KIDNEY STONE: Primary | ICD-10-CM

## 2022-12-13 PROCEDURE — 81003 URINALYSIS AUTO W/O SCOPE: CPT
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
## 2022-12-14 ENCOUNTER — TELEPHONE (OUTPATIENT)
Dept: UROLOGY | Facility: CLINIC | Age: 67
End: 2022-12-14

## 2022-12-14 NOTE — TELEPHONE ENCOUNTER
Called Patient to remind them to get KUB 1 hour prior to appointment.  Spoke with Patient. If patient calls back it is ok for the HUB to tell the pt the message.

## 2022-12-15 ENCOUNTER — OFFICE VISIT (OUTPATIENT)
Dept: UROLOGY | Facility: CLINIC | Age: 67
End: 2022-12-15

## 2022-12-15 ENCOUNTER — HOSPITAL ENCOUNTER (OUTPATIENT)
Dept: CT IMAGING | Facility: HOSPITAL | Age: 67
Discharge: HOME | End: 2022-12-15

## 2022-12-15 ENCOUNTER — HOSPITAL ENCOUNTER (OUTPATIENT)
Dept: GENERAL RADIOLOGY | Facility: HOSPITAL | Age: 67
Discharge: HOME | End: 2022-12-15

## 2022-12-15 VITALS — WEIGHT: 140.6 LBS | TEMPERATURE: 97.1 F | BODY MASS INDEX: 22.07 KG/M2 | HEIGHT: 67 IN

## 2022-12-15 DIAGNOSIS — N20.0 KIDNEY STONE: Primary | ICD-10-CM

## 2022-12-15 DIAGNOSIS — N20.0 KIDNEY STONE: ICD-10-CM

## 2022-12-15 DIAGNOSIS — N39.0 RECURRENT UTI: ICD-10-CM

## 2022-12-15 DIAGNOSIS — N40.0 BPH WITHOUT OBSTRUCTION/LOWER URINARY TRACT SYMPTOMS: ICD-10-CM

## 2022-12-15 DIAGNOSIS — R10.9 LEFT FLANK PAIN: ICD-10-CM

## 2022-12-15 LAB
BILIRUB BLD-MCNC: NEGATIVE MG/DL
CLARITY, POC: CLEAR
COLOR UR: YELLOW
GLUCOSE UR STRIP-MCNC: NEGATIVE MG/DL
KETONES UR QL: NEGATIVE
LEUKOCYTE EST, POC: ABNORMAL
NITRITE UR-MCNC: NEGATIVE MG/ML
PH UR: 6.5 [PH] (ref 5–8)
PROT UR STRIP-MCNC: ABNORMAL MG/DL
RBC # UR STRIP: NEGATIVE /UL
SP GR UR: 1.02 (ref 1–1.03)
UROBILINOGEN UR QL: ABNORMAL

## 2022-12-15 PROCEDURE — 74176 CT ABD & PELVIS W/O CONTRAST: CPT

## 2022-12-15 PROCEDURE — 81001 URINALYSIS AUTO W/SCOPE: CPT

## 2022-12-15 PROCEDURE — 74018 RADEX ABDOMEN 1 VIEW: CPT

## 2022-12-15 PROCEDURE — 99214 OFFICE O/P EST MOD 30 MIN: CPT

## 2022-12-15 RX ORDER — TAMSULOSIN HYDROCHLORIDE 0.4 MG/1
1 CAPSULE ORAL NIGHTLY
Qty: 30 CAPSULE | Refills: 11 | Status: SHIPPED | OUTPATIENT
Start: 2022-12-15 | End: 2023-12-10

## 2022-12-15 RX ORDER — PHENAZOPYRIDINE HYDROCHLORIDE 100 MG/1
100 TABLET, FILM COATED ORAL 3 TIMES DAILY PRN
Qty: 20 TABLET | Refills: 0 | Status: SHIPPED | OUTPATIENT
Start: 2022-12-15 | End: 2023-01-12

## 2022-12-21 RX ORDER — FLUOXETINE HYDROCHLORIDE 40 MG/1
CAPSULE ORAL
Qty: 30 CAPSULE | Refills: 2 | Status: SHIPPED | OUTPATIENT
Start: 2022-12-21 | End: 2023-03-17 | Stop reason: SDUPTHER

## 2022-12-22 ENCOUNTER — DOCUMENTATION (OUTPATIENT)
Dept: UROLOGY | Facility: CLINIC | Age: 67
End: 2022-12-22

## 2022-12-28 ENCOUNTER — APPOINTMENT (OUTPATIENT)
Dept: GENERAL RADIOLOGY | Facility: HOSPITAL | Age: 67
End: 2022-12-28
Payer: MEDICARE

## 2022-12-28 ENCOUNTER — APPOINTMENT (OUTPATIENT)
Dept: CT IMAGING | Facility: HOSPITAL | Age: 67
End: 2022-12-28
Payer: MEDICARE

## 2022-12-28 ENCOUNTER — HOSPITAL ENCOUNTER (EMERGENCY)
Facility: HOSPITAL | Age: 67
Discharge: HOME | End: 2022-12-28
Attending: STUDENT IN AN ORGANIZED HEALTH CARE EDUCATION/TRAINING PROGRAM | Admitting: STUDENT IN AN ORGANIZED HEALTH CARE EDUCATION/TRAINING PROGRAM
Payer: MEDICARE

## 2022-12-28 VITALS
TEMPERATURE: 98 F | SYSTOLIC BLOOD PRESSURE: 118 MMHG | HEART RATE: 64 BPM | WEIGHT: 150 LBS | DIASTOLIC BLOOD PRESSURE: 75 MMHG | RESPIRATION RATE: 18 BRPM | BODY MASS INDEX: 23.54 KG/M2 | HEIGHT: 67 IN | OXYGEN SATURATION: 98 %

## 2022-12-28 DIAGNOSIS — Z87.442 HISTORY OF KIDNEY STONES: ICD-10-CM

## 2022-12-28 DIAGNOSIS — N30.00 ACUTE CYSTITIS WITHOUT HEMATURIA: ICD-10-CM

## 2022-12-28 DIAGNOSIS — R07.9 CHEST PAIN, UNSPECIFIED TYPE: ICD-10-CM

## 2022-12-28 DIAGNOSIS — R10.9 ACUTE LEFT FLANK PAIN: Primary | ICD-10-CM

## 2022-12-28 LAB
ALBUMIN SERPL-MCNC: 3.9 G/DL (ref 3.5–5.2)
ALBUMIN/GLOB SERPL: 1.6 G/DL
ALP SERPL-CCNC: 91 U/L (ref 39–117)
ALT SERPL W P-5'-P-CCNC: 15 U/L (ref 1–41)
ANION GAP SERPL CALCULATED.3IONS-SCNC: 10 MMOL/L (ref 5–15)
AST SERPL-CCNC: 16 U/L (ref 1–40)
BACTERIA UR QL AUTO: ABNORMAL /HPF
BASOPHILS # BLD AUTO: 0.02 10*3/MM3 (ref 0–0.2)
BASOPHILS NFR BLD AUTO: 0.4 % (ref 0–1.5)
BILIRUB SERPL-MCNC: <0.2 MG/DL (ref 0–1.2)
BILIRUB UR QL STRIP: NEGATIVE
BUN SERPL-MCNC: 19 MG/DL (ref 8–23)
BUN/CREAT SERPL: 21.1 (ref 7–25)
CALCIUM SPEC-SCNC: 8.5 MG/DL (ref 8.6–10.5)
CHLORIDE SERPL-SCNC: 105 MMOL/L (ref 98–107)
CLARITY UR: CLEAR
CO2 SERPL-SCNC: 27 MMOL/L (ref 22–29)
COLOR UR: ABNORMAL
CREAT SERPL-MCNC: 0.9 MG/DL (ref 0.76–1.27)
DEPRECATED RDW RBC AUTO: 43.4 FL (ref 37–54)
EGFRCR SERPLBLD CKD-EPI 2021: 93.6 ML/MIN/1.73
EOSINOPHIL # BLD AUTO: 0.06 10*3/MM3 (ref 0–0.4)
EOSINOPHIL NFR BLD AUTO: 1.2 % (ref 0.3–6.2)
ERYTHROCYTE [DISTWIDTH] IN BLOOD BY AUTOMATED COUNT: 13.5 % (ref 12.3–15.4)
GLOBULIN UR ELPH-MCNC: 2.4 GM/DL
GLUCOSE SERPL-MCNC: 94 MG/DL (ref 65–99)
GLUCOSE UR STRIP-MCNC: NEGATIVE MG/DL
HCT VFR BLD AUTO: 36.6 % (ref 37.5–51)
HGB BLD-MCNC: 11.9 G/DL (ref 13–17.7)
HGB UR QL STRIP.AUTO: NEGATIVE
HYALINE CASTS UR QL AUTO: ABNORMAL /LPF
IMM GRANULOCYTES # BLD AUTO: 0.01 10*3/MM3 (ref 0–0.05)
IMM GRANULOCYTES NFR BLD AUTO: 0.2 % (ref 0–0.5)
KETONES UR QL STRIP: NEGATIVE
LEUKOCYTE ESTERASE UR QL STRIP.AUTO: ABNORMAL
LIPASE SERPL-CCNC: 27 U/L (ref 13–60)
LYMPHOCYTES # BLD AUTO: 1.29 10*3/MM3 (ref 0.7–3.1)
LYMPHOCYTES NFR BLD AUTO: 26.5 % (ref 19.6–45.3)
MCH RBC QN AUTO: 28.5 PG (ref 26.6–33)
MCHC RBC AUTO-ENTMCNC: 32.5 G/DL (ref 31.5–35.7)
MCV RBC AUTO: 87.6 FL (ref 79–97)
MONOCYTES # BLD AUTO: 0.62 10*3/MM3 (ref 0.1–0.9)
MONOCYTES NFR BLD AUTO: 12.8 % (ref 5–12)
NEUTROPHILS NFR BLD AUTO: 2.86 10*3/MM3 (ref 1.7–7)
NEUTROPHILS NFR BLD AUTO: 58.9 % (ref 42.7–76)
NITRITE UR QL STRIP: POSITIVE
NRBC BLD AUTO-RTO: 0 /100 WBC (ref 0–0.2)
PH UR STRIP.AUTO: 6 [PH] (ref 5–8)
PLATELET # BLD AUTO: 153 10*3/MM3 (ref 140–450)
PMV BLD AUTO: 8.7 FL (ref 6–12)
POTASSIUM SERPL-SCNC: 4.1 MMOL/L (ref 3.5–5.2)
PROT SERPL-MCNC: 6.3 G/DL (ref 6–8.5)
PROT UR QL STRIP: NEGATIVE
RBC # BLD AUTO: 4.18 10*6/MM3 (ref 4.14–5.8)
RBC # UR STRIP: ABNORMAL /HPF
REF LAB TEST METHOD: ABNORMAL
SODIUM SERPL-SCNC: 142 MMOL/L (ref 136–145)
SP GR UR STRIP: 1.02 (ref 1–1.03)
SQUAMOUS #/AREA URNS HPF: ABNORMAL /HPF
TROPONIN T SERPL-MCNC: <0.01 NG/ML (ref 0–0.03)
UROBILINOGEN UR QL STRIP: ABNORMAL
WBC # UR STRIP: ABNORMAL /HPF
WBC NRBC COR # BLD: 4.86 10*3/MM3 (ref 3.4–10.8)

## 2022-12-28 PROCEDURE — 71045 X-RAY EXAM CHEST 1 VIEW: CPT

## 2022-12-28 PROCEDURE — 87086 URINE CULTURE/COLONY COUNT: CPT | Performed by: STUDENT IN AN ORGANIZED HEALTH CARE EDUCATION/TRAINING PROGRAM

## 2022-12-28 PROCEDURE — 25010000002 DROPERIDOL PER 5 MG: Performed by: STUDENT IN AN ORGANIZED HEALTH CARE EDUCATION/TRAINING PROGRAM

## 2022-12-28 PROCEDURE — 87077 CULTURE AEROBIC IDENTIFY: CPT | Performed by: STUDENT IN AN ORGANIZED HEALTH CARE EDUCATION/TRAINING PROGRAM

## 2022-12-28 PROCEDURE — 84484 ASSAY OF TROPONIN QUANT: CPT | Performed by: STUDENT IN AN ORGANIZED HEALTH CARE EDUCATION/TRAINING PROGRAM

## 2022-12-28 PROCEDURE — 74176 CT ABD & PELVIS W/O CONTRAST: CPT

## 2022-12-28 PROCEDURE — 87186 SC STD MICRODIL/AGAR DIL: CPT | Performed by: STUDENT IN AN ORGANIZED HEALTH CARE EDUCATION/TRAINING PROGRAM

## 2022-12-28 PROCEDURE — 80053 COMPREHEN METABOLIC PANEL: CPT | Performed by: STUDENT IN AN ORGANIZED HEALTH CARE EDUCATION/TRAINING PROGRAM

## 2022-12-28 PROCEDURE — 25010000002 KETOROLAC TROMETHAMINE PER 15 MG: Performed by: STUDENT IN AN ORGANIZED HEALTH CARE EDUCATION/TRAINING PROGRAM

## 2022-12-28 PROCEDURE — 36415 COLL VENOUS BLD VENIPUNCTURE: CPT

## 2022-12-28 PROCEDURE — 96365 THER/PROPH/DIAG IV INF INIT: CPT

## 2022-12-28 PROCEDURE — 93010 ELECTROCARDIOGRAM REPORT: CPT | Performed by: INTERNAL MEDICINE

## 2022-12-28 PROCEDURE — 93005 ELECTROCARDIOGRAM TRACING: CPT | Performed by: STUDENT IN AN ORGANIZED HEALTH CARE EDUCATION/TRAINING PROGRAM

## 2022-12-28 PROCEDURE — 93005 ELECTROCARDIOGRAM TRACING: CPT | Performed by: FAMILY MEDICINE

## 2022-12-28 PROCEDURE — 81001 URINALYSIS AUTO W/SCOPE: CPT | Performed by: STUDENT IN AN ORGANIZED HEALTH CARE EDUCATION/TRAINING PROGRAM

## 2022-12-28 PROCEDURE — 99284 EMERGENCY DEPT VISIT MOD MDM: CPT

## 2022-12-28 PROCEDURE — 25010000002 CEFTRIAXONE PER 250 MG: Performed by: STUDENT IN AN ORGANIZED HEALTH CARE EDUCATION/TRAINING PROGRAM

## 2022-12-28 PROCEDURE — 96375 TX/PRO/DX INJ NEW DRUG ADDON: CPT

## 2022-12-28 PROCEDURE — 83690 ASSAY OF LIPASE: CPT | Performed by: STUDENT IN AN ORGANIZED HEALTH CARE EDUCATION/TRAINING PROGRAM

## 2022-12-28 PROCEDURE — 85025 COMPLETE CBC W/AUTO DIFF WBC: CPT | Performed by: STUDENT IN AN ORGANIZED HEALTH CARE EDUCATION/TRAINING PROGRAM

## 2022-12-28 RX ORDER — HYDROMORPHONE HYDROCHLORIDE 1 MG/ML
0.5 INJECTION, SOLUTION INTRAMUSCULAR; INTRAVENOUS; SUBCUTANEOUS ONCE AS NEEDED
Status: DISCONTINUED | OUTPATIENT
Start: 2022-12-28 | End: 2022-12-29 | Stop reason: HOSPADM

## 2022-12-28 RX ORDER — HYDROMORPHONE HYDROCHLORIDE 1 MG/ML
0.5 INJECTION, SOLUTION INTRAMUSCULAR; INTRAVENOUS; SUBCUTANEOUS ONCE
Status: DISCONTINUED | OUTPATIENT
Start: 2022-12-28 | End: 2022-12-28

## 2022-12-28 RX ORDER — SODIUM CHLORIDE 0.9 % (FLUSH) 0.9 %
10 SYRINGE (ML) INJECTION AS NEEDED
Status: DISCONTINUED | OUTPATIENT
Start: 2022-12-28 | End: 2022-12-29 | Stop reason: HOSPADM

## 2022-12-28 RX ORDER — DROPERIDOL 2.5 MG/ML
2.5 INJECTION, SOLUTION INTRAMUSCULAR; INTRAVENOUS ONCE
Status: COMPLETED | OUTPATIENT
Start: 2022-12-28 | End: 2022-12-28

## 2022-12-28 RX ORDER — CEFDINIR 300 MG/1
300 CAPSULE ORAL 2 TIMES DAILY
Qty: 20 CAPSULE | Refills: 0 | Status: SHIPPED | OUTPATIENT
Start: 2022-12-28 | End: 2023-01-07

## 2022-12-28 RX ORDER — KETOROLAC TROMETHAMINE 15 MG/ML
15 INJECTION, SOLUTION INTRAMUSCULAR; INTRAVENOUS ONCE
Status: COMPLETED | OUTPATIENT
Start: 2022-12-28 | End: 2022-12-28

## 2022-12-28 RX ORDER — QUETIAPINE FUMARATE 25 MG/1
TABLET, FILM COATED ORAL
Qty: 60 TABLET | Refills: 2 | Status: SHIPPED | OUTPATIENT
Start: 2022-12-28 | End: 2023-03-14

## 2022-12-28 RX ADMIN — CEFTRIAXONE SODIUM 2 G: 2 INJECTION, POWDER, FOR SOLUTION INTRAMUSCULAR; INTRAVENOUS at 20:55

## 2022-12-28 RX ADMIN — KETOROLAC TROMETHAMINE 15 MG: 15 INJECTION, SOLUTION INTRAMUSCULAR; INTRAVENOUS at 21:12

## 2022-12-28 RX ADMIN — DROPERIDOL 2.5 MG: 2.5 INJECTION, SOLUTION INTRAMUSCULAR; INTRAVENOUS at 20:51

## 2022-12-29 LAB
QT INTERVAL: 426 MS
QTC INTERVAL: 426 MS
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
## 2022-12-29 NOTE — DISCHARGE INSTRUCTIONS
Today you requested to return home prior to completion of your workup as your CT scan has not yet been read by our radiologists. I will call you tomorrow if you have an abnormal result on your CT scan.  As we discussed due to your recurrent UTIs I think is extremely important and that you follow-up closely with your infectious disease doctor as you may need chronic suppression medicine to prevent urinary tract infections from occurring.  Please additionally follow-up with your primary care provider within 2 days for further management.  If her symptoms worsen prior please return to the emergency department immediately.

## 2022-12-31 ENCOUNTER — TELEPHONE (OUTPATIENT)
Dept: EMERGENCY DEPT | Facility: HOSPITAL | Age: 67
End: 2022-12-31

## 2022-12-31 LAB — BACTERIA SPEC AEROBE CULT: ABNORMAL

## 2023-01-06 ENCOUNTER — TELEPHONE (OUTPATIENT)
Dept: UROLOGY | Facility: CLINIC | Age: 68
End: 2023-01-06
Payer: MEDICARE

## 2023-01-06 NOTE — TELEPHONE ENCOUNTER
Called and left voicemail message with patient and patient's spouse Amanda to let patient know that I have spoke with Dr. Green this morning and he is okay with proceeding with cystoscopy left retrograde pyelogram to take a look to see if there is possibly a remaining stone fragment lodged.  Dr. Green's next available appears to be January 12 next Thursday if this is okay with patient will proceed with getting this scheduled.  Patient and patient's spouse instructed to contact our office to let us know if wanting to proceed.

## 2023-01-10 PROBLEM — N39.0 RECURRENT UTI: Status: ACTIVE | Noted: 2023-01-10

## 2023-01-10 PROBLEM — N20.0 KIDNEY STONE: Status: ACTIVE | Noted: 2023-01-10

## 2023-01-11 ENCOUNTER — TELEPHONE (OUTPATIENT)
Dept: UROLOGY | Facility: CLINIC | Age: 68
End: 2023-01-11
Payer: MEDICARE

## 2023-01-11 NOTE — TELEPHONE ENCOUNTER
Called patient to remind them to arrive at patient registration on 1/12/2023 at 1245 for preop and 1/19/23 at 1230 for the procedure with Dr. Green. Spoke with patient. Told patient if they had any questions to please contact our office at [phone].

## 2023-01-12 ENCOUNTER — PRE-ADMISSION TESTING (OUTPATIENT)
Dept: PREADMISSION TESTING | Facility: HOSPITAL | Age: 68
End: 2023-01-12
Payer: MEDICARE

## 2023-01-12 VITALS
RESPIRATION RATE: 16 BRPM | BODY MASS INDEX: 22.6 KG/M2 | SYSTOLIC BLOOD PRESSURE: 132 MMHG | WEIGHT: 140.65 LBS | HEIGHT: 66 IN | DIASTOLIC BLOOD PRESSURE: 78 MMHG | HEART RATE: 59 BPM | OXYGEN SATURATION: 96 %

## 2023-01-12 PROCEDURE — 80048 BASIC METABOLIC PNL TOTAL CA: CPT

## 2023-01-12 PROCEDURE — 87086 URINE CULTURE/COLONY COUNT: CPT

## 2023-01-12 PROCEDURE — 81001 URINALYSIS AUTO W/SCOPE: CPT

## 2023-01-12 PROCEDURE — 87077 CULTURE AEROBIC IDENTIFY: CPT

## 2023-01-12 PROCEDURE — 85027 COMPLETE CBC AUTOMATED: CPT

## 2023-01-12 PROCEDURE — 87186 SC STD MICRODIL/AGAR DIL: CPT

## 2023-01-12 NOTE — DISCHARGE INSTRUCTIONS
Before you come to the hospital        Arrival time: AS DIRECTED BY OFFICE     YOU MAY TAKE THE FOLLOWING MEDICATION(S) THE MORNING OF SURGERY WITH A SIP OF WATER: AMLODIPINE, LOPRESSOR, OXYCODONE           ALL OTHER HOME MEDICATION CHECK WITH YOUR PHYSICIAN (especially if   you are taking diabetes medicines or blood thinners)    Do not take any Erectile Dysfunction medications (EX: CIALIS, VIAGRA) 24 hours prior to surgery.      If you were given and instructed to use a germ- killing soap, use as directed the night before surgery and again the morning of surgery or as directed by your surgeon. (Use one-half of the bottle with each shower.)   See attached information for How to Use Chlorhexidine for Bathing if applicable.            Eating and drinking restrictions prior to scheduled arrival time    2 Hours before arrival time STOP   Drinking Clear liquids (water, apple juice-no pulp)     6 Hours before arrival time STOP   Milk or drinks that contain milk, full liquids    6 Hours before arrival time STOP   Light meals or foods, such as toast or cereal    8 Hours before arrival time STOP   Heavy foods, such as meat, fried foods, or fatty foods    (It is extremely important that you follow these guidelines to prevent delay or cancelation of your procedure)     Clear Liquids  Water and flavored water                                                                      Clear Fruit juices, such as cranberry juice and apple juice.  Black coffee (NO cream of any kind, including powdered).  Plain tea  Clear bouillon or broth.  Flavored gelatin.  Soda.  Gatorade or Powerade.  Full liquid examples  Juices that have pulp.  Frozen ice pops that contain fruit pieces.  Coffee with creamer  Milk.  Yogurt.                MANAGING PAIN AFTER SURGERY    We know you are probably wondering what your pain will be like after surgery.  Following surgery it is unrealistic to expect you will not have pain.   Pain is how our bodies let us  know that something is wrong or cautions us to be careful.  That said, our goal is to make your pain tolerable.    Methods we may use to treat your pain include (oral or IV medications, PCAs, epidurals, nerve blocks, etc.)   While some procedures require IV pain medications for a short time after surgery, transitioning to pain medications by mouth allows for better management of pain.   Your nurse will encourage you to take oral pain medications whenever possible.  IV medications work almost immediately, but only last a short while.  Taking medications by mouth allows for a more constant level of medication in your blood stream for a longer period of time.      Once your pain is out of control it is harder to get back under control.  It is important you are aware when your next dose of pain medication is due.  If you are admitted, your nurse may write the time of your next dose on the white board in your room to help you remember.      We are interested in your pain and encourage you to inform us about aggravating factors during your visit.   Many times a simple repositioning every few hours can make a big difference.    If your physician says it is okay, do not let your pain prevent you from getting out of bed. Be sure to call your nurse for assistance prior to getting up so you do not fall.      Before surgery, please decide your tolerable pain goal.  These faces help describe the pain ratings we use on a 0-10 scale.   Be prepared to tell us your goal and whether or not you take pain or anxiety medications at home.          Preparing for Surgery  Preparing for surgery is an important part of your care. It can make things go more smoothly and help you avoid complications. The steps leading up to surgery may vary among hospitals. Follow all instructions given to you by your health care providers. Ask questions if you do not understand something. Talk about any concerns that you have.  Here are some questions to  consider asking before your surgery:  If my surgery is not an emergency (is elective), when would be the best time to have the surgery?  What arrangements do I need to make for work, home, or school?  What will my recovery be like? How long will it be before I can return to normal activities?  Will I need to prepare my home? Will I need to arrange care for me or my children?  Should I expect to have pain after surgery? What are my pain management options? Are there nonmedical options that I can try for pain?  Tell a health care provider about:  Any allergies you have.  All medicines you are taking, including vitamins, herbs, eye drops, creams, and over-the-counter medicines.  Any problems you or family members have had with anesthetic medicines.  Any blood disorders you have.  Any surgeries you have had.  Any medical conditions you have.  Whether you are pregnant or may be pregnant.  What are the risks?  The risks and complications of surgery depend on the specific procedure that you have. Discuss all the risks with your health care providers before your surgery. Ask about common surgical complications, which may include:  Infection.  Bleeding or a need for blood replacement (transfusion).  Allergic reactions to medicines.  Damage to surrounding nerves, tissues, or structures.  A blood clot.  Scarring.  Failure of the surgery to correct the problem.  Follow these instructions before the procedure:  Several days or weeks before your procedure  You may have a physical exam by your primary health care provider to make sure it is safe for you to have surgery.  You may have testing. This may include a chest X-ray, blood and urine tests, electrocardiogram (ECG), or other testing.  Ask your health care provider about:  Changing or stopping your regular medicines. This is especially important if you are taking diabetes medicines or blood thinners.  Taking medicines such as aspirin and ibuprofen. These medicines can thin  your blood. Do not take these medicines unless your health care provider tells you to take them.  Taking over-the-counter medicines, vitamins, herbs, and supplements.  Do not use any products that contain nicotine or tobacco, such as cigarettes and e-cigarettes. If you need help quitting, ask your health care provider.  Avoid alcohol.  Ask your health care provider if there are exercises you can do to prepare for surgery.  Eat a healthy diet.   Plan to have someone take you home from the hospital or clinic.  Plan to have a responsible adult care for you for at least 24 hours after you leave the hospital or clinic. This is important.  The day before your procedure  You may be given antibiotic medicine to take by mouth to help prevent infection. Take it as told by your health care provider.  You may be asked to shower with a germ-killing soap.  Follow instructions from your health care provider about eating and drinking restrictions. This includes gum, mints and hard candy.  Pack comfortable clothes according to your procedure.   The day of your procedure  You may need to take another shower with a germ-killing soap before you leave home in the morning.  With a small sip of water, take only the medicines that you are told to take.  Remove all jewelry including rings.   Leave anything you consider valuable at home except hearing aids if needed.  You do not need to bring your home medications into the hospital.   Do not wear any makeup, nail polish, powder, deodorant, lotion, hair accessories, or anything on your skin or body except your clothes.  If you will be staying in the hospital, bring a case to hold your glasses, contacts, or dentures. You may also want to bring your robe and non-skid footwear.       (Do not use denture adhesives since you will be asked to remove them during  surgery).   If you wear oxygen at home, bring it with you the day of surgery.  If instructed by your health care provider, bring your  sleep apnea device with you on the day of your surgery (if this applies to you).  You may want to leave your suitcase and sleep apnea device in the car until after surgery.   Arrive at the hospital as scheduled.  Bring a friend or family member with you who can help to answer questions and be present while you meet with your health care provider.  At the hospital  When you arrive at the hospital:  Go to registration located at the main entrance of the hospital. You will be registered and given a beeper and a sticker sheet. Take the stickers to the Outpatient nurses desk and place in the black tray. This is to notify staff that you have arrived. Then return to the lobby to wait.   When your beeper lights up and vibrates proceed through the double doors, under the stairs, and a member of the Outpatient Surgery staff will escort you to your preoperative room.  You may have to wear compression sleeves. These help to prevent blood clots and reduce swelling in your legs.  An IV may be inserted into one of your veins.              In the operating room, you may be given one or more of the following:        A medicine to help you relax (sedative).        A medicine to numb the area (local anesthetic).        A medicine to make you fall asleep (general anesthetic).        A medicine that is injected into an area of your body to numb everything below the                      injection site (regional anesthetic).  You may be given an antibiotic through your IV to help prevent infection.  Your surgical site will be marked or identified.    Contact a health care provider if you:  Develop a fever of more than 100.4°F (38°C) or other feelings of illness during the 48 hours before your surgery.  Have symptoms that get worse.  Have questions or concerns about your surgery.  Summary  Preparing for surgery can make the procedure go more smoothly and lower your risk of complications.  Before surgery, make a list of questions and  concerns to discuss with your surgeon. Ask about the risks and possible complications.  In the days or weeks before your surgery, follow all instructions from your health care provider. You may need to stop smoking, avoid alcohol, follow eating restrictions, and change or stop your regular medicines.  Contact your surgeon if you develop a fever or other signs of illness during the few days before your surgery.  This information is not intended to replace advice given to you by your health care provider. Make sure you discuss any questions you have with your health care provider.  Document Revised: 12/21/2018 Document Reviewed: 10/23/2018  Elsevier Patient Education © 2021 [employer]

## 2023-01-13 ENCOUNTER — OFFICE VISIT (OUTPATIENT)
Dept: GASTROENTEROLOGY | Facility: CLINIC | Age: 68
End: 2023-01-13
Payer: MEDICARE

## 2023-01-13 VITALS
BODY MASS INDEX: 22.34 KG/M2 | TEMPERATURE: 97.1 F | WEIGHT: 139 LBS | DIASTOLIC BLOOD PRESSURE: 64 MMHG | SYSTOLIC BLOOD PRESSURE: 96 MMHG | HEART RATE: 67 BPM | HEIGHT: 66 IN | OXYGEN SATURATION: 98 %

## 2023-01-13 DIAGNOSIS — Z83.71 FAMILY HX COLONIC POLYPS: ICD-10-CM

## 2023-01-13 DIAGNOSIS — D68.9 COAGULOPATHY: ICD-10-CM

## 2023-01-13 DIAGNOSIS — Z86.010 HX OF COLONIC POLYP: Primary | ICD-10-CM

## 2023-01-13 PROCEDURE — 99214 OFFICE O/P EST MOD 30 MIN: CPT | Performed by: NURSE PRACTITIONER
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
## 2023-01-14 DIAGNOSIS — N30.00 ACUTE CYSTITIS WITHOUT HEMATURIA: Primary | ICD-10-CM

## 2023-01-14 RX ORDER — CEFDINIR 300 MG/1
300 CAPSULE ORAL 2 TIMES DAILY
Qty: 20 CAPSULE | Refills: 0 | Status: SHIPPED | OUTPATIENT
Start: 2023-01-14 | End: 2023-03-29

## 2023-01-16 ENCOUNTER — TELEPHONE (OUTPATIENT)
Dept: UROLOGY | Facility: CLINIC | Age: 68
End: 2023-01-16
Payer: MEDICARE

## 2023-01-16 NOTE — TELEPHONE ENCOUNTER
----- Message from GUERA Mcduffie sent at 1/14/2023  4:52 PM CST -----  Urine culture showing bacteria again. Sent in omnicef to pharmacy

## 2023-01-16 NOTE — TELEPHONE ENCOUNTER
Called patient to remind them to arrive at patient registration on 1/19/23 at 1230 for the procedure with Dr. Green. Spoke with patient who confirmed he would be here. Told patient if they had any questions to please contact our office at [phone].

## 2023-01-18 ENCOUNTER — TELEPHONE (OUTPATIENT)
Dept: UROLOGY | Facility: CLINIC | Age: 68
End: 2023-01-18
Payer: MEDICARE

## 2023-01-19 ENCOUNTER — ANESTHESIA (OUTPATIENT)
Dept: PERIOP | Facility: HOSPITAL | Age: 68
End: 2023-01-19
Payer: MEDICARE

## 2023-01-19 ENCOUNTER — APPOINTMENT (OUTPATIENT)
Dept: GENERAL RADIOLOGY | Facility: HOSPITAL | Age: 68
End: 2023-01-19
Payer: MEDICARE

## 2023-01-19 ENCOUNTER — HOSPITAL ENCOUNTER (OUTPATIENT)
Facility: HOSPITAL | Age: 68
Setting detail: HOSPITAL OUTPATIENT SURGERY
Discharge: HOME | End: 2023-01-19
Attending: UROLOGY | Admitting: UROLOGY
Payer: MEDICARE

## 2023-01-19 ENCOUNTER — ANESTHESIA EVENT (OUTPATIENT)
Dept: PERIOP | Facility: HOSPITAL | Age: 68
End: 2023-01-19
Payer: MEDICARE

## 2023-01-19 VITALS
SYSTOLIC BLOOD PRESSURE: 121 MMHG | RESPIRATION RATE: 18 BRPM | OXYGEN SATURATION: 94 % | HEART RATE: 60 BPM | TEMPERATURE: 97.9 F | DIASTOLIC BLOOD PRESSURE: 69 MMHG

## 2023-01-19 DIAGNOSIS — N39.0 RECURRENT UTI: ICD-10-CM

## 2023-01-19 DIAGNOSIS — N20.0 KIDNEY STONE: ICD-10-CM

## 2023-01-19 DIAGNOSIS — R10.9 LEFT FLANK PAIN: ICD-10-CM

## 2023-01-19 PROCEDURE — 25010000002 FENTANYL CITRATE (PF) 50 MCG/ML SOLUTION: Performed by: ANESTHESIOLOGY

## 2023-01-19 PROCEDURE — 52351 CYSTOURETERO & OR PYELOSCOPE: CPT | Performed by: UROLOGY

## 2023-01-19 PROCEDURE — 25010000002 KETOROLAC TROMETHAMINE PER 15 MG: Performed by: NURSE ANESTHETIST, CERTIFIED REGISTERED

## 2023-01-19 PROCEDURE — 25010000002 DROPERIDOL PER 5 MG: Performed by: ANESTHESIOLOGY

## 2023-01-19 PROCEDURE — 74420 UROGRAPHY RTRGR +-KUB: CPT

## 2023-01-19 PROCEDURE — 25010000002 HYDROMORPHONE PER 4 MG: Performed by: UROLOGY

## 2023-01-19 PROCEDURE — 25010000002 ONDANSETRON PER 1 MG: Performed by: NURSE ANESTHETIST, CERTIFIED REGISTERED

## 2023-01-19 PROCEDURE — C1758 CATHETER, URETERAL: HCPCS | Performed by: UROLOGY

## 2023-01-19 PROCEDURE — 74420 UROGRAPHY RTRGR +-KUB: CPT | Performed by: UROLOGY

## 2023-01-19 PROCEDURE — 25010000002 FENTANYL CITRATE (PF) 100 MCG/2ML SOLUTION: Performed by: NURSE ANESTHETIST, CERTIFIED REGISTERED

## 2023-01-19 PROCEDURE — 25010000002 PROPOFOL 10 MG/ML EMULSION: Performed by: NURSE ANESTHETIST, CERTIFIED REGISTERED

## 2023-01-19 PROCEDURE — 25010000002 IOPAMIDOL 61 % SOLUTION: Performed by: UROLOGY

## 2023-01-19 RX ORDER — SODIUM CHLORIDE 0.9 % (FLUSH) 0.9 %
10 SYRINGE (ML) INJECTION AS NEEDED
Status: DISCONTINUED | OUTPATIENT
Start: 2023-01-19 | End: 2023-01-19 | Stop reason: HOSPADM

## 2023-01-19 RX ORDER — FLUMAZENIL 0.1 MG/ML
0.2 INJECTION INTRAVENOUS AS NEEDED
Status: DISCONTINUED | OUTPATIENT
Start: 2023-01-19 | End: 2023-01-19 | Stop reason: HOSPADM

## 2023-01-19 RX ORDER — LABETALOL HYDROCHLORIDE 5 MG/ML
5 INJECTION, SOLUTION INTRAVENOUS
Status: DISCONTINUED | OUTPATIENT
Start: 2023-01-19 | End: 2023-01-19 | Stop reason: HOSPADM

## 2023-01-19 RX ORDER — LIDOCAINE HYDROCHLORIDE 10 MG/ML
0.5 INJECTION, SOLUTION EPIDURAL; INFILTRATION; INTRACAUDAL; PERINEURAL ONCE AS NEEDED
Status: DISCONTINUED | OUTPATIENT
Start: 2023-01-19 | End: 2023-01-19 | Stop reason: HOSPADM

## 2023-01-19 RX ORDER — SODIUM CHLORIDE, SODIUM LACTATE, POTASSIUM CHLORIDE, CALCIUM CHLORIDE 600; 310; 30; 20 MG/100ML; MG/100ML; MG/100ML; MG/100ML
9 INJECTION, SOLUTION INTRAVENOUS CONTINUOUS
Status: DISCONTINUED | OUTPATIENT
Start: 2023-01-19 | End: 2023-01-19 | Stop reason: HOSPADM

## 2023-01-19 RX ORDER — DROPERIDOL 2.5 MG/ML
0.62 INJECTION, SOLUTION INTRAMUSCULAR; INTRAVENOUS ONCE AS NEEDED
Status: COMPLETED | OUTPATIENT
Start: 2023-01-19 | End: 2023-01-19

## 2023-01-19 RX ORDER — DEXTROSE MONOHYDRATE 25 G/50ML
12.5 INJECTION, SOLUTION INTRAVENOUS AS NEEDED
Status: DISCONTINUED | OUTPATIENT
Start: 2023-01-19 | End: 2023-01-19 | Stop reason: HOSPADM

## 2023-01-19 RX ORDER — EPHEDRINE SULFATE 50 MG/ML
INJECTION, SOLUTION INTRAVENOUS AS NEEDED
Status: DISCONTINUED | OUTPATIENT
Start: 2023-01-19 | End: 2023-01-19 | Stop reason: SURG

## 2023-01-19 RX ORDER — ONDANSETRON 2 MG/ML
INJECTION INTRAMUSCULAR; INTRAVENOUS AS NEEDED
Status: DISCONTINUED | OUTPATIENT
Start: 2023-01-19 | End: 2023-01-19 | Stop reason: SURG

## 2023-01-19 RX ORDER — OXYCODONE AND ACETAMINOPHEN 7.5; 325 MG/1; MG/1
2 TABLET ORAL EVERY 4 HOURS PRN
Status: DISCONTINUED | OUTPATIENT
Start: 2023-01-19 | End: 2023-01-19 | Stop reason: HOSPADM

## 2023-01-19 RX ORDER — SODIUM CHLORIDE, SODIUM LACTATE, POTASSIUM CHLORIDE, CALCIUM CHLORIDE 600; 310; 30; 20 MG/100ML; MG/100ML; MG/100ML; MG/100ML
1000 INJECTION, SOLUTION INTRAVENOUS CONTINUOUS
Status: DISCONTINUED | OUTPATIENT
Start: 2023-01-19 | End: 2023-01-19 | Stop reason: HOSPADM

## 2023-01-19 RX ORDER — KETOROLAC TROMETHAMINE 30 MG/ML
30 INJECTION, SOLUTION INTRAMUSCULAR; INTRAVENOUS ONCE
Status: COMPLETED | OUTPATIENT
Start: 2023-01-19 | End: 2023-01-19

## 2023-01-19 RX ORDER — OXYCODONE AND ACETAMINOPHEN 10; 325 MG/1; MG/1
1 TABLET ORAL ONCE AS NEEDED
Status: DISCONTINUED | OUTPATIENT
Start: 2023-01-19 | End: 2023-01-19 | Stop reason: HOSPADM

## 2023-01-19 RX ORDER — MAGNESIUM HYDROXIDE 1200 MG/15ML
LIQUID ORAL AS NEEDED
Status: DISCONTINUED | OUTPATIENT
Start: 2023-01-19 | End: 2023-01-19 | Stop reason: HOSPADM

## 2023-01-19 RX ORDER — SODIUM CHLORIDE 0.9 % (FLUSH) 0.9 %
3 SYRINGE (ML) INJECTION AS NEEDED
Status: DISCONTINUED | OUTPATIENT
Start: 2023-01-19 | End: 2023-01-19 | Stop reason: HOSPADM

## 2023-01-19 RX ORDER — FENTANYL CITRATE 50 UG/ML
INJECTION, SOLUTION INTRAMUSCULAR; INTRAVENOUS AS NEEDED
Status: DISCONTINUED | OUTPATIENT
Start: 2023-01-19 | End: 2023-01-19 | Stop reason: SURG

## 2023-01-19 RX ORDER — FENTANYL CITRATE 50 UG/ML
25 INJECTION, SOLUTION INTRAMUSCULAR; INTRAVENOUS
Status: DISCONTINUED | OUTPATIENT
Start: 2023-01-19 | End: 2023-01-19 | Stop reason: HOSPADM

## 2023-01-19 RX ORDER — HYDROMORPHONE HYDROCHLORIDE 1 MG/ML
0.5 INJECTION, SOLUTION INTRAMUSCULAR; INTRAVENOUS; SUBCUTANEOUS
Status: DISCONTINUED | OUTPATIENT
Start: 2023-01-19 | End: 2023-01-19 | Stop reason: HOSPADM

## 2023-01-19 RX ORDER — LIDOCAINE HYDROCHLORIDE 20 MG/ML
INJECTION, SOLUTION EPIDURAL; INFILTRATION; INTRACAUDAL; PERINEURAL AS NEEDED
Status: DISCONTINUED | OUTPATIENT
Start: 2023-01-19 | End: 2023-01-19 | Stop reason: SURG

## 2023-01-19 RX ORDER — IBUPROFEN 600 MG/1
600 TABLET ORAL ONCE AS NEEDED
Status: DISCONTINUED | OUTPATIENT
Start: 2023-01-19 | End: 2023-01-19 | Stop reason: HOSPADM

## 2023-01-19 RX ORDER — OXYCODONE AND ACETAMINOPHEN 7.5; 325 MG/1; MG/1
1 TABLET ORAL ONCE AS NEEDED
Status: DISCONTINUED | OUTPATIENT
Start: 2023-01-19 | End: 2023-01-19 | Stop reason: HOSPADM

## 2023-01-19 RX ORDER — PROPOFOL 10 MG/ML
VIAL (ML) INTRAVENOUS AS NEEDED
Status: DISCONTINUED | OUTPATIENT
Start: 2023-01-19 | End: 2023-01-19 | Stop reason: SURG

## 2023-01-19 RX ORDER — KETOROLAC TROMETHAMINE 10 MG/1
10 TABLET, FILM COATED ORAL EVERY 6 HOURS PRN
Qty: 12 TABLET | Refills: 0 | Status: SHIPPED | OUTPATIENT
Start: 2023-01-19 | End: 2023-02-22

## 2023-01-19 RX ORDER — ONDANSETRON 2 MG/ML
4 INJECTION INTRAMUSCULAR; INTRAVENOUS ONCE AS NEEDED
Status: DISCONTINUED | OUTPATIENT
Start: 2023-01-19 | End: 2023-01-19 | Stop reason: HOSPADM

## 2023-01-19 RX ORDER — SODIUM CHLORIDE 0.9 % (FLUSH) 0.9 %
10 SYRINGE (ML) INJECTION EVERY 12 HOURS SCHEDULED
Status: DISCONTINUED | OUTPATIENT
Start: 2023-01-19 | End: 2023-01-19 | Stop reason: HOSPADM

## 2023-01-19 RX ORDER — ONDANSETRON 4 MG/1
4 TABLET, FILM COATED ORAL ONCE AS NEEDED
Status: DISCONTINUED | OUTPATIENT
Start: 2023-01-19 | End: 2023-01-19 | Stop reason: HOSPADM

## 2023-01-19 RX ORDER — BUPIVACAINE HCL/0.9 % NACL/PF 0.1 %
2 PLASTIC BAG, INJECTION (ML) EPIDURAL ONCE
Status: DISCONTINUED | OUTPATIENT
Start: 2023-01-19 | End: 2023-01-19 | Stop reason: HOSPADM

## 2023-01-19 RX ORDER — NALOXONE HCL 0.4 MG/ML
0.4 VIAL (ML) INJECTION AS NEEDED
Status: DISCONTINUED | OUTPATIENT
Start: 2023-01-19 | End: 2023-01-19 | Stop reason: HOSPADM

## 2023-01-19 RX ORDER — KETOROLAC TROMETHAMINE 30 MG/ML
INJECTION, SOLUTION INTRAMUSCULAR; INTRAVENOUS AS NEEDED
Status: DISCONTINUED | OUTPATIENT
Start: 2023-01-19 | End: 2023-01-19 | Stop reason: SURG

## 2023-01-19 RX ADMIN — PROPOFOL INJECTABLE EMULSION 100 MG: 10 INJECTION, EMULSION INTRAVENOUS at 15:28

## 2023-01-19 RX ADMIN — FENTANYL CITRATE 25 MCG: 50 INJECTION INTRAMUSCULAR; INTRAVENOUS at 13:45

## 2023-01-19 RX ADMIN — FENTANYL CITRATE 25 MCG: 50 INJECTION INTRAMUSCULAR; INTRAVENOUS at 16:05

## 2023-01-19 RX ADMIN — KETOROLAC TROMETHAMINE 10 MG: 30 INJECTION, SOLUTION INTRAMUSCULAR; INTRAVENOUS at 15:42

## 2023-01-19 RX ADMIN — ONDANSETRON 4 MG: 2 INJECTION INTRAMUSCULAR; INTRAVENOUS at 15:42

## 2023-01-19 RX ADMIN — EPHEDRINE SULFATE 15 MG: 50 INJECTION INTRAVENOUS at 15:35

## 2023-01-19 RX ADMIN — FENTANYL CITRATE 25 MCG: 50 INJECTION INTRAMUSCULAR; INTRAVENOUS at 16:25

## 2023-01-19 RX ADMIN — LIDOCAINE HYDROCHLORIDE 100 MG: 20 INJECTION, SOLUTION EPIDURAL; INFILTRATION; INTRACAUDAL at 15:24

## 2023-01-19 RX ADMIN — DROPERIDOL 0.62 MG: 2.5 INJECTION, SOLUTION INTRAMUSCULAR; INTRAVENOUS at 17:16

## 2023-01-19 RX ADMIN — FENTANYL CITRATE 25 MCG: 50 INJECTION INTRAMUSCULAR; INTRAVENOUS at 16:30

## 2023-01-19 RX ADMIN — SODIUM CHLORIDE, POTASSIUM CHLORIDE, SODIUM LACTATE AND CALCIUM CHLORIDE 1000 ML: 600; 310; 30; 20 INJECTION, SOLUTION INTRAVENOUS at 12:59

## 2023-01-19 RX ADMIN — HYDROMORPHONE HYDROCHLORIDE 0.5 MG: 1 INJECTION, SOLUTION INTRAMUSCULAR; INTRAVENOUS; SUBCUTANEOUS at 16:56

## 2023-01-19 RX ADMIN — FENTANYL CITRATE 100 MCG: 50 INJECTION INTRAMUSCULAR; INTRAVENOUS at 15:24

## 2023-01-19 RX ADMIN — KETOROLAC TROMETHAMINE 30 MG: 30 INJECTION, SOLUTION INTRAMUSCULAR; INTRAVENOUS at 17:10

## 2023-01-19 RX ADMIN — FENTANYL CITRATE 25 MCG: 50 INJECTION INTRAMUSCULAR; INTRAVENOUS at 13:50

## 2023-01-19 RX ADMIN — PROPOFOL INJECTABLE EMULSION 200 MG: 10 INJECTION, EMULSION INTRAVENOUS at 15:24

## 2023-01-19 RX ADMIN — OXYCODONE HYDROCHLORIDE AND ACETAMINOPHEN 2 TABLET: 7.5; 325 TABLET ORAL at 16:05

## 2023-01-19 RX ADMIN — FENTANYL CITRATE 25 MCG: 50 INJECTION INTRAMUSCULAR; INTRAVENOUS at 16:20

## 2023-01-19 NOTE — OP NOTE
CYSTOSCOPY RETROGRADE PYELOGRAM AND STENT INSERTION  Procedure Note    Junito Phelan  1/19/2023    Pre-op Diagnosis:   Kidney stone [N20.0]  Left flank pain [R10.9]  Recurrent UTI [N39.0]    Post-op Diagnosis:     Post-Op Diagnosis Codes:     * Kidney stone [N20.0]     * Left flank pain [R10.9]     * Recurrent UTI [N39.0]    Procedure/CPT® Codes:      Procedure(s):  CYSTOSCOPY, URETEROSCOPY, RETROGRADE PYELOGRAM- left    Surgeon(s):  Isac Green MD    Anesthesia: General    Staff:   Circulator: Milla Aguilera, ROBBY; Apple Treviño RN  Scrub Person: Aric White; Lea Lepe    Estimated Blood Loss: none    Specimens:                None      Drains: * No LDAs found *    Findings: Normal retrograde pyelogram  No ureteral or renal stone seen  No evidence of ureteral stricture    Complications: None    Indications: 67-year-old male with longstanding history of nephrolithiasis.  He is undergone multiple procedures at [facility] urolog.  He had issues with recurrent urinary tract infections.  There was a question of a 1 mm stone in his ureter on imaging he has been seen by infectious disease at [place] who felt it was worthwhile for his collecting system to be evaluated.    Description of Procedure:   After informed consent was obtained, patient was brought to the operating room where patient underwent general endotracheal anesthesia in the supine position.  Patient was converted to the dorsolithotomy position.  Prepped and draped in the usual sterile fashion.  Timeout was done to ensure the correct patient, procedure, and site.  Patient did receive preoperative antibiotics in the holding area.    23 Divehi Quigley endoscope through the urethra retrograde fashion.  The anterior urethra was normal.  Post urethra showed mild bilobar hyperplasia of the prostate.  Bladder was entered and drained.  The bladder was inspected there was no lesions noted.  He had 1+ trabeculation of his bladder.  The left  ureteral orifice was identified and cannulated 0.38 sensor tip wire.  5 Portuguese opening catheter was placed over this.  The wire was removed.  10 cc dilute contrast were instilled to outline the collecting system.  Findings are dictated brief operative note.  Wire was then placed back up.  The Nanovi disposable flexible ureteroscope was then placed over the wire into the proximal ureter there was some narrowing at his UPJ to although this was easily manipulated in an atraumatic fashion into the kidney.  I inspected all the calyces of the kidney and saw no evidence of stone disease.  I inspected the ureter in its entirety in the way out and saw no evidence of ureteral injury stricture or stone.  Since this was done atraumatic about his best not to place a stent.  I removed the ureteroscope entirely.  I placed the cystoscope and drained his bladder.  The cystoscope was then removed patient was then awakened anesthesia and transferred recovery in satisfactory condition.    Isac Green MD     Date: 1/19/2023  Time: 15:52 CST
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
## 2023-01-19 NOTE — ANESTHESIA POSTPROCEDURE EVALUATION
Patient: Junito Phelan    Procedure Summary     Date: 01/19/23 Room / Location:  [facility] 01 /  [facility]    Anesthesia Start: 1520 Anesthesia Stop: 1553    Procedure: CYSTOSCOPY, URETEROSCOPY, RETROGRADE PYELOGRAM- left (Left: Ureter) Diagnosis:       Kidney stone      Left flank pain      Recurrent UTI      (Kidney stone [N20.0])      (Left flank pain [R10.9])      (Recurrent UTI [N39.0])    Surgeons: Isac Green MD Provider:     Anesthesia Type: general ASA Status: 3          Anesthesia Type: general    Vitals  Vitals Value Taken Time   /67 01/19/23 1731   Temp 97.9 °F (36.6 °C) 01/19/23 1723   Pulse 63 01/19/23 1737   Resp 18 01/19/23 1730   SpO2 89 % 01/19/23 1737   Vitals shown include unvalidated device data.        Post Anesthesia Care and Evaluation    Patient location during evaluation: PACU  Patient participation: complete - patient participated  Level of consciousness: awake and alert  Pain management: adequate    Airway patency: patent  Anesthetic complications: No anesthetic complications    Cardiovascular status: acceptable  Respiratory status: acceptable  Hydration status: acceptable    Comments: Blood pressure 111/67, pulse 59, temperature 97.9 °F (36.6 °C), temperature source Tympanic, resp. rate 18, SpO2 95 %.    Pt discharged from PACU based on manjit score >8  No anesthesia care post op

## 2023-01-19 NOTE — BRIEF OP NOTE
CYSTOSCOPY RETROGRADE PYELOGRAM AND STENT INSERTION  Progress Note    Junito Phelan  1/19/2023    Pre-op Diagnosis:   Kidney stone [N20.0]  Left flank pain [R10.9]  Recurrent UTI [N39.0]       Post-Op Diagnosis Codes:     * Kidney stone [N20.0]     * Left flank pain [R10.9]     * Recurrent UTI [N39.0]    Procedure/CPT® Codes:        Procedure(s):  CYSTOSCOPY, FLEXIBLE URETEROSCOPY, RETROGRADE PYELOGRAM- left        Surgeon(s):  Isac Green MD    Anesthesia: General    Staff:   Circulator: Milla Aguilera, ROBBY; Apple Treviño RN  Scrub Person: Aric White; Lea Lepe         Estimated Blood Loss: minimal    Urine Voided: * No values recorded between 1/19/2023  3:19 PM and 1/19/2023  3:48 PM *    Specimens:                None          Drains: * No LDAs found *    Findings: Left retrograde pyelogram read: 5 Mauritian open-ended catheters placed over previously placed 0.38 sensor tip wire and 10 cc dilute contrast used to outline the collecting system.  The ureter was normal in course and caliber there was no filling defects.  There is no hydronephrosis.  Collecting system drained appropriately.                  Isac Green MD     Date: 1/19/2023  Time: 15:51 CST

## 2023-01-19 NOTE — ANESTHESIA PREPROCEDURE EVALUATION
Anesthesia Evaluation     Patient summary reviewed   no history of anesthetic complications:  NPO Solid Status: > 8 hours             Airway   Mallampati: II  Dental    (+) upper dentures and lower dentures    Pulmonary    (+) sleep apnea,   (-) COPD, asthma, not a smoker  Cardiovascular   Exercise tolerance: good (4-7 METS)    (+) pacemaker (medtronic) pacemaker, hypertension, CAD, cardiac stents (2019) hyperlipidemia,   (-) past MI, angina      Neuro/Psych  (-) seizures, TIA, CVA  GI/Hepatic/Renal/Endo    (+)   renal disease stones,   (-) GERD, liver disease, diabetes    Musculoskeletal     Abdominal    Substance History      OB/GYN          Other                        Anesthesia Plan    ASA 3     general     intravenous induction     Anesthetic plan, risks, benefits, and alternatives have been provided, discussed and informed consent has been obtained with: patient.        CODE STATUS:

## 2023-02-13 RX ORDER — HYDROXYZINE PAMOATE 25 MG/1
25 CAPSULE ORAL NIGHTLY PRN
Qty: 45 CAPSULE | Refills: 1 | Status: SHIPPED | OUTPATIENT
Start: 2023-02-13

## 2023-02-22 ENCOUNTER — OFFICE VISIT (OUTPATIENT)
Dept: FAMILY MEDICINE CLINIC | Facility: CLINIC | Age: 68
End: 2023-02-22
Payer: MEDICARE

## 2023-02-22 VITALS
BODY MASS INDEX: 22.5 KG/M2 | SYSTOLIC BLOOD PRESSURE: 132 MMHG | WEIGHT: 140 LBS | DIASTOLIC BLOOD PRESSURE: 76 MMHG | OXYGEN SATURATION: 97 % | HEIGHT: 66 IN | HEART RATE: 60 BPM

## 2023-02-22 DIAGNOSIS — R10.9 FLANK PAIN: Primary | ICD-10-CM

## 2023-02-22 DIAGNOSIS — R35.0 URINARY FREQUENCY: ICD-10-CM

## 2023-02-22 PROCEDURE — 99213 OFFICE O/P EST LOW 20 MIN: CPT | Performed by: FAMILY MEDICINE

## 2023-02-22 RX ORDER — CEFDINIR 300 MG/1
300 CAPSULE ORAL 2 TIMES DAILY
Qty: 14 CAPSULE | Refills: 0 | Status: SHIPPED | OUTPATIENT
Start: 2023-02-22 | End: 2023-03-29

## 2023-02-22 RX ORDER — PROCHLORPERAZINE MALEATE 10 MG
10 TABLET ORAL EVERY 6 HOURS PRN
Qty: 10 TABLET | Refills: 0 | Status: SHIPPED | OUTPATIENT
Start: 2023-02-22 | End: 2023-04-03 | Stop reason: SDUPTHER
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
## 2023-03-14 RX ORDER — QUETIAPINE FUMARATE 25 MG/1
TABLET, FILM COATED ORAL
Qty: 60 TABLET | Refills: 2 | Status: SHIPPED | OUTPATIENT
Start: 2023-03-14 | End: 2023-03-14 | Stop reason: SDUPTHER

## 2023-03-14 RX ORDER — QUETIAPINE FUMARATE 25 MG/1
25 TABLET, FILM COATED ORAL 2 TIMES DAILY
Qty: 180 TABLET | Refills: 1 | Status: SHIPPED | OUTPATIENT
Start: 2023-03-14

## 2023-03-17 RX ORDER — FLUOXETINE HYDROCHLORIDE 40 MG/1
40 CAPSULE ORAL DAILY
Qty: 90 CAPSULE | Refills: 2 | Status: SHIPPED | OUTPATIENT
Start: 2023-03-17

## 2023-03-29 ENCOUNTER — OFFICE VISIT (OUTPATIENT)
Dept: FAMILY MEDICINE CLINIC | Facility: CLINIC | Age: 68
End: 2023-03-29
Payer: MEDICARE

## 2023-03-29 VITALS
SYSTOLIC BLOOD PRESSURE: 124 MMHG | HEIGHT: 66 IN | BODY MASS INDEX: 22.98 KG/M2 | OXYGEN SATURATION: 98 % | WEIGHT: 143 LBS | DIASTOLIC BLOOD PRESSURE: 72 MMHG | HEART RATE: 70 BPM

## 2023-03-29 DIAGNOSIS — I20.8 CHRONIC STABLE ANGINA: ICD-10-CM

## 2023-03-29 DIAGNOSIS — E78.2 MIXED HYPERLIPIDEMIA: ICD-10-CM

## 2023-03-29 DIAGNOSIS — Z12.5 ENCOUNTER FOR SCREENING FOR MALIGNANT NEOPLASM OF PROSTATE: ICD-10-CM

## 2023-03-29 DIAGNOSIS — I10 ESSENTIAL HYPERTENSION: Primary | ICD-10-CM

## 2023-03-29 PROCEDURE — 3078F DIAST BP <80 MM HG: CPT | Performed by: FAMILY MEDICINE

## 2023-03-29 PROCEDURE — 1160F RVW MEDS BY RX/DR IN RCRD: CPT | Performed by: FAMILY MEDICINE

## 2023-03-29 PROCEDURE — 3074F SYST BP LT 130 MM HG: CPT | Performed by: FAMILY MEDICINE

## 2023-03-29 PROCEDURE — 1159F MED LIST DOCD IN RCRD: CPT | Performed by: FAMILY MEDICINE

## 2023-03-29 PROCEDURE — 99213 OFFICE O/P EST LOW 20 MIN: CPT | Performed by: FAMILY MEDICINE
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
## 2023-03-30 LAB
ALBUMIN SERPL-MCNC: 4.5 G/DL (ref 3.5–5.2)
ALBUMIN/GLOB SERPL: 2 G/DL
ALP SERPL-CCNC: 111 U/L (ref 39–117)
ALT SERPL-CCNC: 11 U/L (ref 1–41)
AST SERPL-CCNC: 13 U/L (ref 1–40)
BASOPHILS # BLD AUTO: 0.04 10*3/MM3 (ref 0–0.2)
BASOPHILS NFR BLD AUTO: 0.7 % (ref 0–1.5)
BILIRUB SERPL-MCNC: 0.2 MG/DL (ref 0–1.2)
BUN SERPL-MCNC: 20 MG/DL (ref 8–23)
BUN/CREAT SERPL: 24.1 (ref 7–25)
CALCIUM SERPL-MCNC: 9.5 MG/DL (ref 8.6–10.5)
CHLORIDE SERPL-SCNC: 105 MMOL/L (ref 98–107)
CHOLEST SERPL-MCNC: 150 MG/DL (ref 0–200)
CHOLEST/HDLC SERPL: 1.97 {RATIO}
CO2 SERPL-SCNC: 29.8 MMOL/L (ref 22–29)
CREAT SERPL-MCNC: 0.83 MG/DL (ref 0.76–1.27)
EGFRCR SERPLBLD CKD-EPI 2021: 95.3 ML/MIN/1.73
EOSINOPHIL # BLD AUTO: 0.15 10*3/MM3 (ref 0–0.4)
EOSINOPHIL NFR BLD AUTO: 2.6 % (ref 0.3–6.2)
ERYTHROCYTE [DISTWIDTH] IN BLOOD BY AUTOMATED COUNT: 14.1 % (ref 12.3–15.4)
GLOBULIN SER CALC-MCNC: 2.2 GM/DL
GLUCOSE SERPL-MCNC: 95 MG/DL (ref 65–99)
HCT VFR BLD AUTO: 44.2 % (ref 37.5–51)
HDLC SERPL-MCNC: 76 MG/DL (ref 40–60)
HGB BLD-MCNC: 14.9 G/DL (ref 13–17.7)
IMM GRANULOCYTES # BLD AUTO: 0.03 10*3/MM3 (ref 0–0.05)
IMM GRANULOCYTES NFR BLD AUTO: 0.5 % (ref 0–0.5)
LDLC SERPL CALC-MCNC: 46 MG/DL (ref 0–100)
LYMPHOCYTES # BLD AUTO: 1.59 10*3/MM3 (ref 0.7–3.1)
LYMPHOCYTES NFR BLD AUTO: 27.8 % (ref 19.6–45.3)
MCH RBC QN AUTO: 29.8 PG (ref 26.6–33)
MCHC RBC AUTO-ENTMCNC: 33.7 G/DL (ref 31.5–35.7)
MCV RBC AUTO: 88.4 FL (ref 79–97)
MONOCYTES # BLD AUTO: 0.58 10*3/MM3 (ref 0.1–0.9)
MONOCYTES NFR BLD AUTO: 10.2 % (ref 5–12)
NEUTROPHILS # BLD AUTO: 3.32 10*3/MM3 (ref 1.7–7)
NEUTROPHILS NFR BLD AUTO: 58.2 % (ref 42.7–76)
NRBC BLD AUTO-RTO: 0 /100 WBC (ref 0–0.2)
PLATELET # BLD AUTO: 222 10*3/MM3 (ref 140–450)
POTASSIUM SERPL-SCNC: 4.8 MMOL/L (ref 3.5–5.2)
PROT SERPL-MCNC: 6.7 G/DL (ref 6–8.5)
PSA SERPL-MCNC: 0.21 NG/ML (ref 0–4)
RBC # BLD AUTO: 5 10*6/MM3 (ref 4.14–5.8)
SODIUM SERPL-SCNC: 145 MMOL/L (ref 136–145)
TRIGL SERPL-MCNC: 173 MG/DL (ref 0–150)
VLDLC SERPL CALC-MCNC: 28 MG/DL (ref 5–40)
WBC # BLD AUTO: 5.71 10*3/MM3 (ref 3.4–10.8)

## 2023-04-03 RX ORDER — NITROGLYCERIN 0.4 MG/1
0.4 TABLET SUBLINGUAL
Qty: 25 TABLET | Refills: 0 | Status: SHIPPED | OUTPATIENT
Start: 2023-04-03

## 2023-04-03 RX ORDER — PROCHLORPERAZINE MALEATE 10 MG
10 TABLET ORAL EVERY 6 HOURS PRN
Qty: 10 TABLET | Refills: 0 | Status: SHIPPED | OUTPATIENT
Start: 2023-04-03

## 2023-05-12 RX ORDER — HYDROXYZINE PAMOATE 25 MG/1
25 CAPSULE ORAL NIGHTLY PRN
Qty: 45 CAPSULE | Refills: 1 | Status: SHIPPED | OUTPATIENT
Start: 2023-05-12

## 2023-06-08 ENCOUNTER — TRANSCRIBE ORDERS (OUTPATIENT)
Dept: ADMINISTRATIVE | Facility: HOSPITAL | Age: 68
End: 2023-06-08
Payer: MEDICARE

## 2023-06-08 DIAGNOSIS — H34.8330: Primary | ICD-10-CM

## 2023-06-13 ENCOUNTER — HOSPITAL ENCOUNTER (OUTPATIENT)
Dept: ULTRASOUND IMAGING | Facility: HOSPITAL | Age: 68
Discharge: HOME | End: 2023-06-13
Admitting: OPHTHALMOLOGY
Payer: MEDICARE

## 2023-06-13 DIAGNOSIS — H34.8330: ICD-10-CM

## 2023-06-13 PROCEDURE — 93880 EXTRACRANIAL BILAT STUDY: CPT

## 2023-08-08 RX ORDER — HYDROXYZINE PAMOATE 25 MG/1
25 CAPSULE ORAL NIGHTLY PRN
Qty: 45 CAPSULE | Refills: 1 | Status: SHIPPED | OUTPATIENT
Start: 2023-08-08

## 2023-08-11 ENCOUNTER — PATIENT MESSAGE (OUTPATIENT)
Dept: FAMILY MEDICINE CLINIC | Facility: CLINIC | Age: 68
End: 2023-08-11
Payer: MEDICARE

## 2023-08-11 ENCOUNTER — HOSPITAL ENCOUNTER (EMERGENCY)
Age: 68
Discharge: HOME | End: 2023-08-11
Attending: EMERGENCY MEDICINE
Payer: MEDICARE

## 2023-08-11 VITALS
HEIGHT: 67 IN | OXYGEN SATURATION: 97 % | WEIGHT: 127 LBS | RESPIRATION RATE: 14 BRPM | SYSTOLIC BLOOD PRESSURE: 136 MMHG | HEART RATE: 97 BPM | TEMPERATURE: 97.5 F | BODY MASS INDEX: 19.93 KG/M2 | DIASTOLIC BLOOD PRESSURE: 94 MMHG

## 2023-08-11 DIAGNOSIS — T78.40XA ALLERGIC REACTION, INITIAL ENCOUNTER: Primary | ICD-10-CM

## 2023-08-11 DIAGNOSIS — R11.0 NAUSEA: ICD-10-CM

## 2023-08-11 DIAGNOSIS — T63.441A BEE STING, ACCIDENTAL OR UNINTENTIONAL, INITIAL ENCOUNTER: ICD-10-CM

## 2023-08-11 PROCEDURE — 2580000003 HC RX 258: Performed by: EMERGENCY MEDICINE

## 2023-08-11 PROCEDURE — A4216 STERILE WATER/SALINE, 10 ML: HCPCS | Performed by: EMERGENCY MEDICINE

## 2023-08-11 PROCEDURE — 6370000000 HC RX 637 (ALT 250 FOR IP): Performed by: EMERGENCY MEDICINE

## 2023-08-11 PROCEDURE — 99284 EMERGENCY DEPT VISIT MOD MDM: CPT

## 2023-08-11 PROCEDURE — 2500000003 HC RX 250 WO HCPCS: Performed by: EMERGENCY MEDICINE

## 2023-08-11 PROCEDURE — 6360000002 HC RX W HCPCS: Performed by: EMERGENCY MEDICINE

## 2023-08-11 PROCEDURE — 96375 TX/PRO/DX INJ NEW DRUG ADDON: CPT

## 2023-08-11 PROCEDURE — 96374 THER/PROPH/DIAG INJ IV PUSH: CPT

## 2023-08-11 RX ORDER — CETIRIZINE HYDROCHLORIDE 10 MG/1
10 TABLET ORAL ONCE
Status: COMPLETED | OUTPATIENT
Start: 2023-08-11 | End: 2023-08-11

## 2023-08-11 RX ORDER — EVOLOCUMAB 420 MG/3.5
KIT SUBCUTANEOUS
COMMUNITY

## 2023-08-11 RX ORDER — PROCHLORPERAZINE MALEATE 5 MG/1
5 TABLET ORAL EVERY 6 HOURS PRN
Qty: 40 TABLET | Refills: 0 | OUTPATIENT
Start: 2023-08-11 | End: 2023-08-21

## 2023-08-11 RX ORDER — PREDNISONE 20 MG/1
20 TABLET ORAL DAILY
Qty: 5 TABLET | Refills: 0 | Status: SHIPPED | OUTPATIENT
Start: 2023-08-11 | End: 2023-08-16

## 2023-08-11 RX ORDER — FAMOTIDINE 20 MG/1
20 TABLET, FILM COATED ORAL 2 TIMES DAILY
Qty: 20 TABLET | Refills: 0 | Status: SHIPPED | OUTPATIENT
Start: 2023-08-11 | End: 2023-08-21

## 2023-08-11 RX ORDER — PROCHLORPERAZINE MALEATE 10 MG
10 TABLET ORAL EVERY 6 HOURS PRN
Qty: 10 TABLET | Refills: 0 | Status: SHIPPED | OUTPATIENT
Start: 2023-08-11

## 2023-08-11 RX ORDER — SCOLOPAMINE TRANSDERMAL SYSTEM 1 MG/1
1 PATCH, EXTENDED RELEASE TRANSDERMAL
Qty: 20 EACH | Refills: 0 | Status: SHIPPED | OUTPATIENT
Start: 2023-08-11

## 2023-08-11 RX ORDER — EPINEPHRINE 0.3 MG/.3ML
0.3 INJECTION SUBCUTANEOUS ONCE
Qty: 1 EACH | Refills: 0 | Status: SHIPPED | OUTPATIENT
Start: 2023-08-11 | End: 2023-08-11

## 2023-08-11 RX ADMIN — CETIRIZINE HYDROCHLORIDE 10 MG: 10 TABLET ORAL at 22:11

## 2023-08-11 RX ADMIN — METHYLPREDNISOLONE SODIUM SUCCINATE 80 MG: 125 INJECTION, POWDER, FOR SOLUTION INTRAMUSCULAR; INTRAVENOUS at 22:09

## 2023-08-11 RX ADMIN — FAMOTIDINE 20 MG: 10 INJECTION, SOLUTION INTRAVENOUS at 22:08

## 2023-08-11 ASSESSMENT — PAIN - FUNCTIONAL ASSESSMENT: PAIN_FUNCTIONAL_ASSESSMENT: NONE - DENIES PAIN

## 2023-08-11 ASSESSMENT — ENCOUNTER SYMPTOMS
VOMITING: 0
DIARRHEA: 0
SHORTNESS OF BREATH: 1
NAUSEA: 1
ABDOMINAL PAIN: 0
EYE PAIN: 0

## 2023-11-09 RX ORDER — QUETIAPINE FUMARATE 25 MG/1
25 TABLET, FILM COATED ORAL 2 TIMES DAILY
Qty: 180 TABLET | Refills: 1 | Status: SHIPPED | OUTPATIENT
Start: 2023-11-09

## 2023-11-16 ENCOUNTER — OFFICE VISIT (OUTPATIENT)
Dept: FAMILY MEDICINE CLINIC | Facility: CLINIC | Age: 68
End: 2023-11-16
Payer: MEDICARE

## 2023-11-16 VITALS
HEART RATE: 61 BPM | HEIGHT: 66 IN | OXYGEN SATURATION: 98 % | TEMPERATURE: 98.5 F | DIASTOLIC BLOOD PRESSURE: 60 MMHG | RESPIRATION RATE: 16 BRPM | SYSTOLIC BLOOD PRESSURE: 122 MMHG | WEIGHT: 144 LBS | BODY MASS INDEX: 23.14 KG/M2

## 2023-11-16 DIAGNOSIS — Z23 NEED FOR IMMUNIZATION AGAINST INFLUENZA: Primary | ICD-10-CM

## 2023-11-16 DIAGNOSIS — Z12.11 SCREEN FOR COLON CANCER: ICD-10-CM

## 2023-11-16 RX ORDER — NITROGLYCERIN 0.4 MG/1
0.4 TABLET SUBLINGUAL
Qty: 25 TABLET | Refills: 0 | Status: SHIPPED | OUTPATIENT
Start: 2023-11-16

## 2023-11-16 RX ORDER — TRAZODONE HYDROCHLORIDE 50 MG/1
50 TABLET ORAL NIGHTLY
Qty: 30 TABLET | Refills: 5 | Status: SHIPPED | OUTPATIENT
Start: 2023-11-16
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
## 2023-11-17 ENCOUNTER — TELEPHONE (OUTPATIENT)
Dept: GASTROENTEROLOGY | Facility: CLINIC | Age: 68
End: 2023-11-17
Payer: MEDICARE

## 2023-11-17 NOTE — TELEPHONE ENCOUNTER
PT called wanting to schedule his colon.  PT is still on Plavix.   He had an OV with his  six months ago.

## 2023-12-07 RX ORDER — FLUOXETINE HYDROCHLORIDE 40 MG/1
40 CAPSULE ORAL DAILY
Qty: 90 CAPSULE | Refills: 2 | Status: SHIPPED | OUTPATIENT
Start: 2023-12-07

## 2023-12-13 RX ORDER — TRAZODONE HYDROCHLORIDE 50 MG/1
50 TABLET ORAL NIGHTLY
Qty: 90 TABLET | Refills: 1 | Status: SHIPPED | OUTPATIENT
Start: 2023-12-13

## 2023-12-15 RX ORDER — NITROGLYCERIN 0.4 MG/1
0.4 TABLET SUBLINGUAL
Qty: 25 TABLET | Refills: 0 | Status: SHIPPED | OUTPATIENT
Start: 2023-12-15

## 2023-12-15 RX ORDER — EPINEPHRINE 0.3 MG/.3ML
0.3 INJECTION SUBCUTANEOUS ONCE
Qty: 1 EACH | Refills: 0 | Status: SHIPPED | OUTPATIENT
Start: 2023-12-15 | End: 2023-12-15

## 2023-12-22 ENCOUNTER — TELEPHONE (OUTPATIENT)
Dept: FAMILY MEDICINE CLINIC | Facility: CLINIC | Age: 68
End: 2023-12-22

## 2023-12-22 RX ORDER — ONDANSETRON 4 MG/1
4 TABLET, ORALLY DISINTEGRATING ORAL EVERY 8 HOURS PRN
Qty: 10 TABLET | Refills: 0 | Status: SHIPPED | OUTPATIENT
Start: 2023-12-22

## 2023-12-22 NOTE — TELEPHONE ENCOUNTER
Caller: Cam Phelan    Relationship: Emergency Contact    Best call back number: [phone]     What medication are you requesting: SOMETHING FOR NAUSEA AND VOMITING    What are your current symptoms: NAUSEA AND VOMITING AND DIARRHEA    How long have you been experiencing symptoms: YESTERDAY EVENING    Have you had these symptoms before:    [] Yes  [x] No    Have you been treated for these symptoms before:   [] Yes  [x] No    If a prescription is needed, what is your preferred pharmacy and phone number: [facility] #[id number] - [place] - [address] AT ACROSS FROM [address]  [phone] [facility] [phone] FX     Additional notes: CAM SAYS EDUARDO HAS GOT A STOMACH BUG AND WOULD LIKE YOU TO CALL SOMETHING IN FOR HIM.

## 2024-01-17 ENCOUNTER — OFFICE VISIT (OUTPATIENT)
Dept: GASTROENTEROLOGY | Facility: CLINIC | Age: 69
End: 2024-01-17
Payer: MEDICARE

## 2024-01-17 VITALS
HEIGHT: 67 IN | WEIGHT: 141 LBS | SYSTOLIC BLOOD PRESSURE: 102 MMHG | DIASTOLIC BLOOD PRESSURE: 66 MMHG | HEART RATE: 67 BPM | TEMPERATURE: 97.5 F | OXYGEN SATURATION: 100 % | BODY MASS INDEX: 22.13 KG/M2

## 2024-01-17 DIAGNOSIS — Z83.719 FAMILY HX COLONIC POLYPS: ICD-10-CM

## 2024-01-17 DIAGNOSIS — D68.9 COAGULOPATHY: ICD-10-CM

## 2024-01-17 DIAGNOSIS — Z86.010 HX OF COLONIC POLYP: Primary | ICD-10-CM

## 2024-01-17 PROBLEM — Z86.0100 HX OF COLONIC POLYP: Status: ACTIVE | Noted: 2024-01-17

## 2024-01-17 PROCEDURE — 3074F SYST BP LT 130 MM HG: CPT | Performed by: NURSE PRACTITIONER

## 2024-01-17 PROCEDURE — 99214 OFFICE O/P EST MOD 30 MIN: CPT | Performed by: NURSE PRACTITIONER

## 2024-01-17 PROCEDURE — 3078F DIAST BP <80 MM HG: CPT | Performed by: NURSE PRACTITIONER

## 2024-01-17 RX ORDER — POLYETHYLENE GLYCOL 3350, SODIUM SULFATE ANHYDROUS, SODIUM BICARBONATE, SODIUM CHLORIDE, POTASSIUM CHLORIDE 236; 22.74; 6.74; 5.86; 2.97 G/4L; G/4L; G/4L; G/4L; G/4L
4 POWDER, FOR SOLUTION ORAL ONCE
Qty: 4000 ML | Refills: 0 | Status: SHIPPED | OUTPATIENT
Start: 2024-01-17 | End: 2024-01-17

## 2024-01-17 RX ORDER — TAMSULOSIN HYDROCHLORIDE 0.4 MG/1
1 CAPSULE ORAL DAILY
COMMUNITY
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
## 2024-01-19 ENCOUNTER — ANESTHESIA (OUTPATIENT)
Dept: GASTROENTEROLOGY | Facility: HOSPITAL | Age: 69
End: 2024-01-19
Payer: MEDICARE

## 2024-01-19 ENCOUNTER — ANESTHESIA EVENT (OUTPATIENT)
Dept: GASTROENTEROLOGY | Facility: HOSPITAL | Age: 69
End: 2024-01-19
Payer: MEDICARE

## 2024-01-19 ENCOUNTER — HOSPITAL ENCOUNTER (OUTPATIENT)
Facility: HOSPITAL | Age: 69
Setting detail: HOSPITAL OUTPATIENT SURGERY
Discharge: HOME | End: 2024-01-19
Attending: INTERNAL MEDICINE | Admitting: INTERNAL MEDICINE
Payer: MEDICARE

## 2024-01-19 VITALS
SYSTOLIC BLOOD PRESSURE: 120 MMHG | HEIGHT: 67 IN | DIASTOLIC BLOOD PRESSURE: 68 MMHG | OXYGEN SATURATION: 96 % | HEART RATE: 60 BPM | WEIGHT: 141 LBS | BODY MASS INDEX: 22.13 KG/M2 | RESPIRATION RATE: 20 BRPM | TEMPERATURE: 97.3 F

## 2024-01-19 DIAGNOSIS — Z86.010 HX OF COLONIC POLYP: ICD-10-CM

## 2024-01-19 DIAGNOSIS — Z83.719 FAMILY HX COLONIC POLYPS: ICD-10-CM

## 2024-01-19 PROCEDURE — 25010000002 PROPOFOL 10 MG/ML EMULSION

## 2024-01-19 PROCEDURE — 45385 COLONOSCOPY W/LESION REMOVAL: CPT | Performed by: INTERNAL MEDICINE

## 2024-01-19 PROCEDURE — 25810000003 SODIUM CHLORIDE 0.9 % SOLUTION: Performed by: ANESTHESIOLOGY

## 2024-01-19 PROCEDURE — 88305 TISSUE EXAM BY PATHOLOGIST: CPT | Performed by: INTERNAL MEDICINE

## 2024-01-19 RX ORDER — SODIUM CHLORIDE 9 MG/ML
500 INJECTION, SOLUTION INTRAVENOUS CONTINUOUS PRN
Status: DISCONTINUED | OUTPATIENT
Start: 2024-01-19 | End: 2024-01-19 | Stop reason: HOSPADM

## 2024-01-19 RX ORDER — PROPOFOL 10 MG/ML
VIAL (ML) INTRAVENOUS AS NEEDED
Status: DISCONTINUED | OUTPATIENT
Start: 2024-01-19 | End: 2024-01-19 | Stop reason: SURG

## 2024-01-19 RX ORDER — LIDOCAINE HYDROCHLORIDE 10 MG/ML
0.5 INJECTION, SOLUTION EPIDURAL; INFILTRATION; INTRACAUDAL; PERINEURAL ONCE AS NEEDED
Status: DISCONTINUED | OUTPATIENT
Start: 2024-01-19 | End: 2024-01-19 | Stop reason: HOSPADM

## 2024-01-19 RX ORDER — SODIUM CHLORIDE 0.9 % (FLUSH) 0.9 %
10 SYRINGE (ML) INJECTION AS NEEDED
Status: DISCONTINUED | OUTPATIENT
Start: 2024-01-19 | End: 2024-01-19 | Stop reason: HOSPADM

## 2024-01-19 RX ORDER — ONDANSETRON 2 MG/ML
4 INJECTION INTRAMUSCULAR; INTRAVENOUS ONCE AS NEEDED
Status: DISCONTINUED | OUTPATIENT
Start: 2024-01-19 | End: 2024-01-19 | Stop reason: HOSPADM

## 2024-01-19 RX ORDER — LIDOCAINE HYDROCHLORIDE 20 MG/ML
INJECTION, SOLUTION EPIDURAL; INFILTRATION; INTRACAUDAL; PERINEURAL AS NEEDED
Status: DISCONTINUED | OUTPATIENT
Start: 2024-01-19 | End: 2024-01-19 | Stop reason: SURG

## 2024-01-19 RX ADMIN — LIDOCAINE HYDROCHLORIDE 50 MG: 20 INJECTION, SOLUTION EPIDURAL; INFILTRATION; INTRACAUDAL; PERINEURAL at 12:44

## 2024-01-19 RX ADMIN — SODIUM CHLORIDE 500 ML: 9 INJECTION, SOLUTION INTRAVENOUS at 11:07

## 2024-01-19 RX ADMIN — PROPOFOL INJECTABLE EMULSION 150 MG: 10 INJECTION, EMULSION INTRAVENOUS at 12:44

## 2024-01-19 NOTE — ANESTHESIA POSTPROCEDURE EVALUATION
Patient: Junito Phelan    Procedure Summary       Date: 01/19/24 Room / Location: [facility] ENDOSCOPY 5 /  [facility]    Anesthesia Start: 1241 Anesthesia Stop: 1302    Procedure: COLONOSCOPY WITH ANESTHESIA Diagnosis:       Hx of colonic polyp      Family hx colonic polyps      (Hx of colonic polyp [Z86.010])      (Family hx colonic polyps [Z83.719])    Surgeons: Josiah Pedraza MD Provider: Nelly Watts CRNA    Anesthesia Type: MAC ASA Status: 3            Anesthesia Type: MAC    Vitals  Vitals Value Taken Time   /65 01/19/24 1306   Temp     Pulse 60 01/19/24 1309   Resp 12 01/19/24 1302   SpO2 96 % 01/19/24 1309   Vitals shown include unfiled device data.        Post Anesthesia Care and Evaluation    Patient location during evaluation: bedside  Patient participation: complete - patient participated  Level of consciousness: awake and alert  Pain management: adequate    Airway patency: patent  Anesthetic complications: No anesthetic complications  PONV Status: none  Cardiovascular status: acceptable  Respiratory status: acceptable  Hydration status: acceptable  No anesthesia care post op

## 2024-01-19 NOTE — ANESTHESIA PREPROCEDURE EVALUATION
Anesthesia Evaluation     Patient summary reviewed   no history of anesthetic complications:   NPO Solid Status: > 8 hours             Airway   Mallampati: II  Dental    (+) upper dentures and lower dentures    Pulmonary - negative pulmonary ROS   Cardiovascular   Exercise tolerance: good (4-7 METS)    (+) pacemaker (medtronic) pacemaker, hypertension, CAD, cardiac stents more than 12 months ago , angina (chronic stable angina), hyperlipidemia      Neuro/Psych- negative ROS  GI/Hepatic/Renal/Endo    (+) renal disease- stones    Musculoskeletal     Abdominal    Substance History      OB/GYN          Other                      Anesthesia Plan    ASA 3     MAC       Anesthetic plan, risks, benefits, and alternatives have been provided, discussed and informed consent has been obtained with: patient.    CODE STATUS:

## 2024-01-20 DIAGNOSIS — N20.0 CALCULUS OF KIDNEY: ICD-10-CM

## 2024-01-20 DIAGNOSIS — N40.0 BENIGN PROSTATIC HYPERPLASIA WITHOUT LOWER URINARY TRACT SYMPTOMS: ICD-10-CM

## 2024-01-22 LAB
CYTO UR: NORMAL
LAB AP CASE REPORT: NORMAL
Lab: NORMAL
PATH REPORT.FINAL DX SPEC: NORMAL
PATH REPORT.GROSS SPEC: NORMAL

## 2024-01-22 RX ORDER — TAMSULOSIN HYDROCHLORIDE 0.4 MG/1
CAPSULE ORAL
Qty: 30 CAPSULE | Refills: 11 | OUTPATIENT
Start: 2024-01-22

## 2024-01-29 RX ORDER — HYDROXYZINE PAMOATE 25 MG/1
25 CAPSULE ORAL NIGHTLY PRN
Qty: 45 CAPSULE | Refills: 1 | Status: SHIPPED | OUTPATIENT
Start: 2024-01-29

## 2024-02-01 RX ORDER — TAMSULOSIN HYDROCHLORIDE 0.4 MG/1
1 CAPSULE ORAL DAILY
Qty: 30 CAPSULE | Refills: 2 | Status: SHIPPED | OUTPATIENT
Start: 2024-02-01

## 2024-02-01 NOTE — TELEPHONE ENCOUNTER
Caller: Junito Phelan    Relationship: Self    Best call back number:  [phone]   Requested Prescriptions:   Requested Prescriptions     Pending Prescriptions Disp Refills    tamsulosin (FLOMAX) 0.4 MG capsule 24 hr capsule 30 capsule      Sig: Take 1 capsule by mouth Daily.        Pharmacy where request should be sent:  [facility] [address]    Last office visit with prescribing clinician: 11/16/2023   Last telemedicine visit with prescribing clinician: Visit date not found   Next office visit with prescribing clinician: Visit date not found     Additional details provided by patient:      Does the patient have less than a 3 day supply:  [x] Yes  [] No    Would you like a call back once the refill request has been completed: [] Yes [x] No    If the office needs to give you a call back, can they leave a voicemail: [] Yes [x] No    Prince Abbott Rep   02/01/24 12:47 CST

## 2024-02-14 ENCOUNTER — APPOINTMENT (OUTPATIENT)
Dept: CT IMAGING | Age: 69
End: 2024-02-14
Payer: MEDICARE

## 2024-02-14 ENCOUNTER — HOSPITAL ENCOUNTER (EMERGENCY)
Age: 69
Discharge: HOME | End: 2024-02-14
Payer: MEDICARE

## 2024-02-14 VITALS
WEIGHT: 140 LBS | OXYGEN SATURATION: 97 % | BODY MASS INDEX: 21.93 KG/M2 | HEART RATE: 95 BPM | DIASTOLIC BLOOD PRESSURE: 82 MMHG | TEMPERATURE: 97.8 F | SYSTOLIC BLOOD PRESSURE: 130 MMHG | RESPIRATION RATE: 18 BRPM

## 2024-02-14 DIAGNOSIS — R10.9 FLANK PAIN: Primary | ICD-10-CM

## 2024-02-14 LAB
ALBUMIN SERPL-MCNC: 4.3 G/DL (ref 3.5–5.2)
ALP SERPL-CCNC: 116 U/L (ref 40–130)
ALT SERPL-CCNC: 48 U/L (ref 5–41)
ANION GAP SERPL CALCULATED.3IONS-SCNC: 11 MMOL/L (ref 7–19)
AST SERPL-CCNC: 35 U/L (ref 5–40)
BACTERIA #/AREA URNS HPF: ABNORMAL /HPF
BASOPHILS # BLD: 0 K/UL (ref 0–0.2)
BASOPHILS NFR BLD: 0.4 % (ref 0–1)
BILIRUB SERPL-MCNC: 0.5 MG/DL (ref 0.2–1.2)
BILIRUB UR QL STRIP: NEGATIVE
BUN SERPL-MCNC: 16 MG/DL (ref 8–23)
CALCIUM SERPL-MCNC: 9.1 MG/DL (ref 8.8–10.2)
CHLORIDE SERPL-SCNC: 98 MMOL/L (ref 98–111)
CLARITY UR: CLEAR
CO2 SERPL-SCNC: 27 MMOL/L (ref 22–29)
COLOR UR: ABNORMAL
CREAT SERPL-MCNC: 0.8 MG/DL (ref 0.5–1.2)
CRYSTALS URNS MICRO: ABNORMAL /HPF
EOSINOPHIL # BLD: 0 K/UL (ref 0–0.6)
EOSINOPHIL NFR BLD: 0.7 % (ref 0–5)
ERYTHROCYTE [DISTWIDTH] IN BLOOD BY AUTOMATED COUNT: 13 % (ref 11.5–14.5)
GLUCOSE SERPL-MCNC: 115 MG/DL (ref 74–109)
GLUCOSE UR STRIP.AUTO-MCNC: NEGATIVE MG/DL
HCT VFR BLD AUTO: 42.5 % (ref 42–52)
HGB BLD-MCNC: 14.6 G/DL (ref 14–18)
HGB UR STRIP.AUTO-MCNC: NEGATIVE MG/L
IMM GRANULOCYTES # BLD: 0 K/UL
KETONES UR STRIP.AUTO-MCNC: NEGATIVE MG/DL
LEUKOCYTE ESTERASE UR QL STRIP.AUTO: ABNORMAL
LYMPHOCYTES # BLD: 1.4 K/UL (ref 1.1–4.5)
LYMPHOCYTES NFR BLD: 25 % (ref 20–40)
MCH RBC QN AUTO: 31.3 PG (ref 27–31)
MCHC RBC AUTO-ENTMCNC: 34.4 G/DL (ref 33–37)
MCV RBC AUTO: 91.2 FL (ref 80–94)
MONOCYTES # BLD: 0.7 K/UL (ref 0–0.9)
MONOCYTES NFR BLD: 12 % (ref 0–10)
NEUTROPHILS # BLD: 3.4 K/UL (ref 1.5–7.5)
NEUTS SEG NFR BLD: 61.5 % (ref 50–65)
NITRITE UR QL STRIP.AUTO: NEGATIVE
PH UR STRIP.AUTO: 5.5 [PH] (ref 5–8)
PLATELET # BLD AUTO: 184 K/UL (ref 130–400)
PMV BLD AUTO: 8.5 FL (ref 9.4–12.4)
POTASSIUM SERPL-SCNC: 3.8 MMOL/L (ref 3.5–5)
PROT SERPL-MCNC: 6.9 G/DL (ref 6.6–8.7)
PROT UR STRIP.AUTO-MCNC: NEGATIVE MG/DL
RBC # BLD AUTO: 4.66 M/UL (ref 4.7–6.1)
RBC #/AREA URNS HPF: ABNORMAL /HPF (ref 0–2)
SODIUM SERPL-SCNC: 136 MMOL/L (ref 136–145)
SP GR UR STRIP.AUTO: 1.03 (ref 1–1.03)
SQUAMOUS #/AREA URNS HPF: ABNORMAL /HPF
UROBILINOGEN UR STRIP.AUTO-MCNC: 0.2 E.U./DL
WBC # BLD AUTO: 5.5 K/UL (ref 4.8–10.8)
WBC #/AREA URNS HPF: ABNORMAL /HPF (ref 0–5)

## 2024-02-14 PROCEDURE — 99284 EMERGENCY DEPT VISIT MOD MDM: CPT

## 2024-02-14 PROCEDURE — 6360000002 HC RX W HCPCS: Performed by: NURSE PRACTITIONER

## 2024-02-14 PROCEDURE — 2580000003 HC RX 258: Performed by: NURSE PRACTITIONER

## 2024-02-14 PROCEDURE — 96374 THER/PROPH/DIAG INJ IV PUSH: CPT

## 2024-02-14 PROCEDURE — 36415 COLL VENOUS BLD VENIPUNCTURE: CPT

## 2024-02-14 PROCEDURE — 74176 CT ABD & PELVIS W/O CONTRAST: CPT

## 2024-02-14 PROCEDURE — 85025 COMPLETE CBC W/AUTO DIFF WBC: CPT

## 2024-02-14 PROCEDURE — 96376 TX/PRO/DX INJ SAME DRUG ADON: CPT

## 2024-02-14 PROCEDURE — 96375 TX/PRO/DX INJ NEW DRUG ADDON: CPT

## 2024-02-14 PROCEDURE — 80053 COMPREHEN METABOLIC PANEL: CPT

## 2024-02-14 PROCEDURE — 81001 URINALYSIS AUTO W/SCOPE: CPT

## 2024-02-14 RX ORDER — ONDANSETRON 4 MG/1
4 TABLET, ORALLY DISINTEGRATING ORAL EVERY 8 HOURS PRN
Qty: 20 TABLET | Refills: 0 | Status: SHIPPED | OUTPATIENT
Start: 2024-02-14 | End: 2024-02-21

## 2024-02-14 RX ORDER — MORPHINE SULFATE 4 MG/ML
4 INJECTION, SOLUTION INTRAMUSCULAR; INTRAVENOUS
Status: COMPLETED | OUTPATIENT
Start: 2024-02-14 | End: 2024-02-14

## 2024-02-14 RX ORDER — MORPHINE SULFATE 4 MG/ML
4 INJECTION, SOLUTION INTRAMUSCULAR; INTRAVENOUS ONCE
Status: COMPLETED | OUTPATIENT
Start: 2024-02-14 | End: 2024-02-14

## 2024-02-14 RX ORDER — 0.9 % SODIUM CHLORIDE 0.9 %
1000 INTRAVENOUS SOLUTION INTRAVENOUS ONCE
Status: COMPLETED | OUTPATIENT
Start: 2024-02-14 | End: 2024-02-14

## 2024-02-14 RX ORDER — KETOROLAC TROMETHAMINE 30 MG/ML
15 INJECTION, SOLUTION INTRAMUSCULAR; INTRAVENOUS ONCE
Status: COMPLETED | OUTPATIENT
Start: 2024-02-14 | End: 2024-02-14

## 2024-02-14 RX ORDER — TRAZODONE HYDROCHLORIDE 50 MG/1
50 TABLET ORAL NIGHTLY
COMMUNITY

## 2024-02-14 RX ORDER — ONDANSETRON 2 MG/ML
4 INJECTION INTRAMUSCULAR; INTRAVENOUS ONCE
Status: COMPLETED | OUTPATIENT
Start: 2024-02-14 | End: 2024-02-14

## 2024-02-14 RX ADMIN — MORPHINE SULFATE 4 MG: 4 INJECTION, SOLUTION INTRAMUSCULAR; INTRAVENOUS at 16:23

## 2024-02-14 RX ADMIN — MORPHINE SULFATE 4 MG: 4 INJECTION, SOLUTION INTRAMUSCULAR; INTRAVENOUS at 14:53

## 2024-02-14 RX ADMIN — ONDANSETRON 4 MG: 2 INJECTION INTRAMUSCULAR; INTRAVENOUS at 14:53

## 2024-02-14 RX ADMIN — SODIUM CHLORIDE 1000 ML: 9 INJECTION, SOLUTION INTRAVENOUS at 14:52

## 2024-02-14 RX ADMIN — KETOROLAC TROMETHAMINE 15 MG: 30 INJECTION, SOLUTION INTRAMUSCULAR; INTRAVENOUS at 15:56

## 2024-02-14 NOTE — ED PROVIDER NOTES
[facility] EMERGENCY DEPT  EMERGENCY DEPARTMENT ENCOUNTER      Pt Name: Kraig Thakkar  MRN: [id number]  Birthdate 1955  Date of evaluation: 2/14/2024  Provider: BHUMIKA Baird CNP  4:19 PM    CHIEF COMPLAINT       Chief Complaint   Patient presents with    Flank Pain     Left flank pain intermittent for 4 days; hx of stones    Testicle Pain     Left testicle pain intermittent for 4 days          HISTORY OF PRESENT ILLNESS    Kraig Thakkar is a 69 y.o. male who presents to the emergency department with concern for left flank pain for past four days, now radiating to left lower groin and testicle. States \"I think I have a kidney stone\". Reports hx of kidney stones previously, also requiring intervention previously. Unsure of urologists name. No fever or chills. Some nausea. Not vomiting currently    HPI    Nursing Notes were reviewed.    REVIEW OF SYSTEMS       Review of Systems   Constitutional:  Negative for chills and fever.   HENT:  Negative for congestion.    Respiratory:  Negative for cough and shortness of breath.    Cardiovascular:  Negative for chest pain and palpitations.   Gastrointestinal:  Positive for nausea. Negative for abdominal pain, diarrhea and vomiting.   Genitourinary:  Positive for flank pain. Negative for dysuria, frequency and urgency.   Musculoskeletal:  Negative for back pain and neck pain.   Neurological:  Negative for dizziness, syncope, weakness, light-headedness and headaches.       Except as noted above the remainder of the review of systems was reviewed and negative.       PAST MEDICAL HISTORY     Past Medical History:   Diagnosis Date    CAD (coronary artery disease) 2010    Native Vessel.  S/p stenting wtih negative cardiac cath on January 18, 2010 and negative nuclear stress test on June 29, 2010.    Chest pain 1/28/2016    DJD (degenerative joint disease)     GERD (gastroesophageal reflux disease) 2010    HTN (hypertension)     Hyperlipidemia     Left knee pain 1/28/2016  preop anxiety

## 2024-02-22 RX ORDER — TRAZODONE HYDROCHLORIDE 50 MG/1
50 TABLET ORAL NIGHTLY
Qty: 90 TABLET | Refills: 1 | Status: SHIPPED | OUTPATIENT
Start: 2024-02-22

## 2024-03-11 ENCOUNTER — TELEPHONE (OUTPATIENT)
Dept: FAMILY MEDICINE CLINIC | Facility: CLINIC | Age: 69
End: 2024-03-11
Payer: MEDICARE

## 2024-03-11 NOTE — TELEPHONE ENCOUNTER
Keturah called from [facility] requesting Junito's most recent fasting lipid panel. The fax number is [phone].

## 2024-03-14 ENCOUNTER — APPOINTMENT (OUTPATIENT)
Dept: GENERAL RADIOLOGY | Age: 69
End: 2024-03-14
Payer: MEDICARE

## 2024-03-14 ENCOUNTER — APPOINTMENT (OUTPATIENT)
Dept: CT IMAGING | Age: 69
End: 2024-03-14
Payer: MEDICARE

## 2024-03-14 ENCOUNTER — HOSPITAL ENCOUNTER (OUTPATIENT)
Age: 69
Setting detail: OBSERVATION
Discharge: HOME | End: 2024-03-16
Attending: STUDENT IN AN ORGANIZED HEALTH CARE EDUCATION/TRAINING PROGRAM | Admitting: STUDENT IN AN ORGANIZED HEALTH CARE EDUCATION/TRAINING PROGRAM
Payer: MEDICARE

## 2024-03-14 DIAGNOSIS — S20.362A TICK BITE OF LEFT FRONT WALL OF THORAX, INITIAL ENCOUNTER: ICD-10-CM
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
## 2024-03-14 DIAGNOSIS — R07.9 CHEST PAIN, UNSPECIFIED TYPE: Primary | ICD-10-CM

## 2024-03-14 PROBLEM — I25.119 CHEST PAIN DUE TO CAD (HCC): Status: ACTIVE | Noted: 2024-03-14

## 2024-03-14 LAB
ALBUMIN SERPL-MCNC: 3.9 G/DL (ref 3.5–5.2)
ALP SERPL-CCNC: 133 U/L (ref 40–130)
ALT SERPL-CCNC: 70 U/L (ref 5–41)
ANION GAP SERPL CALCULATED.3IONS-SCNC: 6 MMOL/L (ref 7–19)
AST SERPL-CCNC: 54 U/L (ref 5–40)
BASOPHILS # BLD: 0 K/UL (ref 0–0.2)
BASOPHILS NFR BLD: 0.3 % (ref 0–1)
BILIRUB SERPL-MCNC: 0.4 MG/DL (ref 0.2–1.2)
BNP BLD-MCNC: 101 PG/ML (ref 0–124)
BUN SERPL-MCNC: 13 MG/DL (ref 8–23)
CALCIUM SERPL-MCNC: 8.9 MG/DL (ref 8.8–10.2)
CHLORIDE SERPL-SCNC: 101 MMOL/L (ref 98–111)
CO2 SERPL-SCNC: 31 MMOL/L (ref 22–29)
CREAT SERPL-MCNC: 0.7 MG/DL (ref 0.5–1.2)
D DIMER PPP FEU-MCNC: 0.58 UG/ML FEU (ref 0–0.48)
EOSINOPHIL # BLD: 0.1 K/UL (ref 0–0.6)
EOSINOPHIL NFR BLD: 3.3 % (ref 0–5)
ERYTHROCYTE [DISTWIDTH] IN BLOOD BY AUTOMATED COUNT: 13 % (ref 11.5–14.5)
GLUCOSE SERPL-MCNC: 115 MG/DL (ref 74–109)
HCT VFR BLD AUTO: 43.2 % (ref 42–52)
HGB BLD-MCNC: 14.7 G/DL (ref 14–18)
IMM GRANULOCYTES # BLD: 0 K/UL
LYMPHOCYTES # BLD: 0.5 K/UL (ref 1.1–4.5)
LYMPHOCYTES NFR BLD: 15.1 % (ref 20–40)
MCH RBC QN AUTO: 32.2 PG (ref 27–31)
MCHC RBC AUTO-ENTMCNC: 34 G/DL (ref 33–37)
MCV RBC AUTO: 94.7 FL (ref 80–94)
MONOCYTES # BLD: 0.5 K/UL (ref 0–0.9)
MONOCYTES NFR BLD: 15.7 % (ref 0–10)
NEUTROPHILS # BLD: 2.2 K/UL (ref 1.5–7.5)
NEUTS SEG NFR BLD: 65.3 % (ref 50–65)
PLATELET # BLD AUTO: 146 K/UL (ref 130–400)
PMV BLD AUTO: 8.9 FL (ref 9.4–12.4)
POTASSIUM SERPL-SCNC: 4.6 MMOL/L (ref 3.5–5)
PROT SERPL-MCNC: 6.6 G/DL (ref 6.6–8.7)
RBC # BLD AUTO: 4.56 M/UL (ref 4.7–6.1)
REASON FOR REJECTION: NORMAL
REASON FOR REJECTION: NORMAL
REJECTED TEST: NORMAL
REJECTED TEST: NORMAL
SODIUM SERPL-SCNC: 138 MMOL/L (ref 136–145)
TROPONIN, HIGH SENSITIVITY: 8 NG/L (ref 0–22)
TROPONIN, HIGH SENSITIVITY: 8 NG/L (ref 0–22)
TROPONIN, HIGH SENSITIVITY: 9 NG/L (ref 0–22)
WBC # BLD AUTO: 3.3 K/UL (ref 4.8–10.8)

## 2024-03-14 PROCEDURE — G0378 HOSPITAL OBSERVATION PER HR: HCPCS

## 2024-03-14 PROCEDURE — 96376 TX/PRO/DX INJ SAME DRUG ADON: CPT

## 2024-03-14 PROCEDURE — 93005 ELECTROCARDIOGRAM TRACING: CPT | Performed by: PHYSICIAN ASSISTANT

## 2024-03-14 PROCEDURE — 70450 CT HEAD/BRAIN W/O DYE: CPT

## 2024-03-14 PROCEDURE — 96374 THER/PROPH/DIAG INJ IV PUSH: CPT

## 2024-03-14 PROCEDURE — 83880 ASSAY OF NATRIURETIC PEPTIDE: CPT

## 2024-03-14 PROCEDURE — 6370000000 HC RX 637 (ALT 250 FOR IP): Performed by: PHYSICIAN ASSISTANT

## 2024-03-14 PROCEDURE — 6370000000 HC RX 637 (ALT 250 FOR IP): Performed by: STUDENT IN AN ORGANIZED HEALTH CARE EDUCATION/TRAINING PROGRAM

## 2024-03-14 PROCEDURE — 6370000000 HC RX 637 (ALT 250 FOR IP)

## 2024-03-14 PROCEDURE — 93005 ELECTROCARDIOGRAM TRACING: CPT | Performed by: STUDENT IN AN ORGANIZED HEALTH CARE EDUCATION/TRAINING PROGRAM

## 2024-03-14 PROCEDURE — 99285 EMERGENCY DEPT VISIT HI MDM: CPT

## 2024-03-14 PROCEDURE — 6360000002 HC RX W HCPCS: Performed by: STUDENT IN AN ORGANIZED HEALTH CARE EDUCATION/TRAINING PROGRAM

## 2024-03-14 PROCEDURE — 36415 COLL VENOUS BLD VENIPUNCTURE: CPT

## 2024-03-14 PROCEDURE — 80053 COMPREHEN METABOLIC PANEL: CPT

## 2024-03-14 PROCEDURE — 2580000003 HC RX 258: Performed by: PHYSICIAN ASSISTANT

## 2024-03-14 PROCEDURE — 85025 COMPLETE CBC W/AUTO DIFF WBC: CPT

## 2024-03-14 PROCEDURE — 71045 X-RAY EXAM CHEST 1 VIEW: CPT

## 2024-03-14 PROCEDURE — 96372 THER/PROPH/DIAG INJ SC/IM: CPT

## 2024-03-14 PROCEDURE — 6360000002 HC RX W HCPCS: Performed by: PHYSICIAN ASSISTANT

## 2024-03-14 PROCEDURE — 96375 TX/PRO/DX INJ NEW DRUG ADDON: CPT

## 2024-03-14 PROCEDURE — 85379 FIBRIN DEGRADATION QUANT: CPT

## 2024-03-14 PROCEDURE — 84484 ASSAY OF TROPONIN QUANT: CPT

## 2024-03-14 RX ORDER — ACETAMINOPHEN 650 MG/1
650 SUPPOSITORY RECTAL EVERY 6 HOURS PRN
Status: DISCONTINUED | OUTPATIENT
Start: 2024-03-14 | End: 2024-03-16 | Stop reason: HOSPADM

## 2024-03-14 RX ORDER — POTASSIUM CHLORIDE 7.45 MG/ML
10 INJECTION INTRAVENOUS PRN
Status: DISCONTINUED | OUTPATIENT
Start: 2024-03-14 | End: 2024-03-16 | Stop reason: HOSPADM

## 2024-03-14 RX ORDER — POTASSIUM CITRATE 15 MEQ/1
15 TABLET, EXTENDED RELEASE ORAL NIGHTLY
Status: ON HOLD | COMMUNITY
Start: 2023-08-18 | End: 2024-03-15 | Stop reason: HOSPADM

## 2024-03-14 RX ORDER — AMLODIPINE BESYLATE 10 MG/1
10 TABLET ORAL DAILY
Status: DISCONTINUED | OUTPATIENT
Start: 2024-03-15 | End: 2024-03-16 | Stop reason: HOSPADM

## 2024-03-14 RX ORDER — HYDROXYZINE PAMOATE 25 MG/1
25 CAPSULE ORAL
Status: ON HOLD | COMMUNITY
Start: 2024-01-29 | End: 2024-03-15 | Stop reason: HOSPADM

## 2024-03-14 RX ORDER — SODIUM CHLORIDE 9 MG/ML
INJECTION, SOLUTION INTRAVENOUS PRN
Status: DISCONTINUED | OUTPATIENT
Start: 2024-03-14 | End: 2024-03-16 | Stop reason: HOSPADM

## 2024-03-14 RX ORDER — ISOSORBIDE MONONITRATE 60 MG/1
120 TABLET, EXTENDED RELEASE ORAL 2 TIMES DAILY
Status: DISCONTINUED | OUTPATIENT
Start: 2024-03-14 | End: 2024-03-15

## 2024-03-14 RX ORDER — METOPROLOL SUCCINATE 25 MG/1
25 TABLET, EXTENDED RELEASE ORAL
COMMUNITY
Start: 2023-12-14

## 2024-03-14 RX ORDER — MORPHINE SULFATE 2 MG/ML
2 INJECTION, SOLUTION INTRAMUSCULAR; INTRAVENOUS
Status: DISCONTINUED | OUTPATIENT
Start: 2024-03-14 | End: 2024-03-16 | Stop reason: HOSPADM

## 2024-03-14 RX ORDER — MAGNESIUM SULFATE IN WATER 40 MG/ML
2000 INJECTION, SOLUTION INTRAVENOUS PRN
Status: DISCONTINUED | OUTPATIENT
Start: 2024-03-14 | End: 2024-03-16 | Stop reason: HOSPADM

## 2024-03-14 RX ORDER — POTASSIUM CHLORIDE 20 MEQ/1
40 TABLET, EXTENDED RELEASE ORAL PRN
Status: DISCONTINUED | OUTPATIENT
Start: 2024-03-14 | End: 2024-03-16 | Stop reason: HOSPADM

## 2024-03-14 RX ORDER — POLYETHYLENE GLYCOL 3350 17 G/17G
17 POWDER, FOR SOLUTION ORAL DAILY PRN
Status: DISCONTINUED | OUTPATIENT
Start: 2024-03-14 | End: 2024-03-16 | Stop reason: HOSPADM

## 2024-03-14 RX ORDER — METOPROLOL TARTRATE 50 MG/1
50 TABLET, FILM COATED ORAL 2 TIMES DAILY
Status: DISCONTINUED | OUTPATIENT
Start: 2024-03-14 | End: 2024-03-16 | Stop reason: HOSPADM

## 2024-03-14 RX ORDER — TAMSULOSIN HYDROCHLORIDE 0.4 MG/1
0.4 CAPSULE ORAL DAILY
COMMUNITY
Start: 2023-03-14

## 2024-03-14 RX ORDER — ACETAMINOPHEN 325 MG/1
650 TABLET ORAL EVERY 6 HOURS PRN
Status: DISCONTINUED | OUTPATIENT
Start: 2024-03-14 | End: 2024-03-16 | Stop reason: HOSPADM

## 2024-03-14 RX ORDER — ENOXAPARIN SODIUM 100 MG/ML
40 INJECTION SUBCUTANEOUS DAILY
Status: DISCONTINUED | OUTPATIENT
Start: 2024-03-14 | End: 2024-03-15

## 2024-03-14 RX ORDER — 0.9 % SODIUM CHLORIDE 0.9 %
500 INTRAVENOUS SOLUTION INTRAVENOUS ONCE
Status: COMPLETED | OUTPATIENT
Start: 2024-03-14 | End: 2024-03-14

## 2024-03-14 RX ORDER — PROCHLORPERAZINE MALEATE 10 MG
5 TABLET ORAL EVERY 6 HOURS PRN
Status: DISCONTINUED | OUTPATIENT
Start: 2024-03-14 | End: 2024-03-16 | Stop reason: HOSPADM

## 2024-03-14 RX ORDER — MORPHINE SULFATE 4 MG/ML
4 INJECTION, SOLUTION INTRAMUSCULAR; INTRAVENOUS ONCE
Status: COMPLETED | OUTPATIENT
Start: 2024-03-14 | End: 2024-03-14

## 2024-03-14 RX ORDER — ONDANSETRON 2 MG/ML
4 INJECTION INTRAMUSCULAR; INTRAVENOUS ONCE
Status: COMPLETED | OUTPATIENT
Start: 2024-03-14 | End: 2024-03-14

## 2024-03-14 RX ORDER — ONDANSETRON 2 MG/ML
4 INJECTION INTRAMUSCULAR; INTRAVENOUS EVERY 6 HOURS PRN
Status: DISCONTINUED | OUTPATIENT
Start: 2024-03-14 | End: 2024-03-16 | Stop reason: HOSPADM

## 2024-03-14 RX ORDER — ONDANSETRON 4 MG/1
4 TABLET, ORALLY DISINTEGRATING ORAL EVERY 8 HOURS PRN
Status: DISCONTINUED | OUTPATIENT
Start: 2024-03-14 | End: 2024-03-16 | Stop reason: HOSPADM

## 2024-03-14 RX ORDER — FLUOXETINE HYDROCHLORIDE 40 MG/1
40 CAPSULE ORAL DAILY
COMMUNITY
Start: 2023-12-07

## 2024-03-14 RX ORDER — NITROGLYCERIN 0.4 MG/1
0.4 TABLET SUBLINGUAL 3 TIMES DAILY PRN
Status: DISCONTINUED | OUTPATIENT
Start: 2024-03-14 | End: 2024-03-16 | Stop reason: HOSPADM

## 2024-03-14 RX ORDER — NITROGLYCERIN 0.4 MG/1
0.4 TABLET SUBLINGUAL EVERY 5 MIN PRN
Status: DISCONTINUED | OUTPATIENT
Start: 2024-03-14 | End: 2024-03-14 | Stop reason: SDUPTHER

## 2024-03-14 RX ORDER — SODIUM CHLORIDE 0.9 % (FLUSH) 0.9 %
5-40 SYRINGE (ML) INJECTION PRN
Status: DISCONTINUED | OUTPATIENT
Start: 2024-03-14 | End: 2024-03-16 | Stop reason: HOSPADM

## 2024-03-14 RX ORDER — TRAZODONE HYDROCHLORIDE 50 MG/1
50 TABLET ORAL NIGHTLY PRN
Status: DISCONTINUED | OUTPATIENT
Start: 2024-03-14 | End: 2024-03-16 | Stop reason: HOSPADM

## 2024-03-14 RX ORDER — SENNA AND DOCUSATE SODIUM 50; 8.6 MG/1; MG/1
1 TABLET, FILM COATED ORAL 2 TIMES DAILY
Status: DISCONTINUED | OUTPATIENT
Start: 2024-03-14 | End: 2024-03-16 | Stop reason: HOSPADM

## 2024-03-14 RX ORDER — ASPIRIN 325 MG
325 TABLET ORAL DAILY
Status: DISCONTINUED | OUTPATIENT
Start: 2024-03-15 | End: 2024-03-16 | Stop reason: HOSPADM

## 2024-03-14 RX ORDER — CLOPIDOGREL BISULFATE 75 MG/1
75 TABLET ORAL DAILY
Status: DISCONTINUED | OUTPATIENT
Start: 2024-03-14 | End: 2024-03-16 | Stop reason: HOSPADM

## 2024-03-14 RX ORDER — SODIUM CHLORIDE 0.9 % (FLUSH) 0.9 %
5-40 SYRINGE (ML) INJECTION EVERY 12 HOURS SCHEDULED
Status: DISCONTINUED | OUTPATIENT
Start: 2024-03-14 | End: 2024-03-16 | Stop reason: HOSPADM

## 2024-03-14 RX ADMIN — MORPHINE SULFATE 2 MG: 2 INJECTION, SOLUTION INTRAMUSCULAR; INTRAVENOUS at 18:24

## 2024-03-14 RX ADMIN — NITROGLYCERIN 0.4 MG: 0.4 TABLET, ORALLY DISINTEGRATING SUBLINGUAL at 14:15

## 2024-03-14 RX ADMIN — NITROGLYCERIN 0.4 MG: 0.4 TABLET, ORALLY DISINTEGRATING SUBLINGUAL at 14:10

## 2024-03-14 RX ADMIN — NITROGLYCERIN 0.5 INCH: 20 OINTMENT TOPICAL at 17:05

## 2024-03-14 RX ADMIN — MORPHINE SULFATE 4 MG: 4 INJECTION, SOLUTION INTRAMUSCULAR; INTRAVENOUS at 09:37

## 2024-03-14 RX ADMIN — OXYCODONE HYDROCHLORIDE 15 MG: 10 TABLET ORAL at 17:06

## 2024-03-14 RX ADMIN — MORPHINE SULFATE 2 MG: 2 INJECTION, SOLUTION INTRAMUSCULAR; INTRAVENOUS at 21:40

## 2024-03-14 RX ADMIN — ENOXAPARIN SODIUM 40 MG: 100 INJECTION SUBCUTANEOUS at 17:06

## 2024-03-14 RX ADMIN — ONDANSETRON 4 MG: 2 INJECTION INTRAMUSCULAR; INTRAVENOUS at 15:35

## 2024-03-14 RX ADMIN — ISOSORBIDE MONONITRATE 120 MG: 60 TABLET, EXTENDED RELEASE ORAL at 20:17

## 2024-03-14 RX ADMIN — ONDANSETRON 4 MG: 2 INJECTION INTRAMUSCULAR; INTRAVENOUS at 09:36

## 2024-03-14 RX ADMIN — TRAZODONE HYDROCHLORIDE 50 MG: 50 TABLET ORAL at 20:23

## 2024-03-14 RX ADMIN — DOCUSATE SODIUM 50 MG AND SENNOSIDES 8.6 MG 1 TABLET: 8.6; 5 TABLET, FILM COATED ORAL at 20:17

## 2024-03-14 RX ADMIN — SODIUM CHLORIDE 500 ML: 9 INJECTION, SOLUTION INTRAVENOUS at 15:36

## 2024-03-14 ASSESSMENT — ENCOUNTER SYMPTOMS
BACK PAIN: 1
BACK PAIN: 0
CONSTIPATION: 0
NAUSEA: 1
ABDOMINAL PAIN: 0
VOMITING: 0
SHORTNESS OF BREATH: 1
ABDOMINAL DISTENTION: 0
COUGH: 0
DIARRHEA: 0

## 2024-03-14 ASSESSMENT — PAIN SCALES - GENERAL
PAINLEVEL_OUTOF10: 5
PAINLEVEL_OUTOF10: 7

## 2024-03-14 ASSESSMENT — PAIN DESCRIPTION - DESCRIPTORS
DESCRIPTORS: ACHING;DISCOMFORT
DESCRIPTORS: DISCOMFORT;HEAVINESS;PRESSURE
DESCRIPTORS: HEAVINESS;PRESSURE
DESCRIPTORS: PRESSURE
DESCRIPTORS: PRESSURE

## 2024-03-14 ASSESSMENT — PAIN DESCRIPTION - LOCATION
LOCATION: CHEST
LOCATION: CHEST

## 2024-03-14 ASSESSMENT — PAIN - FUNCTIONAL ASSESSMENT
PAIN_FUNCTIONAL_ASSESSMENT: ACTIVITIES ARE NOT PREVENTED

## 2024-03-14 ASSESSMENT — PAIN DESCRIPTION - ORIENTATION
ORIENTATION: RIGHT
ORIENTATION: MID
ORIENTATION: RIGHT
ORIENTATION: RIGHT
ORIENTATION: MID

## 2024-03-14 NOTE — CARE COORDINATION
Case Management Assessment  Initial Evaluation    Date/Time of Evaluation: 3/14/2024 1:31 PM  Assessment Completed by: Zain Alcantara    If patient is discharged prior to next notation, then this note serves as note for discharge by case management.    Patient Name: Kraig Thakkar                   YOB: 1955  Diagnosis: Chest pain due to CAD (HCC) [I25.119]                   Date / Time: 3/14/2024  9:07 AM    Patient Admission Status: Observation   Readmission Risk (Low < 19, Mod (19-27), High > 27): No data recorded  Current PCP: Merlin Brown MD  PCP verified by CM? Yes    Chart Reviewed: Yes      History Provided by: Patient  Patient Orientation: Alert and Oriented    Patient Cognition: Alert    Hospitalization in the last 30 days (Readmission):  No    If yes, Readmission Assessment in  Navigator will be completed.    Advance Directives:      Code Status: Prior   Patient's Primary Decision Maker is: Legal Next of Kin      Discharge Planning:    Patient lives with: Spouse/Significant Other Type of Home: House  Primary Care Giver: Self  Patient Support Systems include: Spouse/Significant Other, Family Members, Friends/Neighbors   Current Financial resources: Medicare  Current community resources: None  Current services prior to admission: None            Current DME:              Type of Home Care services:  None    ADLS  Prior functional level: Independent in ADLs/IADLs  Current functional level: Independent in ADLs/IADLs    PT AM-PAC:   /24  OT AM-PAC:   /24    Family can provide assistance at DC: Yes  Would you like Case Management to discuss the discharge plan with any other family members/significant others, and if so, who? Yes  Plans to Return to Present Housing: Yes  Potential Assistance needed at discharge: N/A            Potential DME:    Patient expects to discharge to: House  Plan for transportation at discharge: Family    Financial    Payor: MEDICARE / Plan: MEDICARE PART A AND B /

## 2024-03-14 NOTE — PROGRESS NOTES
4 Eyes Skin Assessment     NAME:  Kraig Thakkar  YOB: 1955  MEDICAL RECORD NUMBER:  [id number]    The patient is being assessed for  Admission    I agree that at least one RN has performed a thorough Head to Toe Skin Assessment on the patient. ALL assessment sites listed below have been assessed.      Areas assessed by both nurses:    Head, Face, Ears, Shoulders, Back, Chest, Arms, Elbows, Hands, Sacrum. Buttock, Coccyx, Ischium, Legs. Feet and Heels, and Under Medical Devices         Does the Patient have a Wound? No noted wound(s)       Koko Prevention initiated by RN: No  Wound Care Orders initiated by RN: No    Pressure Injury (Stage 3,4, Unstageable, DTI, NWPT, and Complex wounds) if present, place Wound referral order by RN under : No    New Ostomies, if present place, Ostomy referral order under : No     Nurse 1 eSignature: Electronically signed by Addis Arnold RN on 3/14/24 at 6:22 PM CDT    **SHARE this note so that the co-signing nurse can place an eSignature**    Nurse 2 eSignature: Electronically signed by Karen Key RN on 3/14/24 at 6:22 PM CDT

## 2024-03-14 NOTE — ED NOTES
Pt reported nausea at bedside. BP 78/59. BP recycled and new measurement of 74/53. Pt. Placed in supine position. Attempted to call BHUMIKA Fung at 1435 and Doe Fisher attending MD at 1432 with no answer. Notified DANYELL Mckinnon. IVF bolus ordered per Brian CHILD and given (SEE MAR)  Pt hypotension improving Perfect serve message sent to Dr Fisher at 1439.

## 2024-03-14 NOTE — PROGRESS NOTES
ED TO INPATIENT SBAR HANDOFF    Patient Name: Kraig Thakkar   : 1955  69 y.o.   Family/Caregiver Present: Yes  Code Status Order: Prior    C-SSRS: Risk of Suicide: No Risk  Sitter No  Restraints:         Situation  Chief Complaint   Patient presents with    Chest Pain     Pt here with chest pain and nausea since last night. Pt states that he has cardiac hx      Brief Description of Patient's Condition: Kraig Thakkar is a 69 y.o. male with history including Medtronic pacemaker placement, CAD, prior tobacco use, hyperlipidemia, hypertension, BPH, GERD, and kidney stones who presents to the emergency department with complaint of chest pain.  The patient states sudden onset of chest pain this morning.  He has had 3 nitro at home.  He does admit to associated nausea without vomiting, dizziness, and shortness of breath.  He denies any associated swelling in his legs, abdominal pain, back pain, or neck pain.  He is on Plavix as a blood thinner.     Most recent echo was 2022 at [place] which showed ejection fraction between 55 and 65%.  There was note of mild aortic stenosis and mild to moderate regurgitation in the tricuspid valve.  According to his chart, last cardiac cath was in 2016 by Dr. Hedrick.     Mental Status: oriented, alert, coherent, logical, thought processes intact, and able to concentrate and follow conversation  Arrived from: home    Imaging:   XR CHEST PORTABLE   Final Result       1. No acute cardiopulmonary disease.   2. Pacemaker.               .           ______________________________________    Electronically signed by: HALLE ALEJANDRA D.O.   Date:     2024   Time:    09:46       CT HEAD WO CONTRAST    (Results Pending)     COVID-19 Results:   Internal Administration   First Dose COVID-19, PFIZER PURPLE top, DILUTE for use, (age 12 y+), 30mcg/0.3mL  2021   Second Dose COVID-19, PFIZER PURPLE top, DILUTE for use, (age 12 y+), 30mcg/0.3mL   2021      Score Sepsis Risk Score: 1.7    Admitted with Sepsis? No    Recommendation  Incomplete orders: N/A  Patient Belongings: With patient  Additional Comments:  If any further questions, please call Sending RN at [phone]    Electronically signed by: Electronically signed by Tari Riggs RN on 3/14/2024 at 3:16 PM

## 2024-03-14 NOTE — H&P
[facility]      Hospitalist - History & Physical      PCP: Merlin Brown MD    Date of Admission: 3/14/2024    Date of Service: 3/14/2024    Chief Complaint:  Chest pain    History Of Present Illness:   Patient is a 69-year-old male with a past medical history of Medtronic pacemaker placement, CAD, prior tobacco use, hyperlipidemia, hypertension, BPH, GERD, kidney stone who presented to the ED with complaints of chest pain.  Patient reported a sudden onset of chest pain this morning.  He reported taking 3 nitro at home.  Patient does admit to associated nausea without vomiting, dizziness, and shortness of breath.  He denied any associated swelling in his legs, denies abdominal pain back pain or neck pain.  Patient is currently on Plavix.  Patient's most recent echo 7/11/2022 admitted about showed an ejection fraction between 55 and 60%.  He was noted to have mild aortic stenosis and mild to moderate regurgitation in the tricuspid valve.  Per patient's chart last heart cath was completed in December 2016 by Dr. Hedrick.      Patient admitted to hospital services for medical management.      Past Medical History:        Diagnosis Date    CAD (coronary artery disease) 2010    Native Vessel.  S/p stenting wtih negative cardiac cath on January 18, 2010 and negative nuclear stress test on June 29, 2010.    Chest pain 1/28/2016    DJD (degenerative joint disease)     GERD (gastroesophageal reflux disease) 2010    HTN (hypertension)     Hyperlipidemia     Left knee pain 1/28/2016    Pacemaker 2010    MEDTRONIC    Polyarticular arthritis     Renal calculi 2010    S/p lithrotripsy    Right arm pain 1/28/2016    Right ureteral stone 7/8/2016    Stones in the urinary tract 02/02/2022    Tobacco abuse 2010    Prior.       Past Surgical History:        Procedure Laterality Date    CARDIAC CATHETERIZATION  01/08/2010    EF is estimated to be 60%.  See scanned document.    CARDIAC CATHETERIZATION  6/20/08, 10/2/08

## 2024-03-14 NOTE — PROGRESS NOTES
Social Determinants of Health     Tobacco Use: Medium Risk (2/14/2024)    Patient History     Smoking Tobacco Use: Former     Smokeless Tobacco Use: Former     Passive Exposure: Not on file   Alcohol Use: Not At Risk (6/29/2022)    AUDIT-C     Frequency of Alcohol Consumption: Never     Average Number of Drinks: Not on file     Frequency of Binge Drinking: Not on file   Financial Resource Strain: Not on file   Food Insecurity: Not on file   Transportation Needs: Not on file   Physical Activity: Not on file   Stress: Not on file   Social Connections: Not on file   Intimate Partner Violence: Not on file   Depression: Not on file   Housing Stability: Not on file   Interpersonal Safety: Not on file   Utilities: Not on file     Depression screening from SDOH wheel:         PHQ-9 Score (if applicable):         SDOH: Patient Unable to answer Social Determinant of Health screening questions.     History    Tobacco Use      Smoking status: Former        Types: Cigarettes      Smokeless tobacco: Former    E-cigarette/Vaping       Questions Responses    E-cigarette/Vaping Use Never User    Start Date     Passive Exposure     Quit Date     Counseling Given     Comments             Social History     Substance and Sexual Activity   Drug Use No      HX: Patient Unable to answer Tobacco History and/or Substance use screening questions.     Psychosocial: Abuse Screening       Functional Screening: ADL Screening  Because of a physical, mental, or emotional condition, do you have serious difficulty concentrating, remembering, or making decisions?    Because of a physical, mental, or emotional condition, do you have difficulty doing errands alone such as visiting a doctor's office or shopping?      Utilities  In the past 12 months, has the electric, gas, oil or water company threatened to shut off services in your home? Patient unable to answer    Education  Do you want help with school or training? For example, starting or completing  job training or getting a high school diploma, GED, or equivalent? Patient unable to answer  Do you speak a language other than English at home? Patient unable to answer    Employment  Do you want help finding or keeping work or a job?Patient unable to answer    Safety  How often does anyone including family and friends, physically hurt you? Patient unable to answer  How often does anyone including family and friends, insult or talk down to you? Patient unable to answer  How often does anyone including family and friends, threaten you with harm? Patient unable to answer  How often does anyone including family and friends, scream or curse at you? Patient unable to answer    Basic Daily Needs  Think about the place you live. Do you have problems with any of the following?Unable to answer    Family & Community Support  How often do you feel lonely or isolated from those around you? Patient unable to answer    Care Management consult needed? No

## 2024-03-14 NOTE — ED PROVIDER NOTES
History:   Diagnosis Date    CAD (coronary artery disease) 2010    Native Vessel.  S/p stenting wtih negative cardiac cath on January 18, 2010 and negative nuclear stress test on June 29, 2010.    Chest pain 1/28/2016    DJD (degenerative joint disease)     GERD (gastroesophageal reflux disease) 2010    HTN (hypertension)     Hyperlipidemia     Left knee pain 1/28/2016    Pacemaker 2010    MEDTRONIC    Polyarticular arthritis     Renal calculi 2010    S/p lithrotripsy    Right arm pain 1/28/2016    Right ureteral stone 7/8/2016    Stones in the urinary tract 02/02/2022    Tobacco abuse 2010    Prior.         SURGICAL HISTORY       Past Surgical History:   Procedure Laterality Date    CARDIAC CATHETERIZATION  01/08/2010    EF is estimated to be 60%.  See scanned document.    CARDIAC CATHETERIZATION  6/20/08, 10/2/08    EF is estimated to be 60%.  See scanned document.    CARDIAC CATHETERIZATION  2/13/07, 6/6/07    EF 50%.  See scanned document.    CARDIAC CATHETERIZATION  1/8/07, 11/26/07    EF is estimated to be in excess of 50%.  See scanned doucment.    CARDIAC CATHETERIZATION  08/17/2006    EF is estimated to be in excess of 50%.  See scanned doucment.    CARDIAC CATHETERIZATION  5/14/14  [facility]    normal LV function. EF 60    CHOLECYSTECTOMY      CLAVICLE SURGERY      COLONOSCOPY      Endoscopy    CYSTOSCOPY Right 7/8/2016    CYSTOSCOPY; RIGHT RETROGRADE PYELOGRAM; RIGHT URETERAL DILATATION; RIGHT URETEROSCOPY; RIGHT URETERAL LASER LITHOTRIPSY AND STONE EXTRACTION; INSERTION RIGHT URETERAL DOUBLE J STENT performed by Cesar Black MD at [facility] OR    CYSTOSCOPY Left 1/14/2021    CYSTOSCOPY URETEROSCOPY LASER LITHO WITH STONE EXTRACTION AND STENT REMOVAL> performed by Kush Johnson MD at [facility] OR    CYSTOSCOPY Left 12/29/2021    CYSTOSCOPY; LEFT URTERAL CATHERIZATION; LEFT RETORGRADE PYLEOGRAM; REMOVAL BLADDER CALCULI performed by Manav Baer MD at [facility] OR    CYSTOSCOPY Left 8/5/2022    CYSTOSCOPY LEFT  Verbally with patient  Location:     Location:  Trunk    Trunk location:  L chest    Depth:  Intradermal  Procedure type:     Procedure complexity:  Simple  Procedure details:     Bloodless field: yes      Removal mechanism:  Forceps    Foreign bodies recovered:  1    Description:  Tick, with head intact    Intact foreign body removal: yes    Post-procedure details:     Neurovascular status: intact      Confirmation:  No additional foreign bodies on visualization    Skin closure:  None    Dressing:  Open (no dressing)    Procedure completion:  Tolerated well, no immediate complications        FINAL IMPRESSION      1. Chest pain, unspecified type    2. Tick bite of left front wall of thorax, initial encounter          DISPOSITION/PLAN   DISPOSITION Admitted 03/14/2024 01:21:06 PM      PATIENT REFERRED TO:  No follow-up provider specified.    DISCHARGE MEDICATIONS:  New Prescriptions    No medications on file          (Please note that portions of this note were completed with a voice recognition program.  Efforts were made to edit thedictations but occasionally words are mis-transcribed.)    DANYELL Valdovinos (electronically signed)     Brian Levi PA  03/14/24 0411

## 2024-03-15 ENCOUNTER — APPOINTMENT (OUTPATIENT)
Dept: NUCLEAR MEDICINE | Age: 69
End: 2024-03-15
Payer: MEDICARE

## 2024-03-15 LAB
EKG P AXIS: NORMAL DEGREES
EKG P AXIS: NORMAL DEGREES
EKG P-R INTERVAL: NORMAL MS
EKG P-R INTERVAL: NORMAL MS
EKG Q-T INTERVAL: 412 MS
EKG Q-T INTERVAL: 452 MS
EKG QRS DURATION: 90 MS
EKG QRS DURATION: 92 MS
EKG QTC CALCULATION (BAZETT): 412 MS
EKG QTC CALCULATION (BAZETT): 452 MS
EKG T AXIS: 45 DEGREES
EKG T AXIS: 48 DEGREES
GLUCOSE BLD-MCNC: 114 MG/DL (ref 70–99)
PERFORMED ON: ABNORMAL
TROPONIN, HIGH SENSITIVITY: 9 NG/L (ref 0–22)

## 2024-03-15 PROCEDURE — 94760 N-INVAS EAR/PLS OXIMETRY 1: CPT

## 2024-03-15 PROCEDURE — 93017 CV STRESS TEST TRACING ONLY: CPT

## 2024-03-15 PROCEDURE — 93306 TTE W/DOPPLER COMPLETE: CPT

## 2024-03-15 PROCEDURE — 6370000000 HC RX 637 (ALT 250 FOR IP): Performed by: STUDENT IN AN ORGANIZED HEALTH CARE EDUCATION/TRAINING PROGRAM

## 2024-03-15 PROCEDURE — A9502 TC99M TETROFOSMIN: HCPCS | Performed by: STUDENT IN AN ORGANIZED HEALTH CARE EDUCATION/TRAINING PROGRAM

## 2024-03-15 PROCEDURE — 3430000000 HC RX DIAGNOSTIC RADIOPHARMACEUTICAL: Performed by: STUDENT IN AN ORGANIZED HEALTH CARE EDUCATION/TRAINING PROGRAM

## 2024-03-15 PROCEDURE — G0378 HOSPITAL OBSERVATION PER HR: HCPCS

## 2024-03-15 PROCEDURE — 6360000002 HC RX W HCPCS: Performed by: STUDENT IN AN ORGANIZED HEALTH CARE EDUCATION/TRAINING PROGRAM

## 2024-03-15 PROCEDURE — 84484 ASSAY OF TROPONIN QUANT: CPT

## 2024-03-15 PROCEDURE — 6370000000 HC RX 637 (ALT 250 FOR IP)

## 2024-03-15 PROCEDURE — 82962 GLUCOSE BLOOD TEST: CPT

## 2024-03-15 PROCEDURE — 2580000003 HC RX 258: Performed by: STUDENT IN AN ORGANIZED HEALTH CARE EDUCATION/TRAINING PROGRAM

## 2024-03-15 PROCEDURE — 78452 HT MUSCLE IMAGE SPECT MULT: CPT

## 2024-03-15 PROCEDURE — 96376 TX/PRO/DX INJ SAME DRUG ADON: CPT

## 2024-03-15 PROCEDURE — 36415 COLL VENOUS BLD VENIPUNCTURE: CPT

## 2024-03-15 RX ORDER — REGADENOSON 0.08 MG/ML
0.4 INJECTION, SOLUTION INTRAVENOUS
Status: COMPLETED | OUTPATIENT
Start: 2024-03-15 | End: 2024-03-15

## 2024-03-15 RX ORDER — AMLODIPINE BESYLATE 10 MG/1
10 TABLET ORAL DAILY
Qty: 30 TABLET | Refills: 3 | Status: SHIPPED
Start: 2024-03-15

## 2024-03-15 RX ADMIN — CLOPIDOGREL BISULFATE 75 MG: 75 TABLET ORAL at 09:24

## 2024-03-15 RX ADMIN — OXYCODONE HYDROCHLORIDE 15 MG: 10 TABLET ORAL at 09:24

## 2024-03-15 RX ADMIN — MORPHINE SULFATE 2 MG: 2 INJECTION, SOLUTION INTRAMUSCULAR; INTRAVENOUS at 22:50

## 2024-03-15 RX ADMIN — OXYCODONE HYDROCHLORIDE 15 MG: 10 TABLET ORAL at 17:16

## 2024-03-15 RX ADMIN — ISOSORBIDE MONONITRATE 120 MG: 60 TABLET, EXTENDED RELEASE ORAL at 09:23

## 2024-03-15 RX ADMIN — DOCUSATE SODIUM 50 MG AND SENNOSIDES 8.6 MG 1 TABLET: 8.6; 5 TABLET, FILM COATED ORAL at 21:46

## 2024-03-15 RX ADMIN — MORPHINE SULFATE 2 MG: 2 INJECTION, SOLUTION INTRAMUSCULAR; INTRAVENOUS at 10:38

## 2024-03-15 RX ADMIN — ASPIRIN 325 MG: 325 TABLET ORAL at 09:24

## 2024-03-15 RX ADMIN — OXYCODONE HYDROCHLORIDE 15 MG: 10 TABLET ORAL at 21:45

## 2024-03-15 RX ADMIN — SODIUM CHLORIDE, PRESERVATIVE FREE 10 ML: 5 INJECTION INTRAVENOUS at 21:48

## 2024-03-15 RX ADMIN — MORPHINE SULFATE 2 MG: 2 INJECTION, SOLUTION INTRAMUSCULAR; INTRAVENOUS at 18:42

## 2024-03-15 RX ADMIN — REGADENOSON 0.4 MG: 0.08 INJECTION, SOLUTION INTRAVENOUS at 08:12

## 2024-03-15 RX ADMIN — NITROGLYCERIN 0.5 INCH: 20 OINTMENT TOPICAL at 05:47

## 2024-03-15 RX ADMIN — OXYCODONE HYDROCHLORIDE 15 MG: 10 TABLET ORAL at 05:47

## 2024-03-15 RX ADMIN — TETROFOSMIN 24 MILLICURIE: 1.38 INJECTION, POWDER, LYOPHILIZED, FOR SOLUTION INTRAVENOUS at 08:51

## 2024-03-15 RX ADMIN — TETROFOSMIN 8 MILLICURIE: 1.38 INJECTION, POWDER, LYOPHILIZED, FOR SOLUTION INTRAVENOUS at 08:51

## 2024-03-15 RX ADMIN — SODIUM CHLORIDE 250 ML: 9 INJECTION, SOLUTION INTRAVENOUS at 08:15

## 2024-03-15 RX ADMIN — MORPHINE SULFATE 2 MG: 2 INJECTION, SOLUTION INTRAMUSCULAR; INTRAVENOUS at 13:48

## 2024-03-15 RX ADMIN — NITROGLYCERIN 0.5 INCH: 20 OINTMENT TOPICAL at 00:24

## 2024-03-15 RX ADMIN — ISOSORBIDE MONONITRATE 90 MG: 60 TABLET, EXTENDED RELEASE ORAL at 21:46

## 2024-03-15 RX ADMIN — DOCUSATE SODIUM 50 MG AND SENNOSIDES 8.6 MG 1 TABLET: 8.6; 5 TABLET, FILM COATED ORAL at 09:24

## 2024-03-15 RX ADMIN — MORPHINE SULFATE 2 MG: 2 INJECTION, SOLUTION INTRAMUSCULAR; INTRAVENOUS at 01:58

## 2024-03-15 RX ADMIN — OXYCODONE HYDROCHLORIDE 15 MG: 10 TABLET ORAL at 00:23

## 2024-03-15 ASSESSMENT — PAIN SCALES - GENERAL
PAINLEVEL_OUTOF10: 5
PAINLEVEL_OUTOF10: 5
PAINLEVEL_OUTOF10: 7
PAINLEVEL_OUTOF10: 7
PAINLEVEL_OUTOF10: 4

## 2024-03-15 ASSESSMENT — PAIN DESCRIPTION - LOCATION
LOCATION: CHEST;LEG
LOCATION: CHEST;LEG
LOCATION: LEG;CHEST

## 2024-03-15 ASSESSMENT — PAIN DESCRIPTION - DESCRIPTORS
DESCRIPTORS: ACHING;DISCOMFORT

## 2024-03-15 ASSESSMENT — ENCOUNTER SYMPTOMS
SHORTNESS OF BREATH: 1
NAUSEA: 1
BACK PAIN: 1

## 2024-03-15 ASSESSMENT — PAIN DESCRIPTION - ORIENTATION
ORIENTATION: LEFT
ORIENTATION: RIGHT
ORIENTATION: LEFT
ORIENTATION: RIGHT
ORIENTATION: RIGHT

## 2024-03-15 NOTE — DISCHARGE INSTRUCTIONS
Stop Amlodipine until you see your primary care physician within a week.  Monitor blood pressure at home

## 2024-03-15 NOTE — PROGRESS NOTES
03/15/24 1611   Encounter Summary   Encounter Overview/Reason  Initial Encounter   Service Provided For: Patient and family together  (with wife at bedside)   Referral/Consult From: Rounding   Support System Spouse   Complexity of Encounter Low   Begin Time 1445   End Time  1515   Total Time Calculated 30 min   Spiritual/Emotional needs   Type Spiritual Support   Assessment/Intervention/Outcome   Assessment Hopeful;Concerns with suffering   Intervention Active listening;Discussed belief system/[employer] practices/karolina;Explored/Affirmed feelings, thoughts, concerns   Outcome Expressed feelings, needs, and concerns   Plan and Referrals   Plan/Referrals No future visits requested     Listening presence provide as patient sharing his emotion and stories.     Electronically signed by Chaplain Viry Gunderson on 3/15/2024 at 4:13 PM

## 2024-03-15 NOTE — PROGRESS NOTES
[facility]      Patient:  Kraig Thakkar  YOB: 1955  Date of Service: 3/15/2024  MRN: [id number]   Acct: [id number]   Primary Care Physician: Merlin Brown MD  Advance Directive: Full Code  Admit Date: 3/14/2024       Hospital Day: 0  Portions of this note have been copied forward, however, changed to reflect the most current clinical status of this patient.  CHIEF COMPLAINT   Chest pain    SUBJECTIVE:  Patient resting in the bed without complaints at this time.  Reports chest pain is better occasionally still having some pressure.    CUMULATIVE HOSPITAL COURSE:  Patient is a 69-year-old male with a past medical history of Medtronic pacemaker placement, CAD, prior tobacco use, hyperlipidemia, hypertension, BPH, GERD, kidney stone who presented to the ED with complaints of chest pain.  Patient reported a sudden onset of chest pain this morning.  He reported taking 3 nitro at home.  Patient does admit to associated nausea without vomiting, dizziness, and shortness of breath.  He denied any associated swelling in his legs, denies abdominal pain back pain or neck pain.  Patient is currently on Plavix.  Patient's most recent echo 7/11/2022 admitted about showed an ejection fraction between 55 and 60%.  He was noted to have mild aortic stenosis and mild to moderate regurgitation in the tricuspid valve.  Per patient's chart last heart cath was completed in December 2016 by Dr. Hedrick.       Patient admitted to hospital services for medical management.      Discussed medication adjustments with attending, patient sees cardiology in [place] and would prefer to follow up with him.  Norvasc and Lopressor, on hold and patient instructed to follow-up PCP at discharge for management. Lexiscan completed pending results.           Review of Systems:   Review of Systems   Constitutional:  Positive for activity change.   Respiratory:  Positive for shortness of breath.    Cardiovascular:  Positive for

## 2024-03-15 NOTE — PROGRESS NOTES
Kraig Thakkar arrived to room # 720.   Presented with: Chest Pain  Mental Status: Patient is oriented, alert, coherent, logical, thought processes intact, and able to concentrate and follow conversation.   Vitals:    03/14/24 1824   BP:    Pulse:    Resp: 16   Temp:    SpO2:      Patient safety contract and falls prevention contract reviewed with patient Yes.  Oriented Patient to room.  Call light within reach. Yes.  Needs, issues or concerns expressed at this time: yes.      Electronically signed by Addis Arnold RN on 3/14/2024 at 7:12 PM

## 2024-03-16 ENCOUNTER — READMISSION MANAGEMENT (OUTPATIENT)
Dept: CALL CENTER | Facility: HOSPITAL | Age: 69
End: 2024-03-16
Payer: MEDICARE

## 2024-03-16 VITALS
OXYGEN SATURATION: 94 % | DIASTOLIC BLOOD PRESSURE: 69 MMHG | TEMPERATURE: 97.9 F | HEIGHT: 67 IN | WEIGHT: 146 LBS | SYSTOLIC BLOOD PRESSURE: 105 MMHG | HEART RATE: 59 BPM | BODY MASS INDEX: 22.91 KG/M2 | RESPIRATION RATE: 16 BRPM

## 2024-03-16 PROCEDURE — 93018 CV STRESS TEST I&R ONLY: CPT | Performed by: INTERNAL MEDICINE

## 2024-03-16 PROCEDURE — 6360000002 HC RX W HCPCS: Performed by: STUDENT IN AN ORGANIZED HEALTH CARE EDUCATION/TRAINING PROGRAM

## 2024-03-16 PROCEDURE — 93016 CV STRESS TEST SUPVJ ONLY: CPT | Performed by: INTERNAL MEDICINE

## 2024-03-16 PROCEDURE — G0378 HOSPITAL OBSERVATION PER HR: HCPCS

## 2024-03-16 PROCEDURE — 94760 N-INVAS EAR/PLS OXIMETRY 1: CPT

## 2024-03-16 PROCEDURE — 6370000000 HC RX 637 (ALT 250 FOR IP): Performed by: STUDENT IN AN ORGANIZED HEALTH CARE EDUCATION/TRAINING PROGRAM

## 2024-03-16 PROCEDURE — 78452 HT MUSCLE IMAGE SPECT MULT: CPT | Performed by: INTERNAL MEDICINE

## 2024-03-16 PROCEDURE — 96376 TX/PRO/DX INJ SAME DRUG ADON: CPT

## 2024-03-16 PROCEDURE — 2580000003 HC RX 258: Performed by: STUDENT IN AN ORGANIZED HEALTH CARE EDUCATION/TRAINING PROGRAM

## 2024-03-16 RX ADMIN — ISOSORBIDE MONONITRATE 90 MG: 60 TABLET, EXTENDED RELEASE ORAL at 09:04

## 2024-03-16 RX ADMIN — ACETAMINOPHEN 650 MG: 325 TABLET ORAL at 09:04

## 2024-03-16 RX ADMIN — OXYCODONE HYDROCHLORIDE 15 MG: 10 TABLET ORAL at 11:30

## 2024-03-16 RX ADMIN — DOCUSATE SODIUM 50 MG AND SENNOSIDES 8.6 MG 1 TABLET: 8.6; 5 TABLET, FILM COATED ORAL at 09:04

## 2024-03-16 RX ADMIN — OXYCODONE HYDROCHLORIDE 15 MG: 10 TABLET ORAL at 05:27

## 2024-03-16 RX ADMIN — ASPIRIN 325 MG: 325 TABLET ORAL at 09:04

## 2024-03-16 RX ADMIN — SODIUM CHLORIDE, PRESERVATIVE FREE 10 ML: 5 INJECTION INTRAVENOUS at 09:06

## 2024-03-16 RX ADMIN — MORPHINE SULFATE 2 MG: 2 INJECTION, SOLUTION INTRAMUSCULAR; INTRAVENOUS at 02:25

## 2024-03-16 RX ADMIN — CLOPIDOGREL BISULFATE 75 MG: 75 TABLET ORAL at 09:04

## 2024-03-16 RX ADMIN — MORPHINE SULFATE 2 MG: 2 INJECTION, SOLUTION INTRAMUSCULAR; INTRAVENOUS at 09:04

## 2024-03-16 RX ADMIN — TRAZODONE HYDROCHLORIDE 50 MG: 50 TABLET ORAL at 02:26

## 2024-03-16 ASSESSMENT — PAIN SCALES - GENERAL
PAINLEVEL_OUTOF10: 7

## 2024-03-16 ASSESSMENT — PAIN DESCRIPTION - DESCRIPTORS: DESCRIPTORS: ACHING;DISCOMFORT

## 2024-03-16 ASSESSMENT — PAIN DESCRIPTION - LOCATION
LOCATION: BACK;CHEST
LOCATION: CHEST

## 2024-03-16 ASSESSMENT — PAIN - FUNCTIONAL ASSESSMENT: PAIN_FUNCTIONAL_ASSESSMENT: PREVENTS OR INTERFERES WITH MANY ACTIVE NOT PASSIVE ACTIVITIES

## 2024-03-16 NOTE — OUTREACH NOTE
Prep Survey      Flowsheet Row Responses   [employer] facility patient discharged from? Non-BH   Is LACE score < 7 ? Non-BH Discharge   Eligibility [facility] -   Date of Discharge 03/16/24   Discharge Disposition Home or Self Care   Discharge diagnosis Chest pain,/covid   Does the patient have one of the following disease processes/diagnoses(primary or secondary)? Other   Does the patient have Home health ordered? No   Is there a DME ordered? No   Prep survey completed? Yes            KATINA GARCIA - Registered Nurse

## 2024-03-16 NOTE — DISCHARGE SUMMARY
Kraig Thakkar  :  1955  MRN:  [id number]    Admit date:  3/14/2024 Discharge date:  3/16/2024    Discharging Physician:  Dr Fisher    Advance Directive: Full Code    Consults: None     Primary Care Physician:  Merlin Brown MD    Discharge Diagnoses:  Principal Problem:    Chest pain due to CAD (HCC)  Active Problems:    CAD (coronary artery disease)    Hyperlipidemia    HTN (hypertension)  Resolved Problems:    * No resolved hospital problems. *      Portions of this note have been copied forward, however, changed to reflect the most current clinical status of this patient.  Hospital Course:   Patient is a 69-year-old male with a past medical history of [employer] pacemaker placement, CAD, prior tobacco use, hyperlipidemia, hypertension, BPH, GERD, kidney stone who presented to the ED with complaints of chest pain.  Patient reported a sudden onset of chest pain this morning.  He reported taking 3 nitro at home.  Patient does admit to associated nausea without vomiting, dizziness, and shortness of breath.  He denied any associated swelling in his legs, denies abdominal pain back pain or neck pain.  Patient is currently on Plavix.  Patient's most recent echo 2022 admitted about showed an ejection fraction between 55 and 60%.  He was noted to have mild aortic stenosis and mild to moderate regurgitation in the tricuspid valve.  Per patient's chart last heart cath was completed in 2016 by Dr. Hedrick.       Patient admitted to hospital services for medical management.       Discussed medication adjustments with attending, patient sees cardiology in [place] and would prefer to follow up with him.  Norvasc and Lopressor, on hold and patient instructed to follow-up PCP at discharge for management. Lexiscan completed, reviewed per Cardiology, normal.   Stable for discharge at this time. Instructed to follow-up with PCP and Cardiology outpatient.         Significant Diagnostic Studies:   CT HEAD  tablet  Commonly known as: MOBIC  Take 1 tablet by mouth in the morning.     metoprolol succinate 25 MG extended release tablet  Commonly known as: TOPROL XL     Nitrostat 0.4 MG SL tablet  Generic drug: nitroGLYCERIN     oxyCODONE 15 MG immediate release tablet  Commonly known as: OXY-IR     prochlorperazine 5 MG tablet  Commonly known as: COMPAZINE  Take 1 tablet by mouth every 6 hours as needed for Nausea     Repatha Pushtronex System 420 MG/3.5ML Soct  Generic drug: Evolocumab with Infusor     tamsulosin 0.4 MG capsule  Commonly known as: FLOMAX     traZODone 50 MG tablet  Commonly known as: DESYREL            STOP taking these medications      famotidine 20 MG tablet  Commonly known as: Pepcid     hydroCHLOROthiazide 12.5 MG tablet     hydrOXYzine pamoate 25 MG capsule  Commonly known as: VISTARIL     Hyoscyamine Sulfate SL 0.125 MG Subl  Commonly known as: Levsin/SL     LORazepam 1 MG tablet  Commonly known as: ATIVAN     metoprolol tartrate 25 MG tablet  Commonly known as: LOPRESSOR     phenazopyridine 100 MG tablet  Commonly known as: Pyridium     Potassium Citrate ER 15 MEQ (1620 MG) Tbcr extended release tablet  Commonly known as: UROCIT-K     QUEtiapine 25 MG tablet  Commonly known as: SEROQUEL     SCOPOLAMINE HYDROBROMIDE PO               Where to Get Your Medications        Information about where to get these medications is not yet available    Ask your nurse or doctor about these medications  amLODIPine 10 MG tablet         Discharge Instructions:   Follow up with Merlin Brown MD in 3 days.  Take medications as directed.  Resume activity as tolerated.    Diet: ADULT DIET; Regular     Disposition: Patient is Stable and will be discharged to Home.    Time spent on discharge  35 minutes spent in assessing patient, reviewing medications, discussion with nursing, confirming safe discharge plan and preparation of discharge summary.    Signed:  BHUMIKA Garcia - CNP, 3/16/2024 10:30 AM

## 2024-03-16 NOTE — PLAN OF CARE
Problem: Discharge Planning  Goal: Discharge to home or other facility with appropriate resources  Outcome: Progressing     Problem: Pain  Goal: Verbalizes/displays adequate comfort level or baseline comfort level  Outcome: Progressing     Problem: Safety - Adult  Goal: Free from fall injury  Outcome: Progressing     Problem: Neurosensory - Adult  Goal: Achieves stable or improved neurological status  Outcome: Progressing     Problem: Respiratory - Adult  Goal: Achieves optimal ventilation and oxygenation  Outcome: Progressing     Problem: Cardiovascular - Adult  Goal: Maintains optimal cardiac output and hemodynamic stability  Outcome: Progressing     Problem: Skin/Tissue Integrity - Adult  Goal: Skin integrity remains intact  Outcome: Progressing     Problem: Musculoskeletal - Adult  Goal: Return mobility to safest level of function  Outcome: Progressing     Problem: Gastrointestinal - Adult  Goal: Minimal or absence of nausea and vomiting  Outcome: Progressing     Problem: Genitourinary - Adult  Goal: Absence of urinary retention  Outcome: Progressing     Problem: Infection - Adult  Goal: Absence of infection at discharge  Outcome: Progressing     Problem: Metabolic/Fluid and Electrolytes - Adult  Goal: Electrolytes maintained within normal limits  Outcome: Progressing     Problem: Hematologic - Adult  Goal: Maintains hematologic stability  Outcome: Progressing

## 2024-03-17 NOTE — PROGRESS NOTES
0800: Dr. Fisher reached out to this nurse to contact Nuclear Medicine to find out when stress test could be read. Called [facility], no answer. Contacted Dr. Mireles.     0900: patient and wife feeling frustrated because they have been here for 2 days and have not seen a cardiologist. Informed patient that a cardiologist was not consulted for patient. Patient requests to leave.    1000: Dr. Mireles on floor for rounding, verbally spoke to Dr. Mireles. Dr. Mireles states he will read stress test in 20-30 minutes. Informed patient and wife.     1030: Dr. Mireles called via telephone to inform this nurse of stress test results. Relayed to hospitalist and to patient.     1100: Discussed following up with Angel and PCP. Discussed DC medications. No questions at this time. Provided patient advocate phone number per wife's request. No further questions at this time.     Electronically signed by Brigida Vinson RN on 3/16/2024 at 8:27 PM

## 2024-03-18 ENCOUNTER — OFFICE VISIT (OUTPATIENT)
Dept: FAMILY MEDICINE CLINIC | Facility: CLINIC | Age: 69
End: 2024-03-18
Payer: MEDICARE

## 2024-03-18 ENCOUNTER — TRANSITIONAL CARE MANAGEMENT TELEPHONE ENCOUNTER (OUTPATIENT)
Dept: CALL CENTER | Facility: HOSPITAL | Age: 69
End: 2024-03-18
Payer: MEDICARE

## 2024-03-18 VITALS
DIASTOLIC BLOOD PRESSURE: 66 MMHG | OXYGEN SATURATION: 98 % | SYSTOLIC BLOOD PRESSURE: 110 MMHG | HEIGHT: 67 IN | WEIGHT: 144 LBS | TEMPERATURE: 97.3 F | BODY MASS INDEX: 22.6 KG/M2 | HEART RATE: 82 BPM

## 2024-03-18 DIAGNOSIS — R07.9 CHEST PAIN, UNSPECIFIED TYPE: Primary | ICD-10-CM

## 2024-03-18 DIAGNOSIS — R74.8 ELEVATED LIVER ENZYMES: ICD-10-CM

## 2024-03-18 DIAGNOSIS — F41.9 ANXIETY: ICD-10-CM

## 2024-03-18 RX ORDER — LORAZEPAM 0.5 MG/1
0.5 TABLET ORAL
Qty: 20 TABLET | Refills: 0 | Status: SHIPPED | OUTPATIENT
Start: 2024-03-18
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
## 2024-03-18 NOTE — OUTREACH NOTE
Call Center TCM Note      Flowsheet Row Responses   [facility] patient discharged from? Non-BH   Does the patient have one of the following disease processes/diagnoses(primary or secondary)? Other   TCM attempt successful? No   Unsuccessful attempts Attempt 1   Revoked Reason Other  [Pt has not been discharged]            Susan White LPN    3/18/2024, 09:54 CDT

## 2024-03-19 ENCOUNTER — TELEPHONE (OUTPATIENT)
Dept: FAMILY MEDICINE CLINIC | Facility: CLINIC | Age: 69
End: 2024-03-19
Payer: MEDICARE

## 2024-03-19 NOTE — TELEPHONE ENCOUNTER
Please let adia know I spoke to dr de la cruz office at [facility] and they are going to reach out to him to schedule an appt.

## 2024-04-28 ENCOUNTER — APPOINTMENT (OUTPATIENT)
Dept: GENERAL RADIOLOGY | Facility: HOSPITAL | Age: 69
End: 2024-04-28
Payer: MEDICARE

## 2024-04-28 ENCOUNTER — HOSPITAL ENCOUNTER (OUTPATIENT)
Facility: HOSPITAL | Age: 69
Discharge: HOME | End: 2024-04-30
Attending: FAMILY MEDICINE | Admitting: FAMILY MEDICINE
Payer: MEDICARE

## 2024-04-28 DIAGNOSIS — I20.0 UNSTABLE ANGINA: Primary | ICD-10-CM

## 2024-04-28 DIAGNOSIS — R55 SYNCOPE, UNSPECIFIED SYNCOPE TYPE: ICD-10-CM

## 2024-04-28 DIAGNOSIS — R00.1 SYMPTOMATIC BRADYCARDIA: ICD-10-CM

## 2024-04-28 DIAGNOSIS — R06.02 SHORTNESS OF BREATH: ICD-10-CM

## 2024-04-28 LAB
ALBUMIN SERPL-MCNC: 4.1 G/DL (ref 3.5–5.2)
ALBUMIN/GLOB SERPL: 1.5 G/DL
ALP SERPL-CCNC: 115 U/L (ref 39–117)
ALT SERPL W P-5'-P-CCNC: 76 U/L (ref 1–41)
ANION GAP SERPL CALCULATED.3IONS-SCNC: 10 MMOL/L (ref 5–15)
APTT PPP: 28.7 SECONDS (ref 24.5–36)
AST SERPL-CCNC: 85 U/L (ref 1–40)
BASOPHILS # BLD AUTO: 0.02 10*3/MM3 (ref 0–0.2)
BASOPHILS NFR BLD AUTO: 0.6 % (ref 0–1.5)
BILIRUB SERPL-MCNC: 0.8 MG/DL (ref 0–1.2)
BUN SERPL-MCNC: 15 MG/DL (ref 8–23)
BUN/CREAT SERPL: 24.2 (ref 7–25)
CALCIUM SPEC-SCNC: 8.9 MG/DL (ref 8.6–10.5)
CHLORIDE SERPL-SCNC: 101 MMOL/L (ref 98–107)
CO2 SERPL-SCNC: 27 MMOL/L (ref 22–29)
CREAT SERPL-MCNC: 0.62 MG/DL (ref 0.76–1.27)
D-LACTATE SERPL-SCNC: 1.5 MMOL/L (ref 0.5–2)
DEPRECATED RDW RBC AUTO: 40.8 FL (ref 37–54)
EGFRCR SERPLBLD CKD-EPI 2021: 103.5 ML/MIN/1.73
EOSINOPHIL # BLD AUTO: 0.03 10*3/MM3 (ref 0–0.4)
EOSINOPHIL NFR BLD AUTO: 0.9 % (ref 0.3–6.2)
ERYTHROCYTE [DISTWIDTH] IN BLOOD BY AUTOMATED COUNT: 12.2 % (ref 12.3–15.4)
GLOBULIN UR ELPH-MCNC: 2.7 GM/DL
GLUCOSE SERPL-MCNC: 125 MG/DL (ref 65–99)
HCT VFR BLD AUTO: 41.4 % (ref 37.5–51)
HGB BLD-MCNC: 14.2 G/DL (ref 13–17.7)
HOLD SPECIMEN: NORMAL
HOLD SPECIMEN: NORMAL
IMM GRANULOCYTES # BLD AUTO: 0.01 10*3/MM3 (ref 0–0.05)
IMM GRANULOCYTES NFR BLD AUTO: 0.3 % (ref 0–0.5)
INR PPP: 0.92 (ref 0.91–1.09)
LYMPHOCYTES # BLD AUTO: 0.64 10*3/MM3 (ref 0.7–3.1)
LYMPHOCYTES NFR BLD AUTO: 19.6 % (ref 19.6–45.3)
MCH RBC QN AUTO: 31 PG (ref 26.6–33)
MCHC RBC AUTO-ENTMCNC: 34.3 G/DL (ref 31.5–35.7)
MCV RBC AUTO: 90.4 FL (ref 79–97)
MONOCYTES # BLD AUTO: 0.39 10*3/MM3 (ref 0.1–0.9)
MONOCYTES NFR BLD AUTO: 12 % (ref 5–12)
NEUTROPHILS NFR BLD AUTO: 2.17 10*3/MM3 (ref 1.7–7)
NEUTROPHILS NFR BLD AUTO: 66.6 % (ref 42.7–76)
NRBC BLD AUTO-RTO: 0 /100 WBC (ref 0–0.2)
NT-PROBNP SERPL-MCNC: 86.2 PG/ML (ref 0–900)
PLATELET # BLD AUTO: 173 10*3/MM3 (ref 140–450)
PMV BLD AUTO: 8.5 FL (ref 6–12)
POTASSIUM SERPL-SCNC: 3.8 MMOL/L (ref 3.5–5.2)
PROT SERPL-MCNC: 6.8 G/DL (ref 6–8.5)
PROTHROMBIN TIME: 12.8 SECONDS (ref 11.8–14.8)
RBC # BLD AUTO: 4.58 10*6/MM3 (ref 4.14–5.8)
SODIUM SERPL-SCNC: 138 MMOL/L (ref 136–145)
TROPONIN T SERPL HS-MCNC: 9 NG/L
WBC NRBC COR # BLD AUTO: 3.26 10*3/MM3 (ref 3.4–10.8)
WHOLE BLOOD HOLD COAG: NORMAL
WHOLE BLOOD HOLD SPECIMEN: NORMAL

## 2024-04-28 PROCEDURE — 83880 ASSAY OF NATRIURETIC PEPTIDE: CPT | Performed by: FAMILY MEDICINE

## 2024-04-28 PROCEDURE — 85610 PROTHROMBIN TIME: CPT | Performed by: FAMILY MEDICINE

## 2024-04-28 PROCEDURE — 71045 X-RAY EXAM CHEST 1 VIEW: CPT

## 2024-04-28 PROCEDURE — 36415 COLL VENOUS BLD VENIPUNCTURE: CPT

## 2024-04-28 PROCEDURE — 83874 ASSAY OF MYOGLOBIN: CPT | Performed by: FAMILY MEDICINE

## 2024-04-28 PROCEDURE — 96365 THER/PROPH/DIAG IV INF INIT: CPT

## 2024-04-28 PROCEDURE — 96375 TX/PRO/DX INJ NEW DRUG ADDON: CPT

## 2024-04-28 PROCEDURE — 93010 ELECTROCARDIOGRAM REPORT: CPT | Performed by: INTERNAL MEDICINE

## 2024-04-28 PROCEDURE — 93005 ELECTROCARDIOGRAM TRACING: CPT | Performed by: FAMILY MEDICINE

## 2024-04-28 PROCEDURE — 80053 COMPREHEN METABOLIC PANEL: CPT | Performed by: FAMILY MEDICINE

## 2024-04-28 PROCEDURE — 25010000002 NITROGLYCERIN 200 MCG/ML SOLUTION: Performed by: FAMILY MEDICINE

## 2024-04-28 PROCEDURE — 84484 ASSAY OF TROPONIN QUANT: CPT | Performed by: FAMILY MEDICINE

## 2024-04-28 PROCEDURE — 25010000002 ONDANSETRON PER 1 MG: Performed by: FAMILY MEDICINE

## 2024-04-28 PROCEDURE — 85730 THROMBOPLASTIN TIME PARTIAL: CPT | Performed by: FAMILY MEDICINE

## 2024-04-28 PROCEDURE — 83605 ASSAY OF LACTIC ACID: CPT | Performed by: FAMILY MEDICINE

## 2024-04-28 PROCEDURE — 25010000002 MORPHINE PER 10 MG: Performed by: FAMILY MEDICINE

## 2024-04-28 PROCEDURE — 85025 COMPLETE CBC W/AUTO DIFF WBC: CPT | Performed by: FAMILY MEDICINE

## 2024-04-28 PROCEDURE — 99285 EMERGENCY DEPT VISIT HI MDM: CPT

## 2024-04-28 RX ORDER — MORPHINE SULFATE 2 MG/ML
2 INJECTION, SOLUTION INTRAMUSCULAR; INTRAVENOUS ONCE
Status: COMPLETED | OUTPATIENT
Start: 2024-04-29 | End: 2024-04-28

## 2024-04-28 RX ORDER — ONDANSETRON 2 MG/ML
4 INJECTION INTRAMUSCULAR; INTRAVENOUS ONCE
Status: COMPLETED | OUTPATIENT
Start: 2024-04-28 | End: 2024-04-28

## 2024-04-28 RX ORDER — ONDANSETRON 2 MG/ML
4 INJECTION INTRAMUSCULAR; INTRAVENOUS ONCE
Status: DISCONTINUED | OUTPATIENT
Start: 2024-04-29 | End: 2024-04-30 | Stop reason: HOSPADM

## 2024-04-28 RX ORDER — SODIUM CHLORIDE 0.9 % (FLUSH) 0.9 %
10 SYRINGE (ML) INJECTION AS NEEDED
Status: DISCONTINUED | OUTPATIENT
Start: 2024-04-28 | End: 2024-04-30 | Stop reason: HOSPADM

## 2024-04-28 RX ORDER — NITROGLYCERIN 20 MG/100ML
5-200 INJECTION INTRAVENOUS
Status: DISCONTINUED | OUTPATIENT
Start: 2024-04-28 | End: 2024-04-29

## 2024-04-28 RX ADMIN — MORPHINE SULFATE 2 MG: 2 INJECTION, SOLUTION INTRAMUSCULAR; INTRAVENOUS at 23:53

## 2024-04-28 RX ADMIN — ONDANSETRON 4 MG: 2 INJECTION INTRAMUSCULAR; INTRAVENOUS at 23:07

## 2024-04-28 RX ADMIN — NITROGLYCERIN 10 MCG/MIN: 20 INJECTION INTRAVENOUS at 23:04

## 2024-04-28 NOTE — Clinical Note
A 6 fr sheath was successfully inserted using micropuncture technique into the right radial artery. Sheath insertion not delayed.

## 2024-04-29 ENCOUNTER — APPOINTMENT (OUTPATIENT)
Dept: CT IMAGING | Facility: HOSPITAL | Age: 69
End: 2024-04-29
Payer: MEDICARE

## 2024-04-29 PROBLEM — R00.1 SYMPTOMATIC BRADYCARDIA: Status: ACTIVE | Noted: 2024-04-29

## 2024-04-29 LAB
ALBUMIN SERPL-MCNC: 3.9 G/DL (ref 3.5–5.2)
ALBUMIN/GLOB SERPL: 1.6 G/DL
ALP SERPL-CCNC: 108 U/L (ref 39–117)
ALT SERPL W P-5'-P-CCNC: 65 U/L (ref 1–41)
ANION GAP SERPL CALCULATED.3IONS-SCNC: 10 MMOL/L (ref 5–15)
AST SERPL-CCNC: 65 U/L (ref 1–40)
BASOPHILS # BLD AUTO: 0.02 10*3/MM3 (ref 0–0.2)
BASOPHILS NFR BLD AUTO: 0.7 % (ref 0–1.5)
BILIRUB SERPL-MCNC: 0.8 MG/DL (ref 0–1.2)
BUN SERPL-MCNC: 13 MG/DL (ref 8–23)
BUN/CREAT SERPL: 21 (ref 7–25)
CALCIUM SPEC-SCNC: 8.7 MG/DL (ref 8.6–10.5)
CHLORIDE SERPL-SCNC: 100 MMOL/L (ref 98–107)
CHOLEST SERPL-MCNC: 132 MG/DL (ref 0–200)
CO2 SERPL-SCNC: 28 MMOL/L (ref 22–29)
CREAT SERPL-MCNC: 0.62 MG/DL (ref 0.76–1.27)
DEPRECATED RDW RBC AUTO: 41.1 FL (ref 37–54)
EGFRCR SERPLBLD CKD-EPI 2021: 103.5 ML/MIN/1.73
EOSINOPHIL # BLD AUTO: 0.07 10*3/MM3 (ref 0–0.4)
EOSINOPHIL NFR BLD AUTO: 2.3 % (ref 0.3–6.2)
ERYTHROCYTE [DISTWIDTH] IN BLOOD BY AUTOMATED COUNT: 12.4 % (ref 12.3–15.4)
GEN 5 2HR TROPONIN T REFLEX: 9 NG/L
GLOBULIN UR ELPH-MCNC: 2.4 GM/DL
GLUCOSE SERPL-MCNC: 122 MG/DL (ref 65–99)
HBA1C MFR BLD: 4.8 % (ref 4.8–5.6)
HCT VFR BLD AUTO: 41.2 % (ref 37.5–51)
HDLC SERPL-MCNC: 83 MG/DL (ref 40–60)
HGB BLD-MCNC: 14.1 G/DL (ref 13–17.7)
IMM GRANULOCYTES # BLD AUTO: 0.01 10*3/MM3 (ref 0–0.05)
IMM GRANULOCYTES NFR BLD AUTO: 0.3 % (ref 0–0.5)
LDLC SERPL CALC-MCNC: 31 MG/DL (ref 0–100)
LDLC/HDLC SERPL: 0.34 {RATIO}
LYMPHOCYTES # BLD AUTO: 0.87 10*3/MM3 (ref 0.7–3.1)
LYMPHOCYTES NFR BLD AUTO: 28.4 % (ref 19.6–45.3)
MCH RBC QN AUTO: 31.1 PG (ref 26.6–33)
MCHC RBC AUTO-ENTMCNC: 34.2 G/DL (ref 31.5–35.7)
MCV RBC AUTO: 90.7 FL (ref 79–97)
MONOCYTES # BLD AUTO: 0.51 10*3/MM3 (ref 0.1–0.9)
MONOCYTES NFR BLD AUTO: 16.7 % (ref 5–12)
MYOGLOBIN SERPL-MCNC: 30.3 NG/ML (ref 28–72)
NEUTROPHILS NFR BLD AUTO: 1.58 10*3/MM3 (ref 1.7–7)
NEUTROPHILS NFR BLD AUTO: 51.6 % (ref 42.7–76)
NRBC BLD AUTO-RTO: 0 /100 WBC (ref 0–0.2)
PLATELET # BLD AUTO: 159 10*3/MM3 (ref 140–450)
PMV BLD AUTO: 8.8 FL (ref 6–12)
POTASSIUM SERPL-SCNC: 3.3 MMOL/L (ref 3.5–5.2)
PROT SERPL-MCNC: 6.3 G/DL (ref 6–8.5)
QT INTERVAL: 442 MS
QT INTERVAL: 454 MS
QT INTERVAL: 460 MS
QTC INTERVAL: 442 MS
QTC INTERVAL: 454 MS
QTC INTERVAL: 460 MS
RBC # BLD AUTO: 4.54 10*6/MM3 (ref 4.14–5.8)
SODIUM SERPL-SCNC: 138 MMOL/L (ref 136–145)
TRIGL SERPL-MCNC: 103 MG/DL (ref 0–150)
TROPONIN T DELTA: 0 NG/L
TROPONIN T SERPL HS-MCNC: 10 NG/L
TROPONIN T SERPL HS-MCNC: 11 NG/L
VLDLC SERPL-MCNC: 18 MG/DL (ref 5–40)
WBC NRBC COR # BLD AUTO: 3.06 10*3/MM3 (ref 3.4–10.8)

## 2024-04-29 PROCEDURE — 83036 HEMOGLOBIN GLYCOSYLATED A1C: CPT | Performed by: INTERNAL MEDICINE

## 2024-04-29 PROCEDURE — 85025 COMPLETE CBC W/AUTO DIFF WBC: CPT | Performed by: INTERNAL MEDICINE

## 2024-04-29 PROCEDURE — 84484 ASSAY OF TROPONIN QUANT: CPT | Performed by: INTERNAL MEDICINE

## 2024-04-29 PROCEDURE — G0378 HOSPITAL OBSERVATION PER HR: HCPCS

## 2024-04-29 PROCEDURE — 93010 ELECTROCARDIOGRAM REPORT: CPT | Performed by: INTERNAL MEDICINE

## 2024-04-29 PROCEDURE — 25010000002 ONDANSETRON PER 1 MG: Performed by: INTERNAL MEDICINE

## 2024-04-29 PROCEDURE — 36415 COLL VENOUS BLD VENIPUNCTURE: CPT | Performed by: INTERNAL MEDICINE

## 2024-04-29 PROCEDURE — 25510000001 IOPAMIDOL PER 1 ML: Performed by: FAMILY MEDICINE

## 2024-04-29 PROCEDURE — 96376 TX/PRO/DX INJ SAME DRUG ADON: CPT

## 2024-04-29 PROCEDURE — 25010000002 MORPHINE PER 10 MG

## 2024-04-29 PROCEDURE — 93005 ELECTROCARDIOGRAM TRACING: CPT | Performed by: INTERNAL MEDICINE

## 2024-04-29 PROCEDURE — 84484 ASSAY OF TROPONIN QUANT: CPT | Performed by: FAMILY MEDICINE

## 2024-04-29 PROCEDURE — 80061 LIPID PANEL: CPT | Performed by: INTERNAL MEDICINE

## 2024-04-29 PROCEDURE — 99204 OFFICE O/P NEW MOD 45 MIN: CPT | Performed by: INTERNAL MEDICINE

## 2024-04-29 PROCEDURE — 80053 COMPREHEN METABOLIC PANEL: CPT | Performed by: INTERNAL MEDICINE

## 2024-04-29 PROCEDURE — 71275 CT ANGIOGRAPHY CHEST: CPT

## 2024-04-29 PROCEDURE — 96366 THER/PROPH/DIAG IV INF ADDON: CPT

## 2024-04-29 RX ORDER — SODIUM CHLORIDE 0.9 % (FLUSH) 0.9 %
10 SYRINGE (ML) INJECTION EVERY 12 HOURS SCHEDULED
Status: DISCONTINUED | OUTPATIENT
Start: 2024-04-29 | End: 2024-04-30 | Stop reason: HOSPADM

## 2024-04-29 RX ORDER — METOPROLOL SUCCINATE 25 MG/1
25 TABLET, EXTENDED RELEASE ORAL EVERY MORNING
COMMUNITY

## 2024-04-29 RX ORDER — HYDROXYZINE PAMOATE 25 MG/1
25 CAPSULE ORAL NIGHTLY PRN
COMMUNITY
End: 2024-05-06

## 2024-04-29 RX ORDER — VIT A/VIT C/VIT E/ZINC/COPPER 7160-113
1 TABLET, DELAYED RELEASE (ENTERIC COATED) ORAL DAILY
COMMUNITY

## 2024-04-29 RX ORDER — AMLODIPINE BESYLATE 10 MG/1
10 TABLET ORAL DAILY
COMMUNITY

## 2024-04-29 RX ORDER — MORPHINE SULFATE 2 MG/ML
2 INJECTION, SOLUTION INTRAMUSCULAR; INTRAVENOUS ONCE
Status: COMPLETED | OUTPATIENT
Start: 2024-04-29 | End: 2024-04-29

## 2024-04-29 RX ORDER — RANOLAZINE 500 MG/1
500 TABLET, EXTENDED RELEASE ORAL EVERY 12 HOURS SCHEDULED
Status: DISCONTINUED | OUTPATIENT
Start: 2024-04-29 | End: 2024-04-30 | Stop reason: HOSPADM

## 2024-04-29 RX ORDER — ASPIRIN 81 MG/1
81 TABLET ORAL DAILY
Status: DISCONTINUED | OUTPATIENT
Start: 2024-04-29 | End: 2024-04-30 | Stop reason: HOSPADM

## 2024-04-29 RX ORDER — SODIUM CHLORIDE 0.9 % (FLUSH) 0.9 %
10 SYRINGE (ML) INJECTION AS NEEDED
Status: DISCONTINUED | OUTPATIENT
Start: 2024-04-29 | End: 2024-04-30 | Stop reason: HOSPADM

## 2024-04-29 RX ORDER — OXYCODONE HYDROCHLORIDE 15 MG/1
15 TABLET, FILM COATED, EXTENDED RELEASE ORAL EVERY 6 HOURS PRN
Status: DISCONTINUED | OUTPATIENT
Start: 2024-04-29 | End: 2024-04-29

## 2024-04-29 RX ORDER — HYDROCHLOROTHIAZIDE 25 MG/1
12.5 TABLET ORAL DAILY
Status: DISCONTINUED | OUTPATIENT
Start: 2024-04-29 | End: 2024-04-29

## 2024-04-29 RX ORDER — LORAZEPAM 0.5 MG/1
0.5 TABLET ORAL
Status: DISCONTINUED | OUTPATIENT
Start: 2024-04-29 | End: 2024-04-30 | Stop reason: HOSPADM

## 2024-04-29 RX ORDER — FLUOXETINE HYDROCHLORIDE 20 MG/1
40 CAPSULE ORAL DAILY
Status: DISCONTINUED | OUTPATIENT
Start: 2024-04-29 | End: 2024-04-30 | Stop reason: HOSPADM

## 2024-04-29 RX ORDER — CLOPIDOGREL BISULFATE 75 MG/1
75 TABLET ORAL DAILY
Status: DISCONTINUED | OUTPATIENT
Start: 2024-04-29 | End: 2024-04-30 | Stop reason: HOSPADM

## 2024-04-29 RX ORDER — ONDANSETRON 2 MG/ML
4 INJECTION INTRAMUSCULAR; INTRAVENOUS EVERY 6 HOURS PRN
Status: DISCONTINUED | OUTPATIENT
Start: 2024-04-29 | End: 2024-04-30 | Stop reason: HOSPADM

## 2024-04-29 RX ORDER — BISACODYL 5 MG/1
5 TABLET, DELAYED RELEASE ORAL DAILY PRN
Status: DISCONTINUED | OUTPATIENT
Start: 2024-04-29 | End: 2024-04-30 | Stop reason: HOSPADM

## 2024-04-29 RX ORDER — MORPHINE SULFATE 2 MG/ML
INJECTION, SOLUTION INTRAMUSCULAR; INTRAVENOUS
Status: COMPLETED
Start: 2024-04-29 | End: 2024-04-29

## 2024-04-29 RX ORDER — ASPIRIN 325 MG
325 TABLET ORAL DAILY
Status: DISCONTINUED | OUTPATIENT
Start: 2024-04-29 | End: 2024-04-29

## 2024-04-29 RX ORDER — TRAZODONE HYDROCHLORIDE 50 MG/1
50 TABLET ORAL NIGHTLY PRN
Status: DISCONTINUED | OUTPATIENT
Start: 2024-04-29 | End: 2024-04-30 | Stop reason: HOSPADM

## 2024-04-29 RX ORDER — METOPROLOL SUCCINATE 25 MG/1
25 TABLET, EXTENDED RELEASE ORAL
Status: DISCONTINUED | OUTPATIENT
Start: 2024-04-29 | End: 2024-04-30 | Stop reason: HOSPADM

## 2024-04-29 RX ORDER — FLUOXETINE HYDROCHLORIDE 40 MG/1
40 CAPSULE ORAL DAILY
COMMUNITY

## 2024-04-29 RX ORDER — OXYCODONE HYDROCHLORIDE 5 MG/1
15 TABLET ORAL EVERY 8 HOURS PRN
Status: DISCONTINUED | OUTPATIENT
Start: 2024-04-29 | End: 2024-04-30 | Stop reason: HOSPADM

## 2024-04-29 RX ORDER — AMOXICILLIN 250 MG
2 CAPSULE ORAL 2 TIMES DAILY PRN
Status: DISCONTINUED | OUTPATIENT
Start: 2024-04-29 | End: 2024-04-30 | Stop reason: HOSPADM

## 2024-04-29 RX ORDER — BISACODYL 10 MG
10 SUPPOSITORY, RECTAL RECTAL DAILY PRN
Status: DISCONTINUED | OUTPATIENT
Start: 2024-04-29 | End: 2024-04-30 | Stop reason: HOSPADM

## 2024-04-29 RX ORDER — RANOLAZINE 500 MG/1
1000 TABLET, EXTENDED RELEASE ORAL 2 TIMES DAILY
Status: ON HOLD | COMMUNITY
End: 2024-04-29 | Stop reason: DRUGHIGH

## 2024-04-29 RX ORDER — POLYETHYLENE GLYCOL 3350 17 G/17G
17 POWDER, FOR SOLUTION ORAL DAILY PRN
Status: DISCONTINUED | OUTPATIENT
Start: 2024-04-29 | End: 2024-04-30 | Stop reason: HOSPADM

## 2024-04-29 RX ORDER — ACETAMINOPHEN 325 MG/1
650 TABLET ORAL EVERY 4 HOURS PRN
Status: DISCONTINUED | OUTPATIENT
Start: 2024-04-29 | End: 2024-04-30 | Stop reason: SDUPTHER

## 2024-04-29 RX ORDER — SODIUM CHLORIDE 9 MG/ML
40 INJECTION, SOLUTION INTRAVENOUS AS NEEDED
Status: DISCONTINUED | OUTPATIENT
Start: 2024-04-29 | End: 2024-04-30 | Stop reason: HOSPADM

## 2024-04-29 RX ORDER — TAMSULOSIN HYDROCHLORIDE 0.4 MG/1
0.4 CAPSULE ORAL DAILY
Status: DISCONTINUED | OUTPATIENT
Start: 2024-04-29 | End: 2024-04-30 | Stop reason: HOSPADM

## 2024-04-29 RX ORDER — OXYCODONE HYDROCHLORIDE 15 MG/1
15 TABLET ORAL EVERY 8 HOURS PRN
COMMUNITY

## 2024-04-29 RX ORDER — NITROGLYCERIN 0.4 MG/1
0.4 TABLET SUBLINGUAL
Status: DISCONTINUED | OUTPATIENT
Start: 2024-04-29 | End: 2024-04-30 | Stop reason: HOSPADM

## 2024-04-29 RX ORDER — RANOLAZINE 1000 MG/1
1000 TABLET, EXTENDED RELEASE ORAL 2 TIMES DAILY
COMMUNITY

## 2024-04-29 RX ORDER — NITROGLYCERIN 0.4 MG/1
0.4 TABLET SUBLINGUAL
COMMUNITY

## 2024-04-29 RX ADMIN — RANOLAZINE 500 MG: 500 TABLET, EXTENDED RELEASE ORAL at 21:26

## 2024-04-29 RX ADMIN — LORAZEPAM 0.5 MG: 0.5 TABLET ORAL at 12:30

## 2024-04-29 RX ADMIN — OXYCODONE HYDROCHLORIDE 15 MG: 15 TABLET, FILM COATED, EXTENDED RELEASE ORAL at 05:43

## 2024-04-29 RX ADMIN — IOPAMIDOL 100 ML: 755 INJECTION, SOLUTION INTRAVENOUS at 01:54

## 2024-04-29 RX ADMIN — TAMSULOSIN HYDROCHLORIDE 0.4 MG: 0.4 CAPSULE ORAL at 09:35

## 2024-04-29 RX ADMIN — Medication 10 ML: at 21:26

## 2024-04-29 RX ADMIN — NITROGLYCERIN 0.4 MG: 0.4 TABLET SUBLINGUAL at 18:16

## 2024-04-29 RX ADMIN — OXYCODONE HYDROCHLORIDE 15 MG: 5 TABLET ORAL at 15:01

## 2024-04-29 RX ADMIN — Medication 10 ML: at 09:37

## 2024-04-29 RX ADMIN — LORAZEPAM 0.5 MG: 0.5 TABLET ORAL at 07:31

## 2024-04-29 RX ADMIN — MORPHINE SULFATE 2 MG: 2 INJECTION, SOLUTION INTRAMUSCULAR; INTRAVENOUS at 07:29

## 2024-04-29 RX ADMIN — FLUOXETINE HYDROCHLORIDE 40 MG: 20 CAPSULE ORAL at 09:35

## 2024-04-29 RX ADMIN — ASPIRIN 81 MG: 81 TABLET, COATED ORAL at 09:36

## 2024-04-29 RX ADMIN — METOPROLOL SUCCINATE 25 MG: 25 TABLET, EXTENDED RELEASE ORAL at 10:24

## 2024-04-29 RX ADMIN — RANOLAZINE 500 MG: 500 TABLET, EXTENDED RELEASE ORAL at 10:24

## 2024-04-29 RX ADMIN — CLOPIDOGREL BISULFATE 75 MG: 75 TABLET, FILM COATED ORAL at 09:34

## 2024-04-29 RX ADMIN — LORAZEPAM 0.5 MG: 0.5 TABLET ORAL at 17:12

## 2024-04-29 RX ADMIN — ISOSORBIDE MONONITRATE 90 MG: 30 TABLET, EXTENDED RELEASE ORAL at 09:35

## 2024-04-29 RX ADMIN — ONDANSETRON 4 MG: 2 INJECTION INTRAMUSCULAR; INTRAVENOUS at 18:23

## 2024-04-29 RX ADMIN — OXYCODONE HYDROCHLORIDE 15 MG: 5 TABLET ORAL at 22:55
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
## 2024-04-29 NOTE — CONSULTS
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 signed by GUERA Davenport, 04/29/24, 8:58 AM CDT.

## 2024-04-29 NOTE — ED NOTES
Nursing report ED to floor  Junito Phelan  69 y.o.  male    HPI:   Chief Complaint   Patient presents with    Chest Pain    Nausea    Vomiting    Syncope       Admitting doctor:   Juan Lott DO    Consulting provider(s):  Consults       No orders found for last 30 day(s).             Admitting diagnosis:   The primary encounter diagnosis was Symptomatic bradycardia. Diagnoses of Unstable angina, Shortness of breath, and Syncope, unspecified syncope type were also pertinent to this visit.    Code status:   Current Code Status       Date Active Code Status Order ID Comments User Context       Not on file            Allergies:   Bee venom, Ezetimibe-simvastatin, Hydrocodone, Morphine, Phenergan [promethazine hcl], and Propoxyphene    Intake and Output  No intake or output data in the 24 hours ending 04/29/24 0456    Weight:       04/28/24  2216   Weight: 65.8 kg (145 lb)       Most recent vitals:   Vitals:    04/29/24 0216 04/29/24 0316 04/29/24 0346 04/29/24 0416   BP: 140/81 142/82 136/83 135/77   Pulse: 60 60 60 60   Resp:       Temp:       SpO2: 93% 93% 94% 94%   Weight:       Height:         Oxygen Therapy: .    Active LDAs/IV Access:   Lines, Drains & Airways       Active LDAs       Name Placement date Placement time Site Days    Peripheral IV 04/28/24 2230 Left;Posterior Hand 04/28/24 2230  Hand  less than 1    Peripheral IV 04/29/24 0142 Anterior;Right Antecubital 04/29/24  0142  Antecubital  less than 1                    Labs (abnormal labs have a star):   Labs Reviewed   COMPREHENSIVE METABOLIC PANEL - Abnormal; Notable for the following components:       Result Value    Glucose 125 (*)     Creatinine 0.62 (*)     ALT (SGPT) 76 (*)     AST (SGOT) 85 (*)     All other components within normal limits    Narrative:     GFR Normal >60  Chronic Kidney Disease <60  Kidney Failure <15     CBC WITH AUTO DIFFERENTIAL - Abnormal; Notable for the following components:    WBC 3.26 (*)     RDW 12.2 (*)      Lymphocytes, Absolute 0.64 (*)     All other components within normal limits   PROTIME-INR - Normal   APTT - Normal   LACTIC ACID, PLASMA - Normal   TROPONIN - Normal    Narrative:     High Sensitive Troponin T Reference Range:  <14.0 ng/L- Negative Female for AMI  <22.0 ng/L- Negative Male for AMI  >=14 - Abnormal Female indicating possible myocardial injury.  >=22 - Abnormal Male indicating possible myocardial injury.   Clinicians would have to utilize clinical acumen, EKG, Troponin, and serial changes to determine if it is an Acute Myocardial Infarction or myocardial injury due to an underlying chronic condition.        BNP (IN-HOUSE) - Normal    Narrative:     This assay is used as an aid in the diagnosis of individuals suspected of having heart failure. It can be used as an aid in the diagnosis of acute decompensated heart failure (ADHF) in patients presenting with signs and symptoms of ADHF to the emergency department (ED). In addition, NT-proBNP of <300 pg/mL indicates ADHF is not likely.    Age Range Result Interpretation  NT-proBNP Concentration (pg/mL:      <50             Positive            >450                   Gray                 300-450                    Negative             <300    50-75           Positive            >900                  Gray                300-900                  Negative            <300      >75             Positive            >1800                  Gray                300-1800                  Negative            <300   HIGH SENSITIVITIY TROPONIN T 2HR - Normal    Narrative:     High Sensitive Troponin T Reference Range:  <14.0 ng/L- Negative Female for AMI  <22.0 ng/L- Negative Male for AMI  >=14 - Abnormal Female indicating possible myocardial injury.  >=22 - Abnormal Male indicating possible myocardial injury.   Clinicians would have to utilize clinical acumen, EKG, Troponin, and serial changes to determine if it is an Acute Myocardial Infarction or myocardial injury due  to an underlying chronic condition.        RAINBOW DRAW    Narrative:     The following orders were created for panel order [place] Draw.  Procedure                               Abnormality         Status                     ---------                               -----------         ------                     Green Top (Gel)[[id number]]                                  Final result               Lavender Top[[id number]]                                     Final result               Red Top[[id number]]                                          Final result               Light Blue Top[[id number]]                                   Final result                 Please view results for these tests on the individual orders.   MYOGLOBIN, SERUM   GREEN TOP   LAVENDER TOP   RED TOP   LIGHT BLUE TOP   CBC AND DIFFERENTIAL    Narrative:     The following orders were created for panel order CBC & Differential.  Procedure                               Abnormality         Status                     ---------                               -----------         ------                     CBC Auto Differential[[id number]]        Abnormal            Final result                 Please view results for these tests on the individual orders.       Meds given in ED:   Medications   sodium chloride 0.9 % flush 10 mL (has no administration in time range)   ondansetron (ZOFRAN) injection 4 mg (0 mg Intravenous Hold 4/29/24 0007)   ondansetron (ZOFRAN) injection 4 mg (4 mg Intravenous Given 4/28/24 2307)   Morphine sulfate (PF) injection 2 mg (2 mg Intravenous Given 4/28/24 2353)   iopamidol (ISOVUE-370) 76 % injection 100 mL (100 mL Intravenous Given 4/29/24 0154)           NIH Stroke Scale:       Isolation/Infection(s):  No active isolations   No active infections     COVID Testing  Collected .  Resulted .    Nursing report ED to floor:  Mental status: .  Ambulatory status: .  Precautions: .    ED nurse phone extentsion- ..

## 2024-04-29 NOTE — H&P (VIEW-ONLY)
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 signed by GUERA Davenport, 04/29/24, 8:58 AM CDT.
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
## 2024-04-30 ENCOUNTER — READMISSION MANAGEMENT (OUTPATIENT)
Dept: CALL CENTER | Facility: HOSPITAL | Age: 69
End: 2024-04-30
Payer: MEDICARE

## 2024-04-30 VITALS
TEMPERATURE: 97.8 F | HEART RATE: 60 BPM | RESPIRATION RATE: 18 BRPM | BODY MASS INDEX: 22.29 KG/M2 | OXYGEN SATURATION: 93 % | WEIGHT: 142 LBS | SYSTOLIC BLOOD PRESSURE: 99 MMHG | DIASTOLIC BLOOD PRESSURE: 69 MMHG | HEIGHT: 67 IN

## 2024-04-30 PROBLEM — I20.0 UNSTABLE ANGINA: Status: RESOLVED | Noted: 2024-04-28 | Resolved: 2024-04-30

## 2024-04-30 PROBLEM — R07.89 CHEST PAIN, ATYPICAL: Status: ACTIVE | Noted: 2024-03-18

## 2024-04-30 PROBLEM — I20.0 UNSTABLE ANGINA: Status: ACTIVE | Noted: 2024-04-28

## 2024-04-30 PROBLEM — R55 VASOVAGAL SYNDROME: Status: ACTIVE | Noted: 2024-04-30

## 2024-04-30 PROBLEM — R00.1 SYMPTOMATIC BRADYCARDIA: Status: RESOLVED | Noted: 2024-04-29 | Resolved: 2024-04-30

## 2024-04-30 PROCEDURE — G0378 HOSPITAL OBSERVATION PER HR: HCPCS

## 2024-04-30 PROCEDURE — 93458 L HRT ARTERY/VENTRICLE ANGIO: CPT | Performed by: INTERNAL MEDICINE

## 2024-04-30 PROCEDURE — A9270 NON-COVERED ITEM OR SERVICE: HCPCS | Performed by: INTERNAL MEDICINE

## 2024-04-30 PROCEDURE — C1894 INTRO/SHEATH, NON-LASER: HCPCS | Performed by: INTERNAL MEDICINE

## 2024-04-30 PROCEDURE — 25010000002 HEPARIN (PORCINE) PER 1000 UNITS: Performed by: INTERNAL MEDICINE

## 2024-04-30 PROCEDURE — 25010000002 ONDANSETRON PER 1 MG: Performed by: INTERNAL MEDICINE

## 2024-04-30 PROCEDURE — 25010000002 MIDAZOLAM PER 1 MG: Performed by: INTERNAL MEDICINE

## 2024-04-30 PROCEDURE — 99152 MOD SED SAME PHYS/QHP 5/>YRS: CPT | Performed by: INTERNAL MEDICINE

## 2024-04-30 PROCEDURE — 25010000002 DIPHENHYDRAMINE PER 50 MG: Performed by: INTERNAL MEDICINE

## 2024-04-30 PROCEDURE — 25010000002 HEPARIN (PORCINE) 1000-0.9 UT/500ML-% SOLUTION: Performed by: INTERNAL MEDICINE

## 2024-04-30 PROCEDURE — 25010000002 HEPARIN (PORCINE) 2000-0.9 UNIT/L-% SOLUTION: Performed by: INTERNAL MEDICINE

## 2024-04-30 PROCEDURE — 25010000002 FENTANYL CITRATE (PF) 50 MCG/ML SOLUTION: Performed by: INTERNAL MEDICINE

## 2024-04-30 PROCEDURE — 63710000001 OXYCODONE 5 MG TABLET: Performed by: INTERNAL MEDICINE

## 2024-04-30 PROCEDURE — 25510000001 IOPAMIDOL PER 1 ML: Performed by: INTERNAL MEDICINE

## 2024-04-30 RX ORDER — HEPARIN SODIUM 200 [USP'U]/100ML
INJECTION, SOLUTION INTRAVENOUS
Status: DISCONTINUED | OUTPATIENT
Start: 2024-04-30 | End: 2024-04-30 | Stop reason: HOSPADM

## 2024-04-30 RX ORDER — DIPHENHYDRAMINE HYDROCHLORIDE 50 MG/ML
INJECTION INTRAMUSCULAR; INTRAVENOUS
Status: DISCONTINUED | OUTPATIENT
Start: 2024-04-30 | End: 2024-04-30 | Stop reason: HOSPADM

## 2024-04-30 RX ORDER — SODIUM CHLORIDE 9 MG/ML
100 INJECTION, SOLUTION INTRAVENOUS CONTINUOUS
Status: DISPENSED | OUTPATIENT
Start: 2024-04-30 | End: 2024-04-30

## 2024-04-30 RX ORDER — ACETAMINOPHEN 325 MG/1
650 TABLET ORAL EVERY 4 HOURS PRN
Status: DISCONTINUED | OUTPATIENT
Start: 2024-04-30 | End: 2024-04-30 | Stop reason: HOSPADM

## 2024-04-30 RX ORDER — MIDAZOLAM HYDROCHLORIDE 1 MG/ML
INJECTION INTRAMUSCULAR; INTRAVENOUS
Status: DISCONTINUED | OUTPATIENT
Start: 2024-04-30 | End: 2024-04-30 | Stop reason: HOSPADM

## 2024-04-30 RX ORDER — VERAPAMIL HYDROCHLORIDE 2.5 MG/ML
INJECTION, SOLUTION INTRAVENOUS
Status: DISCONTINUED | OUTPATIENT
Start: 2024-04-30 | End: 2024-04-30 | Stop reason: HOSPADM

## 2024-04-30 RX ORDER — HEPARIN SODIUM 1000 [USP'U]/ML
INJECTION, SOLUTION INTRAVENOUS; SUBCUTANEOUS
Status: DISCONTINUED | OUTPATIENT
Start: 2024-04-30 | End: 2024-04-30 | Stop reason: HOSPADM

## 2024-04-30 RX ORDER — FENTANYL CITRATE 50 UG/ML
INJECTION, SOLUTION INTRAMUSCULAR; INTRAVENOUS
Status: DISCONTINUED | OUTPATIENT
Start: 2024-04-30 | End: 2024-04-30 | Stop reason: HOSPADM

## 2024-04-30 RX ORDER — TAMSULOSIN HYDROCHLORIDE 0.4 MG/1
1 CAPSULE ORAL DAILY
Qty: 90 CAPSULE | Refills: 0 | Status: SHIPPED | OUTPATIENT
Start: 2024-04-30

## 2024-04-30 RX ORDER — LIDOCAINE HYDROCHLORIDE 20 MG/ML
INJECTION, SOLUTION INFILTRATION; PERINEURAL
Status: DISCONTINUED | OUTPATIENT
Start: 2024-04-30 | End: 2024-04-30 | Stop reason: HOSPADM

## 2024-04-30 RX ADMIN — ASPIRIN 81 MG: 81 TABLET, COATED ORAL at 08:10

## 2024-04-30 RX ADMIN — Medication 10 ML: at 08:11

## 2024-04-30 RX ADMIN — FLUOXETINE HYDROCHLORIDE 40 MG: 20 CAPSULE ORAL at 08:10

## 2024-04-30 RX ADMIN — ONDANSETRON 4 MG: 2 INJECTION INTRAMUSCULAR; INTRAVENOUS at 01:26

## 2024-04-30 RX ADMIN — OXYCODONE HYDROCHLORIDE 15 MG: 5 TABLET ORAL at 06:51

## 2024-04-30 RX ADMIN — ISOSORBIDE MONONITRATE 90 MG: 30 TABLET, EXTENDED RELEASE ORAL at 08:09

## 2024-04-30 RX ADMIN — OXYCODONE HYDROCHLORIDE 15 MG: 5 TABLET ORAL at 14:40

## 2024-04-30 RX ADMIN — METOPROLOL SUCCINATE 25 MG: 25 TABLET, EXTENDED RELEASE ORAL at 08:10

## 2024-04-30 RX ADMIN — TAMSULOSIN HYDROCHLORIDE 0.4 MG: 0.4 CAPSULE ORAL at 08:10

## 2024-04-30 RX ADMIN — CLOPIDOGREL BISULFATE 75 MG: 75 TABLET, FILM COATED ORAL at 08:10

## 2024-04-30 RX ADMIN — LORAZEPAM 0.5 MG: 0.5 TABLET ORAL at 08:10

## 2024-04-30 RX ADMIN — RANOLAZINE 500 MG: 500 TABLET, EXTENDED RELEASE ORAL at 08:10

## 2024-04-30 NOTE — DISCHARGE SUMMARY
"      [facility] Medicine Services  DISCHARGE SUMMARY       Date of Admission: 4/28/2024  Date of Discharge:  4/30/2024  Primary Care Physician: Jim Stover MD    Presenting Problem/History of Present Illness:  69-year-old male who presents to the emergency department with past medical history including coronary artery disease.  He follows with [facility].  The patient states that he presented to the emergency department because he, \"felt like I was getting ready to have a heart attack.\"  He takes Imdur twice a day.  He recently saw his cardiologist 6 months ago.  His troponin at our facility was negative.  His recent stress test at [facility] in March was negative.     Final Discharge Diagnoses:  Active Hospital Problems    Diagnosis     Vasovagal syndrome     Chest pain, atypical        Consults:   Dr Galvan, cardiology    Procedures Performed:   [facility] 4/30/2024  1. No evidence of atherosclerotic coronary disease. Previously placed stents in the LAD and high diagonal branch are widely patent with minimal in-stent restenosis. There is diffusely sluggish flow suggesting endothelial dysfunction.   2. Normal LVEF and normal LVEDP     Pertinent Test Results:       Imaging Results (All)       Procedure Component Value Units Date/Time    CT Angiogram Chest [[id number]] Collected: 04/29/24 0710     Updated: 04/29/24 0716    Narrative:      EXAM: CT ANGIOGRAM CHEST-      DATE: 4/29/2024 12:41 AM     HISTORY: Substernal chest pain please comment on aorta; R06.02-Shortness  of breath; I20.0-Unstable angina       COMPARISON: None available.     DOSE LENGTH PRODUCT: 205.67 mGy.cm mGy cm. Automatic exposure control  was utilized to make radiation dose as low as reasonably achievable.     TECHNIQUE: Enhanced CT images of the chest obtained with multiplanar  reformats.     FINDINGS:     MEDIASTINUM/EXTRATHORACIC:   The thoracic aorta is unremarkable. No  aneurysm or dissection is " seen. There is mild coronary artery  calcification no pericardial or pleural effusion identified. No thoracic  lymphadenopathy and there is a small hiatal hernia.     PULMONARY ARTERIES: Pulmonary arteries are well-opacified and no filling  defects are seen.     LUNGS/AIRWAYS: Mild linear and groundglass opacities posteriorly likely  represent 5 granulomas. No pneumonia is seen. Airways are unremarkable.        INCLUDED UPPER ABDOMEN: The visualized portions of the upper abdomen are  within normal limits.     SOFT TISSUES: Submitted soft tissues of the chest are unremarkable.     BONES: No suspicious osseous lesions identified.       Impression:      1. Thoracic aorta is unremarkable. No pulmonary embolism identified.  2. No active disease in the chest.     These findings are in agreement with the critical and emergent findings  from the [facility] preliminary report.     This report was signed and finalized on 4/29/2024 7:13 AM by Cedric Champagne.       XR Chest 1 View [[id number]] Collected: 04/29/24 0701     Updated: 04/29/24 0704    Narrative:      EXAM/TECHNIQUE: XR CHEST 1 VW-     INDICATION: Chest pain and shortness of breath     COMPARISON: 12/28/2022     FINDINGS:     Cardiac silhouette is normal size. Left chest wall cardiac 2-lead pacing  device is stable in position.     No pleural effusion or visible pneumothorax. No focal consolidation.     Left clavicle plate and screw fixation and right humeral plate and screw  fixation are noted. No acute osseous finding.       Impression:      No acute findings.     This report was signed and finalized on 4/29/2024 7:01 AM by Dr. Skip Rossi MD.             LAB RESULTS:      Lab 04/29/24  0532 04/28/24  2302 04/28/24  2229   WBC 3.06*  --  3.26*   HEMOGLOBIN 14.1  --  14.2   HEMATOCRIT 41.2  --  41.4   PLATELETS 159  --  173   NEUTROS ABS 1.58*  --  2.17   IMMATURE GRANS (ABS) 0.01  --  0.01   LYMPHS ABS 0.87  --  0.64*   MONOS ABS 0.51  --  0.39   EOS ABS 0.07   --  0.03   MCV 90.7  --  90.4   LACTATE  --  1.5  --    PROTIME  --   --  12.8   APTT  --   --  28.7         Lab 04/29/24  0532 04/28/24  2229   SODIUM 138 138   POTASSIUM 3.3* 3.8   CHLORIDE 100 101   CO2 28.0 27.0   ANION GAP 10.0 10.0   BUN 13 15   CREATININE 0.62* 0.62*   EGFR 103.5 103.5   GLUCOSE 122* 125*   CALCIUM 8.7 8.9   HEMOGLOBIN A1C 4.80  --          Lab 04/29/24  0532 04/28/24  2229   TOTAL PROTEIN 6.3 6.8   ALBUMIN 3.9 4.1   GLOBULIN 2.4 2.7   ALT (SGPT) 65* 76*   AST (SGOT) 65* 85*   BILIRUBIN 0.8 0.8   ALK PHOS 108 115         Lab 04/29/24  0800 04/29/24  0532 04/29/24  0009 04/28/24  2229   PROBNP  --   --   --  86.2   HSTROP T 11 10 9 9   PROTIME  --   --   --  12.8   INR  --   --   --  0.92         Lab 04/29/24  0532   CHOLESTEROL 132   LDL CHOL 31   HDL CHOL 83*   TRIGLYCERIDES 103             Brief Urine Lab Results  (Last result in the past 365 days)        Color   Clarity   Blood   Leuk Est   Nitrite   Protein   CREAT   Urine HCG        02/14/24 1433 DARK YELLOW   Clear   Negative   TRACE  Comment: Culture Urine under CMS guidelines and criteria will auto reflex on a sole  criteria that is based on manual microscopic count of more than 10/hpf for  WBC. If Culture Urine is warranted aside from this criteria, place a  requisition or order within 24 hours of collection.   Negative                   Microbiology Results (last 10 days)       ** No results found for the last 240 hours. **            Hospital Course:   Patient was admitted to telemetry and continue to present complaints with chest pain and require opiates. Wife concern was with prior disease and negative work up.     Cardiology was consulted and due to risk and ongoing symptoms, LHC was electively performed. Fortunately this test yielded negative results. Patient's chest pain complaints improved after LHC and we was eager to be discharged home.     Physical Exam on Discharge:  BP 99/69 (BP Location: Right arm, Patient Position:  "Lying)   Pulse 60   Temp 97.8 °F (36.6 °C) (Oral)   Resp 18   Ht 170.2 cm (67\")   Wt 64.4 kg (142 lb)   SpO2 93%   BMI 22.24 kg/m²   Physical Exam  Constitutional:       Appearance: He is well-developed. He is not ill-appearing.   HENT:      Head: Normocephalic and atraumatic.      Right Ear: External ear normal.      Left Ear: External ear normal.      Nose: Nose normal.      Mouth/Throat:      Mouth: Mucous membranes are dry.   Eyes:      General:         Right eye: No discharge.         Left eye: No discharge.      Extraocular Movements: Extraocular movements intact.      Conjunctiva/sclera: Conjunctivae normal.      Pupils: Pupils are equal, round, and reactive to light.   Neck:      Vascular: No JVD.   Cardiovascular:      Rate and Rhythm: Normal rate and regular rhythm.      Heart sounds: Normal heart sounds. No murmur heard.  Pulmonary:      Effort: Pulmonary effort is normal. No respiratory distress.      Breath sounds: Normal breath sounds. No wheezing or rales.   Chest:      Chest wall: No tenderness.   Abdominal:      General: Bowel sounds are normal. There is no distension.      Palpations: Abdomen is soft.      Tenderness: There is no abdominal tenderness. There is no guarding or rebound.   Musculoskeletal:         General: No tenderness or deformity. Normal range of motion.      Cervical back: Normal range of motion and neck supple. No rigidity.      Right lower leg: No edema.      Left lower leg: No edema.   Skin:     General: Skin is warm and dry.      Findings: No rash.   Neurological:      General: No focal deficit present.      Mental Status: He is alert and oriented to person, place, and time. Mental status is at baseline.      Cranial Nerves: No cranial nerve deficit.      Sensory: No sensory deficit.      Motor: No abnormal muscle tone.      Deep Tendon Reflexes: Reflexes normal.   Psychiatric:         Mood and Affect: Mood normal.         Behavior: Behavior normal.     Condition on " Discharge:   Stable    Discharge Disposition:  Home    Discharge Medications:     Discharge Medications        Continue These Medications        Instructions Start Date   amLODIPine 10 MG tablet  Commonly known as: NORVASC   10 mg, Oral, Daily      aspirin 325 MG tablet   Take 325 mg by mouth daily      clopidogrel 75 MG tablet  Commonly known as: PLAVIX   Take 75 mg by mouth daily.        Coenzyme Q10 50 MG tablet dispersible   Place 1 tablet on the tongue Daily.      hydroCHLOROthiazide 12.5 MG tablet   Take 1 tablet by mouth Daily As Needed.      hydrOXYzine pamoate 25 MG capsule  Commonly known as: VISTARIL   25 mg, Oral, Nightly PRN      ICAPS tablet tablet  Generic drug: multivitamin with minerals   1 tablet, Oral, Daily      isosorbide mononitrate 30 MG 24 hr tablet  Commonly known as: IMDUR   90 mg, Oral, Nightly      metoprolol succinate XL 25 MG 24 hr tablet  Commonly known as: TOPROL-XL   25 mg, Oral, Daily      nitroglycerin 0.4 MG SL tablet  Commonly known as: NITROSTAT   0.4 mg, Sublingual, Every 5 Minutes PRN, Take no more than 3 doses in 15 minutes.      ondansetron ODT 4 MG disintegrating tablet  Commonly known as: ZOFRAN-ODT   4 mg, Translingual, Every 8 Hours PRN      oxyCODONE 15 MG immediate release tablet  Commonly known as: ROXICODONE   15 mg, Oral, Every 8 Hours PRN      potassium chloride 10 MEQ CR tablet   10 mEq, Oral, Daily PRN, With HCTZ      PROzac 40 MG capsule  Generic drug: FLUoxetine   40 mg, Oral, Daily      ranolazine 1000 MG 12 hr tablet  Commonly known as: RANEXA   1,000 mg, Oral, 2 Times Daily      Repatha Pushtronex System solution cartridge  Generic drug: Evolocumab with Infusor   420 mg, Subcutaneous, Every 30 Days      tamsulosin 0.4 MG capsule 24 hr capsule  Commonly known as: FLOMAX   0.4 mg, Oral, Daily      traZODone 50 MG tablet  Commonly known as: DESYREL   50 mg, Oral, Nightly             Discharge Diet:   Cardiac    Activity at Discharge:   Resume usual  activity    Follow-up Appointments:   Future Appointments   Date Time Provider Department Center   5/13/2024  8:45 AM Paola Gutierrez APRN [facility]   5/17/2024 11:30 AM Jim Stover MD [facility]       Test Results Pending at Discharge: None    Electronically signed by Toni Mckeon MD, 04/30/24, 15:33 CDT.    Time: 31 minutes.

## 2024-04-30 NOTE — OUTREACH NOTE
Prep Survey      Flowsheet Row Responses   [facility] facility patient discharged from? [facility]   Is LACE score < 7 ? 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 Other   Does the patient have Home health ordered? No   Is there a DME ordered? No   Prep survey completed? Yes            GENOVEVA OATES - Registered Nurse

## 2024-04-30 NOTE — PROCEDURES
"Please see procedural report under \"results\" tab.    Full report to follow; in brief, procedure completed via radial approach with no complications noted.  Patient's coronary arteries appear essentially normal with exception of the stented LAD and diagonal, both of which are patent with very mild in-stent restenosis.  There is diffusely sluggish flow suggesting endothelial dysfunction.  LV function is normal and EDP was also normal.    Routine post-cath orders for radial procedure; pain not suspected to be cardiac in nature.  Recommend noncardiac workup if deemed necessary by admitting doctor; otherwise, cardiology will sign off.    Recommend routine follow-up with his cardiologist at [facility] as previously scheduled.    "

## 2024-04-30 NOTE — PLAN OF CARE
Goal Outcome Evaluation:   PT is A&O x4, VSS, and has been NPO since midnight. He took a shower around midnight and otherwise appeared to rest well over night. Safety maintained and call light within reach.

## 2024-05-01 ENCOUNTER — TRANSITIONAL CARE MANAGEMENT TELEPHONE ENCOUNTER (OUTPATIENT)
Dept: CALL CENTER | Facility: HOSPITAL | Age: 69
End: 2024-05-01
Payer: MEDICARE

## 2024-05-01 NOTE — OUTREACH NOTE
Call Center TCM Note      Flowsheet Row Responses   [facility] patient discharged from? [facility]   Does the patient have one of the following disease processes/diagnoses(primary or secondary)? Other   TCM attempt successful? 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 Yes   Is the patient having any side effects they believe may be caused by any medication additions or changes? No   Does the patient have all medications ordered at discharge? N/A   Is the patient taking all medications as directed (includes completed medication regime)? Yes   Comments Hosp [facility] on 5/2/24 with PCP   Does the patient have an appointment with their PCP within 7-14 days of discharge? Yes   Has home health visited the patient within 72 hours of discharge? N/A   Psychosocial issues? No   Did the patient receive a copy of their discharge instructions? Yes   Nursing interventions Reviewed instructions with patient   What is the patient's perception of their health status since discharge? Same  [low energy/SOB/nausea-per pt]   Is the patient/caregiver able to teach back signs and symptoms related to disease process for when to call PCP? Yes   Is the patient/caregiver able to teach back signs and symptoms related to disease process for when to call 911? Yes   Is the patient/caregiver able to teach back the hierarchy of who to call/visit for symptoms/problems? PCP, Specialist, Home health nurse, Urgent Care, ED, 911 Yes   If the patient is a current smoker, are they able to teach back resources for cessation? Not a smoker   TCM call completed? Yes   Call end time 1253            Mee Winters RN    5/1/2024, 12:54 CDT         Reason For Visit  WING RUST is here today for a nurse visit for blood work and the patient tolerated the procedure well.      Current Meds   1. Augmentin TABS (Amoxicillin-Pot Clavulanate); TAKE 1 TABLET EVERY 12 HOURS DAILY   FOR 10 DAYS;   Therapy: (Recorded:28Feb2018) to Recorded   2. KlonoPIN 0.5 MG Oral Tablet (ClonazePAM); 1 table prn, Dr. SCHMID;   Therapy: (Recorded:24Jan2018) to Recorded    Assessment   1. Encounter for preventive health examination (Z00.00)   2. Enlarged tonsils (J35.1)   3. Fever (R50.9)   4. Hypertrophy of nasal turbinates (J34.3)   5. Hypertrophy of tonsil and adenoid (J35.3)   6. Paroxysmal SVT (supraventricular tachycardia) (I47.1)    Plan   1. prc VENIPUNCTURE, non-MD; Status:Complete;   Done: 02Mar2018    Signatures   Electronically signed by : Farzana Poon CMA; Mar  2 2018  9:51AM CST

## 2024-05-02 ENCOUNTER — OFFICE VISIT (OUTPATIENT)
Dept: FAMILY MEDICINE CLINIC | Facility: CLINIC | Age: 69
End: 2024-05-02
Payer: MEDICARE

## 2024-05-02 VITALS
DIASTOLIC BLOOD PRESSURE: 58 MMHG | RESPIRATION RATE: 18 BRPM | TEMPERATURE: 98.6 F | SYSTOLIC BLOOD PRESSURE: 92 MMHG | HEART RATE: 57 BPM | BODY MASS INDEX: 21.5 KG/M2 | OXYGEN SATURATION: 98 % | HEIGHT: 67 IN | WEIGHT: 137 LBS

## 2024-05-02 DIAGNOSIS — I95.9 HYPOTENSION, UNSPECIFIED HYPOTENSION TYPE: Primary | ICD-10-CM

## 2024-05-02 RX ORDER — ONDANSETRON 4 MG/1
4 TABLET, ORALLY DISINTEGRATING ORAL EVERY 8 HOURS PRN
Qty: 10 TABLET | Refills: 0 | Status: SHIPPED | OUTPATIENT
Start: 2024-05-02
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
## 2024-05-06 RX ORDER — HYDROXYZINE PAMOATE 25 MG/1
CAPSULE ORAL
Qty: 45 CAPSULE | Refills: 2 | Status: SHIPPED | OUTPATIENT
Start: 2024-05-06

## 2024-05-07 ENCOUNTER — ANESTHESIA (OUTPATIENT)
Dept: PERIOP | Facility: HOSPITAL | Age: 69
End: 2024-05-07
Payer: MEDICARE

## 2024-05-07 ENCOUNTER — HOSPITAL ENCOUNTER (OUTPATIENT)
Facility: HOSPITAL | Age: 69
Setting detail: HOSPITAL OUTPATIENT SURGERY
Discharge: HOME | End: 2024-05-07
Attending: SURGERY | Admitting: SURGERY
Payer: MEDICARE

## 2024-05-07 ENCOUNTER — ANESTHESIA EVENT (OUTPATIENT)
Dept: PERIOP | Facility: HOSPITAL | Age: 69
End: 2024-05-07
Payer: MEDICARE

## 2024-05-07 VITALS
HEIGHT: 67 IN | WEIGHT: 138.45 LBS | SYSTOLIC BLOOD PRESSURE: 124 MMHG | HEART RATE: 60 BPM | RESPIRATION RATE: 16 BRPM | DIASTOLIC BLOOD PRESSURE: 76 MMHG | OXYGEN SATURATION: 94 % | BODY MASS INDEX: 21.73 KG/M2 | TEMPERATURE: 97.2 F

## 2024-05-07 DIAGNOSIS — D48.9 NEOPLASM OF UNCERTAIN BEHAVIOR: ICD-10-CM

## 2024-05-07 PROCEDURE — 25810000003 LACTATED RINGERS PER 1000 ML: Performed by: SURGERY

## 2024-05-07 PROCEDURE — 25010000002 CEFAZOLIN PER 500 MG: Performed by: SURGERY

## 2024-05-07 PROCEDURE — 88304 TISSUE EXAM BY PATHOLOGIST: CPT | Performed by: SURGERY

## 2024-05-07 PROCEDURE — 25010000002 PROPOFOL 1000 MG/100ML EMULSION

## 2024-05-07 RX ORDER — MAGNESIUM HYDROXIDE 1200 MG/15ML
LIQUID ORAL AS NEEDED
Status: DISCONTINUED | OUTPATIENT
Start: 2024-05-07 | End: 2024-05-07 | Stop reason: HOSPADM

## 2024-05-07 RX ORDER — PROPOFOL 10 MG/ML
INJECTION, EMULSION INTRAVENOUS CONTINUOUS PRN
Status: DISCONTINUED | OUTPATIENT
Start: 2024-05-07 | End: 2024-05-07 | Stop reason: SURG

## 2024-05-07 RX ORDER — FLUMAZENIL 0.1 MG/ML
0.2 INJECTION INTRAVENOUS AS NEEDED
Status: DISCONTINUED | OUTPATIENT
Start: 2024-05-07 | End: 2024-05-07 | Stop reason: HOSPADM

## 2024-05-07 RX ORDER — SODIUM CHLORIDE 0.9 % (FLUSH) 0.9 %
10 SYRINGE (ML) INJECTION AS NEEDED
Status: DISCONTINUED | OUTPATIENT
Start: 2024-05-07 | End: 2024-05-07 | Stop reason: HOSPADM

## 2024-05-07 RX ORDER — IBUPROFEN 600 MG/1
600 TABLET ORAL EVERY 6 HOURS PRN
Status: DISCONTINUED | OUTPATIENT
Start: 2024-05-07 | End: 2024-05-07 | Stop reason: HOSPADM

## 2024-05-07 RX ORDER — HYDROCODONE BITARTRATE AND ACETAMINOPHEN 5; 325 MG/1; MG/1
1 TABLET ORAL EVERY 4 HOURS PRN
Status: DISCONTINUED | OUTPATIENT
Start: 2024-05-07 | End: 2024-05-07

## 2024-05-07 RX ORDER — FENTANYL CITRATE 50 UG/ML
25 INJECTION, SOLUTION INTRAMUSCULAR; INTRAVENOUS
Status: DISCONTINUED | OUTPATIENT
Start: 2024-05-07 | End: 2024-05-07 | Stop reason: HOSPADM

## 2024-05-07 RX ORDER — NALOXONE HCL 0.4 MG/ML
0.4 VIAL (ML) INJECTION AS NEEDED
Status: DISCONTINUED | OUTPATIENT
Start: 2024-05-07 | End: 2024-05-07 | Stop reason: HOSPADM

## 2024-05-07 RX ORDER — ASPIRIN 81 MG/1
81 TABLET, CHEWABLE ORAL ONCE
Status: COMPLETED | OUTPATIENT
Start: 2024-05-07 | End: 2024-05-07

## 2024-05-07 RX ORDER — SODIUM CHLORIDE 0.9 % (FLUSH) 0.9 %
3 SYRINGE (ML) INJECTION AS NEEDED
Status: DISCONTINUED | OUTPATIENT
Start: 2024-05-07 | End: 2024-05-07 | Stop reason: HOSPADM

## 2024-05-07 RX ORDER — SODIUM CHLORIDE 0.9 % (FLUSH) 0.9 %
10 SYRINGE (ML) INJECTION EVERY 12 HOURS SCHEDULED
Status: DISCONTINUED | OUTPATIENT
Start: 2024-05-07 | End: 2024-05-07 | Stop reason: HOSPADM

## 2024-05-07 RX ORDER — LIDOCAINE HYDROCHLORIDE 20 MG/ML
INJECTION, SOLUTION EPIDURAL; INFILTRATION; INTRACAUDAL; PERINEURAL AS NEEDED
Status: DISCONTINUED | OUTPATIENT
Start: 2024-05-07 | End: 2024-05-07 | Stop reason: SURG

## 2024-05-07 RX ORDER — ONDANSETRON 2 MG/ML
4 INJECTION INTRAMUSCULAR; INTRAVENOUS ONCE AS NEEDED
Status: DISCONTINUED | OUTPATIENT
Start: 2024-05-07 | End: 2024-05-07 | Stop reason: HOSPADM

## 2024-05-07 RX ORDER — DROPERIDOL 2.5 MG/ML
0.62 INJECTION, SOLUTION INTRAMUSCULAR; INTRAVENOUS ONCE AS NEEDED
Status: DISCONTINUED | OUTPATIENT
Start: 2024-05-07 | End: 2024-05-07 | Stop reason: HOSPADM

## 2024-05-07 RX ORDER — LABETALOL HYDROCHLORIDE 5 MG/ML
5 INJECTION, SOLUTION INTRAVENOUS
Status: DISCONTINUED | OUTPATIENT
Start: 2024-05-07 | End: 2024-05-07 | Stop reason: HOSPADM

## 2024-05-07 RX ORDER — DEXTROSE MONOHYDRATE 25 G/50ML
12.5 INJECTION, SOLUTION INTRAVENOUS AS NEEDED
Status: DISCONTINUED | OUTPATIENT
Start: 2024-05-07 | End: 2024-05-07 | Stop reason: HOSPADM

## 2024-05-07 RX ORDER — SODIUM CHLORIDE, SODIUM LACTATE, POTASSIUM CHLORIDE, CALCIUM CHLORIDE 600; 310; 30; 20 MG/100ML; MG/100ML; MG/100ML; MG/100ML
1000 INJECTION, SOLUTION INTRAVENOUS CONTINUOUS
Status: DISCONTINUED | OUTPATIENT
Start: 2024-05-07 | End: 2024-05-07 | Stop reason: HOSPADM

## 2024-05-07 RX ORDER — LIDOCAINE HYDROCHLORIDE AND EPINEPHRINE 10; 10 MG/ML; UG/ML
INJECTION, SOLUTION INFILTRATION; PERINEURAL AS NEEDED
Status: DISCONTINUED | OUTPATIENT
Start: 2024-05-07 | End: 2024-05-07 | Stop reason: HOSPADM

## 2024-05-07 RX ORDER — FENTANYL CITRATE 50 UG/ML
50 INJECTION, SOLUTION INTRAMUSCULAR; INTRAVENOUS
Status: DISCONTINUED | OUTPATIENT
Start: 2024-05-07 | End: 2024-05-07 | Stop reason: HOSPADM

## 2024-05-07 RX ORDER — SODIUM CHLORIDE, SODIUM LACTATE, POTASSIUM CHLORIDE, CALCIUM CHLORIDE 600; 310; 30; 20 MG/100ML; MG/100ML; MG/100ML; MG/100ML
9 INJECTION, SOLUTION INTRAVENOUS CONTINUOUS
Status: DISCONTINUED | OUTPATIENT
Start: 2024-05-07 | End: 2024-05-07 | Stop reason: HOSPADM

## 2024-05-07 RX ORDER — HYDROCODONE BITARTRATE AND ACETAMINOPHEN 10; 325 MG/1; MG/1
1 TABLET ORAL EVERY 4 HOURS PRN
Status: DISCONTINUED | OUTPATIENT
Start: 2024-05-07 | End: 2024-05-07

## 2024-05-07 RX ORDER — LIDOCAINE HYDROCHLORIDE 10 MG/ML
0.5 INJECTION, SOLUTION EPIDURAL; INFILTRATION; INTRACAUDAL; PERINEURAL ONCE AS NEEDED
Status: DISCONTINUED | OUTPATIENT
Start: 2024-05-07 | End: 2024-05-07 | Stop reason: HOSPADM

## 2024-05-07 RX ADMIN — LIDOCAINE HYDROCHLORIDE 100 MG: 20 INJECTION, SOLUTION EPIDURAL; INFILTRATION; INTRACAUDAL; PERINEURAL at 08:44

## 2024-05-07 RX ADMIN — SODIUM CHLORIDE, POTASSIUM CHLORIDE, SODIUM LACTATE AND CALCIUM CHLORIDE 1000 ML: 600; 310; 30; 20 INJECTION, SOLUTION INTRAVENOUS at 07:02

## 2024-05-07 RX ADMIN — CEFAZOLIN 2000 MG: 2 INJECTION, POWDER, FOR SOLUTION INTRAMUSCULAR; INTRAVENOUS at 08:50

## 2024-05-07 RX ADMIN — ASPIRIN 81 MG: 81 TABLET, CHEWABLE ORAL at 07:58

## 2024-05-07 RX ADMIN — PROPOFOL INJECTABLE EMULSION 150 MCG/KG/MIN: 10 INJECTION, EMULSION INTRAVENOUS at 08:44

## 2024-05-09 ENCOUNTER — TRANSCRIBE ORDERS (OUTPATIENT)
Dept: ADMINISTRATIVE | Facility: HOSPITAL | Age: 69
End: 2024-05-09
Payer: MEDICARE

## 2024-05-09 ENCOUNTER — HOSPITAL ENCOUNTER (OUTPATIENT)
Dept: GENERAL RADIOLOGY | Facility: HOSPITAL | Age: 69
Discharge: HOME | End: 2024-05-09
Admitting: PAIN MEDICINE
Payer: MEDICARE

## 2024-05-09 DIAGNOSIS — G89.29 OTHER CHRONIC PAIN: ICD-10-CM

## 2024-05-09 DIAGNOSIS — G89.29 OTHER CHRONIC PAIN: Primary | ICD-10-CM

## 2024-05-09 PROCEDURE — 73562 X-RAY EXAM OF KNEE 3: CPT

## 2024-05-13 ENCOUNTER — OFFICE VISIT (OUTPATIENT)
Dept: GASTROENTEROLOGY | Facility: CLINIC | Age: 69
End: 2024-05-13
Payer: MEDICARE

## 2024-05-13 ENCOUNTER — TELEPHONE (OUTPATIENT)
Dept: GASTROENTEROLOGY | Facility: CLINIC | Age: 69
End: 2024-05-13

## 2024-05-13 ENCOUNTER — LAB (OUTPATIENT)
Dept: LAB | Facility: HOSPITAL | Age: 69
End: 2024-05-13
Payer: MEDICARE

## 2024-05-13 VITALS
TEMPERATURE: 97.3 F | SYSTOLIC BLOOD PRESSURE: 104 MMHG | OXYGEN SATURATION: 94 % | BODY MASS INDEX: 22.13 KG/M2 | HEART RATE: 61 BPM | DIASTOLIC BLOOD PRESSURE: 60 MMHG | WEIGHT: 141 LBS | HEIGHT: 67 IN

## 2024-05-13 DIAGNOSIS — R11.2 NAUSEA AND VOMITING, UNSPECIFIED VOMITING TYPE: ICD-10-CM

## 2024-05-13 DIAGNOSIS — R74.8 ABNORMAL TRANSAMINASES: Primary | ICD-10-CM

## 2024-05-13 DIAGNOSIS — K76.0 FATTY LIVER: ICD-10-CM

## 2024-05-13 DIAGNOSIS — R74.01 ELEVATION OF LEVELS OF LIVER TRANSAMINASE LEVELS: ICD-10-CM

## 2024-05-13 DIAGNOSIS — R79.89 ABNORMAL LFTS: ICD-10-CM

## 2024-05-13 LAB
ALBUMIN SERPL-MCNC: 4.3 G/DL (ref 3.5–5.2)
ALP SERPL-CCNC: 130 U/L (ref 39–117)
ALPHA1 GLOB MFR UR ELPH: 138 MG/DL (ref 90–200)
ALT SERPL W P-5'-P-CCNC: 49 U/L (ref 1–41)
AST SERPL-CCNC: 40 U/L (ref 1–40)
BILIRUB CONJ SERPL-MCNC: <0.2 MG/DL (ref 0–0.3)
BILIRUB INDIRECT SERPL-MCNC: ABNORMAL MG/DL
BILIRUB SERPL-MCNC: 0.4 MG/DL (ref 0–1.2)
CERULOPLASMIN SERPL-MCNC: 17 MG/DL (ref 16–31)
FERRITIN SERPL-MCNC: 78.7 NG/ML (ref 30–400)
HAV IGM SERPL QL IA: NORMAL
HBV CORE IGM SERPL QL IA: NORMAL
HBV SURFACE AG SERPL QL IA: NORMAL
HCV AB SER QL: NORMAL
IRON 24H UR-MRATE: 134 MCG/DL (ref 59–158)
IRON SATN MFR SERPL: 30 % (ref 20–50)
PROT SERPL-MCNC: 6.6 G/DL (ref 6–8.5)
TIBC SERPL-MCNC: 453 MCG/DL (ref 298–536)
TRANSFERRIN SERPL-MCNC: 304 MG/DL (ref 200–360)

## 2024-05-13 PROCEDURE — 82390 ASSAY OF CERULOPLASMIN: CPT

## 2024-05-13 PROCEDURE — 3078F DIAST BP <80 MM HG: CPT | Performed by: NURSE PRACTITIONER

## 2024-05-13 PROCEDURE — 82103 ALPHA-1-ANTITRYPSIN TOTAL: CPT

## 2024-05-13 PROCEDURE — 36415 COLL VENOUS BLD VENIPUNCTURE: CPT

## 2024-05-13 PROCEDURE — 83540 ASSAY OF IRON: CPT

## 2024-05-13 PROCEDURE — 84466 ASSAY OF TRANSFERRIN: CPT

## 2024-05-13 PROCEDURE — 99214 OFFICE O/P EST MOD 30 MIN: CPT | Performed by: NURSE PRACTITIONER

## 2024-05-13 PROCEDURE — 80076 HEPATIC FUNCTION PANEL: CPT

## 2024-05-13 PROCEDURE — 86038 ANTINUCLEAR ANTIBODIES: CPT

## 2024-05-13 PROCEDURE — 86015 ACTIN ANTIBODY EACH: CPT

## 2024-05-13 PROCEDURE — 82728 ASSAY OF FERRITIN: CPT

## 2024-05-13 PROCEDURE — 80074 ACUTE HEPATITIS PANEL: CPT

## 2024-05-13 PROCEDURE — 86381 MITOCHONDRIAL ANTIBODY EACH: CPT

## 2024-05-13 PROCEDURE — 3074F SYST BP LT 130 MM HG: CPT | Performed by: NURSE PRACTITIONER

## 2024-05-13 NOTE — TELEPHONE ENCOUNTER
Make sure he knows to be off plavix x 7 days and asa 325 as well. He  needs to take asa 821 mg while off the 325.  Thank you
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
## 2024-05-14 PROBLEM — R11.2 NAUSEA AND VOMITING: Status: ACTIVE | Noted: 2024-05-13

## 2024-05-14 LAB
MITOCHONDRIA M2 IGG SER-ACNC: <20 UNITS (ref 0–20)
SMA IGG SER-ACNC: 10 UNITS (ref 0–19)

## 2024-05-15 LAB
ANA HOMOGEN TITR SER: ABNORMAL {TITER}
ANA SER QL IF: POSITIVE
ANA SPECKLED TITR SER: ABNORMAL {TITER}
Lab: ABNORMAL

## 2024-05-16 ENCOUNTER — TELEPHONE (OUTPATIENT)
Dept: GASTROENTEROLOGY | Facility: CLINIC | Age: 69
End: 2024-05-16
Payer: MEDICARE

## 2024-05-16 NOTE — TELEPHONE ENCOUNTER
Please let patient know that all labs were ok except NAOMI was positive. With all the other labs being normal I cannot say this is positive due to something going on with the liver. I would defer to pcp. I will send copy of lab to his pcp. I recommend he keep f/u office visit here 6-27-24.

## 2024-05-17 DIAGNOSIS — R76.8 POSITIVE ANA (ANTINUCLEAR ANTIBODY): Primary | ICD-10-CM

## 2024-05-20 ENCOUNTER — TELEPHONE (OUTPATIENT)
Dept: FAMILY MEDICINE CLINIC | Facility: CLINIC | Age: 69
End: 2024-05-20
Payer: MEDICARE

## 2024-05-20 ENCOUNTER — LAB (OUTPATIENT)
Dept: LAB | Facility: HOSPITAL | Age: 69
End: 2024-05-20
Payer: MEDICARE

## 2024-05-20 PROCEDURE — 86038 ANTINUCLEAR ANTIBODIES: CPT | Performed by: FAMILY MEDICINE

## 2024-05-21 LAB — ANA SER QL: NEGATIVE

## 2024-05-22 ENCOUNTER — TELEPHONE (OUTPATIENT)
Dept: FAMILY MEDICINE CLINIC | Facility: CLINIC | Age: 69
End: 2024-05-22
Payer: MEDICARE

## 2024-05-22 RX ORDER — ONDANSETRON 4 MG/1
4 TABLET, ORALLY DISINTEGRATING ORAL EVERY 8 HOURS PRN
Qty: 10 TABLET | Refills: 0 | Status: SHIPPED | OUTPATIENT
Start: 2024-05-22

## 2024-05-22 RX ORDER — SCOLOPAMINE TRANSDERMAL SYSTEM 1 MG/1
1 PATCH, EXTENDED RELEASE TRANSDERMAL
Qty: 10 EACH | Refills: 0 | Status: SHIPPED | OUTPATIENT
Start: 2024-05-22

## 2024-05-22 NOTE — TELEPHONE ENCOUNTER
Caller: Junito Phelan    Relationship: Self    Best call back number: [phone]     What medication are you requestin SEA SICKNESS PATCHES     If a prescription is needed, what is your preferred pharmacy and phone number: [facility] #[id number] - [place] - [address] AT ACROSS FROM [address]  [phone] [facility] [phone]      Additional notes:  HE IS GOING ON  A CRUISE  FOR 12 DAYS.

## 2024-05-24 ENCOUNTER — TELEPHONE (OUTPATIENT)
Dept: GASTROENTEROLOGY | Facility: CLINIC | Age: 69
End: 2024-05-24
Payer: MEDICARE

## 2024-05-24 NOTE — TELEPHONE ENCOUNTER
PTs wife called.   PT had an OV @  [facility] with Dr. Deleon (sp?) Brain/Spine.  On the way home..the patient got sick and they went to ER.  He had a CT Scan and they saw a spot on pancreas .  The PT was told that he is needing an ERCP.  They wanted it done here.

## 2024-05-28 ENCOUNTER — HOSPITAL ENCOUNTER (OUTPATIENT)
Facility: HOSPITAL | Age: 69
Setting detail: HOSPITAL OUTPATIENT SURGERY
Discharge: HOME | End: 2024-05-28
Attending: INTERNAL MEDICINE | Admitting: INTERNAL MEDICINE
Payer: MEDICARE

## 2024-05-28 ENCOUNTER — TELEPHONE (OUTPATIENT)
Dept: GASTROENTEROLOGY | Facility: CLINIC | Age: 69
End: 2024-05-28
Payer: MEDICARE

## 2024-05-28 ENCOUNTER — ANESTHESIA (OUTPATIENT)
Dept: GASTROENTEROLOGY | Facility: HOSPITAL | Age: 69
End: 2024-05-28
Payer: MEDICARE

## 2024-05-28 ENCOUNTER — ANESTHESIA EVENT (OUTPATIENT)
Dept: GASTROENTEROLOGY | Facility: HOSPITAL | Age: 69
End: 2024-05-28
Payer: MEDICARE

## 2024-05-28 VITALS
HEIGHT: 67 IN | OXYGEN SATURATION: 95 % | WEIGHT: 140 LBS | SYSTOLIC BLOOD PRESSURE: 116 MMHG | DIASTOLIC BLOOD PRESSURE: 73 MMHG | HEART RATE: 60 BPM | BODY MASS INDEX: 21.97 KG/M2 | RESPIRATION RATE: 11 BRPM | TEMPERATURE: 96.7 F

## 2024-05-28 DIAGNOSIS — R11.2 NAUSEA AND VOMITING, UNSPECIFIED VOMITING TYPE: ICD-10-CM

## 2024-05-28 PROCEDURE — 43239 EGD BIOPSY SINGLE/MULTIPLE: CPT | Performed by: INTERNAL MEDICINE

## 2024-05-28 PROCEDURE — 87081 CULTURE SCREEN ONLY: CPT | Performed by: INTERNAL MEDICINE

## 2024-05-28 PROCEDURE — 88305 TISSUE EXAM BY PATHOLOGIST: CPT | Performed by: INTERNAL MEDICINE

## 2024-05-28 PROCEDURE — 25010000002 PROPOFOL 10 MG/ML EMULSION: Performed by: NURSE ANESTHETIST, CERTIFIED REGISTERED

## 2024-05-28 PROCEDURE — 25810000003 SODIUM CHLORIDE 0.9 % SOLUTION: Performed by: NURSE ANESTHETIST, CERTIFIED REGISTERED

## 2024-05-28 RX ORDER — LIDOCAINE HYDROCHLORIDE 20 MG/ML
INJECTION, SOLUTION EPIDURAL; INFILTRATION; INTRACAUDAL; PERINEURAL AS NEEDED
Status: DISCONTINUED | OUTPATIENT
Start: 2024-05-28 | End: 2024-05-28 | Stop reason: SURG

## 2024-05-28 RX ORDER — SODIUM CHLORIDE 9 MG/ML
500 INJECTION, SOLUTION INTRAVENOUS CONTINUOUS PRN
Status: DISCONTINUED | OUTPATIENT
Start: 2024-05-28 | End: 2024-05-28 | Stop reason: HOSPADM

## 2024-05-28 RX ORDER — SODIUM CHLORIDE 0.9 % (FLUSH) 0.9 %
10 SYRINGE (ML) INJECTION AS NEEDED
Status: DISCONTINUED | OUTPATIENT
Start: 2024-05-28 | End: 2024-05-28 | Stop reason: HOSPADM

## 2024-05-28 RX ORDER — PROPOFOL 10 MG/ML
VIAL (ML) INTRAVENOUS AS NEEDED
Status: DISCONTINUED | OUTPATIENT
Start: 2024-05-28 | End: 2024-05-28 | Stop reason: SURG

## 2024-05-28 RX ORDER — ONDANSETRON 2 MG/ML
4 INJECTION INTRAMUSCULAR; INTRAVENOUS ONCE AS NEEDED
Status: DISCONTINUED | OUTPATIENT
Start: 2024-05-28 | End: 2024-05-28 | Stop reason: HOSPADM

## 2024-05-28 RX ORDER — PANTOPRAZOLE SODIUM 40 MG/1
40 TABLET, DELAYED RELEASE ORAL DAILY
Qty: 30 TABLET | Refills: 11 | Status: SHIPPED | OUTPATIENT
Start: 2024-05-28

## 2024-05-28 RX ADMIN — LIDOCAINE HYDROCHLORIDE 100 MG: 20 INJECTION, SOLUTION EPIDURAL; INFILTRATION; INTRACAUDAL; PERINEURAL at 09:05

## 2024-05-28 RX ADMIN — SODIUM CHLORIDE 500 ML: 9 INJECTION, SOLUTION INTRAVENOUS at 07:43

## 2024-05-28 RX ADMIN — PROPOFOL 200 MG: 10 INJECTION, EMULSION INTRAVENOUS at 09:05

## 2024-05-28 NOTE — ANESTHESIA PREPROCEDURE EVALUATION
Anesthesia Evaluation     Patient summary reviewed   no history of anesthetic complications:   NPO Solid Status: > 8 hours             Airway   Mallampati: II  Dental    (+) upper dentures and lower dentures    Pulmonary    (+) ,sleep apnea  (-) COPD, asthma, not a smoker  Cardiovascular   Exercise tolerance: good (4-7 METS)    (+) pacemaker (medtronic) pacemaker interrogated 1-3 months ago, hypertension, CAD, cardiac stents more than 12 months ago , angina (chronic stable angina), hyperlipidemia  (-) past MI      Neuro/Psych  (+) seizures (remote hx)  (-) TIA, CVA  GI/Hepatic/Renal/Endo    (+) renal disease- stones  (-) GERD, liver disease, diabetes    Musculoskeletal     Abdominal    Substance History      OB/GYN          Other                      Anesthesia Plan    ASA 3     MAC       Anesthetic plan, risks, benefits, and alternatives have been provided, discussed and informed consent has been obtained with: patient.    CODE STATUS:

## 2024-05-28 NOTE — ANESTHESIA POSTPROCEDURE EVALUATION
Patient: Junito Phelan    Procedure Summary       Date: 05/28/24 Room / Location: [facility] ENDOSCOPY 5 / [facility]    Anesthesia Start: 0902 Anesthesia Stop: 0912    Procedure: ESOPHAGOGASTRODUODENOSCOPY WITH ANESTHESIA Diagnosis:       Nausea and vomiting, unspecified vomiting type      (Nausea and vomiting, unspecified vomiting type [R11.2])    Surgeons: Josiah Pedraza MD Provider: Sal Luevano CRNA    Anesthesia Type: MAC ASA Status: 3            Anesthesia Type: MAC    Vitals  Vitals Value Taken Time   BP 96/69 05/28/24 0913   Temp     Pulse 60 05/28/24 0913   Resp 12 05/28/24 0910   SpO2 95 % 05/28/24 0913   Vitals shown include unfiled device data.        Anesthesia Post Evaluation

## 2024-05-28 NOTE — TELEPHONE ENCOUNTER
I talked to the patient and his wife today.  They inform he had a CT at an outside facility noting a lesion in the pancreas.  They had asked them to fax the report but I informed them that I would not get a fax.  I was able to track down the CT under care everywhere after they had left.  We talked about differential diagnosis prior to leaving of pancreatic lesion some being premalignant some be malignant some being benign.  We talked about referral for an EUS.  I talked to them about what an EUS is.    Jonelle  Please contact the patient.  Let him know that I was able to track down his CT of his chest report that mentions a pancreatic lesion in the tail of the pancreas.  I recommend that this further be evaluated with endoscopic ultrasound.  Please arrange referral to Dr. Louie Gutierrez for EUS for abnormal imaging of the pancreas suggestive of neuroendocrine tumor.  Send Dr. Gutierrez office a copy of the CT report that is is in the patient's chart under imaging now

## 2024-05-29 DIAGNOSIS — K86.89 PANCREATIC MASS: Primary | ICD-10-CM

## 2024-05-29 LAB
CYTO UR: NORMAL
LAB AP CASE REPORT: NORMAL
Lab: NORMAL
PATH REPORT.FINAL DX SPEC: NORMAL
PATH REPORT.GROSS SPEC: NORMAL
UREASE TISS QL: NEGATIVE

## 2024-05-30 ENCOUNTER — TELEPHONE (OUTPATIENT)
Dept: GASTROENTEROLOGY | Facility: CLINIC | Age: 69
End: 2024-05-30
Payer: MEDICARE

## 2024-05-30 DIAGNOSIS — K86.9 PANCREATIC LESION: Primary | ICD-10-CM

## 2024-05-30 RX ORDER — SCOLOPAMINE TRANSDERMAL SYSTEM 1 MG/1
1 PATCH, EXTENDED RELEASE TRANSDERMAL
COMMUNITY

## 2024-05-30 RX ORDER — HYDROXYZINE PAMOATE 25 MG/1
25 CAPSULE ORAL 3 TIMES DAILY PRN
COMMUNITY

## 2024-05-30 RX ORDER — RANOLAZINE 1000 MG/1
500 TABLET, EXTENDED RELEASE ORAL 2 TIMES DAILY
COMMUNITY

## 2024-05-30 RX ORDER — PANTOPRAZOLE SODIUM 40 MG/1
40 TABLET, DELAYED RELEASE ORAL DAILY
COMMUNITY

## 2024-05-30 RX ORDER — ONDANSETRON 4 MG/1
4 TABLET, ORALLY DISINTEGRATING ORAL EVERY 8 HOURS PRN
COMMUNITY

## 2024-05-30 RX ORDER — POTASSIUM CHLORIDE 750 MG/1
10 TABLET, EXTENDED RELEASE ORAL 2 TIMES DAILY
COMMUNITY

## 2024-05-30 RX ORDER — HYDROCHLOROTHIAZIDE 12.5 MG/1
12.5 TABLET ORAL DAILY
COMMUNITY

## 2024-05-30 NOTE — TELEPHONE ENCOUNTER
I was able to call the patient today.  I informed him that I was able to find the CT report on his chest that he had at the outside facility.  I informed him that the radiologist felt that there could be a neuroendocrine tumor in the tail of his pancreas.  I discussed what that meant.  I informed him that that does not necessarily mean that is what it is.  We need to still rule out whether it is something benign or more aggressive malignancy etc.  The radiologist suggested MRCP.  He informing he cannot have MRI secondary to his pacemaker and the leads.    As we talked about the other day at length I did suggest endoscopic ultrasound then and again today.  I think that is the next step to best evaluate this.  I informed him that my office staff is currently working on a referral to Dr. Louie Gutierrez.  He is in agreement with this approach.  I did ask him that if he has not heard anything about that appointment by early next week to contact our office.  He agreed to do so.    Otherwise I will see him in later June when he is scheduled for an appointment with me.

## 2024-05-30 NOTE — TELEPHONE ENCOUNTER
Pts wife wants to know if we got records from [facility]. I see a CT in care everywhere from 5/22 and she says that is the records she is talking about. I told her I will check with Dr Pedraza to make sure he has seen this CT.

## 2024-06-04 RX ORDER — ONDANSETRON 4 MG/1
4 TABLET, ORALLY DISINTEGRATING ORAL EVERY 8 HOURS PRN
Qty: 10 TABLET | Refills: 0 | Status: SHIPPED | OUTPATIENT
Start: 2024-06-04

## 2024-06-07 ENCOUNTER — TELEPHONE (OUTPATIENT)
Dept: GASTROENTEROLOGY | Age: 69
End: 2024-06-07

## 2024-06-07 NOTE — TELEPHONE ENCOUNTER
Patient: Kraig Thakkar    YOB: 1955      Clearance was received on June 7, 2024.    for Endoscopy / Colonoscopy scheduled for: 7/2/24    Patient may discontinue the use of Plavix and Aspirin for 5  days prior to the procedure.    IS Lovenox required:  No    PATIENT NOTIFIED ON:  6/7/24      Clearance scanned into media

## 2024-06-27 ENCOUNTER — OFFICE VISIT (OUTPATIENT)
Dept: GASTROENTEROLOGY | Facility: CLINIC | Age: 69
End: 2024-06-27
Payer: MEDICARE

## 2024-06-27 VITALS
HEART RATE: 69 BPM | WEIGHT: 134 LBS | SYSTOLIC BLOOD PRESSURE: 128 MMHG | DIASTOLIC BLOOD PRESSURE: 68 MMHG | BODY MASS INDEX: 21.03 KG/M2 | OXYGEN SATURATION: 100 % | HEIGHT: 67 IN | TEMPERATURE: 98 F

## 2024-06-27 DIAGNOSIS — K20.90 ESOPHAGITIS: ICD-10-CM

## 2024-06-27 DIAGNOSIS — R74.8 ELEVATED LIVER ENZYMES: ICD-10-CM

## 2024-06-27 DIAGNOSIS — R11.2 NAUSEA AND VOMITING, UNSPECIFIED VOMITING TYPE: Primary | ICD-10-CM

## 2024-06-27 DIAGNOSIS — K86.89 PANCREATIC MASS: ICD-10-CM

## 2024-06-27 PROCEDURE — 1160F RVW MEDS BY RX/DR IN RCRD: CPT | Performed by: INTERNAL MEDICINE

## 2024-06-27 PROCEDURE — 1159F MED LIST DOCD IN RCRD: CPT | Performed by: INTERNAL MEDICINE

## 2024-06-27 PROCEDURE — 3074F SYST BP LT 130 MM HG: CPT | Performed by: INTERNAL MEDICINE

## 2024-06-27 PROCEDURE — 99214 OFFICE O/P EST MOD 30 MIN: CPT | Performed by: INTERNAL MEDICINE

## 2024-06-27 PROCEDURE — 3078F DIAST BP <80 MM HG: CPT | Performed by: INTERNAL MEDICINE
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
## 2024-07-02 ENCOUNTER — TELEPHONE (OUTPATIENT)
Dept: GASTROENTEROLOGY | Age: 69
End: 2024-07-02

## 2024-07-02 ENCOUNTER — ANESTHESIA EVENT (OUTPATIENT)
Dept: ENDOSCOPY | Age: 69
End: 2024-07-02
Payer: MEDICARE

## 2024-07-02 ENCOUNTER — ANESTHESIA (OUTPATIENT)
Dept: ENDOSCOPY | Age: 69
End: 2024-07-02
Payer: MEDICARE

## 2024-07-02 ENCOUNTER — HOSPITAL ENCOUNTER (OUTPATIENT)
Age: 69
Setting detail: OUTPATIENT SURGERY
Discharge: HOME | End: 2024-07-02
Attending: INTERNAL MEDICINE | Admitting: INTERNAL MEDICINE
Payer: MEDICARE

## 2024-07-02 VITALS
SYSTOLIC BLOOD PRESSURE: 122 MMHG | TEMPERATURE: 97.6 F | DIASTOLIC BLOOD PRESSURE: 76 MMHG | HEIGHT: 67 IN | WEIGHT: 140 LBS | OXYGEN SATURATION: 96 % | BODY MASS INDEX: 21.97 KG/M2 | RESPIRATION RATE: 18 BRPM | HEART RATE: 60 BPM

## 2024-07-02 DIAGNOSIS — K86.9 PANCREATIC LESION: Primary | ICD-10-CM

## 2024-07-02 PROCEDURE — 2580000003 HC RX 258: Performed by: INTERNAL MEDICINE

## 2024-07-02 PROCEDURE — 3700000001 HC ADD 15 MINUTES (ANESTHESIA): Performed by: INTERNAL MEDICINE

## 2024-07-02 PROCEDURE — 2709999900 HC NON-CHARGEABLE SUPPLY: Performed by: INTERNAL MEDICINE

## 2024-07-02 PROCEDURE — 7100000011 HC PHASE II RECOVERY - ADDTL 15 MIN: Performed by: INTERNAL MEDICINE

## 2024-07-02 PROCEDURE — 3700000000 HC ANESTHESIA ATTENDED CARE: Performed by: INTERNAL MEDICINE

## 2024-07-02 PROCEDURE — 2580000003 HC RX 258: Performed by: NURSE ANESTHETIST, CERTIFIED REGISTERED

## 2024-07-02 PROCEDURE — 3609020800 HC EGD W/EUS FNA: Performed by: INTERNAL MEDICINE

## 2024-07-02 PROCEDURE — 7100000010 HC PHASE II RECOVERY - FIRST 15 MIN: Performed by: INTERNAL MEDICINE

## 2024-07-02 PROCEDURE — 2500000003 HC RX 250 WO HCPCS: Performed by: NURSE ANESTHETIST, CERTIFIED REGISTERED

## 2024-07-02 PROCEDURE — 43259 EGD US EXAM DUODENUM/JEJUNUM: CPT | Performed by: INTERNAL MEDICINE

## 2024-07-02 PROCEDURE — 6360000002 HC RX W HCPCS: Performed by: NURSE ANESTHETIST, CERTIFIED REGISTERED

## 2024-07-02 RX ORDER — ONDANSETRON 2 MG/ML
INJECTION INTRAMUSCULAR; INTRAVENOUS PRN
Status: DISCONTINUED | OUTPATIENT
Start: 2024-07-02 | End: 2024-07-02 | Stop reason: SDUPTHER

## 2024-07-02 RX ORDER — FENTANYL CITRATE 50 UG/ML
INJECTION, SOLUTION INTRAMUSCULAR; INTRAVENOUS PRN
Status: DISCONTINUED | OUTPATIENT
Start: 2024-07-02 | End: 2024-07-02 | Stop reason: SDUPTHER

## 2024-07-02 RX ORDER — SODIUM CHLORIDE, SODIUM LACTATE, POTASSIUM CHLORIDE, CALCIUM CHLORIDE 600; 310; 30; 20 MG/100ML; MG/100ML; MG/100ML; MG/100ML
INJECTION, SOLUTION INTRAVENOUS CONTINUOUS
Status: DISCONTINUED | OUTPATIENT
Start: 2024-07-02 | End: 2024-07-02 | Stop reason: HOSPADM

## 2024-07-02 RX ORDER — LIDOCAINE HYDROCHLORIDE 10 MG/ML
INJECTION, SOLUTION INFILTRATION; PERINEURAL PRN
Status: DISCONTINUED | OUTPATIENT
Start: 2024-07-02 | End: 2024-07-02 | Stop reason: SDUPTHER

## 2024-07-02 RX ORDER — SODIUM CHLORIDE, SODIUM LACTATE, POTASSIUM CHLORIDE, CALCIUM CHLORIDE 600; 310; 30; 20 MG/100ML; MG/100ML; MG/100ML; MG/100ML
INJECTION, SOLUTION INTRAVENOUS CONTINUOUS PRN
Status: DISCONTINUED | OUTPATIENT
Start: 2024-07-02 | End: 2024-07-02 | Stop reason: SDUPTHER

## 2024-07-02 RX ORDER — PROPOFOL 10 MG/ML
INJECTION, EMULSION INTRAVENOUS PRN
Status: DISCONTINUED | OUTPATIENT
Start: 2024-07-02 | End: 2024-07-02 | Stop reason: SDUPTHER

## 2024-07-02 RX ADMIN — ONDANSETRON 4 MG: 2 INJECTION INTRAMUSCULAR; INTRAVENOUS at 10:46

## 2024-07-02 RX ADMIN — FENTANYL CITRATE 50 MCG: 50 INJECTION INTRAMUSCULAR; INTRAVENOUS at 10:46

## 2024-07-02 RX ADMIN — PROPOFOL 20 MG: 10 INJECTION, EMULSION INTRAVENOUS at 10:56

## 2024-07-02 RX ADMIN — FENTANYL CITRATE 50 MCG: 50 INJECTION INTRAMUSCULAR; INTRAVENOUS at 10:58

## 2024-07-02 RX ADMIN — PROPOFOL 140 MCG/KG/MIN: 10 INJECTION, EMULSION INTRAVENOUS at 10:57

## 2024-07-02 RX ADMIN — PROPOFOL 30 MG: 10 INJECTION, EMULSION INTRAVENOUS at 10:58

## 2024-07-02 RX ADMIN — PROPOFOL 30 MG: 10 INJECTION, EMULSION INTRAVENOUS at 11:01

## 2024-07-02 RX ADMIN — LIDOCAINE HYDROCHLORIDE 65 MG: 10 INJECTION, SOLUTION INFILTRATION; PERINEURAL at 10:46

## 2024-07-02 RX ADMIN — SODIUM CHLORIDE, POTASSIUM CHLORIDE, SODIUM LACTATE AND CALCIUM CHLORIDE: 600; 310; 30; 20 INJECTION, SOLUTION INTRAVENOUS at 10:43

## 2024-07-02 RX ADMIN — PROPOFOL 50 MG: 10 INJECTION, EMULSION INTRAVENOUS at 10:54

## 2024-07-02 RX ADMIN — PROPOFOL 20 MG: 10 INJECTION, EMULSION INTRAVENOUS at 10:59

## 2024-07-02 RX ADMIN — PROPOFOL 50 MG: 10 INJECTION, EMULSION INTRAVENOUS at 10:55

## 2024-07-02 RX ADMIN — SODIUM CHLORIDE, POTASSIUM CHLORIDE, SODIUM LACTATE AND CALCIUM CHLORIDE: 600; 310; 30; 20 INJECTION, SOLUTION INTRAVENOUS at 10:26

## 2024-07-02 ASSESSMENT — LIFESTYLE VARIABLES: SMOKING_STATUS: 1

## 2024-07-02 ASSESSMENT — PAIN - FUNCTIONAL ASSESSMENT
PAIN_FUNCTIONAL_ASSESSMENT: 0-10
PAIN_FUNCTIONAL_ASSESSMENT: ACTIVITIES ARE NOT PREVENTED
PAIN_FUNCTIONAL_ASSESSMENT: 0-10

## 2024-07-02 ASSESSMENT — PAIN DESCRIPTION - DESCRIPTORS
DESCRIPTORS: ACHING;DISCOMFORT
DESCRIPTORS: ACHING
DESCRIPTORS: ACHING;DISCOMFORT

## 2024-07-02 ASSESSMENT — ENCOUNTER SYMPTOMS
DYSPNEA ACTIVITY LEVEL: AFTER AMBULATING 1 FLIGHT OF STAIRS
SHORTNESS OF BREATH: 1

## 2024-07-02 NOTE — DISCHARGE INSTRUCTIONS
RECOMMENDATIONS:     - Pancreatic protocol CT to be done as outpatient   - Pending MRI results, we will consider biopsy if high risk features noted.          Endoscopic Ultrasound (Oral): What to Expect At Home  Your Recovery  After you have an endoscopic ultrasound--a test to look for problems in the stomach, liver, gallbladder, and other organs--you will stay at the hospital or clinic for 1 to 2 hours. This will allow the medicine to wear off. You will be able to go home after your doctor or nurse checks to make sure you are not having any problems.  You may have a sore throat for a day or two after the test.  This care sheet gives you a general idea about what to expect after the test.  How can you care for yourself at home?  Activity    Rest as much as you need to after you go home.     Ask your doctor when you can drive again.     You should be able to go back to your usual activities the day after the test.   Diet    Follow your doctor's directions for eating after the test.     Drink plenty of fluids (unless your doctor has told you not to).   Medicines    If you have a sore throat the day after the test, use an over-the-counter spray to numb your throat.   Other instructions    Do not sign legal documents or make major decisions until the medicine effects are gone and you can think clearly. The anesthesia medicine can make it hard for you to fully understand what you are agreeing to.   Follow-up care is a key part of your treatment and safety. Be sure to make and go to all appointments, and call your doctor if you are having problems. It's also a good idea to know your test results and keep a list of the medicines you take.  When should you call for help?   Call 911 anytime you think you may need emergency care. For example, call if:    You passed out (lost consciousness).     You have trouble breathing.     You vomit blood or what looks like coffee grounds.     Your stools are maroon or very bloody.

## 2024-07-02 NOTE — TELEPHONE ENCOUNTER
Per Dr Stock today, lvm for pt to call back to verify when and where he would like to have his Ct scheduled.     IMPRESSION:     Small lesion near the tail of the pancreas measuring 1.6 cm x 8mm.      RECOMMENDATIONS:      - CT of the abdomen/pelvis with and without contrast (pancreatic protocol) to be ordered.    - Pending CT results, we will consider biopsy if high risk features noted.      The results were discussed with the patient and family. A copy of the images obtained were given to the patient.      Can Stock MD  07/02/24  11:11 AM

## 2024-07-02 NOTE — H&P
esophagitis w/no bleeding-No Valdivia's, no h pylori    UPPER GASTROINTESTINAL ENDOSCOPY  11/04/2004    [facility]    WRIST SURGERY         Social History:  Social History     Tobacco Use    Smoking status: Former     Types: Cigarettes    Smokeless tobacco: Former   Vaping Use    Vaping Use: Never used   Substance Use Topics    Alcohol use: No    Drug use: No       Vital Signs:   Vitals:    07/02/24 1011   BP: 134/77   Pulse: 60   Resp: 18   Temp: 97.4 °F (36.3 °C)   SpO2: 96%        Physical Exam:  Cardiac:  [x]WNL  []Comments:  Pulmonary:  [x]WNL   []Comments:  Neuro/Mental Status:  [x]WNL  []Comments:  Abdominal:  [x]WNL    []Comments:  Other:   []WNL  []Comments:    Informed Consent:  The risks and benefits of the procedure have been discussed with either the patient or if they cannot consent, their representative.    Assessment:  Patient examined and appropriate for planned sedation and procedure.     Plan:  Proceed with planned sedation and procedure as above.         Can Stock MD

## 2024-07-02 NOTE — ANESTHESIA PRE PROCEDURE
Department of Anesthesiology  Preprocedure Note       Name:  Kraig Thakkar   Age:  69 y.o.  :  1955                                          MRN:  [id number]         Date:  2024      Surgeon: Surgeon(s):  Can Stock MD    Procedure: Procedure(s):  ESOPHAGOGASTRODUODENOSCOPY ENDOSCOPIC ULTRASOUND FINE NEEDLE ASPIRATION    Medications prior to admission:   Prior to Admission medications    Medication Sig Start Date End Date Taking? Authorizing Provider   hydroCHLOROthiazide 12.5 MG tablet Take 1 tablet by mouth daily    Priscila Trammell MD   hydrOXYzine pamoate (VISTARIL) 25 MG capsule Take 1 capsule by mouth 3 times daily as needed for Itching    Priscila Trammell MD   ondansetron (ZOFRAN-ODT) 4 MG disintegrating tablet Take 1 tablet by mouth every 8 hours as needed for Nausea or Vomiting    Priscila Trammell MD   pantoprazole (PROTONIX) 40 MG tablet Take 1 tablet by mouth daily    Priscila Trammell MD   potassium chloride (KLOR-CON M) 10 MEQ extended release tablet Take 1 tablet by mouth 2 times daily    Priscila Trammell MD   ranolazine (RANEXA) 1000 MG extended release tablet Take 500 mg by mouth 2 times daily    Priscila Trammell MD   scopolamine (TRANSDERM-SCOP) transdermal patch Place 1 patch onto the skin every 72 hours    Priscila Trammell MD   amLODIPine (NORVASC) 10 MG tablet Take 1 tablet by mouth daily ---Stop medication until you follow up with your primary care physician 3/15/24   Doe Fisher MD   FLUoxetine (PROZAC) 40 MG capsule Take 1 capsule by mouth daily 23   Priscila Trammell MD   metoprolol succinate (TOPROL XL) 25 MG extended release tablet Take 1 tablet by mouth nightly 23   Priscila Trammell MD   tamsulosin (FLOMAX) 0.4 MG capsule Take 1 capsule by mouth daily 3/14/23   Priscila Trammell MD   traZODone (DESYREL) 50 MG tablet Take 1 tablet by mouth nightly    Priscila Trammell MD   Evolocumab with Infusor (REPATHA

## 2024-07-02 NOTE — ANESTHESIA POSTPROCEDURE EVALUATION
Department of Anesthesiology  Postprocedure Note    Patient: Kraig Thakkar  MRN: [id number]  YOB: 1955  Date of evaluation: 7/2/2024    Procedure Summary       Date: 07/02/24 Room / Location: [place] [id number] / [facility]    Anesthesia Start: 1043 Anesthesia Stop: 1110    Procedure: ESOPHAGOGASTRODUODENOSCOPY ENDOSCOPIC ULTRASOUND FINE NEEDLE ASPIRATION Diagnosis:       Pancreatic lesion      (Pancreatic lesion [K86.9])    Surgeons: Can Stock MD Responsible Provider: Wilfrido Rubio APRN - CRNA    Anesthesia Type: general, TIVA ASA Status: 3            Anesthesia Type: No value filed.    Brenna Phase I:      Brenna Phase II:      Anesthesia Post Evaluation    Patient location during evaluation: bedside  Patient participation: complete - patient participated  Level of consciousness: sleepy but conscious  Pain score: 0  Airway patency: patent  Nausea & Vomiting: no nausea and no vomiting  Cardiovascular status: hemodynamically stable and blood pressure returned to baseline  Respiratory status: acceptable, spontaneous ventilation, nonlabored ventilation and room air  Hydration status: stable  Comments: /64   Pulse 60   Temp 97.4 °F (36.3 °C) (Temporal)   Resp 14   Ht 1.702 m (5' 7\")   Wt 63.5 kg (140 lb)   SpO2 98%   BMI 21.93 kg/m²     Pain management: adequate    No notable events documented.

## 2024-07-02 NOTE — OP NOTE
Referring/Primary Care Provider: Merlin Brown MD, Can Stock MD    Date of Procedure: 07/02/24    Procedure:   1. EGD with Endoscopic Ultrasound     Indications:   1. Abnormal lesion on CT chest    Anesthesia:  Sedation was administered by anesthesia who monitored the patient during the procedure.    Procedure:   After reviewing the patient's chart, H&P, medications, obtaining informed consent, and discussing risks benefits and alternatives to the procedure the patient was placed in the left lateral decubitus position. A oblique viewing Olympus 140 Linear EUS scope was lubricated and inserted through the mouth into the oropharynx. Under indirect visualization, the upper esophagus was intubated. The scope was advanced to the level of the third portion of duodenum with limited views of the esophageal mucosa. Findings and maneuvers are listed in impression below.     The patient tolerated the procedure well. There were no immediate complications.    Findings:   Endoscopic Finding:     - endoscopic evaluation of the upper GI tract appeared normal    Endosonographic Findings:  - The celiac axis and associated vascular structures was identified and examined.  No concerning or malignant lymphadenopathy was identified.     - Limited views of the left lobe of the liver revealed no biliary dilation or lesion.     - The EUS scope was advanced to the duodenal bulb. CBD normal.     - Pancreas: no pancreatic mass. No lymphadenopathy. In the area of the pancreatic tail there was a small hypoechoic oval appearing structure. This lesion was not fully cystic. Total measurement was 1.6cm x 8mm.  This was not fully contained in the pancreas and may have been a small splenule. There was no MPD dilation. There was noted intervening vasculature in the area between the probe and lesion. Due to this and the overall small size of the lesion, I elected not to pursue FNA today.     Estimated Blood Loss:

## 2024-07-16 ENCOUNTER — TRANSCRIBE ORDERS (OUTPATIENT)
Dept: ADMINISTRATIVE | Facility: HOSPITAL | Age: 69
End: 2024-07-16
Payer: MEDICARE

## 2024-07-16 DIAGNOSIS — K86.9 PANCREATIC LESION: Primary | ICD-10-CM

## 2024-07-29 ENCOUNTER — TELEPHONE (OUTPATIENT)
Dept: GASTROENTEROLOGY | Age: 69
End: 2024-07-29

## 2024-07-29 DIAGNOSIS — K86.9 PANCREATIC LESION: Primary | ICD-10-CM

## 2024-07-29 RX ORDER — TAMSULOSIN HYDROCHLORIDE 0.4 MG/1
1 CAPSULE ORAL DAILY
Qty: 90 CAPSULE | Refills: 0 | Status: SHIPPED | OUTPATIENT
Start: 2024-07-29

## 2024-07-29 NOTE — TELEPHONE ENCOUNTER
I called and discussed with [facility]. They needed the order to say with and without, pancreatic protocol. I have corrected it and faxed the new order.

## 2024-07-29 NOTE — TELEPHONE ENCOUNTER
[facility] called to get a new order for pt to have a abdominal pelvic that states with or without contrast protocol. [phone]. Fax: [phone]. Please advise.

## 2024-07-30 ENCOUNTER — HOSPITAL ENCOUNTER (OUTPATIENT)
Dept: CT IMAGING | Facility: HOSPITAL | Age: 69
Discharge: HOME | End: 2024-07-30
Admitting: INTERNAL MEDICINE
Payer: MEDICARE

## 2024-07-30 DIAGNOSIS — K86.9 PANCREATIC LESION: ICD-10-CM

## 2024-07-30 PROCEDURE — 25510000001 IOPAMIDOL 61 % SOLUTION: Performed by: INTERNAL MEDICINE

## 2024-07-30 PROCEDURE — 74178 CT ABD&PLV WO CNTR FLWD CNTR: CPT

## 2024-07-30 RX ADMIN — IOPAMIDOL 100 ML: 612 INJECTION, SOLUTION INTRAVENOUS at 10:03

## 2024-07-31 DIAGNOSIS — K86.9 PANCREATIC LESION: ICD-10-CM

## 2024-08-17 ENCOUNTER — APPOINTMENT (OUTPATIENT)
Dept: GENERAL RADIOLOGY | Age: 69
End: 2024-08-17
Payer: MEDICARE

## 2024-08-17 ENCOUNTER — APPOINTMENT (OUTPATIENT)
Dept: CT IMAGING | Age: 69
End: 2024-08-17
Payer: MEDICARE

## 2024-08-17 ENCOUNTER — HOSPITAL ENCOUNTER (EMERGENCY)
Age: 69
Discharge: HOME | End: 2024-08-17
Attending: STUDENT IN AN ORGANIZED HEALTH CARE EDUCATION/TRAINING PROGRAM
Payer: MEDICARE

## 2024-08-17 VITALS
RESPIRATION RATE: 16 BRPM | HEART RATE: 66 BPM | TEMPERATURE: 97.8 F | BODY MASS INDEX: 21.14 KG/M2 | OXYGEN SATURATION: 92 % | DIASTOLIC BLOOD PRESSURE: 66 MMHG | SYSTOLIC BLOOD PRESSURE: 104 MMHG | WEIGHT: 135 LBS
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
## 2024-08-17 PROCEDURE — 90715 TDAP VACCINE 7 YRS/> IM: CPT | Performed by: STUDENT IN AN ORGANIZED HEALTH CARE EDUCATION/TRAINING PROGRAM

## 2024-08-17 PROCEDURE — 72125 CT NECK SPINE W/O DYE: CPT

## 2024-08-17 PROCEDURE — 73560 X-RAY EXAM OF KNEE 1 OR 2: CPT

## 2024-08-17 PROCEDURE — 99284 EMERGENCY DEPT VISIT MOD MDM: CPT

## 2024-08-17 PROCEDURE — 73130 X-RAY EXAM OF HAND: CPT

## 2024-08-17 PROCEDURE — 73590 X-RAY EXAM OF LOWER LEG: CPT

## 2024-08-17 PROCEDURE — 90471 IMMUNIZATION ADMIN: CPT | Performed by: STUDENT IN AN ORGANIZED HEALTH CARE EDUCATION/TRAINING PROGRAM

## 2024-08-17 PROCEDURE — 70450 CT HEAD/BRAIN W/O DYE: CPT

## 2024-08-17 PROCEDURE — 6360000002 HC RX W HCPCS: Performed by: STUDENT IN AN ORGANIZED HEALTH CARE EDUCATION/TRAINING PROGRAM

## 2024-08-17 RX ADMIN — TETANUS TOXOID, REDUCED DIPHTHERIA TOXOID AND ACELLULAR PERTUSSIS VACCINE, ADSORBED 0.5 ML: 5; 2.5; 8; 8; 2.5 SUSPENSION INTRAMUSCULAR at 20:39

## 2024-08-17 ASSESSMENT — ENCOUNTER SYMPTOMS
NAUSEA: 0
VOMITING: 0
SHORTNESS OF BREATH: 0
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
## 2024-08-29 RX ORDER — FLUOXETINE 40 MG/1
40 CAPSULE ORAL DAILY
Qty: 30 CAPSULE | Refills: 2 | Status: SHIPPED | OUTPATIENT
Start: 2024-08-29

## 2024-08-29 NOTE — TELEPHONE ENCOUNTER
Rx Refill Note  Requested Prescriptions     Pending Prescriptions Disp Refills    FLUoxetine (PROzac) 40 MG capsule 30 capsule 2     Sig: Take 1 capsule by mouth Daily.      Last office visit with office: 05/02/24  Next office visit with office: none    UDS:     DATE OF LAST REFILL:     Controlled Substance Agreement:     RAQUEL OR ASTER:          {TIP  Is Refill Pharmacy correct?:  Cat Son MA  08/29/24, 15:07 CDT

## 2024-09-11 RX ORDER — TRAZODONE HYDROCHLORIDE 50 MG/1
50 TABLET, FILM COATED ORAL NIGHTLY
Qty: 90 TABLET | Refills: 1 | Status: CANCELLED | OUTPATIENT
Start: 2024-09-11

## 2024-09-26 ENCOUNTER — TELEPHONE (OUTPATIENT)
Dept: GASTROENTEROLOGY | Facility: CLINIC | Age: 69
End: 2024-09-26
Payer: MEDICARE

## 2024-10-10 ENCOUNTER — OFFICE VISIT (OUTPATIENT)
Dept: GASTROENTEROLOGY | Facility: CLINIC | Age: 69
End: 2024-10-10
Payer: MEDICARE

## 2024-10-10 VITALS
SYSTOLIC BLOOD PRESSURE: 122 MMHG | HEIGHT: 67 IN | HEART RATE: 55 BPM | BODY MASS INDEX: 21.35 KG/M2 | WEIGHT: 136 LBS | DIASTOLIC BLOOD PRESSURE: 76 MMHG | OXYGEN SATURATION: 99 % | TEMPERATURE: 97.8 F

## 2024-10-10 DIAGNOSIS — K20.90 ESOPHAGITIS: Primary | ICD-10-CM

## 2024-10-10 DIAGNOSIS — K86.89 PANCREATIC MASS: ICD-10-CM

## 2024-10-10 DIAGNOSIS — K21.9 GASTROESOPHAGEAL REFLUX DISEASE, UNSPECIFIED WHETHER ESOPHAGITIS PRESENT: ICD-10-CM

## 2024-10-10 PROBLEM — K86.9 PANCREATIC LESION: Status: ACTIVE | Noted: 2024-10-10

## 2024-10-10 PROBLEM — R11.2 NAUSEA AND VOMITING: Status: RESOLVED | Noted: 2024-05-13 | Resolved: 2024-10-10

## 2024-10-10 PROCEDURE — 99213 OFFICE O/P EST LOW 20 MIN: CPT | Performed by: INTERNAL MEDICINE

## 2024-10-10 PROCEDURE — 1160F RVW MEDS BY RX/DR IN RCRD: CPT | Performed by: INTERNAL MEDICINE

## 2024-10-10 PROCEDURE — 3078F DIAST BP <80 MM HG: CPT | Performed by: INTERNAL MEDICINE

## 2024-10-10 PROCEDURE — 3074F SYST BP LT 130 MM HG: CPT | Performed by: INTERNAL MEDICINE

## 2024-10-10 PROCEDURE — 1159F MED LIST DOCD IN RCRD: CPT | Performed by: INTERNAL MEDICINE

## 2024-10-10 RX ORDER — ZOLPIDEM TARTRATE 5 MG/1
5 TABLET ORAL NIGHTLY PRN
COMMUNITY

## 2024-10-10 RX ORDER — PANTOPRAZOLE SODIUM 40 MG/1
40 TABLET, DELAYED RELEASE ORAL DAILY
Qty: 30 TABLET | Refills: 11 | Status: SHIPPED | OUTPATIENT
Start: 2024-10-10
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
## 2024-10-28 DIAGNOSIS — M79.642 LEFT HAND PAIN: Primary | ICD-10-CM

## 2024-10-29 ENCOUNTER — OFFICE VISIT (OUTPATIENT)
Age: 69
End: 2024-10-29

## 2024-10-29 ENCOUNTER — TELEPHONE (OUTPATIENT)
Age: 69
End: 2024-10-29

## 2024-10-29 VITALS — BODY MASS INDEX: 21.19 KG/M2 | HEIGHT: 67 IN | WEIGHT: 135 LBS

## 2024-10-29 DIAGNOSIS — M79.642 HAND PAIN, LEFT: Primary | ICD-10-CM

## 2024-10-29 RX ORDER — ZOLPIDEM TARTRATE 5 MG/1
5 TABLET ORAL NIGHTLY PRN
COMMUNITY

## 2024-10-29 RX ORDER — EVOLOCUMAB 140 MG/ML
INJECTION, SOLUTION SUBCUTANEOUS
COMMUNITY
Start: 2024-09-25
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
## 2024-12-02 ENCOUNTER — OFFICE VISIT (OUTPATIENT)
Age: 69
End: 2024-12-02
Payer: MEDICARE

## 2024-12-02 VITALS — WEIGHT: 135 LBS | BODY MASS INDEX: 21.19 KG/M2 | HEIGHT: 67 IN

## 2024-12-02 DIAGNOSIS — M79.642 PAIN IN LEFT HAND: Primary | ICD-10-CM

## 2024-12-02 PROBLEM — R41.0 CONFUSION: Status: RESOLVED | Noted: 2021-07-13 | Resolved: 2024-12-02

## 2024-12-02 PROCEDURE — G8427 DOCREV CUR MEDS BY ELIG CLIN: HCPCS | Performed by: ORTHOPAEDIC SURGERY

## 2024-12-02 PROCEDURE — 1036F TOBACCO NON-USER: CPT | Performed by: ORTHOPAEDIC SURGERY

## 2024-12-02 PROCEDURE — 99213 OFFICE O/P EST LOW 20 MIN: CPT | Performed by: ORTHOPAEDIC SURGERY

## 2024-12-02 PROCEDURE — G8420 CALC BMI NORM PARAMETERS: HCPCS | Performed by: ORTHOPAEDIC SURGERY

## 2024-12-02 PROCEDURE — 1160F RVW MEDS BY RX/DR IN RCRD: CPT | Performed by: ORTHOPAEDIC SURGERY

## 2024-12-02 PROCEDURE — 1159F MED LIST DOCD IN RCRD: CPT | Performed by: ORTHOPAEDIC SURGERY

## 2024-12-02 PROCEDURE — 3017F COLORECTAL CA SCREEN DOC REV: CPT | Performed by: ORTHOPAEDIC SURGERY

## 2024-12-02 PROCEDURE — 1123F ACP DISCUSS/DSCN MKR DOCD: CPT | Performed by: ORTHOPAEDIC SURGERY

## 2024-12-02 PROCEDURE — G8484 FLU IMMUNIZE NO ADMIN: HCPCS | Performed by: ORTHOPAEDIC SURGERY
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
## 2025-05-28 NOTE — PROGRESS NOTES
-Nutrition consulted. Most recent weight and BMI monitored-   -Measurements:  Wt Readings from Last 1 Encounters:   05/27/25 78.2 kg (172 lb 6.4 oz)   Body mass index is 22.13 kg/m².   Subjective   Junito Phelan is a 65 y.o. male.     Chief Complaint   Patient presents with   • Cyst     Right shoulder x 6 months       History of Present Illness     he is seeing dr de la cruz in [facility] for cad...his bp has remained stable [facility] ha..  Notes having some cheesy material from the right shoulder when he expressed it but never felt a bump    Current Outpatient Medications:   •  amLODIPine (NORVASC) 10 MG tablet, Take 10 mg by mouth daily.  , Disp: , Rfl:   •  aspirin 325 MG tablet, Take 325 mg by mouth daily, Disp: , Rfl:   •  clopidogrel (PLAVIX) 75 MG tablet, Take 75 mg by mouth daily.  , Disp: , Rfl:   •  Coenzyme Q10 50 MG tablet dispersible, Take by mouth daily , Disp: , Rfl:   •  hydrochlorothiazide (HYDRODIURIL) 12.5 MG tablet, Take 12.5 mg by mouth daily Indications: as needed, Disp: , Rfl:   •  isosorbide mononitrate (IMDUR) 120 MG 24 hr tablet, Take 120 mg by mouth Daily., Disp: , Rfl:   •  LORazepam (ATIVAN) 0.5 MG tablet, Take 1 tablet by mouth 3 (Three) Times a Day As Needed for Anxiety., Disp: 90 tablet, Rfl: 0  •  metoprolol tartrate (LOPRESSOR) 25 MG tablet, Take 25 mg by mouth 2 times daily Indications: Pt unaware of dosage, Disp: , Rfl:   •  nitroglycerin (NITROSTAT) 0.4 MG SL tablet, Place 1 tablet under the tongue Every 5 (Five) Minutes As Needed for Chest Pain. Take no more than 3 doses in 15 minutes., Disp: 30 tablet, Rfl: 2  •  oxyCODONE-acetaminophen (PERCOCET)  MG per tablet, Take 1 tablet by mouth., Disp: , Rfl:   •  potassium chloride (K-DUR,KLOR-CON) 10 MEQ CR tablet, Take 10 mEq by mouth., Disp: , Rfl:   •  REPATHA PUSHTRONEX SYSTEM 420 MG/3.5ML solution cartridge, , Disp: , Rfl:   •  minocycline (Minocin) 50 MG capsule, Take 1 capsule by mouth 2 (Two) Times a Day., Disp: 60 capsule, Rfl: 1  •  montelukast (SINGULAIR) 10 MG tablet, TAKE ONE TABLET AT BEDTIME, Disp: 30 tablet, Rfl: 2  Allergies   Allergen Reactions   • Ezetimibe-Simvastatin Other (See Comments)      "Myositis/ elevated CPK  Other reaction(s): myositis/elevated CPK  Other reaction(s): Other (See Comments)  Myositis/ elevated CPK   • Morphine    • Penicillins    • Phenergan [Promethazine Hcl]    • Hydrocodone Rash     Other reaction(s): RASH URTICARIA   • Propoxyphene Rash   • Shellfish-Derived Products Rash       Past Medical History:   Diagnosis Date   • Hyperlipidemia    • Hypertension      Past Surgical History:   Procedure Laterality Date   • CHOLECYSTECTOMY     • COLONOSCOPY     • CORONARY STENT PLACEMENT         Review of Systems   Constitutional: Negative.    HENT: Negative.    Eyes: Negative.    Respiratory: Negative.    Cardiovascular: Positive for chest pain.   Gastrointestinal: Negative.    Endocrine: Negative.    Genitourinary: Negative.    Musculoskeletal: Negative.    Skin: Negative.    Allergic/Immunologic: Negative.    Neurological: Negative.    Hematological: Negative.    Psychiatric/Behavioral: Negative.        Objective  /60   Pulse 92   Temp 98 °F (36.7 °C)   Resp 14   Ht 170.2 cm (67\")   Wt 65.3 kg (144 lb)   SpO2 98%   BMI 22.55 kg/m²   Physical Exam   Constitutional: He is oriented to person, place, and time. He appears well-developed and well-nourished.   HENT:   Head: Normocephalic and atraumatic.   Right Ear: External ear normal.   Left Ear: External ear normal.   Nose: Nose normal.   Mouth/Throat: Oropharynx is clear and moist.   Eyes: Pupils are equal, round, and reactive to light. Conjunctivae and EOM are normal.   Neck: Normal range of motion. Neck supple.   Cardiovascular: Normal rate, regular rhythm, normal heart sounds and intact distal pulses.   Pulmonary/Chest: Effort normal and breath sounds normal.   Abdominal: Soft. Bowel sounds are normal.   Musculoskeletal: Normal range of motion.   Neurological: He is alert and oriented to person, place, and time.   Skin: Skin is warm. Capillary refill takes less than 2 seconds.   Psychiatric: He has a normal mood and affect. " His behavior is normal. Judgment and thought content normal.   Nursing note and vitals reviewed.      Assessment/Plan   Junito was seen today for cyst.    Diagnoses and all orders for this visit:    Coronary artery disease involving native coronary artery of native heart without angina pectoris    Seizure (CMS/HCC)    Atherosclerosis of native coronary artery of native heart with angina pectoris (CMS/HCC)    Essential hypertension    Mixed hyperlipidemia    Sebaceous cyst  -     Comprehensive metabolic panel  -     Lipid Panel With / Chol / HDL Ratio    Abnormal finding of blood chemistry, unspecified   -     Lipid Panel With / Chol / HDL Ratio    Colon cancer screening  -     Ambulatory Referral to Gastroenterology    Other orders  -     minocycline (Minocin) 50 MG capsule; Take 1 capsule by mouth 2 (Two) Times a Day.      Sun sennsitiviy           Orders Placed This Encounter   Procedures   • Comprehensive metabolic panel   • Lipid Panel With / Chol / HDL Ratio   • Ambulatory Referral to Gastroenterology     Referral Priority:   Routine     Referral Type:   Consultation     Referral Reason:   Specialty Services Required     Requested Specialty:   Gastroenterology     Number of Visits Requested:   1       Follow up: 6 month(s)

## (undated) DEVICE — CONTAINER,SPECIMEN,OR STERILE,4OZ: Brand: MEDLINE

## (undated) DEVICE — RADIFOCUS GLIDEWIRE: Brand: GLIDEWIRE

## (undated) DEVICE — APPL CHLORAPREP HI/LITE 26ML ORNG

## (undated) DEVICE — SOLIDIFIER LIQUI LOC PLUS 2000CC

## (undated) DEVICE — Z INACTIVE USE 2660664 SOLUTION IRRIG 3000ML 0.9% SOD CHL USP UROMATIC PLAS CONT

## (undated) DEVICE — MASK,OXYGEN,MED CONC,ADLT,7' TUB, UC: Brand: PENDING

## (undated) DEVICE — ENDO KIT,LOURDES HOSPITAL: Brand: MEDLINE INDUSTRIES, INC.

## (undated) DEVICE — BAG DRNGE COMB PK

## (undated) DEVICE — MASK VENTILATOR MED AD SUPERNOVA ET

## (undated) DEVICE — CATHETER URET 5FR L70CM OPN END SGL LUMN INJ HUB FLEXIMA

## (undated) DEVICE — THE CHANNEL CLEANING BRUSH IS A NYLON FLEXI BRUSH ATTACHED TO A FLEXIBLE PLASTIC SHEATH DESIGNED TO SAFELY REMOVE DEBRIS FROM FLEXIBLE ENDOSCOPES.

## (undated) DEVICE — GLOVE SURG SZ 75 CRM LTX FREE POLYISOPRENE POLYMER BEAD ANTI

## (undated) DEVICE — BLD BEAVR MINI GEN SZ15 ORNG

## (undated) DEVICE — CATHETER URET 5FR L70CM TIP 8FR OPN END CONE TIP INJ HUB

## (undated) DEVICE — CURAVIEW LED LARYNSCP BLDE

## (undated) DEVICE — CATH F6INF PIG 145 110CM 6SH: Brand: INFINITI

## (undated) DEVICE — MAJOR DOUBLE BASIN W/GOWNS II: Brand: MEDLINE INDUSTRIES, INC.

## (undated) DEVICE — Z DISCONTINUED BY MEDLINE USE 2733855 TRAY SKIN SCRB VAG PVP-I

## (undated) DEVICE — FIBER LSR 200UM 2J 80HZ 60W D F L FOR LITHO MOSES

## (undated) DEVICE — GLOVE SURG SZ 65 L12IN FNGR THK79MIL GRN LTX FREE

## (undated) DEVICE — SEAL ENDO INSTR SELF SEAL UROLOGY

## (undated) DEVICE — ADHS LIQ MASTISOL 2/3ML

## (undated) DEVICE — PK ENT HD AND NK 30

## (undated) DEVICE — STRIP CLS WND CURAD MEDI/STRIP HYPOALLERG 0.25X4IN PK/10

## (undated) DEVICE — LARYNGOSCOPE BLDE MAC HNDL M SZ 35 ST CURAPLEX CURAVIEW LED

## (undated) DEVICE — CUFF,BP,DISP,1 TUBE,ADULT,HP: Brand: MEDLINE

## (undated) DEVICE — CABL BIPOL MEGADYNE 12FT DISP

## (undated) DEVICE — GUIDEWIRE ENDOSCP L150CM DIA0.035IN TIP 3CM PTFE NIT

## (undated) DEVICE — SURGICAL PROCEDURE PACK CYTOSCOPY

## (undated) DEVICE — PK CATH CARD 30 CA/4

## (undated) DEVICE — RADIFOCUS OPTITORQUE ANGIOGRAPHIC CATHETER: Brand: OPTITORQUE

## (undated) DEVICE — SUT ETHLN 6/0 PC3 18IN 1866G

## (undated) DEVICE — TUBE ET 7.5MM NSL ORAL BASIC CUF INTMED MURPHY EYE RADPQ

## (undated) DEVICE — CATH URETRL OPN/END 5F70CM

## (undated) DEVICE — GOWN,PREVENTION PLUS,XL,ST,24/CS: Brand: MEDLINE

## (undated) DEVICE — YANKAUER,BULB TIP WITH VENT: Brand: ARGYLE

## (undated) DEVICE — THE SINGLE USE ETRAP – POLYP TRAP IS USED FOR SUCTION RETRIEVAL OF ENDOSCOPICALLY REMOVED POLYPS.: Brand: ETRAP

## (undated) DEVICE — FRCP BX RADJAW4 NDL 2.8 240 STD OG

## (undated) DEVICE — SOL IRR NACL 0.9PCT BT 1000ML

## (undated) DEVICE — PAD,EYE,1-5/8X2 5/8,STERILE,LF,1/PK: Brand: MEDLINE

## (undated) DEVICE — Device: Brand: DEFENDO AIR/WATER/SUCTION AND BIOPSY VALVE

## (undated) DEVICE — SOLUTION IRRIG 3000ML 0.9% SOD CHL USP UROMATIC PLAS CONT

## (undated) DEVICE — BHS TURNOVER CYSTO: Brand: MEDLINE INDUSTRIES, INC.

## (undated) DEVICE — SUP ARMBRD ART/LINE BLU

## (undated) DEVICE — Device

## (undated) DEVICE — DRSNG SURESITE WNDW 4X4.5

## (undated) DEVICE — PROXIMATE RH ROTATING HEAD SKIN STAPLERS (35 WIDE) CONTAINS 35 STAINLESS STEEL STAPLES: Brand: PROXIMATE

## (undated) DEVICE — BAPTIST TURNOVER KIT: Brand: MEDLINE INDUSTRIES, INC.

## (undated) DEVICE — SNAR POLYP SENSATION MICRO OVL 13 240X40

## (undated) DEVICE — PK CYSTO 30

## (undated) DEVICE — STERILE LATEX POWDER FREE SURGICAL GLOVES WITH HYDROGEL COATING: Brand: PROTEXIS

## (undated) DEVICE — PAD, DEFIB, ADULT, RADIOTRANS, PHYSIO: Brand: MEDLINE

## (undated) DEVICE — NITINOL STONE RETRIEVAL BASKET: Brand: ZERO TIP

## (undated) DEVICE — 4-PORT MANIFOLD: Brand: NEPTUNE 2

## (undated) DEVICE — GLIDESHEATH SLENDER STAINLESS STEEL KIT: Brand: GLIDESHEATH SLENDER

## (undated) DEVICE — NITINOL STONE RETRIEVAL DEVICE: Brand: DAKOTA

## (undated) DEVICE — SENSR O2 OXIMAX FNGR A/ 18IN NONSTR

## (undated) DEVICE — CANN NASL ETCO2 LO/FLO A/

## (undated) DEVICE — Device: Brand: MEDEX

## (undated) DEVICE — GLOVE SURG SZ 7 L12IN FNGR THK79MIL GRN LTX FREE

## (undated) DEVICE — TR BAND RADIAL ARTERY COMPRESSION DEVICE: Brand: TR BAND

## (undated) DEVICE — CONMED SCOPE SAVER BITE BLOCK, 20X27 MM: Brand: SCOPE SAVER

## (undated) DEVICE — EVACUATOR URO BLDR W/ ADPT UROVAC

## (undated) DEVICE — SINGLE-USE DIGITAL FLEXIBLE URETEROSCOPE: Brand: LITHOVUE

## (undated) DEVICE — SUT ETHLN 5/0 FS2 18IN 661H

## (undated) DEVICE — GLV SURG BIOGEL M LTX PF 7 1/2

## (undated) DEVICE — GLV SURG BIOGEL LTX PF 8